# Patient Record
Sex: FEMALE | Race: WHITE | NOT HISPANIC OR LATINO | Employment: FULL TIME | URBAN - METROPOLITAN AREA
[De-identification: names, ages, dates, MRNs, and addresses within clinical notes are randomized per-mention and may not be internally consistent; named-entity substitution may affect disease eponyms.]

---

## 2017-01-06 RX ORDER — CODEINE SULFATE 30 MG/1
TABLET ORAL
Qty: 40 TABLET | Refills: 0 | Status: SHIPPED | OUTPATIENT
Start: 2017-01-06 | End: 2017-02-06 | Stop reason: SDUPTHER

## 2017-01-10 ENCOUNTER — TELEPHONE (OUTPATIENT)
Dept: SURGERY | Facility: CLINIC | Age: 37
End: 2017-01-10

## 2017-01-18 ENCOUNTER — PATIENT MESSAGE (OUTPATIENT)
Dept: SURGERY | Facility: CLINIC | Age: 37
End: 2017-01-18

## 2017-01-24 RX ORDER — HYDROCODONE BITARTRATE AND ACETAMINOPHEN 10; 325 MG/1; MG/1
TABLET ORAL
Qty: 90 TABLET | Refills: 0 | Status: SHIPPED | OUTPATIENT
Start: 2017-01-24 | End: 2017-02-22 | Stop reason: SDUPTHER

## 2017-01-25 ENCOUNTER — PATIENT MESSAGE (OUTPATIENT)
Dept: SURGERY | Facility: CLINIC | Age: 37
End: 2017-01-25

## 2017-01-25 RX ORDER — TINIDAZOLE 250 MG/1
250 TABLET ORAL
Qty: 14 TABLET | Refills: 0 | Status: SHIPPED | OUTPATIENT
Start: 2017-01-25 | End: 2018-01-18 | Stop reason: SDUPTHER

## 2017-02-06 RX ORDER — CIPROFLOXACIN 500 MG/1
TABLET ORAL
Qty: 14 TABLET | Refills: 1 | Status: SHIPPED | OUTPATIENT
Start: 2017-02-06 | End: 2017-03-06 | Stop reason: SDUPTHER

## 2017-02-06 RX ORDER — CODEINE SULFATE 30 MG/1
TABLET ORAL
Qty: 40 TABLET | Refills: 0 | Status: SHIPPED | OUTPATIENT
Start: 2017-02-06 | End: 2017-02-22 | Stop reason: SDUPTHER

## 2017-02-16 ENCOUNTER — OFFICE VISIT (OUTPATIENT)
Dept: INTERNAL MEDICINE | Facility: CLINIC | Age: 37
End: 2017-02-16
Payer: COMMERCIAL

## 2017-02-16 VITALS
HEART RATE: 80 BPM | TEMPERATURE: 99 F | RESPIRATION RATE: 16 BRPM | DIASTOLIC BLOOD PRESSURE: 72 MMHG | SYSTOLIC BLOOD PRESSURE: 132 MMHG | HEIGHT: 68 IN | WEIGHT: 143.5 LBS | BODY MASS INDEX: 21.75 KG/M2

## 2017-02-16 DIAGNOSIS — Z00.00 ANNUAL PHYSICAL EXAM: Primary | ICD-10-CM

## 2017-02-16 DIAGNOSIS — F41.1 GAD (GENERALIZED ANXIETY DISORDER): ICD-10-CM

## 2017-02-16 DIAGNOSIS — R53.83 FATIGUE, UNSPECIFIED TYPE: ICD-10-CM

## 2017-02-16 DIAGNOSIS — R63.4 WEIGHT LOSS: ICD-10-CM

## 2017-02-16 DIAGNOSIS — G47.00 INSOMNIA, UNSPECIFIED TYPE: ICD-10-CM

## 2017-02-16 PROCEDURE — 99385 PREV VISIT NEW AGE 18-39: CPT | Mod: S$GLB,,, | Performed by: INTERNAL MEDICINE

## 2017-02-16 PROCEDURE — 99999 PR PBB SHADOW E&M-EST. PATIENT-LVL III: CPT | Mod: PBBFAC,,, | Performed by: INTERNAL MEDICINE

## 2017-02-16 RX ORDER — SERTRALINE HYDROCHLORIDE 100 MG/1
200 TABLET, FILM COATED ORAL DAILY
Qty: 180 TABLET | Refills: 3 | Status: SHIPPED | OUTPATIENT
Start: 2017-02-16 | End: 2018-05-08 | Stop reason: ALTCHOICE

## 2017-02-16 RX ORDER — TRAZODONE HYDROCHLORIDE 50 MG/1
50 TABLET ORAL NIGHTLY
Qty: 30 TABLET | Refills: 11 | Status: SHIPPED | OUTPATIENT
Start: 2017-02-16 | End: 2017-04-19

## 2017-02-16 RX ORDER — SIMETHICONE 125 MG
125 TABLET,CHEWABLE ORAL EVERY 6 HOURS PRN
COMMUNITY
End: 2017-04-19

## 2017-02-16 RX ORDER — DIPHENOXYLATE HYDROCHLORIDE AND ATROPINE SULFATE 2.5; .025 MG/1; MG/1
1 TABLET ORAL 4 TIMES DAILY PRN
COMMUNITY
End: 2017-04-19 | Stop reason: SDUPTHER

## 2017-02-16 NOTE — PROGRESS NOTES
Subjective:       Patient ID: Nyasia Middleton is a 36 y.o. female.    Chief Complaint: Fatigue and Weight Loss    Patient is a 36 y.o.female who presents today for annual.      Cholesterol: (due now)  Vaccines: Influenza (yearly)   Gyn exam: dr. Burnett; pap smear up to date  Exercise: some  Diet: paleo  LMP: regular  EGD done last  Year was normal with dr. Gonzales. She used to take protonix but started having black stools.   Colonoscopy done in December showed an anal stricture.   She had an IV dye study was performed which was normal.         Past Medical History   Diagnosis Date    GERD (gastroesophageal reflux disease)     History of Clostridium difficile infection     History of ulcerative colitis      Past Surgical History   Procedure Laterality Date    Restorative proctocolectomy      Ileostomy closure       section, classic      Tonsillectomy       adenoids    Rectovaginal fistula repair      Tubal ligation       2016     Social History     Social History    Marital status:      Spouse name: N/A    Number of children: N/A    Years of education: N/A     Occupational History    Not on file.     Social History Main Topics    Smoking status: Former Smoker     Quit date: 2009    Smokeless tobacco: Never Used    Alcohol use No    Drug use: No    Sexual activity: Not Currently     Other Topics Concern    Not on file     Social History Narrative    No narrative on file     Review of patient's allergies indicates:   Allergen Reactions    Ativan [lorazepam] Anxiety     Ms. Middleton had no medications administered during this visit.    Review of Systems   Constitutional: Negative for appetite change, chills, diaphoresis, fatigue and fever.   HENT: Negative for congestion, dental problem, ear discharge, ear pain, hearing loss, postnasal drip, sinus pressure and sore throat.    Eyes: Negative for discharge, redness and itching.   Respiratory: Negative for cough, chest tightness,  shortness of breath and wheezing.    Cardiovascular: Negative for chest pain, palpitations and leg swelling.   Gastrointestinal: Negative for abdominal pain, constipation, diarrhea, nausea and vomiting.   Endocrine: Negative for cold intolerance and heat intolerance.   Genitourinary: Negative for difficulty urinating, frequency, hematuria and urgency.   Musculoskeletal: Negative for arthralgias, back pain, gait problem, myalgias and neck pain.   Skin: Negative for color change and rash.   Neurological: Negative for dizziness, syncope and headaches.   Hematological: Negative for adenopathy.   Psychiatric/Behavioral: Negative for behavioral problems and sleep disturbance. The patient is nervous/anxious.        Objective:      Physical Exam   Constitutional: She is oriented to person, place, and time. She appears well-developed and well-nourished. No distress.   HENT:   Head: Normocephalic and atraumatic.   Right Ear: Tympanic membrane and external ear normal.   Left Ear: Tympanic membrane and external ear normal.   Nose: Nose normal. No mucosal edema or rhinorrhea.   Mouth/Throat: Uvula is midline, oropharynx is clear and moist and mucous membranes are normal. No oropharyngeal exudate, posterior oropharyngeal edema, posterior oropharyngeal erythema or tonsillar abscesses.   Eyes: Conjunctivae and EOM are normal. Pupils are equal, round, and reactive to light. Right eye exhibits no discharge. Left eye exhibits no discharge. No scleral icterus.   Neck: Normal range of motion. Neck supple. No JVD present. No thyromegaly present.   Cardiovascular: Normal rate, regular rhythm, normal heart sounds and intact distal pulses.  Exam reveals no gallop and no friction rub.    No murmur heard.  Pulmonary/Chest: Effort normal and breath sounds normal. No stridor. No respiratory distress. She has no wheezes. She has no rales. She exhibits no tenderness.   Abdominal: Soft. Bowel sounds are normal. She exhibits no distension. There is  no tenderness. There is no rebound.   Musculoskeletal: Normal range of motion. She exhibits no edema or tenderness.   Lymphadenopathy:     She has no cervical adenopathy.   Neurological: She is alert and oriented to person, place, and time.   Skin: Skin is warm. No rash noted. She is not diaphoretic. No erythema.   Psychiatric: She has a normal mood and affect. Her behavior is normal.   Nursing note and vitals reviewed.      Assessment and Plan:       1. Annual physical exam  - CBC auto differential; Future  - Ferritin; Future  - Iron and TIBC; Future  - Lipid panel; Future  - TSH; Future  - T4, free; Future  - Thyroglobulin; Future  - THYROID PEROXIDASE ANTIBODY; Future    2. Weight loss  - check tsh    3. Fatigue, unspecified type  - check labs    4. Insomnia, unspecified type  - trial of trazodone    5. MARION (generalized anxiety disorder)  - increase zoloft to 200 mg daily        No Follow-up on file.

## 2017-02-22 RX ORDER — HYDROCODONE BITARTRATE AND ACETAMINOPHEN 10; 325 MG/1; MG/1
TABLET ORAL
Qty: 90 TABLET | Refills: 0 | Status: SHIPPED | OUTPATIENT
Start: 2017-02-22 | End: 2017-04-07 | Stop reason: SDUPTHER

## 2017-02-22 RX ORDER — CODEINE SULFATE 30 MG/1
TABLET ORAL
Qty: 40 TABLET | Refills: 0 | Status: SHIPPED | OUTPATIENT
Start: 2017-02-22 | End: 2017-04-07 | Stop reason: SDUPTHER

## 2017-03-06 RX ORDER — CIPROFLOXACIN 500 MG/1
TABLET ORAL
Qty: 14 TABLET | Refills: 1 | Status: SHIPPED | OUTPATIENT
Start: 2017-03-06 | End: 2017-04-19

## 2017-03-08 ENCOUNTER — PATIENT MESSAGE (OUTPATIENT)
Dept: INTERNAL MEDICINE | Facility: CLINIC | Age: 37
End: 2017-03-08

## 2017-03-09 ENCOUNTER — PATIENT MESSAGE (OUTPATIENT)
Dept: INTERNAL MEDICINE | Facility: CLINIC | Age: 37
End: 2017-03-09

## 2017-03-14 ENCOUNTER — LAB VISIT (OUTPATIENT)
Dept: LAB | Facility: HOSPITAL | Age: 37
End: 2017-03-14
Attending: INTERNAL MEDICINE
Payer: COMMERCIAL

## 2017-03-14 DIAGNOSIS — Z00.00 ANNUAL PHYSICAL EXAM: ICD-10-CM

## 2017-03-14 LAB
BASOPHILS # BLD AUTO: 0.05 K/UL
BASOPHILS NFR BLD: 0.4 %
CHOLEST/HDLC SERPL: 2.4 {RATIO}
DIFFERENTIAL METHOD: ABNORMAL
EOSINOPHIL # BLD AUTO: 0.6 K/UL
EOSINOPHIL NFR BLD: 4.5 %
ERYTHROCYTE [DISTWIDTH] IN BLOOD BY AUTOMATED COUNT: 14.5 %
FERRITIN SERPL-MCNC: 83 NG/ML
HCT VFR BLD AUTO: 38.2 %
HDL/CHOLESTEROL RATIO: 41.1 %
HDLC SERPL-MCNC: 146 MG/DL
HDLC SERPL-MCNC: 60 MG/DL
HGB BLD-MCNC: 12.2 G/DL
IRON SERPL-MCNC: 68 UG/DL
LDLC SERPL CALC-MCNC: 72 MG/DL
LYMPHOCYTES # BLD AUTO: 1.6 K/UL
LYMPHOCYTES NFR BLD: 13 %
MCH RBC QN AUTO: 28.7 PG
MCHC RBC AUTO-ENTMCNC: 31.9 %
MCV RBC AUTO: 90 FL
MONOCYTES # BLD AUTO: 1 K/UL
MONOCYTES NFR BLD: 8 %
NEUTROPHILS # BLD AUTO: 9 K/UL
NEUTROPHILS NFR BLD: 73.8 %
NONHDLC SERPL-MCNC: 86 MG/DL
PLATELET # BLD AUTO: 283 K/UL
PMV BLD AUTO: 9.9 FL
RBC # BLD AUTO: 4.25 M/UL
SATURATED IRON: 17 %
T4 FREE SERPL-MCNC: 1.01 NG/DL
THYROPEROXIDASE IGG SERPL-ACNC: <6 IU/ML
TOTAL IRON BINDING CAPACITY: 389 UG/DL
TRANSFERRIN SERPL-MCNC: 263 MG/DL
TRIGL SERPL-MCNC: 70 MG/DL
TSH SERPL DL<=0.005 MIU/L-ACNC: 2.19 UIU/ML
WBC # BLD AUTO: 12.21 K/UL

## 2017-03-14 PROCEDURE — 84439 ASSAY OF FREE THYROXINE: CPT

## 2017-03-14 PROCEDURE — 36415 COLL VENOUS BLD VENIPUNCTURE: CPT | Mod: PO

## 2017-03-14 PROCEDURE — 84432 ASSAY OF THYROGLOBULIN: CPT

## 2017-03-14 PROCEDURE — 86376 MICROSOMAL ANTIBODY EACH: CPT

## 2017-03-14 PROCEDURE — 84443 ASSAY THYROID STIM HORMONE: CPT

## 2017-03-14 PROCEDURE — 85025 COMPLETE CBC W/AUTO DIFF WBC: CPT

## 2017-03-14 PROCEDURE — 82728 ASSAY OF FERRITIN: CPT

## 2017-03-14 PROCEDURE — 83540 ASSAY OF IRON: CPT

## 2017-03-14 PROCEDURE — 84466 ASSAY OF TRANSFERRIN: CPT

## 2017-03-14 PROCEDURE — 80061 LIPID PANEL: CPT

## 2017-03-15 ENCOUNTER — PATIENT MESSAGE (OUTPATIENT)
Dept: INTERNAL MEDICINE | Facility: CLINIC | Age: 37
End: 2017-03-15

## 2017-03-15 DIAGNOSIS — K91.850 POUCHITIS: Primary | ICD-10-CM

## 2017-03-15 LAB
THRYOGLOBULIN INTERPRETATION: ABNORMAL
THYROGLOB AB SERPL-ACNC: <1.8 IU/ML
THYROGLOB SERPL-MCNC: 3.1 NG/ML

## 2017-03-20 ENCOUNTER — PATIENT MESSAGE (OUTPATIENT)
Dept: INTERNAL MEDICINE | Facility: CLINIC | Age: 37
End: 2017-03-20

## 2017-03-20 DIAGNOSIS — R68.82 LOW LIBIDO: Primary | ICD-10-CM

## 2017-03-21 ENCOUNTER — PATIENT MESSAGE (OUTPATIENT)
Dept: INTERNAL MEDICINE | Facility: CLINIC | Age: 37
End: 2017-03-21

## 2017-03-21 ENCOUNTER — LAB VISIT (OUTPATIENT)
Dept: LAB | Facility: HOSPITAL | Age: 37
End: 2017-03-21
Attending: INTERNAL MEDICINE
Payer: COMMERCIAL

## 2017-03-21 DIAGNOSIS — K91.850 POUCHITIS: ICD-10-CM

## 2017-03-21 DIAGNOSIS — R68.82 LOW LIBIDO: ICD-10-CM

## 2017-03-21 LAB
CRP SERPL-MCNC: 40.92 MG/L
ERYTHROCYTE [SEDIMENTATION RATE] IN BLOOD BY WESTERGREN METHOD: 43 MM/HR
ESTRADIOL SERPL-MCNC: 26 PG/ML
FSH SERPL-ACNC: 12.4 MIU/ML
LH SERPL-ACNC: 5.7 MIU/ML
TESTOST SERPL-MCNC: 19 NG/DL

## 2017-03-21 PROCEDURE — 83002 ASSAY OF GONADOTROPIN (LH): CPT

## 2017-03-21 PROCEDURE — 36415 COLL VENOUS BLD VENIPUNCTURE: CPT | Mod: PO

## 2017-03-21 PROCEDURE — 86141 C-REACTIVE PROTEIN HS: CPT

## 2017-03-21 PROCEDURE — 84403 ASSAY OF TOTAL TESTOSTERONE: CPT

## 2017-03-21 PROCEDURE — 83001 ASSAY OF GONADOTROPIN (FSH): CPT

## 2017-03-21 PROCEDURE — 85651 RBC SED RATE NONAUTOMATED: CPT

## 2017-03-21 PROCEDURE — 82670 ASSAY OF TOTAL ESTRADIOL: CPT

## 2017-03-22 ENCOUNTER — PATIENT MESSAGE (OUTPATIENT)
Dept: INTERNAL MEDICINE | Facility: CLINIC | Age: 37
End: 2017-03-22

## 2017-03-28 ENCOUNTER — PATIENT MESSAGE (OUTPATIENT)
Dept: INTERNAL MEDICINE | Facility: CLINIC | Age: 37
End: 2017-03-28

## 2017-03-28 RX ORDER — BUPROPION HYDROCHLORIDE 150 MG/1
150 TABLET ORAL DAILY
Qty: 30 TABLET | Refills: 11 | Status: SHIPPED | OUTPATIENT
Start: 2017-03-28 | End: 2017-04-26 | Stop reason: SDUPTHER

## 2017-04-07 RX ORDER — DIPHENOXYLATE HYDROCHLORIDE AND ATROPINE SULFATE 2.5; .025 MG/1; MG/1
TABLET ORAL
Qty: 200 TABLET | Refills: 3 | Status: SHIPPED | OUTPATIENT
Start: 2017-04-07 | End: 2017-11-01 | Stop reason: SDUPTHER

## 2017-04-07 RX ORDER — CODEINE SULFATE 30 MG/1
TABLET ORAL
Qty: 40 TABLET | Refills: 0 | Status: SHIPPED | OUTPATIENT
Start: 2017-04-07 | End: 2017-05-01 | Stop reason: SDUPTHER

## 2017-04-07 RX ORDER — HYDROCODONE BITARTRATE AND ACETAMINOPHEN 10; 325 MG/1; MG/1
TABLET ORAL
Qty: 90 TABLET | Refills: 0 | Status: SHIPPED | OUTPATIENT
Start: 2017-04-07 | End: 2017-05-22 | Stop reason: SDUPTHER

## 2017-04-11 ENCOUNTER — PATIENT MESSAGE (OUTPATIENT)
Dept: INTERNAL MEDICINE | Facility: CLINIC | Age: 37
End: 2017-04-11

## 2017-04-19 ENCOUNTER — OFFICE VISIT (OUTPATIENT)
Dept: INTERNAL MEDICINE | Facility: CLINIC | Age: 37
End: 2017-04-19
Payer: COMMERCIAL

## 2017-04-19 ENCOUNTER — OFFICE VISIT (OUTPATIENT)
Dept: SURGERY | Facility: CLINIC | Age: 37
End: 2017-04-19
Payer: COMMERCIAL

## 2017-04-19 ENCOUNTER — HOSPITAL ENCOUNTER (OUTPATIENT)
Dept: RADIOLOGY | Facility: HOSPITAL | Age: 37
Discharge: HOME | End: 2017-04-19
Attending: INTERNAL MEDICINE
Payer: COMMERCIAL

## 2017-04-19 VITALS
WEIGHT: 154.13 LBS | TEMPERATURE: 98 F | DIASTOLIC BLOOD PRESSURE: 60 MMHG | RESPIRATION RATE: 16 BRPM | SYSTOLIC BLOOD PRESSURE: 105 MMHG | HEART RATE: 83 BPM | HEIGHT: 68 IN | BODY MASS INDEX: 23.36 KG/M2

## 2017-04-19 VITALS
HEART RATE: 85 BPM | DIASTOLIC BLOOD PRESSURE: 67 MMHG | BODY MASS INDEX: 23.36 KG/M2 | WEIGHT: 153.69 LBS | SYSTOLIC BLOOD PRESSURE: 119 MMHG

## 2017-04-19 DIAGNOSIS — M79.642 BILATERAL HAND PAIN: ICD-10-CM

## 2017-04-19 DIAGNOSIS — M25.462 BILATERAL KNEE SWELLING: ICD-10-CM

## 2017-04-19 DIAGNOSIS — M25.50 ARTHRALGIA, UNSPECIFIED JOINT: Primary | ICD-10-CM

## 2017-04-19 DIAGNOSIS — M79.641 BILATERAL HAND PAIN: ICD-10-CM

## 2017-04-19 DIAGNOSIS — Z43.4 ATTENTION TO ARTIFICIAL OPENING OF DIGESTIVE TRACT: Primary | ICD-10-CM

## 2017-04-19 DIAGNOSIS — M25.511 ACUTE PAIN OF RIGHT SHOULDER: ICD-10-CM

## 2017-04-19 DIAGNOSIS — M25.461 BILATERAL KNEE SWELLING: ICD-10-CM

## 2017-04-19 DIAGNOSIS — M79.10 MYALGIA: ICD-10-CM

## 2017-04-19 PROCEDURE — 73030 X-RAY EXAM OF SHOULDER: CPT | Mod: 26,RT,, | Performed by: RADIOLOGY

## 2017-04-19 PROCEDURE — 99999 PR PBB SHADOW E&M-EST. PATIENT-LVL III: CPT | Mod: PBBFAC,,, | Performed by: INTERNAL MEDICINE

## 2017-04-19 PROCEDURE — 99214 OFFICE O/P EST MOD 30 MIN: CPT | Mod: S$GLB,,, | Performed by: INTERNAL MEDICINE

## 2017-04-19 PROCEDURE — 1160F RVW MEDS BY RX/DR IN RCRD: CPT | Mod: S$GLB,,, | Performed by: INTERNAL MEDICINE

## 2017-04-19 PROCEDURE — 99999 PR PBB SHADOW E&M-EST. PATIENT-LVL III: CPT | Mod: PBBFAC,,, | Performed by: COLON & RECTAL SURGERY

## 2017-04-19 PROCEDURE — 1160F RVW MEDS BY RX/DR IN RCRD: CPT | Mod: S$GLB,,, | Performed by: COLON & RECTAL SURGERY

## 2017-04-19 PROCEDURE — 73030 X-RAY EXAM OF SHOULDER: CPT | Mod: TC,PO,RT

## 2017-04-19 PROCEDURE — 99213 OFFICE O/P EST LOW 20 MIN: CPT | Mod: S$GLB,,, | Performed by: COLON & RECTAL SURGERY

## 2017-04-19 PROCEDURE — 73130 X-RAY EXAM OF HAND: CPT | Mod: 50,TC,PO

## 2017-04-19 PROCEDURE — 73130 X-RAY EXAM OF HAND: CPT | Mod: 26,50,, | Performed by: RADIOLOGY

## 2017-04-19 RX ORDER — TRANEXAMIC ACID 650 MG/1
TABLET ORAL
COMMUNITY
Start: 2017-04-14 | End: 2017-05-23

## 2017-04-19 RX ORDER — SULFAMETHOXAZOLE AND TRIMETHOPRIM 800; 160 MG/1; MG/1
TABLET ORAL
COMMUNITY
Start: 2017-04-18 | End: 2017-05-23 | Stop reason: SDUPTHER

## 2017-04-19 NOTE — PROGRESS NOTES
Subjective:       Patient ID: Nyasia Middleton is a 36 y.o. female.    Chief Complaint: Muscle Pain (also joint pain )    Patient is a 36 y.o.female who presents today for joint and muscle pain.      Knee pain: Monday it started. It is in both knee. She went to work and it was still hurting. She took ibuprofen and it helped her discomfort. Tuesday morning, knees are fine but now pain is in both hands/ knuckles. It always switchest joints it appears. She didn't take ibuprofen yesterday. She also gets pain in her right shoulder near AC joint; ongoing for a few months. She thinks she has a baker cyst in both knees.   Review of Systems   Constitutional: Negative for appetite change, chills, diaphoresis, fatigue and fever.   HENT: Negative for congestion, dental problem, ear discharge, ear pain, hearing loss, postnasal drip, sinus pressure and sore throat.    Eyes: Negative for discharge, redness and itching.   Respiratory: Negative for cough, chest tightness, shortness of breath and wheezing.    Cardiovascular: Negative for chest pain, palpitations and leg swelling.   Gastrointestinal: Negative for abdominal pain, constipation, diarrhea, nausea and vomiting.   Endocrine: Negative for cold intolerance and heat intolerance.   Genitourinary: Negative for difficulty urinating, frequency, hematuria and urgency.   Musculoskeletal: Positive for arthralgias and myalgias. Negative for back pain, gait problem and neck pain.   Skin: Negative for color change and rash.   Neurological: Negative for dizziness, syncope and headaches.   Hematological: Negative for adenopathy.   Psychiatric/Behavioral: Negative for behavioral problems and sleep disturbance. The patient is not nervous/anxious.        Objective:      Physical Exam   Constitutional: She is oriented to person, place, and time. She appears well-developed and well-nourished. No distress.   HENT:   Head: Normocephalic and atraumatic.   Right Ear: External ear normal.   Left  Ear: External ear normal.   Eyes: Conjunctivae and EOM are normal. Pupils are equal, round, and reactive to light. Right eye exhibits no discharge. Left eye exhibits no discharge. No scleral icterus.   Neck: Normal range of motion. Neck supple. No JVD present. No thyromegaly present.   Cardiovascular: Normal rate, regular rhythm, normal heart sounds and intact distal pulses.  Exam reveals no gallop and no friction rub.    No murmur heard.  Pulmonary/Chest: Effort normal and breath sounds normal. No stridor. No respiratory distress. She has no wheezes. She has no rales. She exhibits no tenderness.   Abdominal: Soft. Bowel sounds are normal. She exhibits no distension. There is no tenderness. There is no rebound.   Musculoskeletal: Normal range of motion. She exhibits no edema.        Right shoulder: She exhibits tenderness. She exhibits normal range of motion.        Right knee: She exhibits normal range of motion and no swelling.        Left knee: She exhibits normal range of motion and no swelling.        Right hand: She exhibits normal range of motion, no tenderness and no swelling.        Left hand: She exhibits normal range of motion, no tenderness and no swelling.   Lymphadenopathy:     She has no cervical adenopathy.   Neurological: She is alert and oriented to person, place, and time.   Skin: Skin is warm. No rash noted. She is not diaphoretic. No erythema.   Psychiatric: She has a normal mood and affect. Her behavior is normal.   Nursing note and vitals reviewed.      Assessment and Plan:       1. Arthralgia, unspecified joint  - Rheumatoid factor; Future  - Cyclic citrul peptide antibody, IgG; Future  - KEITH; Future  - Anti-DNA antibody, double-stranded; Future    2. Myalgia  - Rheumatoid factor; Future  - Cyclic citrul peptide antibody, IgG; Future  - KEITH; Future  - Anti-DNA antibody, double-stranded; Future    3. Bilateral knee swelling  - Cardiology Lab US Lower Extremity Veins Bilateral; Future    4.  Acute pain of right shoulder   - X-ray Shoulder 2 or More Views Right; Future    5. Bilateral hand pain    - X-Ray Hand 3 View Bilateral; Future    Notify clinic if symptoms change, worsen or do not improve        No Follow-up on file.

## 2017-04-19 NOTE — PROGRESS NOTES
"SUBJECTIVE:    History of Present Illness:   Patient is a 35 y.o. female for f/u of perineal fistula s/p IPAA      Has two small openings in perineum - puss drainage with pressure. On bactrim for UTI. Bowels reasonable    11/30/16 Pouchagram ; no fistula    10/20/14 : Perineal body repair with biologic (acell).  Currently with minimal pain and little drainage       7/28/14 : LIFT for RV fistula and fistulotomy for vaginal perineal fistula - some pain and small amount of drainage    12/13/13 - LIFT for left anterior fistula. Wound is better but open and mildly irritated.    Has a history of ulcerative colitis s/p previous restorative proctocolectomy with J pouch (Dr Boyd) who presented with complaints of perineal abscess s/p I&D with continued intermittent drainage. for f/u after lift on 7/12/23. Started on Flagyl last visit. Drainage had stopped and patient ois feeling well.   4/3/13 ; Fistulotomy doing OK good control minimal drainage, has "pustule" at vagina area. Treated with Bactrim.   Has history of ulcerative colitis s/p previous restorative proctocolectomy with J pouch (Dr Boyd) who presented with complaints of perineal abscess s/p I&D with continued intermittent drainage.      Recently started on Lialda      PMH  Ulcerative colitis   C. Diff infection   PSH:   Resorative proctocolectomy with J pouch, diverting ileostomy   Ileostomy takedown   No family history on file.   History    Substance Use Topics      Smoking status:  Never Smoker      Smokeless tobacco:  Never Used      Alcohol Use:  No      Review of Systems   Constitutional: Negative for fever and chills.   HENT: Negative for sore throat.   Eyes: Negative for visual disturbance.   Respiratory: Negative for shortness of breath.   Cardiovascular: Negative for chest pain.   Gastrointestinal: Positive for diarrhea and rectal pain. Negative for nausea, vomiting, abdominal pain and constipation.   Genitourinary: Negative for difficulty " urinating.   Neurological: Negative for seizures and syncope.   Psychiatric/Behavioral: Negative for hallucinations and confusion.     OBJECTIVE:    Vital Signs (Most Recent)   Physical Exam   Constitutional: She is oriented to person, place, and time. She appears well-developed and well-nourished. No distress.   HENT:   Head: Normocephalic and atraumatic.   Eyes: Conjunctivae and EOM are normal.   Neck: Neck supple. No tracheal deviation present.   Cardiovascular: Normal rate.   Pulmonary/Chest: Effort normal. No respiratory distress.   Abdominal: Soft.   Genitourinary: Adult female  Rectal:  Perineal body Incisions healed except for 2  1 mm skin openings  No fistula felt with probe. Area treated with silver nitrate. No drainage with bimaual pressure  Rectal with mild stenosis      Neurological: She is alert and oriented to person, place, and time.   Skin: Skin is warm and dry.   Psychiatric: She has a normal mood and affect. Her behavior is normal. Judgment and thought content normal.     IMP: s/p fistula repair, IPAA, healed    PLAN:    RTC 1 yeaqr or PRN

## 2017-04-26 RX ORDER — BUPROPION HYDROCHLORIDE 150 MG/1
TABLET ORAL
Qty: 30 TABLET | Refills: 11 | Status: SHIPPED | OUTPATIENT
Start: 2017-04-26 | End: 2017-05-23 | Stop reason: DRUGHIGH

## 2017-04-27 ENCOUNTER — CLINICAL SUPPORT (OUTPATIENT)
Dept: CARDIOLOGY | Facility: CLINIC | Age: 37
End: 2017-04-27
Payer: COMMERCIAL

## 2017-04-27 DIAGNOSIS — M25.462 BILATERAL KNEE SWELLING: ICD-10-CM

## 2017-04-27 DIAGNOSIS — M25.461 BILATERAL KNEE SWELLING: ICD-10-CM

## 2017-04-27 PROCEDURE — 93970 EXTREMITY STUDY: CPT | Mod: S$GLB,,, | Performed by: INTERNAL MEDICINE

## 2017-05-01 RX ORDER — CODEINE SULFATE 30 MG/1
TABLET ORAL
Qty: 40 TABLET | Refills: 0 | Status: SHIPPED | OUTPATIENT
Start: 2017-05-01 | End: 2017-06-02 | Stop reason: SDUPTHER

## 2017-05-12 ENCOUNTER — PATIENT MESSAGE (OUTPATIENT)
Dept: INTERNAL MEDICINE | Facility: CLINIC | Age: 37
End: 2017-05-12

## 2017-05-22 ENCOUNTER — PATIENT MESSAGE (OUTPATIENT)
Dept: SURGERY | Facility: CLINIC | Age: 37
End: 2017-05-22

## 2017-05-22 ENCOUNTER — TELEPHONE (OUTPATIENT)
Dept: INTERNAL MEDICINE | Facility: CLINIC | Age: 37
End: 2017-05-22

## 2017-05-22 RX ORDER — HYDROCODONE BITARTRATE AND ACETAMINOPHEN 10; 325 MG/1; MG/1
TABLET ORAL
Qty: 90 TABLET | Refills: 0 | Status: SHIPPED | OUTPATIENT
Start: 2017-05-22 | End: 2017-06-23 | Stop reason: SDUPTHER

## 2017-05-23 ENCOUNTER — PATIENT MESSAGE (OUTPATIENT)
Dept: INTERNAL MEDICINE | Facility: CLINIC | Age: 37
End: 2017-05-23

## 2017-05-23 ENCOUNTER — OFFICE VISIT (OUTPATIENT)
Dept: INTERNAL MEDICINE | Facility: CLINIC | Age: 37
End: 2017-05-23
Payer: COMMERCIAL

## 2017-05-23 ENCOUNTER — LAB VISIT (OUTPATIENT)
Dept: LAB | Facility: HOSPITAL | Age: 37
End: 2017-05-23
Attending: INTERNAL MEDICINE
Payer: COMMERCIAL

## 2017-05-23 VITALS
HEIGHT: 68 IN | WEIGHT: 153.88 LBS | BODY MASS INDEX: 23.32 KG/M2 | TEMPERATURE: 98 F | HEART RATE: 78 BPM | SYSTOLIC BLOOD PRESSURE: 104 MMHG | RESPIRATION RATE: 16 BRPM | DIASTOLIC BLOOD PRESSURE: 62 MMHG

## 2017-05-23 DIAGNOSIS — M25.50 ARTHRALGIA, UNSPECIFIED JOINT: ICD-10-CM

## 2017-05-23 DIAGNOSIS — M25.50 ARTHRALGIA, UNSPECIFIED JOINT: Primary | ICD-10-CM

## 2017-05-23 DIAGNOSIS — F41.1 GAD (GENERALIZED ANXIETY DISORDER): ICD-10-CM

## 2017-05-23 DIAGNOSIS — M79.10 MYALGIA: ICD-10-CM

## 2017-05-23 LAB
CRP SERPL-MCNC: 126.4 MG/L
ERYTHROCYTE [SEDIMENTATION RATE] IN BLOOD BY WESTERGREN METHOD: 80 MM/HR
URATE SERPL-MCNC: 3.4 MG/DL

## 2017-05-23 PROCEDURE — 36415 COLL VENOUS BLD VENIPUNCTURE: CPT | Mod: PO

## 2017-05-23 PROCEDURE — 99214 OFFICE O/P EST MOD 30 MIN: CPT | Mod: S$GLB,,, | Performed by: INTERNAL MEDICINE

## 2017-05-23 PROCEDURE — 84550 ASSAY OF BLOOD/URIC ACID: CPT

## 2017-05-23 PROCEDURE — 85651 RBC SED RATE NONAUTOMATED: CPT

## 2017-05-23 PROCEDURE — 99999 PR PBB SHADOW E&M-EST. PATIENT-LVL III: CPT | Mod: PBBFAC,,, | Performed by: INTERNAL MEDICINE

## 2017-05-23 PROCEDURE — 86140 C-REACTIVE PROTEIN: CPT

## 2017-05-23 PROCEDURE — 1160F RVW MEDS BY RX/DR IN RCRD: CPT | Mod: S$GLB,,, | Performed by: INTERNAL MEDICINE

## 2017-05-23 RX ORDER — CYCLOBENZAPRINE HCL 5 MG
5 TABLET ORAL 3 TIMES DAILY PRN
Qty: 30 TABLET | Refills: 1 | Status: SHIPPED | OUTPATIENT
Start: 2017-05-23 | End: 2017-06-02

## 2017-05-23 RX ORDER — TRAMADOL HYDROCHLORIDE 50 MG/1
50 TABLET ORAL 2 TIMES DAILY PRN
Qty: 60 TABLET | Refills: 0 | Status: SHIPPED | OUTPATIENT
Start: 2017-05-23 | End: 2017-06-02

## 2017-05-23 RX ORDER — BUSPIRONE HYDROCHLORIDE 5 MG/1
5 TABLET ORAL 2 TIMES DAILY PRN
Qty: 60 TABLET | Refills: 11 | Status: SHIPPED | OUTPATIENT
Start: 2017-05-23 | End: 2018-05-08

## 2017-05-23 RX ORDER — ONDANSETRON 8 MG/1
TABLET, ORALLY DISINTEGRATING ORAL
COMMUNITY
Start: 2017-04-26 | End: 2018-05-08

## 2017-05-23 RX ORDER — CIPROFLOXACIN 500 MG/1
TABLET ORAL
Qty: 14 TABLET | Refills: 1 | Status: SHIPPED | OUTPATIENT
Start: 2017-05-23 | End: 2017-06-30 | Stop reason: SDUPTHER

## 2017-05-23 RX ORDER — BUPROPION HYDROCHLORIDE 300 MG/1
300 TABLET ORAL DAILY
Qty: 90 TABLET | Refills: 3 | Status: SHIPPED | OUTPATIENT
Start: 2017-05-23 | End: 2018-05-08

## 2017-05-23 NOTE — PROGRESS NOTES
Subjective:       Patient ID: Nyasia Middleton is a 36 y.o. female.    Chief Complaint: Anxiety and Pain (intermittent joint pain x 2 months )    Patient is a 36 y.o.female who presents today for joint pain and anxiety.      Pain is always in her knees; left worse than right. Pain is in her right ankle. She has pain in her left second digit. Pain is in her right wrist and both clavicles. Pain is in her right shoulder. Pain is also in the left big toe and right fourth toe. She has been running low grade fever since Sunday, 100.4. Her temp last night was 99.9.     Her anxiety is not well controlled. She is taking wellbutrin and zoloft daily.    Review of Systems   Constitutional: Positive for activity change. Negative for unexpected weight change.   HENT: Negative for hearing loss, rhinorrhea and trouble swallowing.    Eyes: Negative for discharge and visual disturbance.   Respiratory: Negative for chest tightness and wheezing.    Cardiovascular: Negative for chest pain and palpitations.   Gastrointestinal: Positive for blood in stool. Negative for constipation, diarrhea and vomiting.   Endocrine: Positive for polyuria. Negative for polydipsia.   Genitourinary: Positive for difficulty urinating. Negative for dysuria and hematuria.   Musculoskeletal: Positive for arthralgias, joint swelling and myalgias.   Neurological: Positive for weakness and headaches.   Psychiatric/Behavioral: Positive for dysphoric mood. Negative for confusion.       Objective:      Physical Exam   Constitutional: She is oriented to person, place, and time. She appears well-developed and well-nourished. No distress.   HENT:   Head: Normocephalic and atraumatic.   Right Ear: External ear normal.   Left Ear: External ear normal.   Nose: Nose normal.   Mouth/Throat: Oropharynx is clear and moist. No oropharyngeal exudate.   Eyes: Conjunctivae and EOM are normal. Pupils are equal, round, and reactive to light. Right eye exhibits no discharge. Left  eye exhibits no discharge. No scleral icterus.   Neck: Normal range of motion. Neck supple. No JVD present. No thyromegaly present.   Cardiovascular: Normal rate, regular rhythm, normal heart sounds and intact distal pulses.  Exam reveals no gallop and no friction rub.    No murmur heard.  Pulmonary/Chest: Effort normal and breath sounds normal. No stridor. No respiratory distress. She has no wheezes. She has no rales. She exhibits no tenderness.   Abdominal: Soft. Bowel sounds are normal. She exhibits no distension. There is no tenderness. There is no rebound.   Musculoskeletal: Normal range of motion. She exhibits no edema or tenderness.   Lymphadenopathy:     She has no cervical adenopathy.   Neurological: She is alert and oriented to person, place, and time. No cranial nerve deficit.   Skin: Skin is warm and dry. No rash noted. She is not diaphoretic. No erythema.   Psychiatric: She has a normal mood and affect. Her behavior is normal.   Nursing note and vitals reviewed.      Assessment and Plan:       1. Arthralgia, unspecified joint  - rule out gout vs fibromyalgia  - Uric acid; Future  - Sedimentation rate, manual; Future  - C-reactive protein; Future  - Ambulatory referral to Rheumatology    2. Myalgia  - Uric acid; Future  - Sedimentation rate, manual; Future  - C-reactive protein; Future  - Ambulatory referral to Rheumatology    3. MARION (generalized anxiety disorder)  - continue zoloft  - increase wellbutrin to 300 mg daily  - add buspar 5 mg po bid prn anxiety    Notify clinic if symptoms change, worsen or do not improve          No Follow-up on file.

## 2017-05-24 ENCOUNTER — PATIENT MESSAGE (OUTPATIENT)
Dept: INTERNAL MEDICINE | Facility: CLINIC | Age: 37
End: 2017-05-24

## 2017-05-25 ENCOUNTER — OFFICE VISIT (OUTPATIENT)
Dept: SURGERY | Facility: CLINIC | Age: 37
End: 2017-05-25
Payer: COMMERCIAL

## 2017-05-25 ENCOUNTER — INITIAL CONSULT (OUTPATIENT)
Dept: RHEUMATOLOGY | Facility: CLINIC | Age: 37
End: 2017-05-25
Payer: COMMERCIAL

## 2017-05-25 VITALS
HEIGHT: 68 IN | HEART RATE: 77 BPM | DIASTOLIC BLOOD PRESSURE: 63 MMHG | WEIGHT: 155.88 LBS | SYSTOLIC BLOOD PRESSURE: 111 MMHG | BODY MASS INDEX: 23.63 KG/M2

## 2017-05-25 VITALS
DIASTOLIC BLOOD PRESSURE: 54 MMHG | WEIGHT: 155.88 LBS | HEIGHT: 68 IN | HEART RATE: 76 BPM | SYSTOLIC BLOOD PRESSURE: 116 MMHG | BODY MASS INDEX: 23.63 KG/M2

## 2017-05-25 DIAGNOSIS — M06.4: Primary | ICD-10-CM

## 2017-05-25 DIAGNOSIS — Z43.4 ATTENTION TO ARTIFICIAL OPENING OF DIGESTIVE TRACT: Primary | ICD-10-CM

## 2017-05-25 DIAGNOSIS — N36.0 PERINEAL SINUS: ICD-10-CM

## 2017-05-25 PROCEDURE — 99999 PR PBB SHADOW E&M-EST. PATIENT-LVL III: CPT | Mod: PBBFAC,,, | Performed by: COLON & RECTAL SURGERY

## 2017-05-25 PROCEDURE — 99999 PR PBB SHADOW E&M-EST. PATIENT-LVL III: CPT | Mod: PBBFAC,,, | Performed by: INTERNAL MEDICINE

## 2017-05-25 PROCEDURE — 99244 OFF/OP CNSLTJ NEW/EST MOD 40: CPT | Mod: S$GLB,,, | Performed by: INTERNAL MEDICINE

## 2017-05-25 PROCEDURE — 99213 OFFICE O/P EST LOW 20 MIN: CPT | Mod: S$GLB,,, | Performed by: COLON & RECTAL SURGERY

## 2017-05-25 RX ORDER — METHYLPREDNISOLONE 4 MG/1
TABLET ORAL
Qty: 21 TABLET | Refills: 0 | Status: SHIPPED | OUTPATIENT
Start: 2017-05-25 | End: 2017-06-05 | Stop reason: SDUPTHER

## 2017-05-25 NOTE — LETTER
May 26, 2017      Bisi Tolbert, DO  [address] [facility] [place] [address]           [facility] - Rheumatology  [address]  [place] [address]  Phone: [phone]  Fax: [phone]          Patient: Nyasia Middleton   MR Number: [id number]   YOB: 1980   Date of Visit: 5/25/2017       Dear Dr. Bisi Tolbert:    Thank you for referring Nyasia Middleton to me for evaluation. Attached you will find relevant portions of my assessment and plan of care.    If you have questions, please do not hesitate to call me. I look forward to following Nyasia Middleton along with you.    Sincerely,    Harris Rivera MD    Enclosure  CC:  No Recipients    If you would like to receive this communication electronically, please contact [email or web address] or [phone] to request more information on [employer] Link access.    For providers and/or their staff who would like to refer a patient to [facility], please contact us through our one-stop-shop provider referral line, [facility], at [phone].    If you feel you have received this communication in error or would no longer like to receive these types of communications, please e-mail [email or web address]

## 2017-05-25 NOTE — LETTER
May 25, 2017      Bisi Tolbert, DO  [address] [facility]  [place] [address]           [facility]  [address]  [facility] [address]  Phone: [phone]          Patient: Nyasia Middleton   MR Number: [id number]   YOB: 1980   Date of Visit: 5/25/2017       Dear Dr. Bisi Tolbert:    Thank you for referring Nyasia Middleton to me for evaluation. Attached you will find relevant portions of my assessment and plan of care.    If you have questions, please do not hesitate to call me. I look forward to following Nyasia Middleton along with you.    Sincerely,    Jairo Peralta MD    Enclosure  CC:  No Recipients    If you would like to receive this communication electronically, please contact [email or web address] or [phone] to request more information on [facility] Link access.    For providers and/or their staff who would like to refer a patient to [facility], please contact us through our one-stop-shop provider referral line, [facility], at [phone].    If you feel you have received this communication in error or would no longer like to receive these types of communications, please e-mail [email or web address]

## 2017-05-25 NOTE — PROGRESS NOTES
"SUBJECTIVE:    History of Present Illness:   Patient is a 35 y.o. female for f/u of perineal fistula s/p IPAA      Has two small openings in perineum - puss drainage with pressure.      11/30/16 Pouchagram ; no fistula    10/20/14 : Perineal body repair with biologic (acell).  Currently with minimal pain and little drainage       7/28/14 : LIFT for RV fistula and fistulotomy for vaginal perineal fistula - some pain and small amount of drainage    12/13/13 - LIFT for left anterior fistula. Wound is better but open and mildly irritated.    Has a history of ulcerative colitis s/p previous restorative proctocolectomy with J pouch (Dr Boyd) who presented with complaints of perineal abscess s/p I&D with continued intermittent drainage. for f/u after lift on 7/12/23. Started on Flagyl last visit. Drainage had stopped and patient ois feeling well.   4/3/13 ; Fistulotomy doing OK good control minimal drainage, has "pustule" at vagina area. Treated with Bactrim.   Has history of ulcerative colitis s/p previous restorative proctocolectomy with J pouch (Dr Boyd) who presented with complaints of perineal abscess s/p I&D with continued intermittent drainage.      Recently started on Lialda      PMH  Ulcerative colitis   C. Diff infection   PSH:   Resorative proctocolectomy with J pouch, diverting ileostomy   Ileostomy takedown   No family history on file.   History    Substance Use Topics      Smoking status:  Never Smoker      Smokeless tobacco:  Never Used      Alcohol Use:  No      Review of Systems   Constitutional: Negative for fever and chills.   HENT: Negative for sore throat.   Eyes: Negative for visual disturbance.   Respiratory: Negative for shortness of breath.   Cardiovascular: Negative for chest pain.   Gastrointestinal: Positive for diarrhea and rectal pain. Negative for nausea, vomiting, abdominal pain and constipation.   Genitourinary: Negative for difficulty urinating.   Neurological: Negative for " seizures and syncope.   Psychiatric/Behavioral: Negative for hallucinations and confusion.     OBJECTIVE:    Vital Signs (Most Recent)   Physical Exam   Constitutional: She is oriented to person, place, and time. She appears well-developed and well-nourished. No distress.   HENT:   Head: Normocephalic and atraumatic.   Eyes: Conjunctivae and EOM are normal.   Neck: Neck supple. No tracheal deviation present.   Cardiovascular: Normal rate.   Pulmonary/Chest: Effort normal. No respiratory distress.   Abdominal: Soft.   Genitourinary: Adult female  Rectal:  Perineal body Incisions healed except for 2  1 mm skin openings    VAgina : 3-4 small olepings that may communicate to a vaginal septum cavity. Moderately tender    Rectal with mild stenosis      Neurological: She is alert and oriented to person, place, and time.   Skin: Skin is warm and dry.   Psychiatric: She has a normal mood and affect. Her behavior is normal. Judgment and thought content normal.     IMP: s/p fistula repair, IPAA, perineal body sinus    PLAN:    Discussed options. PAtient will call back to schedule an EUA and sinus excision.

## 2017-05-26 NOTE — PROGRESS NOTES
History of present illness: 36-year-old female developed ulcerative colitis in .  She presented at that time with bloody diarrhea.  She was initially treated with Remicade and prednisone.  When she did not respond she underwent a total colectomy with a J-pouch.  She still has episodes of pouchitis and his had a history of fistulous.  She may need further surgery.  She was on antibiotics for 6 weeks in January and February.  She saw Dr. back earlier today and is being restarted on Cipro.    In  she developed a migratory arthritis.  It lasted for several months.  The pain then went away until March.  It began about a month after she had stopped the antibiotics.  The pain was intermittent at first but is been constant since Saturday.  She has had some swelling in the hands and also in the knees.  She has had some increased warmth but no erythema accompanying the swelling.  She had no history of antecedent trauma.  She had had no increase in her bowel movements.   She has taken ibuprofen several times which has helped.  Heat and topical medications have given her some relief.  She started with a fever on  but it is not present today.  She has had no URI symptoms.    She has had no rashes.  She complains of occipital headaches.  She has no conjunctivitis or oral ulcers.  She has dryness of her mouth but not her eyes.  She has no Raynaud's phenomena, pleurisy, vaginal discharge or ulcers.  She has no numbness or tingling.  She does have a positive family history of IBD.  She is a  3 para 3    Systems review:  Gen.: Weight has decreased 30 pounds  GI: No history of liver problems  : No kidney or bladder problems    Physical examination:  Skin: No rashes  ENT: Adequate tears and saliva.  No conjunctival injection or oral ulcers.  Chest: Clear to auscultation and  Cardiac: No murmurs, gallops, rubs  Abdomen: No organomegaly or masses.  No tenderness to palpation  Extremities: No pedal  edema  Musculoskeletal: She has pain on flexion of the cervical spine but has full range of motion of the neck.  Shoulders have full range of motion without pain on range of motion.  She has no tenderness over the sternoclavicular joint although she has noted swelling there in the past.  Elbows and wrist are unremarkable.  She has synovitis of the left second MCP.  Other joints are unremarkable.  Lumbar spine has good range of motion without pain on range of motion.  Hips, knees, ankles are unremarkable.  She has soft tissue swelling of the left first and second MTP.  Laboratory: She has elevated ESR and CRP.  She has negative KEITH, CCP, rheumatoid factor.  Radiology: X-ray of the hands and shoulder are normal.    Assessment: She has a history of a migratory inflammatory arthritis.  It has settled down in 3 joints at the present time.  She has a history of ulcerative colitis.  She has recurrent pouchitis but no history of recurrence of the UC.  The arthritis is atypical for the arthritis associated with IBD.  It is actually more reminiscent of reactive arthritis associated with infection.  The increased inflammatory markers could be due to arthritis, colitis, or infection.    Plans:  1.  I am treating her with a Medrol Dosepak  2.  No further diagnostic testing at this time  3.  Return to see me in one month in less she flares in the interim.    Plans:  Answers for HPI/ROS submitted by the patient on 5/24/2017   fever: Yes  eye redness: No  swollen glands: No  headaches: Yes  shortness of breath: No  chest pain: No  trouble swallowing: No  heartburn: No  diarrhea: Yes  constipation: No  unexpected weight change: No  genital sore: No  dysuria: No  During the last 3 days, have you had a skin rash?: No  tingling: No  Bruises or bleeds easily: No  cough: No

## 2017-06-02 ENCOUNTER — PATIENT MESSAGE (OUTPATIENT)
Dept: SURGERY | Facility: CLINIC | Age: 37
End: 2017-06-02

## 2017-06-03 RX ORDER — CODEINE SULFATE 30 MG/1
TABLET ORAL
Qty: 40 TABLET | Refills: 0 | Status: SHIPPED | OUTPATIENT
Start: 2017-06-03 | End: 2017-08-14

## 2017-06-05 ENCOUNTER — PATIENT MESSAGE (OUTPATIENT)
Dept: RHEUMATOLOGY | Facility: CLINIC | Age: 37
End: 2017-06-05

## 2017-06-05 ENCOUNTER — PATIENT MESSAGE (OUTPATIENT)
Dept: INTERNAL MEDICINE | Facility: CLINIC | Age: 37
End: 2017-06-05

## 2017-06-05 DIAGNOSIS — N36.0 PERINEAL SINUS: Primary | ICD-10-CM

## 2017-06-05 RX ORDER — METHYLPREDNISOLONE 4 MG/1
TABLET ORAL
Qty: 21 TABLET | Refills: 0 | Status: SHIPPED | OUTPATIENT
Start: 2017-06-05 | End: 2017-07-07 | Stop reason: ALTCHOICE

## 2017-06-13 ENCOUNTER — PATIENT MESSAGE (OUTPATIENT)
Dept: RHEUMATOLOGY | Facility: CLINIC | Age: 37
End: 2017-06-13

## 2017-06-13 ENCOUNTER — PATIENT MESSAGE (OUTPATIENT)
Dept: SURGERY | Facility: CLINIC | Age: 37
End: 2017-06-13

## 2017-06-13 RX ORDER — PREDNISONE 5 MG/1
10 TABLET ORAL DAILY
Qty: 60 TABLET | Refills: 1 | Status: SHIPPED | OUTPATIENT
Start: 2017-06-13 | End: 2017-09-30 | Stop reason: SDUPTHER

## 2017-06-22 ENCOUNTER — OFFICE VISIT (OUTPATIENT)
Dept: SURGERY | Facility: CLINIC | Age: 37
End: 2017-06-22
Payer: COMMERCIAL

## 2017-06-22 VITALS
WEIGHT: 149.94 LBS | HEIGHT: 68 IN | SYSTOLIC BLOOD PRESSURE: 122 MMHG | BODY MASS INDEX: 22.72 KG/M2 | DIASTOLIC BLOOD PRESSURE: 67 MMHG | HEART RATE: 86 BPM

## 2017-06-22 DIAGNOSIS — Z43.4 ATTENTION TO ARTIFICIAL OPENING OF DIGESTIVE TRACT: ICD-10-CM

## 2017-06-22 DIAGNOSIS — K60.3 FISTULA, ANAL: Primary | ICD-10-CM

## 2017-06-22 DIAGNOSIS — K63.2 FISTULA OF INTESTINE, EXCLUDING RECTUM AND ANUS: ICD-10-CM

## 2017-06-22 DIAGNOSIS — K64.4 ANAL SKIN TAG: ICD-10-CM

## 2017-06-22 DIAGNOSIS — N36.0 PERINEAL SINUS: ICD-10-CM

## 2017-06-22 PROCEDURE — 99214 OFFICE O/P EST MOD 30 MIN: CPT | Mod: S$GLB,,, | Performed by: COLON & RECTAL SURGERY

## 2017-06-22 PROCEDURE — 99999 PR PBB SHADOW E&M-EST. PATIENT-LVL III: CPT | Mod: PBBFAC,,, | Performed by: COLON & RECTAL SURGERY

## 2017-06-22 RX ORDER — ACETAMINOPHEN 10 MG/ML
1000 INJECTION, SOLUTION INTRAVENOUS
Status: CANCELLED | OUTPATIENT
Start: 2017-06-22 | End: 2017-06-22

## 2017-06-22 RX ORDER — METRONIDAZOLE 500 MG/100ML
500 INJECTION, SOLUTION INTRAVENOUS
Status: CANCELLED | OUTPATIENT
Start: 2017-06-22

## 2017-06-22 RX ORDER — SODIUM CHLORIDE 9 MG/ML
INJECTION, SOLUTION INTRAVENOUS CONTINUOUS
Status: CANCELLED | OUTPATIENT
Start: 2017-06-22

## 2017-06-22 NOTE — H&P
"History of Present Illness:   Patient is a 36 y.o. female for f/u of perineal fistula s/p IPAA      Has two small openings in perineum - puss drainage with pressure. Desires EUA and sinus excision      11/30/16 Pouchagram ; no fistula    10/20/14 : Perineal body repair with biologic (acell).  Currently with minimal pain and little drainage       7/28/14 : LIFT for RV fistula and fistulotomy for vaginal perineal fistula - some pain and small amount of drainage    12/13/13 - LIFT for left anterior fistula. Wound is better but open and mildly irritated.    Has a history of ulcerative colitis s/p previous restorative proctocolectomy with J pouch (Dr Boyd) who presented with complaints of perineal abscess s/p I&D with continued intermittent drainage. for f/u after lift on 7/12/23. Started on Flagyl last visit. Drainage had stopped and patient ois feeling well.   4/3/13 ; Fistulotomy doing OK good control minimal drainage, has "pustule" at vagina area. Treated with Bactrim.   Has history of ulcerative colitis s/p previous restorative proctocolectomy with J pouch (Dr Boyd) who presented with complaints of perineal abscess s/p I&D with continued intermittent drainage.      Recently started on Lialda      [facility]  Ulcerative colitis   C. Diff infection   PSH:   Resorative proctocolectomy with J pouch, diverting ileostomy   Ileostomy takedown   No family history on file.   History    Substance Use Topics      Smoking status:  Never Smoker      Smokeless tobacco:  Never Used      Alcohol Use:  No      Review of Systems   Constitutional: Negative for fever and chills.   HENT: Negative for sore throat.   Eyes: Negative for visual disturbance.   Respiratory: Negative for shortness of breath.   Cardiovascular: Negative for chest pain.   Gastrointestinal: Positive for diarrhea and rectal pain. Negative for nausea, vomiting, abdominal pain and constipation.   Genitourinary: Negative for difficulty urinating.   Neurological: " Negative for seizures and syncope.   Psychiatric/Behavioral: Negative for hallucinations and confusion.     OBJECTIVE:    Vital Signs (Most Recent)   Physical Exam   Constitutional: She is oriented to person, place, and time. She appears well-developed and well-nourished. No distress.   HENT:   Head: Normocephalic and atraumatic.   Eyes: Conjunctivae and EOM are normal.   Neck: Neck supple. No tracheal deviation present.   Cardiovascular: Normal rate.   Pulmonary/Chest: Effort normal. No respiratory distress.   Abdominal: Soft.   Genitourinary: Adult female  Rectal:  Perineal body Incisions healed except for 2  1 mm skin openings  Skin tag  VAgina : 3-4 small olepings that may communicate to a vaginal septum cavity. Moderately tender    Rectal with mild stenosis      Neurological: She is alert and oriented to person, place, and time.   Skin: Skin is warm and dry.   Psychiatric: She has a normal mood and affect. Her behavior is normal. Judgment and thought content normal.     IMP: s/p fistula repair, IPAA, perineal body sinus    PLAN:    EUA and sinus and tag excision. Possible fistula repair.  I have explained the procedure including indications, alternatives, expected outcomes and potential complications. The patient appears to understand and gives informed consent. The patient is medically ready for surgery.

## 2017-06-22 NOTE — PROGRESS NOTES
"SUBJECTIVE:    History of Present Illness:   Patient is a 36 y.o. female for f/u of perineal fistula s/p IPAA      Has two small openings in perineum - puss drainage with pressure. Desires EUA and sinus excision      11/30/16 Pouchagram ; no fistula    10/20/14 : Perineal body repair with biologic (acell).  Currently with minimal pain and little drainage       7/28/14 : LIFT for RV fistula and fistulotomy for vaginal perineal fistula - some pain and small amount of drainage    12/13/13 - LIFT for left anterior fistula. Wound is better but open and mildly irritated.    Has a history of ulcerative colitis s/p previous restorative proctocolectomy with J pouch (Dr Boyd) who presented with complaints of perineal abscess s/p I&D with continued intermittent drainage. for f/u after lift on 7/12/23. Started on Flagyl last visit. Drainage had stopped and patient ois feeling well.   4/3/13 ; Fistulotomy doing OK good control minimal drainage, has "pustule" at vagina area. Treated with Bactrim.   Has history of ulcerative colitis s/p previous restorative proctocolectomy with J pouch (Dr Boyd) who presented with complaints of perineal abscess s/p I&D with continued intermittent drainage.      Recently started on Lialda      [facility]  Ulcerative colitis   C. Diff infection   PSH:   Resorative proctocolectomy with J pouch, diverting ileostomy   Ileostomy takedown   No family history on file.   History    Substance Use Topics      Smoking status:  Never Smoker      Smokeless tobacco:  Never Used      Alcohol Use:  No      Review of Systems   Constitutional: Negative for fever and chills.   HENT: Negative for sore throat.   Eyes: Negative for visual disturbance.   Respiratory: Negative for shortness of breath.   Cardiovascular: Negative for chest pain.   Gastrointestinal: Positive for diarrhea and rectal pain. Negative for nausea, vomiting, abdominal pain and constipation.   Genitourinary: Negative for difficulty urinating. "   Neurological: Negative for seizures and syncope.   Psychiatric/Behavioral: Negative for hallucinations and confusion.     OBJECTIVE:    Vital Signs (Most Recent)   Physical Exam   Constitutional: She is oriented to person, place, and time. She appears well-developed and well-nourished. No distress.   HENT:   Head: Normocephalic and atraumatic.   Eyes: Conjunctivae and EOM are normal.   Neck: Neck supple. No tracheal deviation present.   Cardiovascular: Normal rate.   Pulmonary/Chest: Effort normal. No respiratory distress.   Abdominal: Soft.   Genitourinary: Adult female  Rectal:  Perineal body Incisions healed except for 2  1 mm skin openings  Skin tag  VAgina : 3-4 small olepings that may communicate to a vaginal septum cavity. Moderately tender    Rectal with mild stenosis      Neurological: She is alert and oriented to person, place, and time.   Skin: Skin is warm and dry.   Psychiatric: She has a normal mood and affect. Her behavior is normal. Judgment and thought content normal.     IMP: s/p fistula repair, IPAA, perineal body sinus    PLAN:    EUA and sinus and tag excision. Possible fistula repair.  I have explained the procedure including indications, alternatives, expected outcomes and potential complications. The patient appears to understand and gives informed consent. The patient is medically ready for surgery.

## 2017-06-24 RX ORDER — HYDROCODONE BITARTRATE AND ACETAMINOPHEN 10; 325 MG/1; MG/1
TABLET ORAL
Qty: 90 TABLET | Refills: 0 | Status: SHIPPED | OUTPATIENT
Start: 2017-06-24 | End: 2017-08-02 | Stop reason: SDUPTHER

## 2017-06-26 ENCOUNTER — ANESTHESIA EVENT (OUTPATIENT)
Dept: SURGERY | Facility: HOSPITAL | Age: 37
End: 2017-06-26
Payer: COMMERCIAL

## 2017-06-26 ENCOUNTER — HOSPITAL ENCOUNTER (OUTPATIENT)
Facility: HOSPITAL | Age: 37
Discharge: HOME | End: 2017-06-26
Attending: COLON & RECTAL SURGERY | Admitting: COLON & RECTAL SURGERY
Payer: COMMERCIAL

## 2017-06-26 ENCOUNTER — ANESTHESIA (OUTPATIENT)
Dept: SURGERY | Facility: HOSPITAL | Age: 37
End: 2017-06-26
Payer: COMMERCIAL

## 2017-06-26 VITALS
BODY MASS INDEX: 22.58 KG/M2 | WEIGHT: 149 LBS | OXYGEN SATURATION: 99 % | RESPIRATION RATE: 18 BRPM | TEMPERATURE: 98 F | HEART RATE: 74 BPM | SYSTOLIC BLOOD PRESSURE: 130 MMHG | HEIGHT: 68 IN | DIASTOLIC BLOOD PRESSURE: 71 MMHG

## 2017-06-26 DIAGNOSIS — K64.4 ANAL SKIN TAG: ICD-10-CM

## 2017-06-26 DIAGNOSIS — K63.2 FISTULA OF INTESTINE, EXCLUDING RECTUM AND ANUS: ICD-10-CM

## 2017-06-26 DIAGNOSIS — K60.3 FISTULA, ANAL: ICD-10-CM

## 2017-06-26 DIAGNOSIS — Z43.4 ATTENTION TO ARTIFICIAL OPENING OF DIGESTIVE TRACT: ICD-10-CM

## 2017-06-26 DIAGNOSIS — N36.0 PERINEAL SINUS: Primary | ICD-10-CM

## 2017-06-26 PROCEDURE — 63600175 PHARM REV CODE 636 W HCPCS: Performed by: NURSE ANESTHETIST, CERTIFIED REGISTERED

## 2017-06-26 PROCEDURE — 88304 TISSUE EXAM BY PATHOLOGIST: CPT | Mod: 26,,, | Performed by: PATHOLOGY

## 2017-06-26 PROCEDURE — 71000039 HC RECOVERY, EACH ADD'L HOUR: Performed by: COLON & RECTAL SURGERY

## 2017-06-26 PROCEDURE — 25000003 PHARM REV CODE 250: Performed by: NURSE ANESTHETIST, CERTIFIED REGISTERED

## 2017-06-26 PROCEDURE — 63600175 PHARM REV CODE 636 W HCPCS

## 2017-06-26 PROCEDURE — 25000003 PHARM REV CODE 250: Performed by: COLON & RECTAL SURGERY

## 2017-06-26 PROCEDURE — 27000221 HC OXYGEN, UP TO 24 HOURS

## 2017-06-26 PROCEDURE — 71000015 HC POSTOP RECOV 1ST HR: Performed by: COLON & RECTAL SURGERY

## 2017-06-26 PROCEDURE — 25000003 PHARM REV CODE 250: Performed by: SURGERY

## 2017-06-26 PROCEDURE — 88304 TISSUE EXAM BY PATHOLOGIST: CPT | Performed by: PATHOLOGY

## 2017-06-26 PROCEDURE — 25000003 PHARM REV CODE 250: Performed by: NURSE PRACTITIONER

## 2017-06-26 PROCEDURE — 63600175 PHARM REV CODE 636 W HCPCS: Performed by: COLON & RECTAL SURGERY

## 2017-06-26 PROCEDURE — D9220A PRA ANESTHESIA: Mod: CRNA,,, | Performed by: NURSE ANESTHETIST, CERTIFIED REGISTERED

## 2017-06-26 PROCEDURE — 37000009 HC ANESTHESIA EA ADD 15 MINS: Performed by: COLON & RECTAL SURGERY

## 2017-06-26 PROCEDURE — 37000008 HC ANESTHESIA 1ST 15 MINUTES: Performed by: COLON & RECTAL SURGERY

## 2017-06-26 PROCEDURE — 63600175 PHARM REV CODE 636 W HCPCS: Performed by: ANESTHESIOLOGY

## 2017-06-26 PROCEDURE — 71000033 HC RECOVERY, INTIAL HOUR: Performed by: COLON & RECTAL SURGERY

## 2017-06-26 PROCEDURE — 36000706: Performed by: COLON & RECTAL SURGERY

## 2017-06-26 PROCEDURE — 36000707: Performed by: COLON & RECTAL SURGERY

## 2017-06-26 PROCEDURE — D9220A PRA ANESTHESIA: Mod: ANES,,, | Performed by: ANESTHESIOLOGY

## 2017-06-26 RX ORDER — BUPIVACAINE HYDROCHLORIDE 2.5 MG/ML
INJECTION, SOLUTION EPIDURAL; INFILTRATION; INTRACAUDAL
Status: DISCONTINUED | OUTPATIENT
Start: 2017-06-26 | End: 2017-06-26 | Stop reason: HOSPADM

## 2017-06-26 RX ORDER — OXYCODONE AND ACETAMINOPHEN 7.5; 325 MG/1; MG/1
1 TABLET ORAL EVERY 4 HOURS PRN
Status: DISCONTINUED | OUTPATIENT
Start: 2017-06-26 | End: 2017-06-26 | Stop reason: HOSPADM

## 2017-06-26 RX ORDER — HYDROMORPHONE HYDROCHLORIDE 1 MG/ML
INJECTION, SOLUTION INTRAMUSCULAR; INTRAVENOUS; SUBCUTANEOUS
Status: COMPLETED
Start: 2017-06-26 | End: 2017-06-26

## 2017-06-26 RX ORDER — HYDROMORPHONE HYDROCHLORIDE 1 MG/ML
0.2 INJECTION, SOLUTION INTRAMUSCULAR; INTRAVENOUS; SUBCUTANEOUS EVERY 5 MIN PRN
Status: DISCONTINUED | OUTPATIENT
Start: 2017-06-26 | End: 2017-06-26

## 2017-06-26 RX ORDER — FENTANYL CITRATE 50 UG/ML
INJECTION, SOLUTION INTRAMUSCULAR; INTRAVENOUS
Status: DISCONTINUED | OUTPATIENT
Start: 2017-06-26 | End: 2017-06-26

## 2017-06-26 RX ORDER — SODIUM CHLORIDE 9 MG/ML
INJECTION, SOLUTION INTRAVENOUS CONTINUOUS
Status: DISCONTINUED | OUTPATIENT
Start: 2017-06-26 | End: 2017-06-26 | Stop reason: HOSPADM

## 2017-06-26 RX ORDER — DOCUSATE SODIUM 100 MG/1
100 CAPSULE, LIQUID FILLED ORAL 2 TIMES DAILY
Qty: 30 CAPSULE | Refills: 11 | COMMUNITY
Start: 2017-06-26 | End: 2017-08-14

## 2017-06-26 RX ORDER — ONDANSETRON 2 MG/ML
INJECTION INTRAMUSCULAR; INTRAVENOUS
Status: DISCONTINUED | OUTPATIENT
Start: 2017-06-26 | End: 2017-06-26

## 2017-06-26 RX ORDER — DIPHENHYDRAMINE HYDROCHLORIDE 50 MG/ML
12.5 INJECTION INTRAMUSCULAR; INTRAVENOUS ONCE
Status: DISCONTINUED | OUTPATIENT
Start: 2017-06-26 | End: 2017-06-26 | Stop reason: HOSPADM

## 2017-06-26 RX ORDER — ACETAMINOPHEN 10 MG/ML
1000 INJECTION, SOLUTION INTRAVENOUS
Status: COMPLETED | OUTPATIENT
Start: 2017-06-26 | End: 2017-06-26

## 2017-06-26 RX ORDER — HYDROMORPHONE HYDROCHLORIDE 1 MG/ML
0.2 INJECTION, SOLUTION INTRAMUSCULAR; INTRAVENOUS; SUBCUTANEOUS EVERY 5 MIN PRN
Status: DISCONTINUED | OUTPATIENT
Start: 2017-06-26 | End: 2017-06-26 | Stop reason: HOSPADM

## 2017-06-26 RX ORDER — METRONIDAZOLE 500 MG/100ML
500 INJECTION, SOLUTION INTRAVENOUS
Status: COMPLETED | OUTPATIENT
Start: 2017-06-26 | End: 2017-06-26

## 2017-06-26 RX ORDER — MIDAZOLAM HYDROCHLORIDE 1 MG/ML
INJECTION, SOLUTION INTRAMUSCULAR; INTRAVENOUS
Status: DISCONTINUED | OUTPATIENT
Start: 2017-06-26 | End: 2017-06-26

## 2017-06-26 RX ORDER — LIDOCAINE HCL/PF 100 MG/5ML
SYRINGE (ML) INTRAVENOUS
Status: DISCONTINUED | OUTPATIENT
Start: 2017-06-26 | End: 2017-06-26

## 2017-06-26 RX ORDER — DIPHENHYDRAMINE HYDROCHLORIDE 50 MG/ML
12.5 INJECTION INTRAMUSCULAR; INTRAVENOUS ONCE
Status: COMPLETED | OUTPATIENT
Start: 2017-06-26 | End: 2017-06-26

## 2017-06-26 RX ORDER — PROPOFOL 10 MG/ML
VIAL (ML) INTRAVENOUS
Status: DISCONTINUED | OUTPATIENT
Start: 2017-06-26 | End: 2017-06-26

## 2017-06-26 RX ORDER — OXYCODONE AND ACETAMINOPHEN 5; 325 MG/1; MG/1
1-2 TABLET ORAL
Qty: 60 TABLET | Refills: 0 | Status: SHIPPED | OUTPATIENT
Start: 2017-06-26 | End: 2018-05-08

## 2017-06-26 RX ORDER — FENTANYL CITRATE 50 UG/ML
25 INJECTION, SOLUTION INTRAMUSCULAR; INTRAVENOUS EVERY 5 MIN PRN
Status: COMPLETED | OUTPATIENT
Start: 2017-06-26 | End: 2017-06-26

## 2017-06-26 RX ORDER — HYDROMORPHONE HYDROCHLORIDE 1 MG/ML
0.2 INJECTION, SOLUTION INTRAMUSCULAR; INTRAVENOUS; SUBCUTANEOUS EVERY 5 MIN PRN
Status: COMPLETED | OUTPATIENT
Start: 2017-06-26 | End: 2017-06-26

## 2017-06-26 RX ADMIN — PROPOFOL 200 MG: 10 INJECTION, EMULSION INTRAVENOUS at 08:06

## 2017-06-26 RX ADMIN — HYDROMORPHONE HYDROCHLORIDE 0.2 MG: 1 INJECTION, SOLUTION INTRAMUSCULAR; INTRAVENOUS; SUBCUTANEOUS at 10:06

## 2017-06-26 RX ADMIN — MIDAZOLAM HYDROCHLORIDE 2 MG: 1 INJECTION, SOLUTION INTRAMUSCULAR; INTRAVENOUS at 08:06

## 2017-06-26 RX ADMIN — CEFTRIAXONE 2 G: 2 INJECTION, SOLUTION INTRAVENOUS at 09:06

## 2017-06-26 RX ADMIN — FENTANYL CITRATE 25 MCG: 50 INJECTION INTRAMUSCULAR; INTRAVENOUS at 10:06

## 2017-06-26 RX ADMIN — ONDANSETRON 4 MG: 2 INJECTION INTRAMUSCULAR; INTRAVENOUS at 09:06

## 2017-06-26 RX ADMIN — HYDROMORPHONE HYDROCHLORIDE 0.2 MG: 1 INJECTION, SOLUTION INTRAMUSCULAR; INTRAVENOUS; SUBCUTANEOUS at 12:06

## 2017-06-26 RX ADMIN — HYDROMORPHONE HYDROCHLORIDE 0.2 MG: 1 INJECTION, SOLUTION INTRAMUSCULAR; INTRAVENOUS; SUBCUTANEOUS at 11:06

## 2017-06-26 RX ADMIN — FENTANYL CITRATE 25 MCG: 50 INJECTION INTRAMUSCULAR; INTRAVENOUS at 09:06

## 2017-06-26 RX ADMIN — DIPHENHYDRAMINE HYDROCHLORIDE 12.5 MG: 50 INJECTION, SOLUTION INTRAMUSCULAR; INTRAVENOUS at 01:06

## 2017-06-26 RX ADMIN — LIDOCAINE HYDROCHLORIDE 75 MG: 20 INJECTION, SOLUTION INTRAVENOUS at 08:06

## 2017-06-26 RX ADMIN — FENTANYL CITRATE 50 MCG: 50 INJECTION, SOLUTION INTRAMUSCULAR; INTRAVENOUS at 09:06

## 2017-06-26 RX ADMIN — PROPOFOL 50 MG: 10 INJECTION, EMULSION INTRAVENOUS at 09:06

## 2017-06-26 RX ADMIN — OXYCODONE HYDROCHLORIDE AND ACETAMINOPHEN 1 TABLET: 7.5; 325 TABLET ORAL at 09:06

## 2017-06-26 RX ADMIN — SODIUM CHLORIDE: 0.9 INJECTION, SOLUTION INTRAVENOUS at 07:06

## 2017-06-26 RX ADMIN — METRONIDAZOLE 500 MG: 500 SOLUTION INTRAVENOUS at 08:06

## 2017-06-26 RX ADMIN — ACETAMINOPHEN 1000 MG: 10 INJECTION, SOLUTION INTRAVENOUS at 07:06

## 2017-06-26 NOTE — BRIEF OP NOTE
[facility]  Colon and Rectal Surgery  Operative Note    SUMMARY     Date of Procedure: 6/26/2017     Procedure: Procedure(s) (LRB):  EXAM UNDER ANESTHESIA (N/A)  EXCISION-SKIN TAG (N/A)  LIGATION-INTERSPHINCTERIC FISTULA TRACT     Surgeon(s) and Role:     * Jairo Peralta MD - Primary     * Lucille Medina MD - Resident - Assisting        Pre-Operative Diagnosis: Perineal sinus [N36.0]    Post-Operative Diagnosis: Post-Op Diagnosis Codes:     * Perineal sinus [N36.0]    Anesthesia: General/MAC, Exparel 26perianal injection preperironeal injection      Technical Procedures Used:LIFT amd excision of skin tag left    Description of the Findings of the Procedure: Rectovaginal fistula with connection to perineal body    Significant Surgical Tasks Conducted by the Assistant(s), if Applicable: n/a    Complications: No    Estimated Blood Loss (EBL): *15 cc           Implants: * No implants in log *    Specimens:   Specimen (12h ago through future)    Start     Ordered    06/26/17 0935  Specimen to Pathology - Surgery  Once     Comments:  1. Skin tag (permanent, in formalin, to fridge)      06/26/17 0952           Condition: Good    Disposition: PACU - hemodynamically stable.    Attestation: I was present and scrubbed for the entire procedure.

## 2017-06-26 NOTE — H&P (VIEW-ONLY)
"History of Present Illness:   Patient is a 36 y.o. female for f/u of perineal fistula s/p IPAA      Has two small openings in perineum - puss drainage with pressure. Desires EUA and sinus excision      11/30/16 Pouchagram ; no fistula    10/20/14 : Perineal body repair with biologic (acell).  Currently with minimal pain and little drainage       7/28/14 : LIFT for RV fistula and fistulotomy for vaginal perineal fistula - some pain and small amount of drainage    12/13/13 - LIFT for left anterior fistula. Wound is better but open and mildly irritated.    Has a history of ulcerative colitis s/p previous restorative proctocolectomy with J pouch (Dr Boyd) who presented with complaints of perineal abscess s/p I&D with continued intermittent drainage. for f/u after lift on 7/12/23. Started on Flagyl last visit. Drainage had stopped and patient ois feeling well.   4/3/13 ; Fistulotomy doing OK good control minimal drainage, has "pustule" at vagina area. Treated with Bactrim.   Has history of ulcerative colitis s/p previous restorative proctocolectomy with J pouch (Dr Boyd) who presented with complaints of perineal abscess s/p I&D with continued intermittent drainage.      Recently started on Lialda      [facility]  Ulcerative colitis   C. Diff infection   PSH:   Resorative proctocolectomy with J pouch, diverting ileostomy   Ileostomy takedown   No family history on file.   History    Substance Use Topics      Smoking status:  Never Smoker      Smokeless tobacco:  Never Used      Alcohol Use:  No      Review of Systems   Constitutional: Negative for fever and chills.   HENT: Negative for sore throat.   Eyes: Negative for visual disturbance.   Respiratory: Negative for shortness of breath.   Cardiovascular: Negative for chest pain.   Gastrointestinal: Positive for diarrhea and rectal pain. Negative for nausea, vomiting, abdominal pain and constipation.   Genitourinary: Negative for difficulty urinating.   Neurological: " Negative for seizures and syncope.   Psychiatric/Behavioral: Negative for hallucinations and confusion.     OBJECTIVE:    Vital Signs (Most Recent)   Physical Exam   Constitutional: She is oriented to person, place, and time. She appears well-developed and well-nourished. No distress.   HENT:   Head: Normocephalic and atraumatic.   Eyes: Conjunctivae and EOM are normal.   Neck: Neck supple. No tracheal deviation present.   Cardiovascular: Normal rate.   Pulmonary/Chest: Effort normal. No respiratory distress.   Abdominal: Soft.   Genitourinary: Adult female  Rectal:  Perineal body Incisions healed except for 2  1 mm skin openings  Skin tag  VAgina : 3-4 small olepings that may communicate to a vaginal septum cavity. Moderately tender    Rectal with mild stenosis      Neurological: She is alert and oriented to person, place, and time.   Skin: Skin is warm and dry.   Psychiatric: She has a normal mood and affect. Her behavior is normal. Judgment and thought content normal.     IMP: s/p fistula repair, IPAA, perineal body sinus    PLAN:    EUA and sinus and tag excision. Possible fistula repair.  I have explained the procedure including indications, alternatives, expected outcomes and potential complications. The patient appears to understand and gives informed consent. The patient is medically ready for surgery.

## 2017-06-26 NOTE — ANESTHESIA POSTPROCEDURE EVALUATION
"Anesthesia Post Evaluation    Patient: Nyasia Middleton    Procedure(s) Performed: Procedure(s) (LRB):  EXAM UNDER ANESTHESIA (N/A)  EXCISION-SKIN TAG (N/A)  LIGATION-INTERSPHINCTERIC FISTULA TRACT    Final Anesthesia Type: general  Patient location during evaluation: PACU  Patient participation: Yes- Able to Participate  Level of consciousness: awake and alert  Post-procedure vital signs: reviewed and stable  Pain management: adequate  Airway patency: patent  PONV status at discharge: No PONV  Anesthetic complications: no      Cardiovascular status: blood pressure returned to baseline and hemodynamically stable  Respiratory status: unassisted, spontaneous ventilation and room air  Hydration status: euvolemic  Follow-up not needed.        Visit Vitals  /74   Pulse 64   Temp 36.6 °C (97.8 °F) (Oral)   Resp 15   Ht 5' 8" (1.727 m)   Wt 67.6 kg (149 lb)   SpO2 100%   Breastfeeding? No   BMI 22.66 kg/m²       Pain/George Score: Pain Assessment Performed: Yes (6/26/2017 12:36 PM)  Presence of Pain: complains of pain/discomfort (6/26/2017 12:36 PM)  Pain Rating Prior to Med Admin: 5 (6/26/2017 12:25 PM)  George Score: 10 (6/26/2017 12:36 PM)      "

## 2017-06-26 NOTE — DISCHARGE INSTRUCTIONS
Keep teal exparel band on until Friday, 6/30/17.    Ok to shower same day as surgery.  Ok to resume sexual activity once pain subsides.      Taking a Sitz Bath  A sitz bath is a type of therapy done by sitting in warm, shallow water. It can help soothe pain, itching, and other symptoms in the anal and genital areas. It can also help keep these areas clean if you cant take a bath or shower. Sitz is from the Turkmen word sitzen, which means to sit.  Why a sitz bath is done  A sitz bath helps clean and treat certain problems in the anal area, genital area, and the perineum. The perineum is the area between the anus and the vulva in women. In men, it is between the anus and the scrotum. A sitz bath helps increase blood flow to these areas and relax the muscles.  A sitz bath may be done to:  · Help ease pain and itching from hemorrhoids  · Help ease pain from an anal fissure  · Bathe and soothe the perineum after childbirth  · Clean and soothe the anal area or perineum after surgery  · Ease prostate pain during prostatitis or after a procedure  · Help ease period cramps  · Clean the anal and genital areas if you cant take a bath or shower  How a sitz bath is done  A sitz bath can be done in 2 ways: in a bathtub or using a sitz bath bowl.  In a bathtub  To take a sitz bath in a tub:  · Make sure your bathtub is clean. Fill a clean bathtub with 3 to 4 inches of warm water. In some cases, your healthcare provider may tell you to use cold water instead.  · Add salt or medicine to the water if advised by your healthcare provider.  · Gently lower yourself down into the bathtub and sit on the bottom of the tub. Dont get into the bath unless the water temperature is comfortable.  · Hold on to a railing. Or ask for help from a family member, friend, or caregiver if needed.  If you have a wound, the water may cause pain at first. But the pain should ease. Make sure the area that needs treating is under the water. You can bend  your knees up to help expose the area that needs contact with the water.  Using a sitz bath bowl  A sitz bath bowl is a special plastic container thats placed on a toilet. You can buy this in many drugstores and medical supply stores. To take a sitz bath this way:  · Lift the toilet lid and seat. Place a cleaned plastic sitz bath bowl on the rim of your toilet. Make sure the bowl is firmly in place and wont move around.  · Fill the sitz bath bowl with warm water from a pitcher or other container. The water should cover your perineum. Make sure the water temperature is comfortable.  · Add salt or medicine to the water if advised by your healthcare provider. Or follow the package instructions about how to fill the bowl. Some kits come with a plastic bag and tubing. This lets you stream water into the bowl and at the area of your body that needs treatment. The bowl may have a slot or hole in the back. This lets water flow out so it doesnt overflow onto the floor. If there is no hole, be careful not to fill the bowl too full.  · Gently sit down on the sitz bath bowl. Hold on to a railing. Or ask for help from a family member, friend, or caregiver if needed.  If you have a wound, the water may cause pain at first, but the pain should ease. Make sure the area that needs treating is under the water.  For any type of sitz bath:  1. Sit in the water for 10 to 20 minutes.  2. Add more warm water as needed to keep the water comfortable.  3. Get up slowly from the tub or toilet. You may feel lightheaded or dizzy. Hold on to a railing. Or ask for help from a family member, friend, or caregiver if needed.  4. Gently pat your anal area, perineum, and genitals dry with a clean towel. Dont rub the area.  5. Wash your hands. Put any ointment or cream on the area, if advised.  6. Wash the bathtub or sitz bath bowl with soap and water after each use.  7. Use a sitz bath 2 to 3 times a day, or as often as your healthcare provider  advises.  When to call your healthcare provider  Call your healthcare provider right away if you have any of these:  · Fever of 100.4°F (38°C) or higher, or as directed  · Redness, swelling, or fluid leaking from a cut (incision) that gets worse  · Pain that gets worse  · Symptoms that dont get better, or get worse  · New symptoms   Date Last Reviewed: 5/1/2016  © 0013-1222 [employer]. [address] [place] [address], [place] [address]. All rights reserved. This information is not intended as a substitute for professional medical care. Always follow your healthcare professional's instructions.

## 2017-06-26 NOTE — INTERVAL H&P NOTE
The patient has been examined and the H&P has been reviewed:    I concur with the findings and no changes have occurred since H&P was written.    Anesthesia/Surgery risks, benefits and alternative options discussed and understood by patient/family.          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

## 2017-06-26 NOTE — PROGRESS NOTES
Spoke to Dr. Medina for discharge instructions. She said no dressing other than dermabond in the perineum.  She said patient should be taking sitz baths.  She said okay to have sex once pain subsides in a few weeks.  She said okay to shower today. She said take stool softener for soft BMs.

## 2017-06-26 NOTE — ANESTHESIA RELEASE NOTE
"Anesthesia Release from PACU Note    Patient: Nyasia Middleton    Procedure(s) Performed: Procedure(s) (LRB):  EXAM UNDER ANESTHESIA (N/A)  EXCISION-SKIN TAG (N/A)  LIGATION-INTERSPHINCTERIC FISTULA TRACT    Anesthesia type: general    Post pain: Adequate analgesia    Post assessment: no apparent anesthetic complications, tolerated procedure well and no evidence of recall    Last Vitals:   Visit Vitals  /74   Pulse 64   Temp 36.6 °C (97.8 °F) (Oral)   Resp 15   Ht 5' 8" (1.727 m)   Wt 67.6 kg (149 lb)   SpO2 100%   Breastfeeding? No   BMI 22.66 kg/m²       Post vital signs: stable    Level of consciousness: awake and alert     Nausea/Vomiting: no nausea/no vomiting    Complications: none    Airway Patency: patent    Respiratory: unassisted, spontaneous ventilation, room air    Cardiovascular: stable and blood pressure at baseline    Hydration: euvolemic  "

## 2017-06-26 NOTE — OP NOTE
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   Skin tag present on right lateral position    Specimen:  Skin tag  '  Estimated Blood Loss: 30cc    Procedure in detail: after informed consent was reviewed and patient marked. SHe was brought back to the operating room, positioned in lithotomy position, and after adequate anesthesia, a time out was done appropriately to identify two patient identifiers, site, side and procedure to be performed, it was confirmed that preoperative antibiotics were administered.         There was a skin tag in the right lateral position which was excised at its base using cautery, and closed with running 4-0 monocryl.   We examined the vaginal vault where a sinus was noted posteriorly. Hydrogen peroxide confirmed a fistula tract between that and the rectum. The skin overlying the S probe was incised transversely about 2 cm, and tissue divided down to the level of the probe. We then isolated the fistula tract and this was suture ligated. The tissue was closed in 2 layers with interrupted vicryl figure of eights. As well running monocryl suture.  hemostasis was obtained, and exparel with marcaine was used for perianal block.   A figure of eight suture was also placed over the vaginal opening.  Incisions dressed with liquibond           Disposition: PACU - hemodynamically stable.           Condition: Good

## 2017-06-26 NOTE — ANESTHESIA PREPROCEDURE EVALUATION
06/26/2017  Nyasia Middleton is a 36 y.o., female.  36 year old female with pmh of ulcerative colitis with perineal fistula, and recent drainage presents for exam under anesthesia.  Anesthesia Evaluation    I have reviewed the Patient Summary Reports.        Review of Systems  Anesthesia Hx:   Denies Personal Hx of Anesthesia complications.   Hepatic/GI:   GERD        Physical Exam  General:  Well nourished    Airway/Jaw/Neck:  Airway Findings: Mouth Opening: Normal Tongue: Normal  General Airway Assessment: Adult  Mallampati: III  Improves to II with phonation.  TM Distance: Normal, at least 6 cm      Dental:  Dental Findings: In tact   Chest/Lungs:  Chest/Lungs Clear    Heart/Vascular:  Heart Findings: Normal            Anesthesia Plan  Type of Anesthesia, risks & benefits discussed:  Anesthesia Type:  general  Patient's Preference:   Intra-op Monitoring Plan:   Intra-op Monitoring Plan Comments:   Post Op Pain Control Plan:   Post Op Pain Control Plan Comments:   Induction:   IV  Beta Blocker:  Patient is not currently on a Beta-Blocker (No further documentation required).       Informed Consent: Patient understands risks and agrees with Anesthesia plan.  Questions answered. Anesthesia consent signed with patient.  ASA Score: 3     Day of Surgery Review of History & Physical:  There are no significant changes.          Ready For Surgery From Anesthesia Perspective.

## 2017-06-26 NOTE — TRANSFER OF CARE
"Anesthesia Transfer of Care Note    Patient: Nyasia Middleton    Procedure(s) Performed: Procedure(s) (LRB):  EXAM UNDER ANESTHESIA (N/A)  EXCISION-SKIN TAG (N/A)  LIGATION-INTERSPHINCTERIC FISTULA TRACT    Patient location: PACU    Anesthesia Type: general    Transport from OR: Transported from OR on 6-10 L/min O2 by face mask with adequate spontaneous ventilation    Post pain: adequate analgesia    Post assessment: no apparent anesthetic complications    Post vital signs: stable    Level of consciousness: sedated    Nausea/Vomiting: no nausea/vomiting    Complications: none    Transfer of care protocol was followed      Last vitals:   Visit Vitals  /81 (BP Location: Right arm, Patient Position: Lying, BP Method: Automatic)   Pulse 69   Temp 36.9 °C (98.4 °F) (Oral)   Resp 16   Ht 5' 8" (1.727 m)   Wt 67.6 kg (149 lb)   SpO2 99%   Breastfeeding? No   BMI 22.66 kg/m²     "

## 2017-06-26 NOTE — ANESTHESIA POSTPROCEDURE EVALUATION
"Anesthesia Post Evaluation    Patient: Nyasia Middleton    Procedure(s) Performed: Procedure(s) (LRB):  EXAM UNDER ANESTHESIA (N/A)  EXCISION-SKIN TAG (N/A)  LIGATION-INTERSPHINCTERIC FISTULA TRACT    Final Anesthesia Type: general  Patient location during evaluation: PACU  Patient participation: Yes- Able to Participate  Level of consciousness: awake and alert  Post-procedure vital signs: reviewed and stable  Pain management: adequate  Airway patency: patent  PONV status at discharge: No PONV  Anesthetic complications: no      Cardiovascular status: blood pressure returned to baseline and hemodynamically stable  Respiratory status: unassisted, spontaneous ventilation and room air  Hydration status: euvolemic  Follow-up not needed.        Visit Vitals  /70   Pulse 66   Temp 36.6 °C (97.8 °F) (Oral)   Resp 18   Ht 5' 8" (1.727 m)   Wt 67.6 kg (149 lb)   SpO2 100%   Breastfeeding? No   BMI 22.66 kg/m²       Pain/George Score: Pain Assessment Performed: Yes (6/26/2017 12:36 PM)  Presence of Pain: complains of pain/discomfort (6/26/2017 12:36 PM)  Pain Rating Prior to Med Admin: 5 (6/26/2017 12:25 PM)  George Score: 10 (6/26/2017 12:36 PM)      "

## 2017-06-28 ENCOUNTER — PATIENT MESSAGE (OUTPATIENT)
Dept: SURGERY | Facility: CLINIC | Age: 37
End: 2017-06-28

## 2017-06-28 NOTE — DISCHARGE SUMMARY
[facility]  Discharge Note  Short Stay    Admit Date: 6/26/2017    Discharge Date and Time: 6/26/2017  2:03 PM     Attending Physician: Fabian Cho Provider: Lucille Huff    Diagnoses:  Active Hospital Problems    Diagnosis  POA    *Perineal sinus [N36.0]  Yes    Fistula of intestine, excluding rectum and anus [K63.2]  Yes      Resolved Hospital Problems    Diagnosis Date Resolved POA   No resolved problems to display.       Discharged Condition: good    Hospital Course: Patient was admitted for an outpatient procedure and tolerated the procedure well with no complications.    Final Diagnoses: Same as principal problem.    Disposition: Home or Self Care    Follow up/Patient Instructions:    Medications:  Reconciled Home Medications:   Discharge Medication List as of 6/26/2017  1:40 PM      START taking these medications    Details   docusate sodium (COLACE) 100 MG capsule Take 1 capsule (100 mg total) by mouth 2 (two) times daily., Starting Mon 6/26/2017, OTC      oxycodone-acetaminophen (PERCOCET) 5-325 mg per tablet Take 1-2 tablets by mouth every 4 to 6 hours as needed., Starting Mon 6/26/2017, Print         CONTINUE these medications which have NOT CHANGED    Details   buPROPion (WELLBUTRIN XL) 300 MG 24 hr tablet Take 1 tablet (300 mg total) by mouth once daily., Starting Tue 5/23/2017, Normal      busPIRone (BUSPAR) 5 MG Tab Take 1 tablet (5 mg total) by mouth 2 (two) times daily as needed (anxiety)., Starting Tue 5/23/2017, Until Wed 5/23/2018, Normal      ciprofloxacin HCl (CIPRO) 500 MG tablet TAKE 1 TABLET BY MOUTH TWICE DAILY, Normal      codeine 30 MG Tab TAKE 1 TABLET BY MOUTH EVERY FOUR HOURS AS NEEDED, Normal      diphenoxylate-atropine 2.5-0.025 mg (LOMOTIL) 2.5-0.025 mg per tablet TAKE 1 TO 2 TABLETS BY MOUTH FOUR TIMES DAILY AS NEEDED FOR DIARRHEA, Normal      hydrocodone-acetaminophen 10-325mg (NORCO)  mg Tab TAKE 1 TABLET BY MOUTH EVERY 4 TO 6 HOURS AS NEEDED,  Normal      lactobacillus comb no.10 (PROBIOTIC) 20 billion cell Cap Take by mouth., Historical Med      ondansetron (ZOFRAN-ODT) 8 MG TbDL Starting Wed 4/26/2017, Historical Med      predniSONE (DELTASONE) 5 MG tablet Take 2 tablets (10 mg total) by mouth once daily., Starting Tue 6/13/2017, Until Thu 7/13/2017, Normal      PRENATAL VIT/IRON FUMARATE/FA (PRENATAL 1+1 ORAL) Take by mouth once daily., Until Discontinued, Historical Med      sertraline (ZOLOFT) 100 MG tablet Take 2 tablets (200 mg total) by mouth once daily., Starting 2/16/2017, Until Fri 2/16/18, Normal      vitamin D 1000 units Tab Take 185 mg by mouth once daily. Take 2000 IU daily, Until Discontinued, Historical Med      ferrous sulfate 325 (65 FE) MG EC tablet Take 325 mg by mouth 2 (two) times daily. , Until Discontinued, Historical Med      !! methylPREDNISolone (MEDROL DOSEPACK) 4 mg tablet use as directed, Normal      !! methylPREDNISolone (MEDROL DOSEPACK) 4 mg tablet use as directed, Normal       !! - Potential duplicate medications found. Please discuss with provider.          Discharge Procedure Orders  Diet general     Activity as tolerated     Shower on day dressing removed (No bath)     Remove dressing in 48 hours       Follow-up Information     Jairo Peralta MD In 2 weeks.    Specialty:  Colon and Rectal Surgery  Contact information:  [address] [facility] [address] [phone]                   Discharge Procedure Orders (must include Diet, Follow-up, Activity):    Discharge Procedure Orders (must include Diet, Follow-up, Activity)  Diet general     Activity as tolerated     Shower on day dressing removed (No bath)     Remove dressing in 48 hours

## 2017-06-30 RX ORDER — CIPROFLOXACIN 500 MG/1
TABLET ORAL
Qty: 14 TABLET | Refills: 1 | Status: SHIPPED | OUTPATIENT
Start: 2017-06-30 | End: 2017-08-02 | Stop reason: SDUPTHER

## 2017-07-07 ENCOUNTER — OFFICE VISIT (OUTPATIENT)
Dept: RHEUMATOLOGY | Facility: CLINIC | Age: 37
End: 2017-07-07
Payer: COMMERCIAL

## 2017-07-07 VITALS
BODY MASS INDEX: 23.84 KG/M2 | DIASTOLIC BLOOD PRESSURE: 69 MMHG | HEART RATE: 98 BPM | HEIGHT: 68 IN | SYSTOLIC BLOOD PRESSURE: 115 MMHG | WEIGHT: 157.31 LBS

## 2017-07-07 DIAGNOSIS — M13.0 POLYARTHRITIS: Primary | ICD-10-CM

## 2017-07-07 PROCEDURE — 99999 PR PBB SHADOW E&M-EST. PATIENT-LVL III: CPT | Mod: PBBFAC,,, | Performed by: INTERNAL MEDICINE

## 2017-07-07 PROCEDURE — 99213 OFFICE O/P EST LOW 20 MIN: CPT | Mod: S$GLB,,, | Performed by: INTERNAL MEDICINE

## 2017-07-09 NOTE — PROGRESS NOTES
History of present illness: 36-year-old female has a history of ulcerative colitis first diagnosed in 2010.  She received 2 doses of Remicade but because of lack of response she underwent a colectomy with a J-pouch.  She has not been on immunosuppressive therapy since then.  She has had recurrent fistulas and had recent fistula surgery.  She remains on antibiotics.  She is feeling better since she had the surgery.    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  The symptoms had recurred in March.  At that time she had swelling in the left second MCP and the first and second MTPs.  I treated her with a Medrol Dosepak.  This helped but the symptoms recurred on stopping the dose pack.  I treated her with a further dose pack and then placed her on prednisone 10 mg daily.  Her arthritis is been doing better since then.  She has had no other extracolonic manifestations.    Physical examination:  Musculoskeletal: Range of motion of all joints.  No synovitis.  No tender areas to palpation.  No arthritic deformities.    Assessment: She appears to have an inflammatory arthritis.  The question is whether this is a reactive arthritis due to her infections or whether it is arthritis of inflammatory bowel disease.    Plans: She is to decrease prednisone to 7.5 mg daily and if she is stable she may continue decreasing prednisone in the future.  If she has a flare with cutting the prednisone, I will consider adding another DMARD such as methotrexate.  I will see her in follow-up in 6 weeks.

## 2017-07-12 ENCOUNTER — OFFICE VISIT (OUTPATIENT)
Dept: SURGERY | Facility: CLINIC | Age: 37
End: 2017-07-12
Payer: COMMERCIAL

## 2017-07-12 VITALS
WEIGHT: 155.88 LBS | SYSTOLIC BLOOD PRESSURE: 138 MMHG | HEIGHT: 68 IN | BODY MASS INDEX: 23.63 KG/M2 | HEART RATE: 112 BPM | DIASTOLIC BLOOD PRESSURE: 67 MMHG

## 2017-07-12 DIAGNOSIS — K63.2 FISTULA OF INTESTINE, EXCLUDING RECTUM AND ANUS: ICD-10-CM

## 2017-07-12 DIAGNOSIS — Z43.4 ATTENTION TO ARTIFICIAL OPENING OF DIGESTIVE TRACT: Primary | ICD-10-CM

## 2017-07-12 PROCEDURE — 99999 PR PBB SHADOW E&M-EST. PATIENT-LVL III: CPT | Mod: PBBFAC,,, | Performed by: COLON & RECTAL SURGERY

## 2017-07-12 PROCEDURE — 99024 POSTOP FOLLOW-UP VISIT: CPT | Mod: S$GLB,,, | Performed by: COLON & RECTAL SURGERY

## 2017-07-12 NOTE — PROGRESS NOTES
"SUBJECTIVE:    History of Present Illness:   Patient is a 36 y.o. female for f/u of perineal fistula s/p IPAA         6/26/17 Advancement flap for pouchvaginal fistula. Has continued drainage and discomfort    11/30/16 Pouchagram ; no fistula    10/20/14 : Perineal body repair with biologic (acell).  Currently with minimal pain and little drainage       7/28/14 : LIFT for RV fistula and fistulotomy for vaginal perineal fistula - some pain and small amount of drainage    12/13/13 - LIFT for left anterior fistula. Wound is better but open and mildly irritated.    Has a history of ulcerative colitis s/p previous restorative proctocolectomy with J pouch (Dr Boyd) who presented with complaints of perineal abscess s/p I&D with continued intermittent drainage. for f/u after lift on 7/12/23. Started on Flagyl last visit. Drainage had stopped and patient ois feeling well.   4/3/13 ; Fistulotomy doing OK good control minimal drainage, has "pustule" at vagina area. Treated with Bactrim.   Has history of ulcerative colitis s/p previous restorative proctocolectomy with J pouch (Dr Boyd) who presented with complaints of perineal abscess s/p I&D with continued intermittent drainage.      Recently started on Lialda      [facility]  Ulcerative colitis   C. Diff infection   PSH:   Resorative proctocolectomy with J pouch, diverting ileostomy   Ileostomy takedown   No family history on file.   History    Substance Use Topics      Smoking status:  Never Smoker      Smokeless tobacco:  Never Used      Alcohol Use:  No      Review of Systems   Constitutional: Negative for fever and chills.   HENT: Negative for sore throat.   Eyes: Negative for visual disturbance.   Respiratory: Negative for shortness of breath.   Cardiovascular: Negative for chest pain.   Gastrointestinal: Positive for diarrhea and rectal pain. Negative for nausea, vomiting, abdominal pain and constipation.   Genitourinary: Negative for difficulty urinating. "   Neurological: Negative for seizures and syncope.   Psychiatric/Behavioral: Negative for hallucinations and confusion.     OBJECTIVE:    Vital Signs (Most Recent)   Physical Exam   Constitutional: She is oriented to person, place, and time. She appears well-developed and well-nourished. No distress.   HENT:   Head: Normocephalic and atraumatic.   Eyes: Conjunctivae and EOM are normal.   Neck: Neck supple. No tracheal deviation present.   Cardiovascular: Normal rate.   Pulmonary/Chest: Effort normal. No respiratory distress.   Abdominal: Soft.   Genitourinary: Adult female  Rectal:  Perineal body Incisions opened well granulated        Neurological: She is alert and oriented to person, place, and time.   Skin: Skin is warm and dry.   Psychiatric: She has a normal mood and affect. Her behavior is normal. Judgment and thought content normal.     IMP: s/p fistula repair, healing wound IPAA,    PLAN:    Continue flagyl, wound care RTC 1 month.

## 2017-07-12 NOTE — LETTER
July 12, 2017      [facility]  [address]  [facility] [address]  Phone: [phone]       Patient: Nyasia Middleton   YOB: 1980  Date of Visit: 07/12/2017    To Whom It May Concern:    Nyasia Elizabeth was at [facility] on 07/12/2017. He may return to work on with no restrictions. If you have any questions or concerns, or if I can be of further assistance, please do not hesitate to contact me.    Sincerely,    Jennifer Solomon MA

## 2017-07-12 NOTE — LETTER
July 12, 2017      [facility]  [address]  [facility] [address]  Phone: [phone]       Patient: Nyasia Middleton   YOB: 1980  Date of Visit: 07/12/2017    To Whom It May Concern:    Nyasia Elizabeth was at [facility] on 07/12/2017. {She may return to work on 07/12/2017 with no restrictions. If you have any questions or concerns, or if I can be of further assistance, please do not hesitate to contact me.    Sincerely,    Jennifer Solomon MA

## 2017-07-21 ENCOUNTER — PATIENT MESSAGE (OUTPATIENT)
Dept: ADMINISTRATIVE | Facility: HOSPITAL | Age: 37
End: 2017-07-21

## 2017-07-21 RX ORDER — AMOXICILLIN AND CLAVULANATE POTASSIUM 875; 125 MG/1; MG/1
TABLET, FILM COATED ORAL
Qty: 28 TABLET | Refills: 1 | Status: SHIPPED | OUTPATIENT
Start: 2017-07-21 | End: 2017-08-14 | Stop reason: ALTCHOICE

## 2017-08-02 ENCOUNTER — OFFICE VISIT (OUTPATIENT)
Dept: SURGERY | Facility: CLINIC | Age: 37
End: 2017-08-02
Payer: COMMERCIAL

## 2017-08-02 VITALS
HEIGHT: 68 IN | WEIGHT: 154.56 LBS | BODY MASS INDEX: 23.43 KG/M2 | HEART RATE: 90 BPM | SYSTOLIC BLOOD PRESSURE: 116 MMHG | DIASTOLIC BLOOD PRESSURE: 79 MMHG

## 2017-08-02 DIAGNOSIS — K63.2 FISTULA OF INTESTINE, EXCLUDING RECTUM AND ANUS: Primary | ICD-10-CM

## 2017-08-02 DIAGNOSIS — Z43.4 ATTENTION TO ARTIFICIAL OPENING OF DIGESTIVE TRACT: ICD-10-CM

## 2017-08-02 PROCEDURE — 99024 POSTOP FOLLOW-UP VISIT: CPT | Mod: S$GLB,,, | Performed by: COLON & RECTAL SURGERY

## 2017-08-02 PROCEDURE — 99999 PR PBB SHADOW E&M-EST. PATIENT-LVL IV: CPT | Mod: PBBFAC,,, | Performed by: COLON & RECTAL SURGERY

## 2017-08-02 RX ORDER — CIPROFLOXACIN 500 MG/1
500 TABLET ORAL 2 TIMES DAILY
Qty: 60 TABLET | Refills: 1 | Status: SHIPPED | OUTPATIENT
Start: 2017-08-02 | End: 2017-12-15 | Stop reason: SDUPTHER

## 2017-08-02 RX ORDER — METRONIDAZOLE 250 MG/1
250 TABLET ORAL 3 TIMES DAILY
Qty: 90 TABLET | Refills: 3 | Status: SHIPPED | OUTPATIENT
Start: 2017-08-02 | End: 2018-01-29 | Stop reason: SDUPTHER

## 2017-08-02 RX ORDER — HYDROCODONE BITARTRATE AND ACETAMINOPHEN 10; 325 MG/1; MG/1
TABLET ORAL
Qty: 90 TABLET | Refills: 0 | Status: SHIPPED | OUTPATIENT
Start: 2017-08-02 | End: 2017-08-28 | Stop reason: SDUPTHER

## 2017-08-02 NOTE — PROGRESS NOTES
"SUBJECTIVE:    History of Present Illness:   Patient is a 36 y.o. female for f/u of perineal fistula s/p IPAA         6/26/17 Advancement flap for pouchvaginal fistula. Has continued drainage and discomfort. 1 episode of incontinence. 6 BMs/day    11/30/16 Pouchagram ; no fistula    10/20/14 : Perineal body repair with biologic (acell).  Currently with minimal pain and little drainage       7/28/14 : LIFT for RV fistula and fistulotomy for vaginal perineal fistula - some pain and small amount of drainage    12/13/13 - LIFT for left anterior fistula. Wound is better but open and mildly irritated.    Has a history of ulcerative colitis s/p previous restorative proctocolectomy with J pouch (Dr Boyd) who presented with complaints of perineal abscess s/p I&D with continued intermittent drainage. for f/u after lift on 7/12/23. Started on Flagyl last visit. Drainage had stopped and patient ois feeling well.   4/3/13 ; Fistulotomy doing OK good control minimal drainage, has "pustule" at vagina area. Treated with Bactrim.   Has history of ulcerative colitis s/p previous restorative proctocolectomy with J pouch (Dr Boyd) who presented with complaints of perineal abscess s/p I&D with continued intermittent drainage.      Recently started on Lialda      [facility]  Ulcerative colitis   C. Diff infection   PSH:   Resorative proctocolectomy with J pouch, diverting ileostomy   Ileostomy takedown   No family history on file.   History    Substance Use Topics      Smoking status:  Never Smoker      Smokeless tobacco:  Never Used      Alcohol Use:  No      Review of Systems   Constitutional: Negative for fever and chills.   HENT: Negative for sore throat.   Eyes: Negative for visual disturbance.   Respiratory: Negative for shortness of breath.   Cardiovascular: Negative for chest pain.   Gastrointestinal: Positive for diarrhea and rectal pain. Negative for nausea, vomiting, abdominal pain and constipation.   Genitourinary: " Negative for difficulty urinating.   Neurological: Negative for seizures and syncope.   Psychiatric/Behavioral: Negative for hallucinations and confusion.     OBJECTIVE:    Vital Signs (Most Recent)   Physical Exam   Constitutional: She is oriented to person, place, and time. She appears well-developed and well-nourished. No distress.   HENT:   Head: Normocephalic and atraumatic.   Eyes: Conjunctivae and EOM are normal.   Neck: Neck supple. No tracheal deviation present.   Cardiovascular: Normal rate.   Pulmonary/Chest: Effort normal. No respiratory distress.   Abdominal: Soft.   Genitourinary: Adult female  Rectal:  Perineal body Incisions opened well granulated. Smaller in size. No fistula seen        Neurological: She is alert and oriented to person, place, and time.   Skin: Skin is warm and dry.   Psychiatric: She has a normal mood and affect. Her behavior is normal. Judgment and thought content normal.     IMP: s/p fistula repair, healing wound IPAA,    PLAN:    Continue and cipro flagyl, wound care RTC 1 month. Analpram and Calmaceptine

## 2017-08-14 ENCOUNTER — OFFICE VISIT (OUTPATIENT)
Dept: RHEUMATOLOGY | Facility: CLINIC | Age: 37
End: 2017-08-14
Payer: COMMERCIAL

## 2017-08-14 VITALS
RESPIRATION RATE: 18 BRPM | WEIGHT: 158.5 LBS | BODY MASS INDEX: 24.11 KG/M2 | HEART RATE: 89 BPM | DIASTOLIC BLOOD PRESSURE: 73 MMHG | SYSTOLIC BLOOD PRESSURE: 116 MMHG

## 2017-08-14 DIAGNOSIS — K51.90: Primary | ICD-10-CM

## 2017-08-14 DIAGNOSIS — M07.642: Primary | ICD-10-CM

## 2017-08-14 PROCEDURE — 3008F BODY MASS INDEX DOCD: CPT | Mod: S$GLB,,, | Performed by: INTERNAL MEDICINE

## 2017-08-14 PROCEDURE — 99214 OFFICE O/P EST MOD 30 MIN: CPT | Mod: S$GLB,,, | Performed by: INTERNAL MEDICINE

## 2017-08-14 PROCEDURE — 99999 PR PBB SHADOW E&M-EST. PATIENT-LVL III: CPT | Mod: PBBFAC,,, | Performed by: INTERNAL MEDICINE

## 2017-08-14 RX ORDER — BUTOCONAZOLE NITRATE 100 MG/5G
CREAM VAGINAL
COMMUNITY
Start: 2017-07-25 | End: 2018-07-09

## 2017-08-14 RX ORDER — SULFASALAZINE 500 MG/1
TABLET, DELAYED RELEASE ORAL
Qty: 120 TABLET | Refills: 3 | Status: SHIPPED | OUTPATIENT
Start: 2017-08-14 | End: 2017-11-24 | Stop reason: SDUPTHER

## 2017-08-14 RX ORDER — METRONIDAZOLE 250 MG/1
500 TABLET ORAL 3 TIMES DAILY
COMMUNITY
End: 2018-05-08

## 2017-08-14 ASSESSMENT — ROUTINE ASSESSMENT OF PATIENT INDEX DATA (RAPID3)
TOTAL RAPID3 SCORE: 1
AM STIFFNESS SCORE: 0, NO
PSYCHOLOGICAL DISTRESS SCORE: 2.2
PAIN SCORE: 1
MDHAQ FUNCTION SCORE: 0
PATIENT GLOBAL ASSESSMENT SCORE: 2
FATIGUE SCORE: 7

## 2017-08-14 NOTE — PROGRESS NOTES
History of present illness: 36-year-old female with ulcerative colitis diagnosed in 2010.  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  She has had a recent infection in her J-pouch.  She is on no immunosuppressive medications.  I placed her on prednisone and her joint problems did well.  She has been gradually tapering the prednisone and she is on 2.5 mg daily.  She states she flares for several days after cutting down on the prednisone but then she becomes used to it.  She is now on 2.5 mg daily.  Her fistula has been healing.  She still remains on antibiotics.    Physical examination:  Musculoskeletal: She has soft tissue swelling of the left second MCP.  She has tenderness of the left second and fourth DIP without definite synovitis.    Assessment: She still has evidence of active inflammatory arthritis    Plans: I discussed with her and her mother various options and elected to place her on sulfasalazine starting with 500 mg daily, gradually increasing to 2 g daily.  She is to continue prednisone 2.5 mg daily at the present time.  I plan on seeing her in follow-up in 3 months.

## 2017-08-21 ENCOUNTER — PATIENT MESSAGE (OUTPATIENT)
Dept: SURGERY | Facility: CLINIC | Age: 37
End: 2017-08-21

## 2017-09-03 RX ORDER — HYDROCODONE BITARTRATE AND ACETAMINOPHEN 10; 325 MG/1; MG/1
TABLET ORAL
Qty: 90 TABLET | Refills: 0 | Status: SHIPPED | OUTPATIENT
Start: 2017-09-03 | End: 2017-11-06 | Stop reason: SDUPTHER

## 2017-09-13 ENCOUNTER — OFFICE VISIT (OUTPATIENT)
Dept: SURGERY | Facility: CLINIC | Age: 37
End: 2017-09-13
Payer: COMMERCIAL

## 2017-09-13 VITALS
BODY MASS INDEX: 23.72 KG/M2 | DIASTOLIC BLOOD PRESSURE: 66 MMHG | HEIGHT: 68 IN | SYSTOLIC BLOOD PRESSURE: 112 MMHG | HEART RATE: 96 BPM | WEIGHT: 156.5 LBS

## 2017-09-13 DIAGNOSIS — N82.3 RECTOVAGINAL FISTULA: ICD-10-CM

## 2017-09-13 DIAGNOSIS — N36.0 PERINEAL SINUS: Primary | ICD-10-CM

## 2017-09-13 PROCEDURE — 99999 PR PBB SHADOW E&M-EST. PATIENT-LVL III: CPT | Mod: PBBFAC,,, | Performed by: COLON & RECTAL SURGERY

## 2017-09-13 PROCEDURE — 99024 POSTOP FOLLOW-UP VISIT: CPT | Mod: S$GLB,,, | Performed by: COLON & RECTAL SURGERY

## 2017-09-13 NOTE — PROGRESS NOTES
"SUBJECTIVE:    History of Present Illness:   Patient is a 36 y.o. female for f/u of perineal fistula s/p IPAA. Still having drainage. Patient has difficulty knowing if from rectum or vagina. Currently on menstrual cycle.       6/26/17 Advancement flap for pouchvaginal fistula. Has continued drainage and discomfort. 1 episode of incontinence. 6 BMs/day    11/30/16 Pouchagram ; no fistula    10/20/14 : Perineal body repair with biologic (acell).  Currently with minimal pain and little drainage       7/28/14 : LIFT for RV fistula and fistulotomy for vaginal perineal fistula - some pain and small amount of drainage    12/13/13 - LIFT for left anterior fistula. Wound is better but open and mildly irritated.    Has a history of ulcerative colitis s/p previous restorative proctocolectomy with J pouch (Dr Boyd) who presented with complaints of perineal abscess s/p I&D with continued intermittent drainage. for f/u after lift on 7/12/23. Started on Flagyl last visit. Drainage had stopped and patient ois feeling well.   4/3/13 ; Fistulotomy doing OK good control minimal drainage, has "pustule" at vagina area. Treated with Bactrim.   Has history of ulcerative colitis s/p previous restorative proctocolectomy with J pouch (Dr Boyd) who presented with complaints of perineal abscess s/p I&D with continued intermittent drainage.      Recently started on Lialda      PMH  Ulcerative colitis   C. Diff infection   PSH:   Resorative proctocolectomy with J pouch, diverting ileostomy   Ileostomy takedown   No family history on file.   History    Substance Use Topics      Smoking status:  Never Smoker      Smokeless tobacco:  Never Used      Alcohol Use:  No      Review of Systems   Constitutional: Negative for fever and chills.   HENT: Negative for sore throat.   Eyes: Negative for visual disturbance.   Respiratory: Negative for shortness of breath.   Cardiovascular: Negative for chest pain.   Gastrointestinal: Positive for " diarrhea and rectal pain. Negative for nausea, vomiting, abdominal pain and constipation.   Genitourinary: Negative for difficulty urinating.   Neurological: Negative for seizures and syncope.   Psychiatric/Behavioral: Negative for hallucinations and confusion.     OBJECTIVE:    Vital Signs (Most Recent)   Physical Exam   Constitutional: She is oriented to person, place, and time. She appears well-developed and well-nourished. No distress.   HENT:   Head: Normocephalic and atraumatic.   Eyes: Conjunctivae and EOM are normal.   Neck: Neck supple. No tracheal deviation present.   Cardiovascular: Normal rate.   Pulmonary/Chest: Effort normal. No respiratory distress.   Abdominal: Soft.   Genitourinary: Adult female  Rectal:  Perineal body Incisions opened well granulated. Smaller in size. No fistula seen     Neurological: She is alert and oriented to person, place, and time.   Skin: Skin is warm and dry.   Psychiatric: She has a normal mood and affect. Her behavior is normal. Judgment and thought content normal.     IMP: s/p fistula repair, healing wound IPAA,    PLAN:    Try dropoing  cipro and/or  flagyl, wound care RTC 5 weeks  Calmaceptine

## 2017-10-01 RX ORDER — PREDNISONE 5 MG/1
TABLET ORAL
Qty: 60 TABLET | Refills: 1 | Status: SHIPPED | OUTPATIENT
Start: 2017-10-01 | End: 2018-05-08 | Stop reason: ALTCHOICE

## 2017-11-01 RX ORDER — DIPHENOXYLATE HYDROCHLORIDE AND ATROPINE SULFATE 2.5; .025 MG/1; MG/1
TABLET ORAL
Qty: 200 TABLET | Refills: 3 | Status: SHIPPED | OUTPATIENT
Start: 2017-11-01 | End: 2018-05-08

## 2017-11-06 RX ORDER — HYDROCODONE BITARTRATE AND ACETAMINOPHEN 10; 325 MG/1; MG/1
TABLET ORAL
Qty: 90 TABLET | Refills: 0 | Status: SHIPPED | OUTPATIENT
Start: 2017-11-06 | End: 2017-12-06 | Stop reason: SDUPTHER

## 2017-11-24 RX ORDER — SULFASALAZINE 500 MG/1
1000 TABLET, DELAYED RELEASE ORAL 2 TIMES DAILY
Qty: 120 TABLET | Refills: 0 | Status: SHIPPED | OUTPATIENT
Start: 2017-11-24 | End: 2018-05-08

## 2017-12-06 RX ORDER — HYDROCODONE BITARTRATE AND ACETAMINOPHEN 10; 325 MG/1; MG/1
TABLET ORAL
Qty: 90 TABLET | Refills: 0 | Status: SHIPPED | OUTPATIENT
Start: 2017-12-06 | End: 2018-01-08 | Stop reason: SDUPTHER

## 2017-12-17 RX ORDER — CIPROFLOXACIN 500 MG/1
TABLET ORAL
Qty: 20 TABLET | Refills: 0 | Status: SHIPPED | OUTPATIENT
Start: 2017-12-17 | End: 2018-05-08 | Stop reason: ALTCHOICE

## 2017-12-17 RX ORDER — CIPROFLOXACIN 500 MG/1
TABLET ORAL
Qty: 14 TABLET | Refills: 1 | Status: SHIPPED | OUTPATIENT
Start: 2017-12-17 | End: 2018-05-08 | Stop reason: ALTCHOICE

## 2018-01-09 RX ORDER — HYDROCODONE BITARTRATE AND ACETAMINOPHEN 10; 325 MG/1; MG/1
TABLET ORAL
Qty: 90 TABLET | Refills: 0 | Status: SHIPPED | OUTPATIENT
Start: 2018-01-09 | End: 2018-01-29 | Stop reason: SDUPTHER

## 2018-01-18 RX ORDER — TINIDAZOLE 500 MG/1
TABLET ORAL
Qty: 7 TABLET | Refills: 0 | Status: SHIPPED | OUTPATIENT
Start: 2018-01-18 | End: 2018-05-08

## 2018-01-30 RX ORDER — HYDROCODONE BITARTRATE AND ACETAMINOPHEN 10; 325 MG/1; MG/1
TABLET ORAL
Qty: 90 TABLET | Refills: 0 | Status: SHIPPED | OUTPATIENT
Start: 2018-01-30 | End: 2018-02-19 | Stop reason: SDUPTHER

## 2018-01-30 RX ORDER — METRONIDAZOLE 250 MG/1
TABLET ORAL
Qty: 90 TABLET | Refills: 3 | Status: SHIPPED | OUTPATIENT
Start: 2018-01-30 | End: 2018-05-08

## 2018-02-19 RX ORDER — HYDROCODONE BITARTRATE AND ACETAMINOPHEN 10; 325 MG/1; MG/1
TABLET ORAL
Qty: 90 TABLET | Refills: 0 | Status: SHIPPED | OUTPATIENT
Start: 2018-02-19 | End: 2018-03-12 | Stop reason: SDUPTHER

## 2018-03-13 RX ORDER — HYDROCODONE BITARTRATE AND ACETAMINOPHEN 10; 325 MG/1; MG/1
TABLET ORAL
Qty: 90 TABLET | Refills: 0 | Status: SHIPPED | OUTPATIENT
Start: 2018-03-13 | End: 2018-03-30 | Stop reason: SDUPTHER

## 2018-03-31 RX ORDER — HYDROCODONE BITARTRATE AND ACETAMINOPHEN 10; 325 MG/1; MG/1
TABLET ORAL
Qty: 90 TABLET | Refills: 0 | Status: SHIPPED | OUTPATIENT
Start: 2018-03-31 | End: 2018-04-24 | Stop reason: SDUPTHER

## 2018-04-24 RX ORDER — HYDROCODONE BITARTRATE AND ACETAMINOPHEN 10; 325 MG/1; MG/1
TABLET ORAL
Qty: 90 TABLET | Refills: 0 | Status: SHIPPED | OUTPATIENT
Start: 2018-04-24 | End: 2018-05-09 | Stop reason: SDUPTHER

## 2018-05-02 ENCOUNTER — PATIENT MESSAGE (OUTPATIENT)
Dept: SURGERY | Facility: CLINIC | Age: 38
End: 2018-05-02

## 2018-05-02 ENCOUNTER — TELEPHONE (OUTPATIENT)
Dept: SURGERY | Facility: CLINIC | Age: 38
End: 2018-05-02

## 2018-05-03 ENCOUNTER — OFFICE VISIT (OUTPATIENT)
Dept: WOUND CARE | Facility: CLINIC | Age: 38
End: 2018-05-03
Payer: COMMERCIAL

## 2018-05-03 DIAGNOSIS — K50.113 CROHN'S DISEASE OF PERIANAL REGION WITH FISTULA: Primary | ICD-10-CM

## 2018-05-03 PROCEDURE — 99214 OFFICE O/P EST MOD 30 MIN: CPT | Mod: SA,S$GLB,, | Performed by: CLINICAL NURSE SPECIALIST

## 2018-05-03 PROCEDURE — 99999 PR PBB SHADOW E&M-EST. PATIENT-LVL I: CPT | Mod: PBBFAC,,, | Performed by: CLINICAL NURSE SPECIALIST

## 2018-05-03 NOTE — PROGRESS NOTES
Subjective:       Patient ID: Nyasia Middleton is a 37 y.o. female.    Chief Complaint: Wound Check and Fistula    This is new pt to enterostomal therapy dept, she comes for wound care help, has h/o fistula and non healing tracks, along with new skin breakdown in gluteal crease, She receives healthcare between various hospitals so she apparently says she has crohn's and under care of Dr Ku and just started Stelara. She has had many procedures and not yet healed, Had a lap hysterectomy last month ,so still healing from this, she wants to look into HBO as well and will check out [facility]       Wound Check       Review of Systems   Constitutional: Negative for activity change, appetite change and fever.   HENT: Negative for congestion and rhinorrhea.    Respiratory: Negative for cough and shortness of breath.    Cardiovascular: Negative for chest pain.   Gastrointestinal: Negative.    Genitourinary: Positive for dysuria.   Musculoskeletal: Negative.    Skin: Positive for wound.   Neurological: Negative.    Psychiatric/Behavioral: Negative.        Objective:      Physical Exam   Constitutional: She appears well-developed and well-nourished.   Pulmonary/Chest: Effort normal. No respiratory distress.   Abdominal: Soft. Bowel sounds are normal. She exhibits no distension.   Genitourinary:         Genitourinary Comments: The areas near vagina are tracks that have stool seepage from rectum/jpouch   She has tissue loss between glut crease ( from friction of frequent wiping)   Musculoskeletal: Normal range of motion. She exhibits no edema.   Skin: Skin is warm and dry.   Psychiatric: She has a normal mood and affect.     discussed options in topical care and pt wants to try the Sanararx woundspray /solution to the areas daily. We discussed at length ways to apply, how to clean first then apply   She has coverage from insurance for HBO so rec [facility] for this  Assessment:       1. Crohn's disease of perianal  region with fistula        Plan:     consult at [facility] for HBO   Care to fistula and skin breakdown with Sanararx daily  She will return for check in few weeks  I have reviewed the plan of care with the patient and/ her mom and they express understanding. I spent over 50% of this 30 minute visit in face to face counseling.

## 2018-05-07 ENCOUNTER — PATIENT MESSAGE (OUTPATIENT)
Dept: INTERNAL MEDICINE | Facility: CLINIC | Age: 38
End: 2018-05-07

## 2018-05-08 ENCOUNTER — OFFICE VISIT (OUTPATIENT)
Dept: INTERNAL MEDICINE | Facility: CLINIC | Age: 38
End: 2018-05-08
Payer: COMMERCIAL

## 2018-05-08 VITALS
OXYGEN SATURATION: 99 % | HEART RATE: 95 BPM | HEIGHT: 68 IN | TEMPERATURE: 99 F | DIASTOLIC BLOOD PRESSURE: 62 MMHG | SYSTOLIC BLOOD PRESSURE: 98 MMHG | WEIGHT: 143.31 LBS | RESPIRATION RATE: 16 BRPM | BODY MASS INDEX: 21.72 KG/M2

## 2018-05-08 DIAGNOSIS — N30.01 ACUTE CYSTITIS WITH HEMATURIA: ICD-10-CM

## 2018-05-08 DIAGNOSIS — R30.0 DYSURIA: ICD-10-CM

## 2018-05-08 DIAGNOSIS — F41.1 GAD (GENERALIZED ANXIETY DISORDER): Primary | ICD-10-CM

## 2018-05-08 LAB
BILIRUB SERPL-MCNC: NEGATIVE MG/DL
BLOOD URINE, POC: NORMAL
COLOR, POC UA: YELLOW
GLUCOSE UR QL STRIP: NORMAL
KETONES UR QL STRIP: NEGATIVE
LEUKOCYTE ESTERASE URINE, POC: NORMAL
NITRITE, POC UA: NEGATIVE
PH, POC UA: 5
PROTEIN, POC: 30
SPECIFIC GRAVITY, POC UA: 1.02
UROBILINOGEN, POC UA: NORMAL

## 2018-05-08 PROCEDURE — 3008F BODY MASS INDEX DOCD: CPT | Mod: CPTII,S$GLB,, | Performed by: INTERNAL MEDICINE

## 2018-05-08 PROCEDURE — 87088 URINE BACTERIA CULTURE: CPT

## 2018-05-08 PROCEDURE — 87077 CULTURE AEROBIC IDENTIFY: CPT

## 2018-05-08 PROCEDURE — 87186 SC STD MICRODIL/AGAR DIL: CPT

## 2018-05-08 PROCEDURE — 99999 PR PBB SHADOW E&M-EST. PATIENT-LVL IV: CPT | Mod: PBBFAC,,, | Performed by: INTERNAL MEDICINE

## 2018-05-08 PROCEDURE — 87086 URINE CULTURE/COLONY COUNT: CPT

## 2018-05-08 PROCEDURE — 81002 URINALYSIS NONAUTO W/O SCOPE: CPT | Mod: S$GLB,,, | Performed by: INTERNAL MEDICINE

## 2018-05-08 PROCEDURE — 99214 OFFICE O/P EST MOD 30 MIN: CPT | Mod: 25,S$GLB,, | Performed by: INTERNAL MEDICINE

## 2018-05-08 RX ORDER — ONDANSETRON 8 MG/1
TABLET, ORALLY DISINTEGRATING ORAL
COMMUNITY
End: 2020-12-11

## 2018-05-08 RX ORDER — CITALOPRAM 10 MG/1
10 TABLET ORAL DAILY
Qty: 30 TABLET | Refills: 1 | Status: SHIPPED | OUTPATIENT
Start: 2018-05-08 | End: 2018-07-05 | Stop reason: SDUPTHER

## 2018-05-08 RX ORDER — NITROFURANTOIN (MACROCRYSTALS) 100 MG/1
100 CAPSULE ORAL EVERY 12 HOURS
Qty: 14 CAPSULE | Refills: 0 | Status: SHIPPED | OUTPATIENT
Start: 2018-05-08 | End: 2018-05-15

## 2018-05-08 NOTE — PROGRESS NOTES
Subjective:       Patient ID: Nyasia Middleton is a 37 y.o. female.    Chief Complaint: Anxiety (anxiety, depression, dizziness, ) and Urinary Tract Infection (pt reports UTI symptoms)    Patient reported:  Anxiety   Symptoms include confusion and nervous/anxious behavior. Patient reports no chest pain, dizziness, nausea, palpitations, shortness of breath or suicidal ideas.       Urinary Tract Infection    Associated symptoms include chills and flank pain. Pertinent negatives include no hematuria, nausea, vomiting, constipation or rash.      Patient presenting with anxiety.  She notes that she has been having worsening anxiety over the last few months.  She was on zoloft and wellbutrin but ran out of medication and weaned herself off.  She notes being very short-tempered.  She denies any depressed thoughts.  No SI/HI.  She notes anxiety is affecting her day-to-day.  She had been following with a therapist in the past but stopped after her hysterectomy.  She has been on lexapro, cymbalta in the past with no relief.  She notes a lot of stress recently with her Crohn's disease.  She is starting hyperbaric treatment soon.      She notes UTI symptoms for last 24 hrs.  She feels like it is hard to urinate and feels pressure in abdomen.  She also feels like she is unable to completely empty her bladder.  She notes some chills last night.  No fevers.  She denies any burning with urination.  She notes some mild flank pain in the right side.      Review of Systems   Constitutional: Positive for chills. Negative for activity change, fever and unexpected weight change.   HENT: Positive for rhinorrhea. Negative for congestion, hearing loss, sinus pain, sinus pressure, sore throat and trouble swallowing.    Eyes: Positive for visual disturbance. Negative for pain, discharge and redness.   Respiratory: Negative for cough, chest tightness, shortness of breath and wheezing.    Cardiovascular: Negative for chest pain and  palpitations.   Gastrointestinal: Positive for blood in stool and diarrhea. Negative for abdominal pain, constipation, nausea and vomiting.   Endocrine: Positive for polydipsia. Negative for polyuria.   Genitourinary: Positive for difficulty urinating and flank pain. Negative for dysuria, hematuria and menstrual problem.   Musculoskeletal: Negative for arthralgias, joint swelling and neck pain.   Skin: Negative for rash.   Neurological: Positive for weakness and headaches. Negative for dizziness and light-headedness.   Psychiatric/Behavioral: Positive for confusion, dysphoric mood and sleep disturbance. Negative for suicidal ideas. The patient is nervous/anxious.      Objective:     Vitals:    05/08/18 1344   BP: 98/62   Pulse: 95   Resp: 16   Temp: 98.5 °F (36.9 °C)    Body mass index is 21.79 kg/m².  Physical Exam   Constitutional: She is oriented to person, place, and time. She appears well-developed and well-nourished.   HENT:   Head: Normocephalic and atraumatic.   Mouth/Throat: Oropharynx is clear and moist.   Eyes: Conjunctivae are normal. Pupils are equal, round, and reactive to light.   Neck: Normal range of motion. No tracheal deviation present. No thyromegaly present.   Cardiovascular: Normal rate, regular rhythm, normal heart sounds and intact distal pulses.  Exam reveals no gallop and no friction rub.    No murmur heard.  Pulmonary/Chest: Effort normal and breath sounds normal. She has no wheezes. She has no rales.   Abdominal: Soft. Bowel sounds are normal. There is tenderness in the suprapubic area. There is no guarding and no CVA tenderness.   Lymphadenopathy:     She has no cervical adenopathy.   Neurological: She is alert and oriented to person, place, and time.   Skin: Skin is warm and dry. No rash noted.   Psychiatric: Judgment normal. Her mood appears anxious.     Assessment:     1. MARION (generalized anxiety disorder)    2. Dysuria    3. Acute cystitis with hematuria      Plan:   1. MARION  (generalized anxiety disorder)  - return back to therapist  - citalopram (CELEXA) 10 MG tablet; Take 1 tablet (10 mg total) by mouth once daily.  Dispense: 30 tablet; Refill: 1  - Since she has failed multiple medications in the past, would like to have a psychiatry recommendation for medication  - Ambulatory Referral to Psychiatry  - RTC in 1 month for medication check    2. Dysuria  - POCT urine dipstick without microscope  - Urine culture    3. Acute cystitis with hematuria  - increase fluids, add AZO OTC, add gatorade/electrolyte solution  - nitrofurantoin (MACRODANTIN) 100 MG capsule; Take 1 capsule (100 mg total) by mouth every 12 (twelve) hours.  Dispense: 14 capsule; Refill: 0      Return to clinic in one month for follow up or sooner if dictated by labs or illness.

## 2018-05-08 NOTE — PATIENT INSTRUCTIONS
Understanding Generalized Anxiety Disorder (MARION)  Anxiety can fill you with worry and fear. Sometimes anxiety is healthy. It alerts you to a potential threat and gets you to respond and take action. But for some people, anxiety gets so bad it causes problems in daily life. If you find yourself in a constant state of anxiety, you may have an anxiety disorder called generalized anxiety disorder (MARION). Speak with your healthcare provider or mental health professional to learn more. He or she can help.     What is generalized anxiety disorder?  With MARION, you might worry about money, your family and friends, work, or the world in general. You might not even be sure what you're anxious about. But whatever it is, you have an intense fear that the worst will happen. These feelings never really go away. In people age 65 and older, MARION is one of the most commonly diagnosed anxiety disorders.  Many times it occurs with depression. This constant worry affects your quality of life and makes it hard to function. MARION can cause physical symptoms, too.  What are common symptoms of generalized anxiety disorder?  People with MARION often think they have a physical illness. The disorder can cause symptoms, such as:  · Muscle tension, especially in the neck and shoulders  · Nausea and stomach problems  · Frequent headaches  · Feeling lightheaded  · Restlessness, trouble sleeping  · Feeling irritable and on edge all the time  How can generalized anxiety disorder be treated?  MARION can be treated with medicine or therapy (also called counseling), or both. Medicine helps to reduce symptoms, so you can continue with your daily routine. Therapy helps you understand the cause of your anxiety and learn how to manage it. Both forms of treatment help you deal with problems that anxiety causes in your life. This helps you to be healthier and happier.  Date Last Reviewed: 5/1/2017  © 7511-8120 [employer] [address] [facility],  [place] [address]. All rights reserved. This information is not intended as a substitute for professional medical care. Always follow your healthcare professional's instructions.

## 2018-05-09 RX ORDER — HYDROCODONE BITARTRATE AND ACETAMINOPHEN 10; 325 MG/1; MG/1
TABLET ORAL
Qty: 90 TABLET | Refills: 0 | Status: SHIPPED | OUTPATIENT
Start: 2018-05-09 | End: 2018-05-29 | Stop reason: SDUPTHER

## 2018-05-11 LAB — BACTERIA UR CULT: NORMAL

## 2018-05-16 ENCOUNTER — HOSPITAL ENCOUNTER (OUTPATIENT)
Dept: WOUND CARE | Facility: HOSPITAL | Age: 38
Discharge: HOME | End: 2018-05-16
Attending: SURGERY
Payer: COMMERCIAL

## 2018-05-16 VITALS
BODY MASS INDEX: 21.98 KG/M2 | HEART RATE: 74 BPM | WEIGHT: 145 LBS | SYSTOLIC BLOOD PRESSURE: 104 MMHG | HEIGHT: 68 IN | DIASTOLIC BLOOD PRESSURE: 64 MMHG | TEMPERATURE: 98 F

## 2018-05-16 PROCEDURE — 99212 OFFICE O/P EST SF 10 MIN: CPT | Mod: SA

## 2018-05-16 NOTE — PROGRESS NOTES
Much of the documentation for this visit was completed in the [facility] system. Please see the attached documentation for further details about this patient's care.     Myla Carcamo RN

## 2018-05-29 RX ORDER — HYDROCODONE BITARTRATE AND ACETAMINOPHEN 10; 325 MG/1; MG/1
TABLET ORAL
Qty: 90 TABLET | Refills: 0 | Status: SHIPPED | OUTPATIENT
Start: 2018-05-29 | End: 2018-06-25 | Stop reason: SDUPTHER

## 2018-06-25 ENCOUNTER — PATIENT MESSAGE (OUTPATIENT)
Dept: SURGERY | Facility: CLINIC | Age: 38
End: 2018-06-25

## 2018-06-25 RX ORDER — HYDROCODONE BITARTRATE AND ACETAMINOPHEN 10; 325 MG/1; MG/1
TABLET ORAL
Qty: 90 TABLET | Refills: 0 | Status: SHIPPED | OUTPATIENT
Start: 2018-06-25 | End: 2018-07-11 | Stop reason: SDUPTHER

## 2018-06-25 RX ORDER — SENNOSIDES 25 MG/1
1 TABLET, FILM COATED ORAL 4 TIMES DAILY
Qty: 30 G | Refills: 2 | Status: SHIPPED | OUTPATIENT
Start: 2018-06-25 | End: 2019-05-02

## 2018-07-04 ENCOUNTER — PATIENT MESSAGE (OUTPATIENT)
Dept: SURGERY | Facility: CLINIC | Age: 38
End: 2018-07-04

## 2018-07-05 DIAGNOSIS — F41.1 GAD (GENERALIZED ANXIETY DISORDER): ICD-10-CM

## 2018-07-05 RX ORDER — CITALOPRAM 10 MG/1
10 TABLET ORAL DAILY
Qty: 30 TABLET | Refills: 1 | Status: SHIPPED | OUTPATIENT
Start: 2018-07-05 | End: 2018-07-09

## 2018-07-05 NOTE — PROGRESS NOTES
Subjective:       Patient ID: Nyasia Middleton is a 37 y.o. female.    Chief Complaint: Annual Exam    Patient is a 37 y.o.female who presents today for annual.    Cholesterol: due now  Vaccines: Influenza (yearly)  Gyn exam: partial hysterectomy  Exercise: some  Diet: paleo  LMP: regular    On treatment 31 / 40 for hyperbaric treatment. It is treating a wound and fistulas. The wound has decreased in size. The treatments are two hours. She will get a cscope once tx is finished. Pt follows with dr. Gonzales and dr. Peralta.    She still has some fatigue and on and off fevers recently. Her temp can reach 100.5 at times.     Doing well on celexa.    Review of Systems   Constitutional: Negative for appetite change, chills, diaphoresis, fatigue and fever.   HENT: Negative for congestion, dental problem, ear discharge, ear pain, hearing loss, postnasal drip, sinus pressure and sore throat.    Eyes: Negative for discharge, redness and itching.   Respiratory: Negative for cough, chest tightness, shortness of breath and wheezing.    Cardiovascular: Negative for chest pain, palpitations and leg swelling.   Gastrointestinal: Negative for abdominal pain, constipation, diarrhea, nausea and vomiting.   Endocrine: Negative for cold intolerance and heat intolerance.   Genitourinary: Negative for difficulty urinating, frequency, hematuria and urgency.   Musculoskeletal: Negative for arthralgias, back pain, gait problem, myalgias and neck pain.   Skin: Negative for color change and rash.   Neurological: Negative for dizziness, syncope and headaches.   Hematological: Negative for adenopathy.   Psychiatric/Behavioral: Negative for behavioral problems and sleep disturbance. The patient is not nervous/anxious.        Objective:      Physical Exam   Constitutional: She is oriented to person, place, and time. She appears well-developed and well-nourished. No distress.   HENT:   Head: Normocephalic and atraumatic.   Right Ear: External ear  normal.   Left Ear: External ear normal.   Nose: Nose normal.   Mouth/Throat: Oropharynx is clear and moist. No oropharyngeal exudate.   Eyes: Conjunctivae and EOM are normal. Pupils are equal, round, and reactive to light. Right eye exhibits no discharge. Left eye exhibits no discharge. No scleral icterus.   Neck: Normal range of motion. Neck supple. No JVD present. No thyromegaly present.   Cardiovascular: Normal rate, regular rhythm, normal heart sounds and intact distal pulses.  Exam reveals no gallop and no friction rub.    No murmur heard.  Pulmonary/Chest: Effort normal and breath sounds normal. No stridor. No respiratory distress. She has no wheezes. She has no rales. She exhibits no tenderness.   Abdominal: Soft. Bowel sounds are normal. She exhibits no distension. There is no tenderness. There is no rebound.   Musculoskeletal: Normal range of motion. She exhibits no edema or tenderness.   Lymphadenopathy:     She has no cervical adenopathy.   Neurological: She is alert and oriented to person, place, and time. No cranial nerve deficit.   Skin: Skin is warm and dry. No rash noted. She is not diaphoretic. No erythema.   Psychiatric: She has a normal mood and affect. Her behavior is normal.   Nursing note and vitals reviewed.      Assessment and Plan:       1. Annual physical exam  - not fasting today  - CBC auto differential; Future  - Comprehensive metabolic panel; Future  - Lipid panel; Future  - TSH; Future  - Urinalysis; Future  - Vitamin D; Future  - ESTRADIOL; Future  - Follicle stimulating hormone; Future  - Luteinizing hormone; Future    2. Fever, unspecified fever cause  - Blood culture; Future  - Urine culture; Future  - Sedimentation rate; Future  - High sensitivity CRP (Cardiac CRP); Future    3. Fistula of intestine, excluding rectum and anus  - follows with GI and colorectal  - Blood culture; Future  - Urine culture; Future  - Sedimentation rate; Future  - High sensitivity CRP (Cardiac CRP);  Future  - will take over opioid script in 2019    4. MARION (generalized anxiety disorder)  - celexa 20 mg daily          No Follow-up on file.

## 2018-07-09 ENCOUNTER — OFFICE VISIT (OUTPATIENT)
Dept: INTERNAL MEDICINE | Facility: CLINIC | Age: 38
End: 2018-07-09
Payer: COMMERCIAL

## 2018-07-09 ENCOUNTER — LAB VISIT (OUTPATIENT)
Dept: LAB | Facility: HOSPITAL | Age: 38
End: 2018-07-09
Attending: INTERNAL MEDICINE
Payer: COMMERCIAL

## 2018-07-09 VITALS
WEIGHT: 141.31 LBS | DIASTOLIC BLOOD PRESSURE: 56 MMHG | SYSTOLIC BLOOD PRESSURE: 102 MMHG | BODY MASS INDEX: 21.42 KG/M2 | HEART RATE: 84 BPM | TEMPERATURE: 99 F | RESPIRATION RATE: 14 BRPM | HEIGHT: 68 IN

## 2018-07-09 DIAGNOSIS — K63.2 FISTULA OF INTESTINE, EXCLUDING RECTUM AND ANUS: ICD-10-CM

## 2018-07-09 DIAGNOSIS — Z00.00 ANNUAL PHYSICAL EXAM: Primary | ICD-10-CM

## 2018-07-09 DIAGNOSIS — Z00.00 ANNUAL PHYSICAL EXAM: ICD-10-CM

## 2018-07-09 DIAGNOSIS — R50.9 FEVER, UNSPECIFIED FEVER CAUSE: ICD-10-CM

## 2018-07-09 DIAGNOSIS — F41.1 GAD (GENERALIZED ANXIETY DISORDER): ICD-10-CM

## 2018-07-09 LAB
25(OH)D3+25(OH)D2 SERPL-MCNC: 47 NG/ML
ALBUMIN SERPL BCP-MCNC: 3.1 G/DL
ALP SERPL-CCNC: 79 U/L
ALT SERPL W/O P-5'-P-CCNC: 10 U/L
ANION GAP SERPL CALC-SCNC: 7 MMOL/L
AST SERPL-CCNC: 18 U/L
BASOPHILS # BLD AUTO: 0.06 K/UL
BASOPHILS NFR BLD: 0.7 %
BILIRUB SERPL-MCNC: 0.3 MG/DL
BUN SERPL-MCNC: 12 MG/DL
CALCIUM SERPL-MCNC: 9.3 MG/DL
CHLORIDE SERPL-SCNC: 105 MMOL/L
CHOLEST SERPL-MCNC: 118 MG/DL
CHOLEST/HDLC SERPL: 2.9 {RATIO}
CO2 SERPL-SCNC: 26 MMOL/L
CREAT SERPL-MCNC: 0.7 MG/DL
CRP SERPL-MCNC: 67.74 MG/L
DIFFERENTIAL METHOD: ABNORMAL
EOSINOPHIL # BLD AUTO: 0.2 K/UL
EOSINOPHIL NFR BLD: 2.6 %
ERYTHROCYTE [DISTWIDTH] IN BLOOD BY AUTOMATED COUNT: 15.8 %
ERYTHROCYTE [SEDIMENTATION RATE] IN BLOOD BY WESTERGREN METHOD: 78 MM/HR
EST. GFR  (AFRICAN AMERICAN): >60 ML/MIN/1.73 M^2
EST. GFR  (NON AFRICAN AMERICAN): >60 ML/MIN/1.73 M^2
ESTRADIOL SERPL-MCNC: 47 PG/ML
FSH SERPL-ACNC: 4.7 MIU/ML
GLUCOSE SERPL-MCNC: 87 MG/DL
HCT VFR BLD AUTO: 31.3 %
HDLC SERPL-MCNC: 41 MG/DL
HDLC SERPL: 34.7 %
HGB BLD-MCNC: 9.1 G/DL
IMM GRANULOCYTES # BLD AUTO: 0.03 K/UL
IMM GRANULOCYTES NFR BLD AUTO: 0.4 %
LDLC SERPL CALC-MCNC: 60.6 MG/DL
LH SERPL-ACNC: 2.3 MIU/ML
LYMPHOCYTES # BLD AUTO: 1.6 K/UL
LYMPHOCYTES NFR BLD: 19.3 %
MCH RBC QN AUTO: 25 PG
MCHC RBC AUTO-ENTMCNC: 29.1 G/DL
MCV RBC AUTO: 86 FL
MONOCYTES # BLD AUTO: 0.6 K/UL
MONOCYTES NFR BLD: 7.5 %
NEUTROPHILS # BLD AUTO: 5.8 K/UL
NEUTROPHILS NFR BLD: 69.5 %
NONHDLC SERPL-MCNC: 77 MG/DL
NRBC BLD-RTO: 0 /100 WBC
PLATELET # BLD AUTO: 386 K/UL
PMV BLD AUTO: 9.5 FL
POTASSIUM SERPL-SCNC: 3.8 MMOL/L
PROT SERPL-MCNC: 7.8 G/DL
RBC # BLD AUTO: 3.64 M/UL
SODIUM SERPL-SCNC: 138 MMOL/L
TRIGL SERPL-MCNC: 82 MG/DL
TSH SERPL DL<=0.005 MIU/L-ACNC: 1.4 UIU/ML
WBC # BLD AUTO: 8.4 K/UL

## 2018-07-09 PROCEDURE — 85651 RBC SED RATE NONAUTOMATED: CPT

## 2018-07-09 PROCEDURE — 99999 PR PBB SHADOW E&M-EST. PATIENT-LVL III: CPT | Mod: PBBFAC,,, | Performed by: INTERNAL MEDICINE

## 2018-07-09 PROCEDURE — 80061 LIPID PANEL: CPT

## 2018-07-09 PROCEDURE — 82670 ASSAY OF TOTAL ESTRADIOL: CPT

## 2018-07-09 PROCEDURE — 36415 COLL VENOUS BLD VENIPUNCTURE: CPT | Mod: PO

## 2018-07-09 PROCEDURE — 86141 C-REACTIVE PROTEIN HS: CPT

## 2018-07-09 PROCEDURE — 83002 ASSAY OF GONADOTROPIN (LH): CPT

## 2018-07-09 PROCEDURE — 82306 VITAMIN D 25 HYDROXY: CPT

## 2018-07-09 PROCEDURE — 99395 PREV VISIT EST AGE 18-39: CPT | Mod: S$GLB,,, | Performed by: INTERNAL MEDICINE

## 2018-07-09 PROCEDURE — 87040 BLOOD CULTURE FOR BACTERIA: CPT

## 2018-07-09 PROCEDURE — 80053 COMPREHEN METABOLIC PANEL: CPT

## 2018-07-09 PROCEDURE — 84443 ASSAY THYROID STIM HORMONE: CPT

## 2018-07-09 PROCEDURE — 85025 COMPLETE CBC W/AUTO DIFF WBC: CPT

## 2018-07-09 PROCEDURE — 83001 ASSAY OF GONADOTROPIN (FSH): CPT

## 2018-07-09 RX ORDER — CITALOPRAM 20 MG/1
20 TABLET, FILM COATED ORAL DAILY
Qty: 90 TABLET | Refills: 3 | Status: SHIPPED | OUTPATIENT
Start: 2018-07-09 | End: 2018-07-30 | Stop reason: SDUPTHER

## 2018-07-09 RX ORDER — USTEKINUMAB 90 MG/ML
INJECTION, SOLUTION SUBCUTANEOUS
COMMUNITY
Start: 2018-06-19 | End: 2022-08-11

## 2018-07-10 ENCOUNTER — PATIENT MESSAGE (OUTPATIENT)
Dept: INTERNAL MEDICINE | Facility: CLINIC | Age: 38
End: 2018-07-10

## 2018-07-10 DIAGNOSIS — D64.9 ANEMIA, UNSPECIFIED TYPE: Primary | ICD-10-CM

## 2018-07-11 ENCOUNTER — DOCUMENTATION ONLY (OUTPATIENT)
Dept: INTERNAL MEDICINE | Facility: CLINIC | Age: 38
End: 2018-07-11

## 2018-07-11 ENCOUNTER — LAB VISIT (OUTPATIENT)
Dept: LAB | Facility: HOSPITAL | Age: 38
End: 2018-07-11
Attending: INTERNAL MEDICINE
Payer: COMMERCIAL

## 2018-07-11 ENCOUNTER — TELEPHONE (OUTPATIENT)
Dept: ENDOSCOPY | Facility: HOSPITAL | Age: 38
End: 2018-07-11

## 2018-07-11 ENCOUNTER — PATIENT MESSAGE (OUTPATIENT)
Dept: INTERNAL MEDICINE | Facility: CLINIC | Age: 38
End: 2018-07-11

## 2018-07-11 ENCOUNTER — OFFICE VISIT (OUTPATIENT)
Dept: SURGERY | Facility: CLINIC | Age: 38
End: 2018-07-11
Payer: COMMERCIAL

## 2018-07-11 VITALS
DIASTOLIC BLOOD PRESSURE: 68 MMHG | HEART RATE: 105 BPM | WEIGHT: 140 LBS | BODY MASS INDEX: 21.22 KG/M2 | SYSTOLIC BLOOD PRESSURE: 109 MMHG | HEIGHT: 68 IN

## 2018-07-11 DIAGNOSIS — K60.3 PERIANAL FISTULA: ICD-10-CM

## 2018-07-11 DIAGNOSIS — Z87.19 HISTORY OF ULCERATIVE COLITIS: Primary | ICD-10-CM

## 2018-07-11 DIAGNOSIS — D64.9 ANEMIA, UNSPECIFIED TYPE: ICD-10-CM

## 2018-07-11 LAB
BASOPHILS # BLD AUTO: 0.08 K/UL
BASOPHILS NFR BLD: 0.7 %
DIFFERENTIAL METHOD: ABNORMAL
EOSINOPHIL # BLD AUTO: 0.4 K/UL
EOSINOPHIL NFR BLD: 3.6 %
ERYTHROCYTE [DISTWIDTH] IN BLOOD BY AUTOMATED COUNT: 15.9 %
FERRITIN SERPL-MCNC: 97 NG/ML
HCT VFR BLD AUTO: 32.5 %
HGB BLD-MCNC: 9.6 G/DL
IMM GRANULOCYTES # BLD AUTO: 0.04 K/UL
IMM GRANULOCYTES NFR BLD AUTO: 0.4 %
IRON SERPL-MCNC: 20 UG/DL
LYMPHOCYTES # BLD AUTO: 1.8 K/UL
LYMPHOCYTES NFR BLD: 15.7 %
MCH RBC QN AUTO: 25.1 PG
MCHC RBC AUTO-ENTMCNC: 29.5 G/DL
MCV RBC AUTO: 85 FL
MONOCYTES # BLD AUTO: 0.7 K/UL
MONOCYTES NFR BLD: 6.4 %
NEUTROPHILS # BLD AUTO: 8.2 K/UL
NEUTROPHILS NFR BLD: 73.2 %
NRBC BLD-RTO: 0 /100 WBC
PLATELET # BLD AUTO: 442 K/UL
PMV BLD AUTO: 9.8 FL
RBC # BLD AUTO: 3.82 M/UL
SATURATED IRON: 6 %
TOTAL IRON BINDING CAPACITY: 311 UG/DL
TRANSFERRIN SERPL-MCNC: 210 MG/DL
WBC # BLD AUTO: 11.15 K/UL

## 2018-07-11 PROCEDURE — 83540 ASSAY OF IRON: CPT

## 2018-07-11 PROCEDURE — 3008F BODY MASS INDEX DOCD: CPT | Mod: CPTII,S$GLB,, | Performed by: NURSE PRACTITIONER

## 2018-07-11 PROCEDURE — 82728 ASSAY OF FERRITIN: CPT

## 2018-07-11 PROCEDURE — 99214 OFFICE O/P EST MOD 30 MIN: CPT | Mod: S$GLB,,, | Performed by: NURSE PRACTITIONER

## 2018-07-11 PROCEDURE — 36415 COLL VENOUS BLD VENIPUNCTURE: CPT | Mod: PO

## 2018-07-11 PROCEDURE — 99999 PR PBB SHADOW E&M-EST. PATIENT-LVL III: CPT | Mod: PBBFAC,,, | Performed by: NURSE PRACTITIONER

## 2018-07-11 PROCEDURE — 85025 COMPLETE CBC W/AUTO DIFF WBC: CPT

## 2018-07-11 RX ORDER — HYDROCODONE BITARTRATE AND ACETAMINOPHEN 10; 325 MG/1; MG/1
1 TABLET ORAL
Qty: 90 TABLET | Refills: 0 | Status: SHIPPED | OUTPATIENT
Start: 2018-07-11 | End: 2018-08-22 | Stop reason: SDUPTHER

## 2018-07-11 RX ORDER — METRONIDAZOLE 500 MG/1
500 TABLET ORAL EVERY 12 HOURS
Qty: 14 TABLET | Refills: 0 | Status: SHIPPED | OUTPATIENT
Start: 2018-07-11 | End: 2018-07-23 | Stop reason: SDUPTHER

## 2018-07-11 RX ORDER — DICYCLOMINE HYDROCHLORIDE 20 MG/1
20 TABLET ORAL EVERY 6 HOURS
Qty: 120 TABLET | Refills: 0 | Status: SHIPPED | OUTPATIENT
Start: 2018-07-11 | End: 2018-08-10

## 2018-07-11 RX ORDER — HYDROCODONE BITARTRATE AND ACETAMINOPHEN 10; 325 MG/1; MG/1
1 TABLET ORAL
Qty: 30 TABLET | Refills: 0 | Status: SHIPPED | OUTPATIENT
Start: 2018-07-11 | End: 2018-07-11 | Stop reason: SDUPTHER

## 2018-07-11 NOTE — PROGRESS NOTES
Subjective:       Patient ID: Nyasia Middleton is a 37 y.o. female.    Chief Complaint: Skin Tags    HPI   37 F pt of Dr Foote s/p IPAA and multiple perianal fistula repairs. She presents to clinic with complaints of increased purulent drainage. Skin tag also causing irritation. Denies fevers, chills. Having BMs.       Review of Systems   Constitutional: Negative for fatigue, fever and unexpected weight change.   Respiratory: Negative for shortness of breath.    Cardiovascular: Negative for chest pain.   Gastrointestinal: Positive for abdominal pain, diarrhea and rectal pain. Negative for abdominal distention, anal bleeding, blood in stool, constipation, nausea and vomiting.       Objective:      Physical Exam   Constitutional: She is oriented to person, place, and time. She appears well-developed and well-nourished. No distress.   Eyes: Conjunctivae and EOM are normal.   Pulmonary/Chest: Effort normal. No respiratory distress.   Abdominal: Soft. She exhibits no distension. There is no tenderness.   Genitourinary:   Genitourinary Comments: Anterior opening, purulent drainage. Probed with Q tip.   Right lateral skin tag present, non tender   Musculoskeletal: Normal range of motion.   Neurological: She is alert and oriented to person, place, and time.   Skin: Skin is warm and dry.   Psychiatric: She has a normal mood and affect. Her behavior is normal.       Assessment:       1. History of ulcerative colitis    2. Perianal fistula        Plan:       Patient examined with Dr Peralta, will schedule for flex sig possible skin tag removal   Pain medication refilled   Course of flagyl x 1 for drainage

## 2018-07-12 ENCOUNTER — PATIENT MESSAGE (OUTPATIENT)
Dept: INTERNAL MEDICINE | Facility: CLINIC | Age: 38
End: 2018-07-12

## 2018-07-12 DIAGNOSIS — D50.9 IRON DEFICIENCY ANEMIA, UNSPECIFIED IRON DEFICIENCY ANEMIA TYPE: Primary | ICD-10-CM

## 2018-07-12 RX ORDER — FERROUS SULFATE 325(65) MG
325 TABLET ORAL
Qty: 30 TABLET | Refills: 2 | Status: SHIPPED | OUTPATIENT
Start: 2018-07-12 | End: 2018-12-12

## 2018-07-13 ENCOUNTER — PATIENT MESSAGE (OUTPATIENT)
Dept: INTERNAL MEDICINE | Facility: CLINIC | Age: 38
End: 2018-07-13

## 2018-07-14 LAB — BACTERIA BLD CULT: NORMAL

## 2018-07-16 ENCOUNTER — LAB VISIT (OUTPATIENT)
Dept: LAB | Facility: HOSPITAL | Age: 38
End: 2018-07-16
Attending: INTERNAL MEDICINE
Payer: COMMERCIAL

## 2018-07-16 DIAGNOSIS — D64.9 ANEMIA, UNSPECIFIED TYPE: ICD-10-CM

## 2018-07-16 LAB — HEMOCCULT STL QL IA: NEGATIVE

## 2018-07-16 PROCEDURE — 82274 ASSAY TEST FOR BLOOD FECAL: CPT

## 2018-07-19 ENCOUNTER — PATIENT MESSAGE (OUTPATIENT)
Dept: SURGERY | Facility: CLINIC | Age: 38
End: 2018-07-19

## 2018-07-23 RX ORDER — METRONIDAZOLE 500 MG/1
TABLET ORAL
Qty: 14 TABLET | Refills: 0 | Status: SHIPPED | OUTPATIENT
Start: 2018-07-23 | End: 2018-08-08

## 2018-07-30 RX ORDER — CITALOPRAM 20 MG/1
TABLET, FILM COATED ORAL
Qty: 90 TABLET | Refills: 3 | Status: SHIPPED | OUTPATIENT
Start: 2018-07-30 | End: 2018-12-12

## 2018-07-31 ENCOUNTER — ANESTHESIA EVENT (OUTPATIENT)
Dept: ENDOSCOPY | Facility: HOSPITAL | Age: 38
End: 2018-07-31
Payer: COMMERCIAL

## 2018-07-31 RX ORDER — PANTOPRAZOLE SODIUM 40 MG/1
TABLET, DELAYED RELEASE ORAL
Qty: 90 TABLET | Refills: 3 | Status: SHIPPED | OUTPATIENT
Start: 2018-07-31 | End: 2018-11-12 | Stop reason: SDUPTHER

## 2018-07-31 RX ORDER — HYDROCODONE BITARTRATE AND ACETAMINOPHEN 10; 325 MG/1; MG/1
TABLET ORAL
Qty: 90 TABLET | Refills: 0 | Status: SHIPPED | OUTPATIENT
Start: 2018-07-31 | End: 2018-08-17 | Stop reason: SDUPTHER

## 2018-08-01 ENCOUNTER — SURGERY (OUTPATIENT)
Age: 38
End: 2018-08-01

## 2018-08-01 ENCOUNTER — TELEPHONE (OUTPATIENT)
Dept: SURGERY | Facility: CLINIC | Age: 38
End: 2018-08-01

## 2018-08-01 ENCOUNTER — ANESTHESIA (OUTPATIENT)
Dept: ENDOSCOPY | Facility: HOSPITAL | Age: 38
End: 2018-08-01
Payer: COMMERCIAL

## 2018-08-01 ENCOUNTER — PATIENT MESSAGE (OUTPATIENT)
Dept: ENDOSCOPY | Facility: HOSPITAL | Age: 38
End: 2018-08-01

## 2018-08-01 ENCOUNTER — HOSPITAL ENCOUNTER (OUTPATIENT)
Facility: HOSPITAL | Age: 38
Discharge: HOME | End: 2018-08-01
Attending: COLON & RECTAL SURGERY | Admitting: COLON & RECTAL SURGERY
Payer: COMMERCIAL

## 2018-08-01 VITALS
BODY MASS INDEX: 20.92 KG/M2 | SYSTOLIC BLOOD PRESSURE: 109 MMHG | HEART RATE: 61 BPM | DIASTOLIC BLOOD PRESSURE: 52 MMHG | TEMPERATURE: 98 F | HEIGHT: 68 IN | OXYGEN SATURATION: 100 % | RESPIRATION RATE: 18 BRPM | WEIGHT: 138 LBS

## 2018-08-01 DIAGNOSIS — K64.4 ANAL SKIN TAG: ICD-10-CM

## 2018-08-01 DIAGNOSIS — N36.0 PERINEAL SINUS: ICD-10-CM

## 2018-08-01 DIAGNOSIS — Z12.11 SCREENING FOR COLON CANCER: ICD-10-CM

## 2018-08-01 DIAGNOSIS — N82.3 RECTOVAGINAL FISTULA: Primary | ICD-10-CM

## 2018-08-01 PROCEDURE — 44386 ENDOSCOPY BOWEL POUCH/BIOP: CPT | Mod: ,,, | Performed by: COLON & RECTAL SURGERY

## 2018-08-01 PROCEDURE — 37000009 HC ANESTHESIA EA ADD 15 MINS: Performed by: COLON & RECTAL SURGERY

## 2018-08-01 PROCEDURE — 25000003 PHARM REV CODE 250: Performed by: NURSE PRACTITIONER

## 2018-08-01 PROCEDURE — 88305 TISSUE EXAM BY PATHOLOGIST: CPT | Performed by: PATHOLOGY

## 2018-08-01 PROCEDURE — 44377 SMALL BOWEL ENDOSCOPY/BIOPSY: CPT | Performed by: COLON & RECTAL SURGERY

## 2018-08-01 PROCEDURE — E9220 PRA ENDO ANESTHESIA: HCPCS | Mod: ,,, | Performed by: NURSE ANESTHETIST, CERTIFIED REGISTERED

## 2018-08-01 PROCEDURE — 27201012 HC FORCEPS, HOT/COLD, DISP: Performed by: COLON & RECTAL SURGERY

## 2018-08-01 PROCEDURE — 46020 PLACEMENT OF SETON: CPT

## 2018-08-01 PROCEDURE — 37000008 HC ANESTHESIA 1ST 15 MINUTES: Performed by: COLON & RECTAL SURGERY

## 2018-08-01 PROCEDURE — 63600175 PHARM REV CODE 636 W HCPCS: Performed by: ANESTHESIOLOGY

## 2018-08-01 PROCEDURE — 44386 ENDOSCOPY BOWEL POUCH/BIOP: CPT | Performed by: COLON & RECTAL SURGERY

## 2018-08-01 PROCEDURE — 88305 TISSUE EXAM BY PATHOLOGIST: CPT | Mod: 26,,, | Performed by: PATHOLOGY

## 2018-08-01 PROCEDURE — 63600175 PHARM REV CODE 636 W HCPCS: Performed by: NURSE ANESTHETIST, CERTIFIED REGISTERED

## 2018-08-01 RX ORDER — CIPROFLOXACIN 500 MG/1
500 TABLET ORAL 2 TIMES DAILY
Qty: 20 TABLET | Refills: 0 | Status: SHIPPED | OUTPATIENT
Start: 2018-08-01 | End: 2018-08-11

## 2018-08-01 RX ORDER — PROPOFOL 10 MG/ML
VIAL (ML) INTRAVENOUS
Status: DISCONTINUED | OUTPATIENT
Start: 2018-08-01 | End: 2018-08-01

## 2018-08-01 RX ORDER — ALPRAZOLAM 0.5 MG/1
0.5 TABLET ORAL 3 TIMES DAILY
Qty: 50 TABLET | Refills: 0 | Status: SHIPPED | OUTPATIENT
Start: 2018-08-01 | End: 2018-11-12 | Stop reason: SDUPTHER

## 2018-08-01 RX ORDER — SODIUM CHLORIDE 9 MG/ML
INJECTION, SOLUTION INTRAVENOUS CONTINUOUS
Status: DISCONTINUED | OUTPATIENT
Start: 2018-08-01 | End: 2018-08-01 | Stop reason: HOSPADM

## 2018-08-01 RX ORDER — SODIUM CHLORIDE 0.9 % (FLUSH) 0.9 %
3 SYRINGE (ML) INJECTION
Status: DISCONTINUED | OUTPATIENT
Start: 2018-08-01 | End: 2018-08-01 | Stop reason: HOSPADM

## 2018-08-01 RX ORDER — LIDOCAINE HCL/PF 100 MG/5ML
SYRINGE (ML) INTRAVENOUS
Status: DISCONTINUED | OUTPATIENT
Start: 2018-08-01 | End: 2018-08-01

## 2018-08-01 RX ORDER — PROPOFOL 10 MG/ML
VIAL (ML) INTRAVENOUS CONTINUOUS PRN
Status: DISCONTINUED | OUTPATIENT
Start: 2018-08-01 | End: 2018-08-01

## 2018-08-01 RX ORDER — HYDROMORPHONE HYDROCHLORIDE 2 MG/ML
0.2 INJECTION, SOLUTION INTRAMUSCULAR; INTRAVENOUS; SUBCUTANEOUS EVERY 5 MIN PRN
Status: DISCONTINUED | OUTPATIENT
Start: 2018-08-01 | End: 2018-08-01 | Stop reason: HOSPADM

## 2018-08-01 RX ORDER — FENTANYL CITRATE 50 UG/ML
50 INJECTION, SOLUTION INTRAMUSCULAR; INTRAVENOUS ONCE
Status: COMPLETED | OUTPATIENT
Start: 2018-08-01 | End: 2018-08-01

## 2018-08-01 RX ADMIN — PROPOFOL 20 MG: 10 INJECTION, EMULSION INTRAVENOUS at 08:08

## 2018-08-01 RX ADMIN — PROPOFOL 70 MG: 10 INJECTION, EMULSION INTRAVENOUS at 08:08

## 2018-08-01 RX ADMIN — FENTANYL CITRATE 100 MCG: 50 INJECTION INTRAMUSCULAR; INTRAVENOUS at 09:08

## 2018-08-01 RX ADMIN — PROPOFOL 200 MCG/KG/MIN: 10 INJECTION, EMULSION INTRAVENOUS at 08:08

## 2018-08-01 RX ADMIN — LIDOCAINE HYDROCHLORIDE 100 MG: 20 INJECTION, SOLUTION INTRAVENOUS at 08:08

## 2018-08-01 RX ADMIN — SODIUM CHLORIDE: 0.9 INJECTION, SOLUTION INTRAVENOUS at 08:08

## 2018-08-01 RX ADMIN — SODIUM CHLORIDE: 0.9 INJECTION, SOLUTION INTRAVENOUS at 09:08

## 2018-08-01 NOTE — DISCHARGE INSTRUCTIONS
Colonoscopy     A camera attached to a flexible tube with a viewing lens is used to take video pictures.     Colonoscopy is a test to view the inside of your lower digestive tract (colon and rectum). Sometimes it can show the last part of the small intestine (ileum). During the test, small pieces of tissue may be removed for testing. This is called a biopsy. Small growths, such as polyps, may also be removed.   Why is colonoscopy done?  The test is done to help look for colon cancer. And it can help find the source of abdominal pain, bleeding, and changes in bowel habits. It may be needed once a year, depending on factors such as your:  · Age  · Health history  · Family health history  · Symptoms  · Results from any prior colonoscopy  Risks and possible complications  These include:  · Bleeding               · A puncture or tear in the colon   · Risks of anesthesia  · A cancer lesion not being seen  Getting ready   To prepare for the test:  · Talk with your healthcare provider about the risks of the test (see below). Also ask your healthcare provider about alternatives to the test.  · Tell your healthcare provider about any medicines you take. Also tell him or her about any health conditions you may have.  · Make sure your rectum and colon are empty for the test. Follow the diet and bowel prep instructions exactly. If you dont, the test may need to be rescheduled.  · Plan for a friend or family member to drive you home after the test.     Colonoscopy provides an inside view of the entire colon.     You may discuss the results with your doctor right away or at a future visit.  During the test   The test is usually done in the hospital on an outpatient basis. This means you go home the same day. The procedure takes about 30 minutes. During that time:  · You are given relaxing (sedating) medicine through an IV line. You may be drowsy, or fully asleep.  · The healthcare provider will first give you a physical exam to  check for anal and rectal problems.  · Then the anus is lubricated and the scope inserted.  · If you are awake, you may have a feeling similar to needing to have a bowel movement. You may also feel pressure as air is pumped into the colon. Its OK to pass gas during the procedure.  · Biopsy, polyp removal, or other treatments may be done during the test.  After the test   You may have gas right after the test. It can help to try to pass it to help prevent later bloating. Your healthcare provider may discuss the results with you right away. Or you may need to schedule a follow-up visit to talk about the results. After the test, you can go back to your normal eating and other activities. You may be tired from the sedation and need to rest for a few hours.  Date Last Reviewed: 11/1/2016 © 2000-2017 [employer]. [address] [place] [address], [place] [address]. All rights reserved. This information is not intended as a substitute for professional medical care. Always follow your healthcare professional's instructions.

## 2018-08-01 NOTE — ANESTHESIA PREPROCEDURE EVALUATION
2018  Nyasia Middleton is a 37 y.o., female.  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     I have reviewed the Medications.     Review of Systems  Anesthesia Hx:  No problems with previous Anesthesia Denies Hx of Anesthetic complications  Denies Family Hx of Anesthesia complications.   Denies Personal Hx of Anesthesia complications.   Social:  Non-Smoker    Hematology/Oncology:  Hematology Normal   Oncology Normal     EENT/Dental:EENT/Dental Normal   Cardiovascular:  Cardiovascular Normal Exercise tolerance: good     Pulmonary:  Pulmonary Normal    Renal/:  Renal/ Normal     Hepatic/GI:   Bowel Prep. GERD    Musculoskeletal:  Musculoskeletal Normal    Neurological:  Neurology Normal    Endocrine:  Endocrine Normal    Dermatological:  Skin Normal    Psych:   anxiety          Physical Exam  General:  Well nourished    Airway/Jaw/Neck:  Airway Findings: Mouth Opening: Normal Tongue: Normal  General Airway Assessment: Adult  Mallampati: II  TM Distance: Normal, at least 6 cm  Jaw/Neck Findings:  Neck ROM: Normal ROM      Eyes/Ears/Nose:  EYES/EARS/NOSE FINDINGS: Normal   Dental:  Dental Findings: In tact   Chest/Lungs:  Chest/Lungs Findings: Clear to auscultation, Normal Respiratory Rate     Heart/Vascular:  Heart Findings: Rate: Normal  Sounds: Normal     Abdomen:  Abdomen Findings: Normal    Musculoskeletal:  Musculoskeletal Findings: Normal   Skin:  Skin Findings: Normal    Mental Status:  Mental Status Findings:  Cooperative, Alert and Oriented         Anesthesia Plan  Type of Anesthesia, risks & benefits discussed:  Anesthesia Type:  general  Patient's Preference: General  Intra-op Monitoring Plan: standard ASA monitors  Intra-op Monitoring Plan Comments:   Post Op Pain Control Plan:   Post Op Pain Control Plan Comments:   Induction:   IV  Beta Blocker:  Patient is not currently on a Beta-Blocker (No further documentation required).       Informed Consent: Patient understands risks and agrees with Anesthesia plan.  Questions answered. Anesthesia consent signed with patient.  ASA Score: 2     Day of Surgery Review of History & Physical: I have interviewed and examined the patient. I have reviewed the patient's H&P dated:  There are no significant changes.  H&P update referred to the provider.     Anesthesia Plan Notes: NPO confirmed.         Ready For Surgery From Anesthesia Perspective.

## 2018-08-01 NOTE — TRANSFER OF CARE
"Anesthesia Transfer of Care Note    Patient: Nyasia Middleton    Procedure(s) Performed: Procedure(s) (LRB):  SIGMOIDOSCOPY, FLEXIBLE (N/A)    Patient location: OPS    Anesthesia Type: general    Transport from OR: Transported from OR on room air with adequate spontaneous ventilation    Post pain: adequate analgesia    Post assessment: no apparent anesthetic complications    Post vital signs: stable    Level of consciousness: awake    Nausea/Vomiting: no nausea/vomiting    Complications: none    Transfer of care protocol was followed      Last vitals:   Visit Vitals  /60   Pulse 67   Temp 36.8 °C (98.2 °F)   Resp 14   Ht 5' 8" (1.727 m)   Wt 62.6 kg (138 lb)   LMP 07/14/2017   SpO2 98%   Breastfeeding? No   BMI 20.98 kg/m²     "

## 2018-08-01 NOTE — PROVATION PATIENT INSTRUCTIONS
Discharge Summary/Instructions after an Endoscopic Procedure  Patient Name: Nyasia Middleton  Patient MRN: [id number]  Patient YOB: 1980 Wednesday, August 01, 2018  Jairo Peralta MD  RESTRICTIONS:  During your procedure today, you received medications for sedation.  These   medications may affect your judgment, balance and coordination.  Therefore,   for 24 hours, you have the following restrictions:   - DO NOT drive a car, operate machinery, make legal/financial decisions,   sign important papers or drink alcohol.    ACTIVITY:  Today: no heavy lifting, straining or running due to procedural   sedation/anesthesia.  The following day: return to full activity including work.  DIET:  Eat and drink normally unless instructed otherwise.     TREATMENT FOR COMMON SIDE EFFECTS:  - Mild abdominal pain, nausea, belching, bloating or excessive gas:  rest,   eat lightly and use a heating pad.  - Sore Throat: treat with throat lozenges and/or gargle with warm salt   water.  - Because air was used during the procedure, expelling large amounts of air   from your rectum or belching is normal.  - If a bowel prep was taken, you may not have a bowel movement for 1-3 days.    This is normal.  SYMPTOMS TO WATCH FOR AND REPORT TO YOUR PHYSICIAN:  1. Abdominal pain or bloating, other than gas cramps.  2. Chest pain.  3. Back pain.  4. Signs of infection such as: chills or fever occurring within 24 hours   after the procedure.  5. Rectal bleeding, which would show as bright red, maroon, or black stools.   (A tablespoon of blood from the rectum is not serious, especially if   hemorrhoids are present.)  6. Vomiting.  7. Weakness or dizziness.  GO DIRECTLY TO THE NEAREST EMERGENCY ROOM IF YOU HAVE ANY OF THE FOLLOWING:      Difficulty breathing              Chills and/or fever over 101 F   Persistent vomiting and/or vomiting blood   Severe abdominal pain   Severe chest pain   Black, tarry stools   Bleeding- more than one tablespoon   Any  other symptom or condition that you feel may need urgent attention  Your doctor recommends these additional instructions:  If any biopsies were taken, your doctors clinic will contact you in 1 to 2   weeks with any results.  - Discharge patient to home (ambulatory).   - Return to my office in 2 weeks.  For questions, problems or results please call your physician - Jairo Peralta MD at Work:  [phone].  [facility], EMERGENCY ROOM PHONE NUMBER: [phone]  IF A COMPLICATION OR EMERGENCY SITUATION ARISES AND YOU ARE UNABLE TO REACH   YOUR PHYSICIAN - GO DIRECTLY TO THE EMERGENCY ROOM.  Jairo Peralta MD  8/1/2018 9:09:10 AM  This report has been verified and signed electronically.  [employer]

## 2018-08-01 NOTE — ANESTHESIA POSTPROCEDURE EVALUATION
"Anesthesia Post Evaluation    Patient: Nyasia Middleton    Procedure(s) Performed: Procedure(s) (LRB):  SIGMOIDOSCOPY, FLEXIBLE (N/A)    Final Anesthesia Type: general  Patient location during evaluation: PACU  Patient participation: Yes- Able to Participate  Level of consciousness: awake and alert  Post-procedure vital signs: reviewed and stable  Pain management: adequate  Airway patency: patent  PONV status at discharge: No PONV  Anesthetic complications: no      Cardiovascular status: blood pressure returned to baseline  Respiratory status: spontaneous ventilation and room air  Hydration status: euvolemic  Follow-up not needed.        Visit Vitals  /74   Pulse 67   Temp 36.7 °C (98.1 °F)   Resp (!) 23   Ht 5' 8" (1.727 m)   Wt 62.6 kg (138 lb)   LMP 07/14/2017   SpO2 100%   Breastfeeding? No   BMI 20.98 kg/m²       Pain/George Score: Pain Assessment Performed: Yes (8/1/2018  8:06 AM)  Presence of Pain: complains of pain/discomfort (8/1/2018  9:17 AM)  Pain Rating Prior to Med Admin: 10 (8/1/2018  9:33 AM)      "

## 2018-08-01 NOTE — H&P
Endoscopy H&P    Procedure : Colonoscopy      IPAA      Past Medical History:   Diagnosis Date    GERD (gastroesophageal reflux disease)     History of Clostridium difficile infection     History of ulcerative colitis              Review of Systems -ROS:  GENERAL: No fever, chills, fatigability or weight loss.  CHEST: Denies CHRISTINA, cyanosis, wheezing, cough and sputum production.  CARDIOVASCULAR: Denies chest pain, PND, orthopnea or reduced exercise tolerance.   Musculoskeletal ROS: negative for - gait disturbance or joint pain  Neurological ROS: negative for - confusion or memory loss        Physical Exam:  General: well developed, well nourished, no distress  Head: normocephalic  Neck: supple, symmetrical, trachea midline  Lungs:  clear to auscultation bilaterally and normal respiratory effort  Heart: regular rate and rhythm, S1, S2 normal, no murmur, rub or gallop and regular rate and rhythm  Abdomen: soft, non-tender non-distented; bowel sounds normal; no masses,  no organomegaly  Extremities: no cyanosis or edema, or clubbing       Moderate Sedation (choice): Mallampati Score 1    ASA : II    IMP: IPAA    Plan: Pouchoscopy with Moderate sedation. Possible excis eskin tags or seton  I have explained the procedure including indications, alternatives, expected outcomes and potential complications. The patient appears to understand and gives informed consent. The patient is medically ready for surgery.

## 2018-08-08 ENCOUNTER — OFFICE VISIT (OUTPATIENT)
Dept: SURGERY | Facility: CLINIC | Age: 38
End: 2018-08-08
Payer: COMMERCIAL

## 2018-08-08 ENCOUNTER — TELEPHONE (OUTPATIENT)
Dept: ENDOSCOPY | Facility: HOSPITAL | Age: 38
End: 2018-08-08

## 2018-08-08 VITALS
WEIGHT: 141.56 LBS | DIASTOLIC BLOOD PRESSURE: 62 MMHG | HEIGHT: 68 IN | SYSTOLIC BLOOD PRESSURE: 98 MMHG | BODY MASS INDEX: 21.45 KG/M2 | HEART RATE: 73 BPM

## 2018-08-08 DIAGNOSIS — N82.3 RECTOVAGINAL FISTULA: Primary | ICD-10-CM

## 2018-08-08 PROCEDURE — 99214 OFFICE O/P EST MOD 30 MIN: CPT | Mod: S$GLB,,, | Performed by: COLON & RECTAL SURGERY

## 2018-08-08 PROCEDURE — 99999 PR PBB SHADOW E&M-EST. PATIENT-LVL III: CPT | Mod: PBBFAC,,, | Performed by: COLON & RECTAL SURGERY

## 2018-08-08 PROCEDURE — 3008F BODY MASS INDEX DOCD: CPT | Mod: CPTII,S$GLB,, | Performed by: COLON & RECTAL SURGERY

## 2018-08-08 RX ORDER — CIPROFLOXACIN 500 MG/1
500 TABLET ORAL 2 TIMES DAILY
Qty: 20 TABLET | Refills: 0 | Status: SHIPPED | OUTPATIENT
Start: 2018-08-08 | End: 2018-09-18 | Stop reason: SDUPTHER

## 2018-08-08 NOTE — PROGRESS NOTES
Patient ID:  Nyasia Middleton is a 37 y.o. female     Chief Complaint:   Chief Complaint   Patient presents with    Anal Fistula        HPI:37 F pt of Dr Foote s/p IPAA and multiple perianal fistula repairs.      Had pouchoscopy last week with placement of 2 anovaginal setons and ulcers in pouch. alal diltation. Placed on cipro and Xiflaxin. Feels god. Leess drainage from fistulas.      ROS:        Constitutional: No fever, chills, activity or appetite change.      HENT: No hearing loss, facial swelling, neck pain or stiffness.       Eyes: No discharge, itching and visual disturbance.      Respiratory: No apnea, cough, choking or shortness of breath.       Cardiovascular: No leg swelling or chest pain      Gastrointestinal: No abdominal distention or change in bowel habbits     Genitourinary: No dysuria, frequency or flank pain.      Musculoskeletal: No arthralgias or gait problem.      Neurological: No dizziness, seizures or weakness.      Hematological: No adenopathy.      Psychiatric/Behavioral: No hallucinations or behavioral problems.       PE:    APPEARANCE: Well nourished, well developed, in no acute distress.   CHEST: Lungs clear. Normal respiratory effort.  CARDIOVASCULAR: Normal rhythm. No edema.  ABDOMEN: Soft. No tenderness or masses. Well healed scars  Rectum:  2 setons in place  Musculoskeletal: Symmetric, normal range of motion and strength.   Neurological: Alert and oriented to person, place, and time. Normal reflexes.   Skin: Skin is warm and dry.   Psychiatric: Normal mood and affect. Behavior is normal and appropriate.     Impression: IPAA with cROHN'S AND ANOVAGINAL FISTULAS   PLAN: CONTINUE MEDS. RTC 2 weeks. Will remove 1 seton at that time.

## 2018-08-15 ENCOUNTER — PATIENT MESSAGE (OUTPATIENT)
Dept: INTERNAL MEDICINE | Facility: CLINIC | Age: 38
End: 2018-08-15

## 2018-08-15 DIAGNOSIS — N39.0 URINARY TRACT INFECTION WITHOUT HEMATURIA, SITE UNSPECIFIED: Primary | ICD-10-CM

## 2018-08-16 ENCOUNTER — LAB VISIT (OUTPATIENT)
Dept: LAB | Facility: HOSPITAL | Age: 38
End: 2018-08-16
Attending: INTERNAL MEDICINE
Payer: COMMERCIAL

## 2018-08-16 ENCOUNTER — PATIENT MESSAGE (OUTPATIENT)
Dept: INTERNAL MEDICINE | Facility: CLINIC | Age: 38
End: 2018-08-16

## 2018-08-16 DIAGNOSIS — N39.0 URINARY TRACT INFECTION WITHOUT HEMATURIA, SITE UNSPECIFIED: ICD-10-CM

## 2018-08-16 PROCEDURE — 87088 URINE BACTERIA CULTURE: CPT

## 2018-08-16 PROCEDURE — 87086 URINE CULTURE/COLONY COUNT: CPT

## 2018-08-17 ENCOUNTER — PATIENT MESSAGE (OUTPATIENT)
Dept: INTERNAL MEDICINE | Facility: CLINIC | Age: 38
End: 2018-08-17

## 2018-08-17 RX ORDER — AMOXICILLIN AND CLAVULANATE POTASSIUM 875; 125 MG/1; MG/1
1 TABLET, FILM COATED ORAL 2 TIMES DAILY
Qty: 14 TABLET | Refills: 0 | Status: SHIPPED | OUTPATIENT
Start: 2018-08-17 | End: 2018-08-24

## 2018-08-17 NOTE — TELEPHONE ENCOUNTER
Cannot refill this without an appt due to it being a narcotic. Recommend she leave a message with colorectal

## 2018-08-18 LAB — BACTERIA UR CULT: NORMAL

## 2018-08-19 RX ORDER — HYDROCODONE BITARTRATE AND ACETAMINOPHEN 10; 325 MG/1; MG/1
TABLET ORAL
Qty: 90 TABLET | Refills: 0 | Status: SHIPPED | OUTPATIENT
Start: 2018-08-19 | End: 2018-09-10 | Stop reason: SDUPTHER

## 2018-08-20 ENCOUNTER — PATIENT MESSAGE (OUTPATIENT)
Dept: INTERNAL MEDICINE | Facility: CLINIC | Age: 38
End: 2018-08-20

## 2018-08-20 RX ORDER — FLUCONAZOLE 150 MG/1
TABLET ORAL
Qty: 2 TABLET | Refills: 0 | Status: SHIPPED | OUTPATIENT
Start: 2018-08-20 | End: 2018-09-12

## 2018-08-20 RX ORDER — AMOXICILLIN AND CLAVULANATE POTASSIUM 875; 125 MG/1; MG/1
1 TABLET, FILM COATED ORAL 2 TIMES DAILY
Qty: 20 TABLET | Refills: 0 | Status: SHIPPED | OUTPATIENT
Start: 2018-08-20 | End: 2018-08-30

## 2018-08-22 ENCOUNTER — OFFICE VISIT (OUTPATIENT)
Dept: SURGERY | Facility: CLINIC | Age: 38
End: 2018-08-22
Payer: COMMERCIAL

## 2018-08-22 VITALS
WEIGHT: 138 LBS | SYSTOLIC BLOOD PRESSURE: 119 MMHG | HEART RATE: 87 BPM | BODY MASS INDEX: 20.98 KG/M2 | DIASTOLIC BLOOD PRESSURE: 61 MMHG

## 2018-08-22 DIAGNOSIS — Z43.4 ATTENTION TO ARTIFICIAL OPENING OF DIGESTIVE TRACT: ICD-10-CM

## 2018-08-22 DIAGNOSIS — K64.4 ANAL SKIN TAG: Primary | ICD-10-CM

## 2018-08-22 DIAGNOSIS — K63.2 FISTULA OF INTESTINE, EXCLUDING RECTUM AND ANUS: ICD-10-CM

## 2018-08-22 PROCEDURE — 3008F BODY MASS INDEX DOCD: CPT | Mod: CPTII,S$GLB,, | Performed by: COLON & RECTAL SURGERY

## 2018-08-22 PROCEDURE — 99213 OFFICE O/P EST LOW 20 MIN: CPT | Mod: S$GLB,,, | Performed by: COLON & RECTAL SURGERY

## 2018-08-22 PROCEDURE — 99999 PR PBB SHADOW E&M-EST. PATIENT-LVL III: CPT | Mod: PBBFAC,,, | Performed by: COLON & RECTAL SURGERY

## 2018-08-22 RX ORDER — DICYCLOMINE HYDROCHLORIDE 20 MG/1
20 TABLET ORAL EVERY 6 HOURS
Qty: 120 TABLET | Refills: 0 | Status: SHIPPED | OUTPATIENT
Start: 2018-08-22 | End: 2018-09-24 | Stop reason: SDUPTHER

## 2018-08-22 NOTE — PROGRESS NOTES
Patient ID:  Nyasia Middleton is a 37 y.o. female     Chief Complaint:   Chief Complaint   Patient presents with    Anal Fistula    Follow-up        HPI:37 F pt of Dr Foote s/p IPAA and multiple perianal fistula repairs.          Had pouchoscopy last week with placement of 2 anovaginal setons and ulcers in pouch. alal diltation. Placed on cipro and Xiflaxin. Feels good. Leess drainage from fistulas. Pilonida;l wpound decreased in size.  Stlera increased yto q 4 weeks.      ROS:        Constitutional: No fever, chills, activity or appetite change.      HENT: No hearing loss, facial swelling, neck pain or stiffness.       Eyes: No discharge, itching and visual disturbance.      Respiratory: No apnea, cough, choking or shortness of breath.       Cardiovascular: No leg swelling or chest pain      Gastrointestinal: No abdominal distention or change in bowel habbits     Genitourinary: No dysuria, frequency or flank pain.      Musculoskeletal: No arthralgias or gait problem.      Neurological: No dizziness, seizures or weakness.      Hematological: No adenopathy.      Psychiatric/Behavioral: No hallucinations or behavioral problems.       PE:    APPEARANCE: Well nourished, well developed, in no acute distress.   CHEST: Lungs clear. Normal respiratory effort.  CARDIOVASCULAR: Normal rhythm. No edema.  ABDOMEN: Soft. No tenderness or masses. Well healed scars  Rectum:  2 setons in place. Superficial on eremoved   Pilonidal wound 1 x 3 cm shallow  Musculoskeletal: Symmetric, normal range of motion and strength.   Neurological: Alert and oriented to person, place, and time. Normal reflexes.   Skin: Skin is warm and dry.   Psychiatric: Normal mood and affect. Behavior is normal and appropriate.     Impression: IPAA with CROHN'S AND ANOVAGINAL FISTULAS   PLAN: CONTINUE MEDS. RTC 3 weeks.

## 2018-09-05 RX ORDER — METRONIDAZOLE 500 MG/1
TABLET ORAL
Qty: 14 TABLET | Refills: 0 | Status: SHIPPED | OUTPATIENT
Start: 2018-09-05 | End: 2018-09-18 | Stop reason: SDUPTHER

## 2018-09-10 RX ORDER — HYDROCODONE BITARTRATE AND ACETAMINOPHEN 10; 325 MG/1; MG/1
TABLET ORAL
Qty: 90 TABLET | Refills: 0 | Status: SHIPPED | OUTPATIENT
Start: 2018-09-10 | End: 2018-10-01 | Stop reason: SDUPTHER

## 2018-09-12 ENCOUNTER — OFFICE VISIT (OUTPATIENT)
Dept: SURGERY | Facility: CLINIC | Age: 38
End: 2018-09-12
Payer: COMMERCIAL

## 2018-09-12 ENCOUNTER — TELEPHONE (OUTPATIENT)
Dept: ENDOSCOPY | Facility: HOSPITAL | Age: 38
End: 2018-09-12

## 2018-09-12 VITALS
SYSTOLIC BLOOD PRESSURE: 97 MMHG | HEART RATE: 72 BPM | WEIGHT: 139.13 LBS | DIASTOLIC BLOOD PRESSURE: 58 MMHG | BODY MASS INDEX: 21.15 KG/M2

## 2018-09-12 DIAGNOSIS — N82.3 RECTOVAGINAL FISTULA: ICD-10-CM

## 2018-09-12 DIAGNOSIS — K50.018 CROHN'S DISEASE OF SMALL INTESTINE WITH OTHER COMPLICATION: ICD-10-CM

## 2018-09-12 DIAGNOSIS — K60.3 ANAL FISTULA: Primary | ICD-10-CM

## 2018-09-12 PROBLEM — N36.0 PERINEAL SINUS: Status: RESOLVED | Noted: 2017-05-25 | Resolved: 2018-09-12

## 2018-09-12 PROCEDURE — 99214 OFFICE O/P EST MOD 30 MIN: CPT | Mod: S$GLB,,, | Performed by: COLON & RECTAL SURGERY

## 2018-09-12 PROCEDURE — 3008F BODY MASS INDEX DOCD: CPT | Mod: CPTII,S$GLB,, | Performed by: COLON & RECTAL SURGERY

## 2018-09-12 PROCEDURE — 99999 PR PBB SHADOW E&M-EST. PATIENT-LVL III: CPT | Mod: PBBFAC,,, | Performed by: COLON & RECTAL SURGERY

## 2018-09-12 NOTE — PROGRESS NOTES
Patient ID:  Nyasia Middleton is a 37 y.o. female     Chief Complaint:   Chief Complaint   Patient presents with    Follow-up        HPI:37 F pt of Dr Foote s/p IPAA and multiple perianal fistula repairs.  Remaining fistula fell out. Some drainage,    Had pouchoscopy last week with placement of 2 anovaginal setons and ulcers in pouch. alal diltation. Placed on cipro and Xiflaxin. Feels good. Leess drainage from fistulas. Pilonida;l wpound decreased in size.  Stlera increased yto q 4 weeks.      ROS:        Constitutional: No fever, chills, activity or appetite change.      HENT: No hearing loss, facial swelling, neck pain or stiffness.       Eyes: No discharge, itching and visual disturbance.      Respiratory: No apnea, cough, choking or shortness of breath.       Cardiovascular: No leg swelling or chest pain      Gastrointestinal: No abdominal distention or change in bowel habbits     Genitourinary: No dysuria, frequency or flank pain.      Musculoskeletal: No arthralgias or gait problem.      Neurological: No dizziness, seizures or weakness.      Hematological: No adenopathy.      Psychiatric/Behavioral: No hallucinations or behavioral problems.       PE:    APPEARANCE: Well nourished, well developed, in no acute distress.   CHEST: Lungs clear. Normal respiratory effort.  CARDIOVASCULAR: Normal rhythm. No edema.  ABDOMEN: Soft. No tenderness or masses. Well healed scars  Rectum:   No fistula felt in vaginal exam   Pilonidal wound 1 x 2 cm shallow  Musculoskeletal: Symmetric, normal range of motion and strength.   Neurological: Alert and oriented to person, place, and time. Normal reflexes.   Skin: Skin is warm and dry.   Psychiatric: Normal mood and affect. Behavior is normal and appropriate.     Impression: IPAA with CROHN'S AND ANOVAGINAL FISTULAS   PLAN: CONTINUE MEDS. RTC 2 months for pouchoscopy with sedation.

## 2018-09-18 RX ORDER — CIPROFLOXACIN 500 MG/1
TABLET ORAL
Qty: 20 TABLET | Refills: 0 | Status: SHIPPED | OUTPATIENT
Start: 2018-09-18 | End: 2018-09-28 | Stop reason: SDUPTHER

## 2018-09-18 RX ORDER — METRONIDAZOLE 500 MG/1
TABLET ORAL
Qty: 14 TABLET | Refills: 0 | Status: SHIPPED | OUTPATIENT
Start: 2018-09-18 | End: 2018-12-12

## 2018-09-24 RX ORDER — DICYCLOMINE HYDROCHLORIDE 20 MG/1
TABLET ORAL
Qty: 120 TABLET | Refills: 0 | Status: SHIPPED | OUTPATIENT
Start: 2018-09-24 | End: 2018-11-12 | Stop reason: SDUPTHER

## 2018-09-28 RX ORDER — CIPROFLOXACIN 500 MG/1
TABLET ORAL
Qty: 20 TABLET | Refills: 0 | Status: SHIPPED | OUTPATIENT
Start: 2018-09-28 | End: 2018-10-18 | Stop reason: SDUPTHER

## 2018-10-02 RX ORDER — HYDROCODONE BITARTRATE AND ACETAMINOPHEN 10; 325 MG/1; MG/1
TABLET ORAL
Qty: 90 TABLET | Refills: 0 | Status: SHIPPED | OUTPATIENT
Start: 2018-10-02 | End: 2018-10-30 | Stop reason: SDUPTHER

## 2018-10-15 RX ORDER — FLUCONAZOLE 150 MG/1
TABLET ORAL
Qty: 2 TABLET | Refills: 0 | Status: SHIPPED | OUTPATIENT
Start: 2018-10-15 | End: 2018-12-12 | Stop reason: SDUPTHER

## 2018-10-18 RX ORDER — CIPROFLOXACIN 500 MG/1
TABLET ORAL
Qty: 20 TABLET | Refills: 0 | Status: SHIPPED | OUTPATIENT
Start: 2018-10-18 | End: 2018-12-12 | Stop reason: SDUPTHER

## 2018-10-30 RX ORDER — SODIUM CHLORIDE 0.9 % (FLUSH) 0.9 %
3 SYRINGE (ML) INJECTION
Status: CANCELLED | OUTPATIENT
Start: 2018-10-30

## 2018-10-30 RX ORDER — HYDROCODONE BITARTRATE AND ACETAMINOPHEN 10; 325 MG/1; MG/1
TABLET ORAL
Qty: 90 TABLET | Refills: 0 | Status: SHIPPED | OUTPATIENT
Start: 2018-10-30 | End: 2018-11-13 | Stop reason: SDUPTHER

## 2018-11-05 ENCOUNTER — ANESTHESIA EVENT (OUTPATIENT)
Dept: ENDOSCOPY | Facility: HOSPITAL | Age: 38
End: 2018-11-05
Payer: COMMERCIAL

## 2018-11-06 ENCOUNTER — ANESTHESIA (OUTPATIENT)
Dept: ENDOSCOPY | Facility: HOSPITAL | Age: 38
End: 2018-11-06
Payer: COMMERCIAL

## 2018-11-06 ENCOUNTER — PATIENT MESSAGE (OUTPATIENT)
Dept: ENDOSCOPY | Facility: HOSPITAL | Age: 38
End: 2018-11-06

## 2018-11-06 ENCOUNTER — HOSPITAL ENCOUNTER (OUTPATIENT)
Facility: HOSPITAL | Age: 38
Discharge: HOME | End: 2018-11-06
Attending: COLON & RECTAL SURGERY | Admitting: COLON & RECTAL SURGERY
Payer: COMMERCIAL

## 2018-11-06 VITALS
WEIGHT: 137 LBS | RESPIRATION RATE: 18 BRPM | HEIGHT: 68 IN | OXYGEN SATURATION: 100 % | TEMPERATURE: 98 F | BODY MASS INDEX: 20.76 KG/M2 | SYSTOLIC BLOOD PRESSURE: 107 MMHG | DIASTOLIC BLOOD PRESSURE: 64 MMHG | HEART RATE: 64 BPM

## 2018-11-06 DIAGNOSIS — K64.4 ANAL SKIN TAG: ICD-10-CM

## 2018-11-06 DIAGNOSIS — Z12.11 SCREENING FOR COLON CANCER: ICD-10-CM

## 2018-11-06 DIAGNOSIS — N82.3 RECTOVAGINAL FISTULA: ICD-10-CM

## 2018-11-06 DIAGNOSIS — Z43.4 ATTENTION TO ARTIFICIAL OPENING OF DIGESTIVE TRACT: Primary | ICD-10-CM

## 2018-11-06 DIAGNOSIS — K50.018 CROHN'S DISEASE OF SMALL INTESTINE WITH OTHER COMPLICATION: ICD-10-CM

## 2018-11-06 PROCEDURE — 25000003 PHARM REV CODE 250: Performed by: NURSE PRACTITIONER

## 2018-11-06 PROCEDURE — 44385 ENDOSCOPY OF BOWEL POUCH: CPT | Performed by: COLON & RECTAL SURGERY

## 2018-11-06 PROCEDURE — 44799 UNLISTED PX SMALL INTESTINE: CPT

## 2018-11-06 PROCEDURE — 63600175 PHARM REV CODE 636 W HCPCS: Performed by: NURSE ANESTHETIST, CERTIFIED REGISTERED

## 2018-11-06 PROCEDURE — 44385 ENDOSCOPY OF BOWEL POUCH: CPT | Mod: ,,, | Performed by: COLON & RECTAL SURGERY

## 2018-11-06 PROCEDURE — 63600175 PHARM REV CODE 636 W HCPCS: Performed by: COLON & RECTAL SURGERY

## 2018-11-06 PROCEDURE — 37000009 HC ANESTHESIA EA ADD 15 MINS: Performed by: COLON & RECTAL SURGERY

## 2018-11-06 PROCEDURE — E9220 PRA ENDO ANESTHESIA: HCPCS | Mod: ,,, | Performed by: NURSE ANESTHETIST, CERTIFIED REGISTERED

## 2018-11-06 PROCEDURE — 37000008 HC ANESTHESIA 1ST 15 MINUTES: Performed by: COLON & RECTAL SURGERY

## 2018-11-06 RX ORDER — FENTANYL CITRATE 50 UG/ML
INJECTION, SOLUTION INTRAMUSCULAR; INTRAVENOUS
Status: DISCONTINUED | OUTPATIENT
Start: 2018-11-06 | End: 2018-11-06

## 2018-11-06 RX ORDER — FENTANYL CITRATE-0.9 % NACL/PF 10 MCG/ML
25 PLASTIC BAG, INJECTION (ML) INTRAVENOUS ONCE
Status: DISCONTINUED | OUTPATIENT
Start: 2018-11-06 | End: 2018-11-06

## 2018-11-06 RX ORDER — SODIUM CHLORIDE 9 MG/ML
INJECTION, SOLUTION INTRAVENOUS CONTINUOUS
Status: DISCONTINUED | OUTPATIENT
Start: 2018-11-06 | End: 2018-11-06 | Stop reason: HOSPADM

## 2018-11-06 RX ORDER — FENTANYL CITRATE 50 UG/ML
25 INJECTION, SOLUTION INTRAMUSCULAR; INTRAVENOUS ONCE
Status: COMPLETED | OUTPATIENT
Start: 2018-11-06 | End: 2018-11-06

## 2018-11-06 RX ORDER — LIDOCAINE HCL/PF 100 MG/5ML
SYRINGE (ML) INTRAVENOUS
Status: DISCONTINUED | OUTPATIENT
Start: 2018-11-06 | End: 2018-11-06

## 2018-11-06 RX ORDER — PROPOFOL 10 MG/ML
INJECTION, EMULSION INTRAVENOUS
Status: DISCONTINUED | OUTPATIENT
Start: 2018-11-06 | End: 2018-11-06

## 2018-11-06 RX ADMIN — PROPOFOL 20 MG: 10 INJECTION, EMULSION INTRAVENOUS at 08:11

## 2018-11-06 RX ADMIN — PROPOFOL 80 MG: 10 INJECTION, EMULSION INTRAVENOUS at 07:11

## 2018-11-06 RX ADMIN — FENTANYL CITRATE 50 MCG: 50 INJECTION, SOLUTION INTRAMUSCULAR; INTRAVENOUS at 08:11

## 2018-11-06 RX ADMIN — SODIUM CHLORIDE: 0.9 INJECTION, SOLUTION INTRAVENOUS at 07:11

## 2018-11-06 RX ADMIN — FENTANYL CITRATE 25 MCG: 50 INJECTION INTRAMUSCULAR; INTRAVENOUS at 08:11

## 2018-11-06 RX ADMIN — PROPOFOL 50 MG: 10 INJECTION, EMULSION INTRAVENOUS at 08:11

## 2018-11-06 RX ADMIN — PROPOFOL 20 MG: 10 INJECTION, EMULSION INTRAVENOUS at 07:11

## 2018-11-06 RX ADMIN — LIDOCAINE HYDROCHLORIDE 40 MG: 20 INJECTION, SOLUTION INTRAVENOUS at 07:11

## 2018-11-06 NOTE — TRANSFER OF CARE
"Anesthesia Transfer of Care Note    Patient: Nyasia Middleton    Procedure(s) Performed: Procedure(s) (LRB):  ENDOSCOPY, SMALL INTESTINAL POUCH, DIAGNOSTIC, possible seton placement (N/A)    Patient location: GI    Anesthesia Type: general    Transport from OR: Transported from OR on room air with adequate spontaneous ventilation    Post pain: adequate analgesia    Post assessment: no apparent anesthetic complications and tolerated procedure well    Post vital signs: stable    Level of consciousness: awake    Nausea/Vomiting: no nausea/vomiting    Complications: none    Transfer of care protocol was followed      Last vitals:   Visit Vitals  BP (!) 114/56 (BP Location: Left arm, Patient Position: Lying)   Pulse 83   Temp 36.8 °C (98.2 °F) (Temporal)   Resp 18   Ht 5' 8" (1.727 m)   Wt 62.1 kg (137 lb)   LMP 07/14/2017   SpO2 100%   Breastfeeding? No   BMI 20.83 kg/m²     "

## 2018-11-06 NOTE — ANESTHESIA PREPROCEDURE EVALUATION
2018  Nyasia Middleton is a 37 y.o., female.  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    MARION (generalized anxiety disorder)    Screening for colon cancer    Crohn's disease of small intestine with other complication     Past Medical History:   Diagnosis Date    Crohn's disease     GERD (gastroesophageal reflux disease)     History of Clostridium difficile infection     History of ulcerative colitis     Perineal sinus 2017     Past Surgical History:   Procedure Laterality Date    ANOSCOPY N/A 2016    Performed by Jairo Peralta MD at [facility] (4TH FLR)     SECTION, CLASSIC      EXAM UNDER ANESTHESIA N/A 2017    Performed by [facility] at [facility] OR 2ND FLR    EXAM UNDER ANESTHESIA N/A 2013    [facility]    EXAM UNDER ANESTHESIA N/A 2013    [facility]    EXAM UNDER ANESTHESIA N/A 4/3/2013    Performed by Jairo Peralta MD at [facility] OR [address] [place] [address]    EXCISION-SKIN TAG N/A 2017    Performed by Jairo Peralta MD at [facility] OR [address] [place] [address]    FISTULOTOMY, RECTUM  2013    Performed by Jairo Peralta MD at [facility] OR [address] [place] [address]    FISTULOTOMY-RECTAL, EUA, possible LIFT, Lithotomy, 1 hour N/A 2014    Performed by Jairo Peralta MD at [facility] OR [address] [place] [address]    FLEXIBLE SIGMOIDOSCOPY N/A 2018    Procedure: SIGMOIDOSCOPY, FLEXIBLE;  Surgeon: Jairo Peralta MD;  Location: [facility] (4TH FLR);  Service: Endoscopy;  Laterality: N/A;  no enemas per Jennifer NP    HYSTERECTOMY      partial    ILEOSTOMY CLOSURE      LIGATION-INTERSPHINCTERIC FISTULA TRACT  2017     Performed by Jairo Peralta MD at [facility] OR 2ND FLR    rectovaginal fistula repair      REPAIR-FISTULA-RECTOVAGINAL N/A 10/20/2014    Performed by Jairo Peralta MD at [facility] OR 2ND FLR    RESTORATIVE PROCTOCOLECTOMY      SIGMOIDOSCOPY, FLEXIBLE N/A 8/1/2018    Performed by Jairo Peralta MD at [facility] ENDO (4TH FLR)    TONSILLECTOMY      adenoids    TUBAL LIGATION      june 2016           Pre-op Assessment    I have reviewed the Patient Summary Reports.     I have reviewed the Nursing Notes.   I have reviewed the Medications.     Review of Systems  Anesthesia Hx:  No problems with previous Anesthesia Denies Hx of Anesthetic complications  Denies Family Hx of Anesthesia complications.   Denies Personal Hx of Anesthesia complications.   Social:  Non-Smoker    Hematology/Oncology:  Hematology Normal   Oncology Normal     EENT/Dental:EENT/Dental Normal   Cardiovascular:  Cardiovascular Normal Exercise tolerance: good     Pulmonary:  Pulmonary Normal    Renal/:  Renal/ Normal     Hepatic/GI:   GERD    Musculoskeletal:  Musculoskeletal Normal    Neurological:  Neurology Normal    Endocrine:  Endocrine Normal    Dermatological:  Skin Normal    Psych:   anxiety          Physical Exam  General:  Well nourished    Airway/Jaw/Neck:  Airway Findings: Mouth Opening: Normal Tongue: Normal  General Airway Assessment: Adult  Mallampati: II  TM Distance: Normal, at least 6 cm  Jaw/Neck Findings:  Neck ROM: Normal ROM     Eyes/Ears/Nose:  EYES/EARS/NOSE FINDINGS: Normal   Dental:  Dental Findings: In tact   Chest/Lungs:  Chest/Lungs Findings: Clear to auscultation, Normal Respiratory Rate     Heart/Vascular:  Heart Findings: Rate: Normal  Sounds: Normal     Abdomen:  Abdomen Findings: Normal    Musculoskeletal:  Musculoskeletal Findings: Normal   Skin:  Skin Findings: Normal    Mental Status:  Mental Status Findings:  Cooperative, Alert and Oriented         Anesthesia Plan  Type of Anesthesia, risks & benefits  discussed:  Anesthesia Type:  general  Patient's Preference: General  Intra-op Monitoring Plan: standard ASA monitors  Intra-op Monitoring Plan Comments:   Post Op Pain Control Plan: IV/PO Opioids PRN  Post Op Pain Control Plan Comments:   Induction:   IV  Beta Blocker:  Patient is not currently on a Beta-Blocker (No further documentation required).       Informed Consent: Patient understands risks and agrees with Anesthesia plan.  Questions answered. Anesthesia consent signed with patient.  ASA Score: 2     Day of Surgery Review of History & Physical: I have interviewed and examined the patient. I have reviewed the patient's H&P dated: 11/06/18. There are no significant changes.  H&P update referred to the provider.     Anesthesia Plan Notes: NPO confirmed.         Ready For Surgery From Anesthesia Perspective.

## 2018-11-06 NOTE — DISCHARGE INSTRUCTIONS
Colonoscopy     A camera attached to a flexible tube with a viewing lens is used to take video pictures.     Colonoscopy is a test to view the inside of your lower digestive tract (colon and rectum). Sometimes it can show the last part of the small intestine (ileum). During the test, small pieces of tissue may be removed for testing. This is called a biopsy. Small growths, such as polyps, may also be removed.   Why is colonoscopy done?  The test is done to help look for colon cancer. And it can help find the source of abdominal pain, bleeding, and changes in bowel habits. It may be needed once a year, depending on factors such as your:  · Age  · Health history  · Family health history  · Symptoms  · Results from any prior colonoscopy  Risks and possible complications  These include:  · Bleeding               · A puncture or tear in the colon   · Risks of anesthesia  · A cancer lesion not being seen  Getting ready   To prepare for the test:  · Talk with your healthcare provider about the risks of the test (see below). Also ask your healthcare provider about alternatives to the test.  · Tell your healthcare provider about any medicines you take. Also tell him or her about any health conditions you may have.  · Make sure your rectum and colon are empty for the test. Follow the diet and bowel prep instructions exactly. If you dont, the test may need to be rescheduled.  · Plan for a friend or family member to drive you home after the test.     Colonoscopy provides an inside view of the entire colon.     You may discuss the results with your doctor right away or at a future visit.  During the test   The test is usually done in the hospital on an outpatient basis. This means you go home the same day. The procedure takes about 30 minutes. During that time:  · You are given relaxing (sedating) medicine through an IV line. You may be drowsy, or fully asleep.  · The healthcare provider will first give you a physical exam to  check for anal and rectal problems.  · Then the anus is lubricated and the scope inserted.  · If you are awake, you may have a feeling similar to needing to have a bowel movement. You may also feel pressure as air is pumped into the colon. Its OK to pass gas during the procedure.  · Biopsy, polyp removal, or other treatments may be done during the test.  After the test   You may have gas right after the test. It can help to try to pass it to help prevent later bloating. Your healthcare provider may discuss the results with you right away. Or you may need to schedule a follow-up visit to talk about the results. After the test, you can go back to your normal eating and other activities. You may be tired from the sedation and need to rest for a few hours.  Date Last Reviewed: 11/1/2016 © 2000-2017 [employer]. [address] [place] [address], [place] [address]. All rights reserved. This information is not intended as a substitute for professional medical care. Always follow your healthcare professional's instructions.

## 2018-11-06 NOTE — PROVATION PATIENT INSTRUCTIONS
Discharge Summary/Instructions after an Endoscopic Procedure  Patient Name: Nyasia Middleton  Patient MRN: [id number]  Patient YOB: 1980 Tuesday, November 06, 2018  Jairo Peralta MD  RESTRICTIONS:  During your procedure today, you received medications for sedation.  These   medications may affect your judgment, balance and coordination.  Therefore,   for 24 hours, you have the following restrictions:   - DO NOT drive a car, operate machinery, make legal/financial decisions,   sign important papers or drink alcohol.    ACTIVITY:  Today: no heavy lifting, straining or running due to procedural   sedation/anesthesia.  The following day: return to full activity including work.  DIET:  Eat and drink normally unless instructed otherwise.     TREATMENT FOR COMMON SIDE EFFECTS:  - Mild abdominal pain, nausea, belching, bloating or excessive gas:  rest,   eat lightly and use a heating pad.  - Sore Throat: treat with throat lozenges and/or gargle with warm salt   water.  - Because air was used during the procedure, expelling large amounts of air   from your rectum or belching is normal.  - If a bowel prep was taken, you may not have a bowel movement for 1-3 days.    This is normal.  SYMPTOMS TO WATCH FOR AND REPORT TO YOUR PHYSICIAN:  1. Abdominal pain or bloating, other than gas cramps.  2. Chest pain.  3. Back pain.  4. Signs of infection such as: chills or fever occurring within 24 hours   after the procedure.  5. Rectal bleeding, which would show as bright red, maroon, or black stools.   (A tablespoon of blood from the rectum is not serious, especially if   hemorrhoids are present.)  6. Vomiting.  7. Weakness or dizziness.  GO DIRECTLY TO THE NEAREST EMERGENCY ROOM IF YOU HAVE ANY OF THE FOLLOWING:      Difficulty breathing              Chills and/or fever over 101 F   Persistent vomiting and/or vomiting blood   Severe abdominal pain   Severe chest pain   Black, tarry stools   Bleeding- more than one tablespoon   Any  other symptom or condition that you feel may need urgent attention  Your doctor recommends these additional instructions:  If any biopsies were taken, your doctors clinic will contact you in 1 to 2   weeks with any results.  - Discharge patient to home (ambulatory).   - Crohn's medciation  For questions, problems or results please call your physician - Jairo Peralta MD at Work:  [phone].  [facility], EMERGENCY ROOM PHONE NUMBER: [phone]  IF A COMPLICATION OR EMERGENCY SITUATION ARISES AND YOU ARE UNABLE TO REACH   YOUR PHYSICIAN - GO DIRECTLY TO THE EMERGENCY ROOM.  Jairo Peralta MD  11/6/2018 8:22:59 AM  This report has been verified and signed electronically.  [employer]ATION

## 2018-11-06 NOTE — H&P
" Endoscopy H&P    Procedure : Pouchoscopy, excision of skin taG, POSSIBLE SETON PLAVEMENT, eua    INDICATION: ipaa, SKIN TAG, POSSIBLE FISTULA    Past Medical History:   Diagnosis Date    Crohn's disease     GERD (gastroesophageal reflux disease)     History of Clostridium difficile infection     History of ulcerative colitis     Perineal sinus 5/25/2017             Review of Systems -ROS:  GENERAL: No fever, chills, fatigability or weight loss.  CHEST: Denies CHRISTINA, cyanosis, wheezing, cough and sputum production.  CARDIOVASCULAR: Denies chest pain, PND, orthopnea or reduced exercise tolerance.   Musculoskeletal ROS: negative for - gait disturbance or joint pain  Neurological ROS: negative for - confusion or memory loss        Physical Exam:  General: well developed, well nourished, no distress  Head: normocephalic  Neck: supple, symmetrical, trachea midline  Lungs:  clear to auscultation bilaterally and normal respiratory effort  Heart: regular rate and rhythm, S1, S2 normal, no murmur, rub or gallop and regular rate and rhythm  Abdomen: soft, non-tender non-distented; bowel sounds normal; no masses,  no organomegaly  Extremities: no cyanosis or edema, or clubbing       Moderate Sedation (choice): Mallampati Score 1    ASA : II    IMP: ipaa, SKIN TAG, POSSIBLE FISTULA  xam"}    Plan: Pouchoscopy, excision of skin taG, POSSIBLE SETON PLAVEMENT, eua with Moderate sedation.  I have explained the procedure including indications, alternatives, expected outcomes and potential complications. The patient appears to understand and gives informed consent. The patient is medically ready for surgery.        "

## 2018-11-06 NOTE — ANESTHESIA POSTPROCEDURE EVALUATION
"Anesthesia Post Evaluation    Patient: Nyasia Middleton    Procedure(s) Performed: Procedure(s) (LRB):  ENDOSCOPY, SMALL INTESTINAL POUCH, DIAGNOSTIC, possible seton placement (N/A)    Final Anesthesia Type: general  Patient location during evaluation: PACU  Patient participation: Yes- Able to Participate  Level of consciousness: awake and alert  Post-procedure vital signs: reviewed and stable  Pain management: adequate  Airway patency: patent  PONV status at discharge: No PONV  Anesthetic complications: no      Cardiovascular status: stable  Respiratory status: unassisted and spontaneous ventilation  Hydration status: euvolemic  Follow-up not needed.        Visit Vitals  /64   Pulse 64   Temp 36.8 °C (98.2 °F) (Temporal)   Resp 18   Ht 5' 8" (1.727 m)   Wt 62.1 kg (137 lb)   LMP 07/14/2017   SpO2 100%   Breastfeeding? No   BMI 20.83 kg/m²       Pain/George Score: Pain Assessment Performed: Yes (11/6/2018  9:19 AM)  Presence of Pain: complains of pain/discomfort (11/6/2018  9:19 AM)  Pain Rating Prior to Med Admin: 7 (11/6/2018  9:19 AM)  Pain Rating Post Med Admin: 3 (11/6/2018  9:19 AM)  George Score: 10 (11/6/2018  9:19 AM)        "

## 2018-11-13 ENCOUNTER — TELEPHONE (OUTPATIENT)
Dept: ENDOSCOPY | Facility: HOSPITAL | Age: 38
End: 2018-11-13

## 2018-11-13 RX ORDER — DICYCLOMINE HYDROCHLORIDE 20 MG/1
TABLET ORAL
Qty: 120 TABLET | Refills: 0 | Status: SHIPPED | OUTPATIENT
Start: 2018-11-13 | End: 2018-12-12 | Stop reason: SDUPTHER

## 2018-11-13 RX ORDER — ALPRAZOLAM 0.5 MG/1
TABLET ORAL
Qty: 50 TABLET | Refills: 0 | Status: SHIPPED | OUTPATIENT
Start: 2018-11-13 | End: 2018-12-12 | Stop reason: SDUPTHER

## 2018-11-13 RX ORDER — PANTOPRAZOLE SODIUM 40 MG/1
TABLET, DELAYED RELEASE ORAL
Qty: 90 TABLET | Refills: 3 | Status: SHIPPED | OUTPATIENT
Start: 2018-11-13 | End: 2018-12-12 | Stop reason: SDUPTHER

## 2018-11-14 RX ORDER — HYDROCODONE BITARTRATE AND ACETAMINOPHEN 10; 325 MG/1; MG/1
TABLET ORAL
Qty: 90 TABLET | Refills: 0 | Status: SHIPPED | OUTPATIENT
Start: 2018-11-14 | End: 2018-12-03 | Stop reason: SDUPTHER

## 2018-12-03 RX ORDER — HYDROCODONE BITARTRATE AND ACETAMINOPHEN 10; 325 MG/1; MG/1
TABLET ORAL
Qty: 90 TABLET | Refills: 0 | Status: SHIPPED | OUTPATIENT
Start: 2018-12-03 | End: 2018-12-12 | Stop reason: SDUPTHER

## 2018-12-12 ENCOUNTER — OFFICE VISIT (OUTPATIENT)
Dept: INTERNAL MEDICINE | Facility: CLINIC | Age: 38
End: 2018-12-12
Payer: COMMERCIAL

## 2018-12-12 ENCOUNTER — LAB VISIT (OUTPATIENT)
Dept: LAB | Facility: HOSPITAL | Age: 38
End: 2018-12-12
Attending: INTERNAL MEDICINE
Payer: COMMERCIAL

## 2018-12-12 VITALS
BODY MASS INDEX: 21.72 KG/M2 | TEMPERATURE: 99 F | HEIGHT: 68 IN | WEIGHT: 143.31 LBS | SYSTOLIC BLOOD PRESSURE: 107 MMHG | HEART RATE: 72 BPM | DIASTOLIC BLOOD PRESSURE: 79 MMHG | RESPIRATION RATE: 16 BRPM

## 2018-12-12 DIAGNOSIS — K50.018 CROHN'S DISEASE OF SMALL INTESTINE WITH OTHER COMPLICATION: ICD-10-CM

## 2018-12-12 DIAGNOSIS — F32.A DEPRESSION, UNSPECIFIED DEPRESSION TYPE: ICD-10-CM

## 2018-12-12 DIAGNOSIS — R33.9 URINE RETENTION: ICD-10-CM

## 2018-12-12 DIAGNOSIS — N82.3 RECTOVAGINAL FISTULA: Primary | ICD-10-CM

## 2018-12-12 DIAGNOSIS — R53.83 FATIGUE, UNSPECIFIED TYPE: ICD-10-CM

## 2018-12-12 LAB
25(OH)D3+25(OH)D2 SERPL-MCNC: 26 NG/ML
ALBUMIN SERPL BCP-MCNC: 3.7 G/DL
ALP SERPL-CCNC: 78 U/L
ALT SERPL W/O P-5'-P-CCNC: 12 U/L
ANION GAP SERPL CALC-SCNC: 7 MMOL/L
AST SERPL-CCNC: 18 U/L
BASOPHILS # BLD AUTO: 0.08 K/UL
BASOPHILS NFR BLD: 1 %
BILIRUB SERPL-MCNC: 0.5 MG/DL
BUN SERPL-MCNC: 10 MG/DL
CALCIUM SERPL-MCNC: 9.3 MG/DL
CHLORIDE SERPL-SCNC: 102 MMOL/L
CO2 SERPL-SCNC: 29 MMOL/L
CREAT SERPL-MCNC: 0.6 MG/DL
DIFFERENTIAL METHOD: ABNORMAL
EOSINOPHIL # BLD AUTO: 0.7 K/UL
EOSINOPHIL NFR BLD: 8.2 %
ERYTHROCYTE [DISTWIDTH] IN BLOOD BY AUTOMATED COUNT: 12.7 %
EST. GFR  (AFRICAN AMERICAN): >60 ML/MIN/1.73 M^2
EST. GFR  (NON AFRICAN AMERICAN): >60 ML/MIN/1.73 M^2
FERRITIN SERPL-MCNC: 283 NG/ML
GLUCOSE SERPL-MCNC: 73 MG/DL
HCT VFR BLD AUTO: 35.4 %
HGB BLD-MCNC: 11.2 G/DL
IMM GRANULOCYTES # BLD AUTO: 0.02 K/UL
IMM GRANULOCYTES NFR BLD AUTO: 0.2 %
IRON SERPL-MCNC: 80 UG/DL
LYMPHOCYTES # BLD AUTO: 1.6 K/UL
LYMPHOCYTES NFR BLD: 19 %
MCH RBC QN AUTO: 30.5 PG
MCHC RBC AUTO-ENTMCNC: 31.6 G/DL
MCV RBC AUTO: 97 FL
MONOCYTES # BLD AUTO: 0.7 K/UL
MONOCYTES NFR BLD: 8 %
NEUTROPHILS # BLD AUTO: 5.3 K/UL
NEUTROPHILS NFR BLD: 63.6 %
NRBC BLD-RTO: 0 /100 WBC
PLATELET # BLD AUTO: 250 K/UL
PMV BLD AUTO: 10 FL
POTASSIUM SERPL-SCNC: 4.2 MMOL/L
PROT SERPL-MCNC: 7.3 G/DL
RBC # BLD AUTO: 3.67 M/UL
SATURATED IRON: 36 %
SODIUM SERPL-SCNC: 138 MMOL/L
TOTAL IRON BINDING CAPACITY: 223 UG/DL
TRANSFERRIN SERPL-MCNC: 151 MG/DL
TSH SERPL DL<=0.005 MIU/L-ACNC: 1.5 UIU/ML
VIT B12 SERPL-MCNC: 699 PG/ML
WBC # BLD AUTO: 8.39 K/UL

## 2018-12-12 PROCEDURE — 36415 COLL VENOUS BLD VENIPUNCTURE: CPT | Mod: PO

## 2018-12-12 PROCEDURE — 83540 ASSAY OF IRON: CPT

## 2018-12-12 PROCEDURE — 82607 VITAMIN B-12: CPT

## 2018-12-12 PROCEDURE — 84443 ASSAY THYROID STIM HORMONE: CPT

## 2018-12-12 PROCEDURE — 80053 COMPREHEN METABOLIC PANEL: CPT

## 2018-12-12 PROCEDURE — 85025 COMPLETE CBC W/AUTO DIFF WBC: CPT

## 2018-12-12 PROCEDURE — 99999 PR PBB SHADOW E&M-EST. PATIENT-LVL III: CPT | Mod: PBBFAC,,, | Performed by: INTERNAL MEDICINE

## 2018-12-12 PROCEDURE — 82306 VITAMIN D 25 HYDROXY: CPT

## 2018-12-12 PROCEDURE — 3008F BODY MASS INDEX DOCD: CPT | Mod: CPTII,S$GLB,, | Performed by: INTERNAL MEDICINE

## 2018-12-12 PROCEDURE — 99214 OFFICE O/P EST MOD 30 MIN: CPT | Mod: S$GLB,,, | Performed by: INTERNAL MEDICINE

## 2018-12-12 PROCEDURE — 82728 ASSAY OF FERRITIN: CPT

## 2018-12-12 RX ORDER — DIPHENOXYLATE HYDROCHLORIDE AND ATROPINE SULFATE 2.5; .025 MG/1; MG/1
TABLET ORAL
COMMUNITY
Start: 2018-12-07 | End: 2018-12-26 | Stop reason: SDUPTHER

## 2018-12-12 RX ORDER — DICYCLOMINE HYDROCHLORIDE 20 MG/1
20 TABLET ORAL EVERY 6 HOURS
Qty: 120 TABLET | Refills: 0 | Status: SHIPPED | OUTPATIENT
Start: 2018-12-12 | End: 2019-05-25 | Stop reason: SDUPTHER

## 2018-12-12 RX ORDER — DIPHENOXYLATE HYDROCHLORIDE AND ATROPINE SULFATE 2.5; .025 MG/1; MG/1
TABLET ORAL
COMMUNITY
End: 2019-12-03 | Stop reason: SDUPTHER

## 2018-12-12 RX ORDER — KETOCONAZOLE 20 MG/G
1 CREAM TOPICAL 2 TIMES DAILY
Refills: 1 | COMMUNITY
Start: 2018-09-11 | End: 2019-12-03 | Stop reason: ALTCHOICE

## 2018-12-12 RX ORDER — HYDROCODONE BITARTRATE AND ACETAMINOPHEN 10; 325 MG/1; MG/1
1 TABLET ORAL
Qty: 90 TABLET | Refills: 0 | Status: SHIPPED | OUTPATIENT
Start: 2019-01-01 | End: 2019-12-03

## 2018-12-12 RX ORDER — FLUCONAZOLE 150 MG/1
TABLET ORAL
Qty: 4 TABLET | Refills: 11 | Status: SHIPPED | OUTPATIENT
Start: 2018-12-12 | End: 2020-04-20 | Stop reason: SDUPTHER

## 2018-12-12 RX ORDER — ALPRAZOLAM 0.5 MG/1
0.5 TABLET ORAL 3 TIMES DAILY
Qty: 50 TABLET | Refills: 0 | Status: SHIPPED | OUTPATIENT
Start: 2019-01-01 | End: 2022-01-11

## 2018-12-12 RX ORDER — CIPROFLOXACIN 500 MG/1
500 TABLET ORAL 2 TIMES DAILY
Qty: 20 TABLET | Refills: 0 | Status: SHIPPED | OUTPATIENT
Start: 2018-12-12 | End: 2019-01-16 | Stop reason: SDUPTHER

## 2018-12-12 RX ORDER — PANTOPRAZOLE SODIUM 40 MG/1
40 TABLET, DELAYED RELEASE ORAL 2 TIMES DAILY
Qty: 180 TABLET | Refills: 3 | Status: SHIPPED | OUTPATIENT
Start: 2018-12-12 | End: 2020-02-07

## 2018-12-12 RX ORDER — CITALOPRAM 40 MG/1
40 TABLET, FILM COATED ORAL DAILY
Qty: 90 TABLET | Refills: 3 | Status: SHIPPED | OUTPATIENT
Start: 2018-12-12 | End: 2020-03-04

## 2018-12-13 ENCOUNTER — PATIENT MESSAGE (OUTPATIENT)
Dept: INTERNAL MEDICINE | Facility: CLINIC | Age: 38
End: 2018-12-13

## 2018-12-17 RX ORDER — METRONIDAZOLE 250 MG/1
TABLET ORAL
Qty: 90 TABLET | Refills: 3 | Status: SHIPPED | OUTPATIENT
Start: 2018-12-17 | End: 2019-12-03

## 2018-12-17 RX ORDER — HYDROCODONE BITARTRATE AND ACETAMINOPHEN 10; 325 MG/1; MG/1
TABLET ORAL
Qty: 90 TABLET | Refills: 0 | Status: SHIPPED | OUTPATIENT
Start: 2018-12-17 | End: 2018-12-26 | Stop reason: SDUPTHER

## 2018-12-26 RX ORDER — DIPHENOXYLATE HYDROCHLORIDE AND ATROPINE SULFATE 2.5; .025 MG/1; MG/1
TABLET ORAL
Qty: 100 TABLET | Refills: 2 | Status: SHIPPED | OUTPATIENT
Start: 2018-12-26 | End: 2019-12-03 | Stop reason: SDUPTHER

## 2018-12-26 RX ORDER — HYDROCODONE BITARTRATE AND ACETAMINOPHEN 10; 325 MG/1; MG/1
TABLET ORAL
Qty: 90 TABLET | Refills: 0 | Status: SHIPPED | OUTPATIENT
Start: 2018-12-26 | End: 2022-01-11

## 2019-01-16 RX ORDER — CIPROFLOXACIN 500 MG/1
TABLET ORAL
Qty: 20 TABLET | Refills: 0 | Status: SHIPPED | OUTPATIENT
Start: 2019-01-16 | End: 2019-02-12 | Stop reason: SDUPTHER

## 2019-02-12 RX ORDER — CIPROFLOXACIN 500 MG/1
TABLET ORAL
Qty: 20 TABLET | Refills: 0 | Status: SHIPPED | OUTPATIENT
Start: 2019-02-12 | End: 2019-12-03 | Stop reason: ALTCHOICE

## 2019-05-02 ENCOUNTER — INITIAL CONSULT (OUTPATIENT)
Dept: UROGYNECOLOGY | Facility: CLINIC | Age: 39
End: 2019-05-02
Payer: COMMERCIAL

## 2019-05-02 VITALS
DIASTOLIC BLOOD PRESSURE: 74 MMHG | BODY MASS INDEX: 23.69 KG/M2 | SYSTOLIC BLOOD PRESSURE: 110 MMHG | WEIGHT: 156.31 LBS | HEIGHT: 68 IN

## 2019-05-02 DIAGNOSIS — N82.9 FISTULA OF FEMALE GENITALIA: Primary | ICD-10-CM

## 2019-05-02 PROCEDURE — 99215 OFFICE O/P EST HI 40 MIN: CPT | Mod: S$GLB,,, | Performed by: OBSTETRICS & GYNECOLOGY

## 2019-05-02 PROCEDURE — 99215 PR OFFICE/OUTPT VISIT, EST, LEVL V, 40-54 MIN: ICD-10-PCS | Mod: S$GLB,,, | Performed by: OBSTETRICS & GYNECOLOGY

## 2019-05-02 PROCEDURE — 3008F BODY MASS INDEX DOCD: CPT | Mod: CPTII,S$GLB,, | Performed by: OBSTETRICS & GYNECOLOGY

## 2019-05-02 PROCEDURE — 99999 PR PBB SHADOW E&M-EST. PATIENT-LVL III: CPT | Mod: PBBFAC,,, | Performed by: OBSTETRICS & GYNECOLOGY

## 2019-05-02 PROCEDURE — 3008F PR BODY MASS INDEX (BMI) DOCUMENTED: ICD-10-PCS | Mod: CPTII,S$GLB,, | Performed by: OBSTETRICS & GYNECOLOGY

## 2019-05-02 PROCEDURE — 99999 PR PBB SHADOW E&M-EST. PATIENT-LVL III: ICD-10-PCS | Mod: PBBFAC,,, | Performed by: OBSTETRICS & GYNECOLOGY

## 2019-05-02 RX ORDER — DICYCLOMINE HYDROCHLORIDE 20 MG/1
TABLET ORAL
COMMUNITY
End: 2019-12-03 | Stop reason: SDUPTHER

## 2019-05-02 RX ORDER — ASCORBIC ACID 500 MG
TABLET ORAL
COMMUNITY
Start: 2017-03-16 | End: 2019-12-03

## 2019-05-02 RX ORDER — PANTOPRAZOLE SODIUM 40 MG/1
TABLET, DELAYED RELEASE ORAL
COMMUNITY
End: 2019-12-03 | Stop reason: SDUPTHER

## 2019-05-02 RX ORDER — CITALOPRAM 40 MG/1
TABLET, FILM COATED ORAL
COMMUNITY
End: 2019-12-03 | Stop reason: SDUPTHER

## 2019-05-02 RX ORDER — ALPRAZOLAM 0.5 MG/1
TABLET ORAL
COMMUNITY
End: 2019-12-03 | Stop reason: SDUPTHER

## 2019-05-02 NOTE — PROGRESS NOTES
Subjective:      Patient ID: Nyasia Middleton is a 38 y.o. female.    Chief Complaint:  rvf (follow up)      History of Present Illness  Patient's will known from to me from [facility].  Patient with complex rectovaginal fistula.  Patient has had 4 previous attempts at repair.  With pouch.  Patient with seton in in place  Patient is doing much better her C reactive protein is now down to 2 this was in the 100s previously.  Her inflammatory bowel as 100 control of Dr. Ku is at [facility].    I performed her supracervical hysterectomy secondary to dysfunctional uterine bleeding            Past Medical History:   Diagnosis Date    Crohn's disease     GERD (gastroesophageal reflux disease)     History of Clostridium difficile infection     History of ulcerative colitis     Perineal sinus 2017       Past Surgical History:   Procedure Laterality Date    ANOSCOPY N/A 2016    Performed by [facility] at [facility] (4TH FLR)     SECTION, CLASSIC      ENDOSCOPY, SMALL INTESTINAL POUCH, DIAGNOSTIC, possible seton placement N/A 2018    Performed by [facility] at [facility] (4TH FLR)    EXAM UNDER ANESTHESIA N/A 2017    [facility]formed [facility] at [facility] OR 2ND FLR    EXAM UNDER ANESTHESIA N/A 2013    Performed by [facility] at [facility] OR 2ND FLR    EXAM UNDER ANESTHESIA N/A 2013    Performed by [facility] at [facility] OR 2ND FLR    EXAM UNDER ANESTHESIA N/A 4/3/2013    Performed [facility]    EXCISION-SKIN TAG N/A 2017    [facility]    FISTULOTOMY, RECTUM  2013    [facility]    FISTULOTOMY-RECTAL, EUA, possible LIFT, Lithotomy, 1 hour N/A 2014    [facility]    HYSTERECTOMY      partial    ILEOSTOMY CLOSURE      LIGATION-INTERSPHINCTERIC FISTULA  TRACT  2017    Performed by [facility] at [facility] OR 2ND FLR    rectovaginal fistula repair      REPAIR-FISTULA-RECTOVAGINAL N/A 10/20/2014    Performed by [facility] at [facility] OR 2ND FLR    RESTORATIVE PROCTOCOLECTOMY      SIGMOIDOSCOPY, FLEXIBLE N/A 2018    Performed by J[facility]eralta MD at [facility] ENDO (4TH FLR)    TONSILLECTOMY      adenoids    TUBAL LIGATION      2016       GYN & OB History  Patient's last menstrual period was 2017.   Date of Last Pap: No result found    OB History    Para Term  AB Living   3 3 3         SAB TAB Ectopic Multiple Live Births                  # Outcome Date GA Lbr Hong/2nd Weight Sex Delivery Anes PTL Lv   3 Term            2 Term            1 Term                Health Maintenance       Date Due Completion Date    TETANUS VACCINE 2007    Fecal Occult Blood Test (FOBT)/[employer] 2019    Influenza Vaccine 2019 9/15/2014          Family History   Problem Relation Age of Onset    Cancer Paternal Grandfather 53        colon ca       Social History     Socioeconomic History    Marital status:      Spouse name: Not on file    Number of children: Not on file    Years of education: Not on file    Highest education level: Not on file   Occupational History    Not on file   Social Needs    Financial resource strain: Not on file    Food insecurity:     Worry: Not on file     Inability: Not on file    Transportation needs:     Medical: Not on file     Non-medical: Not on file   Tobacco Use    Smoking status: Former Smoker     Last attempt to quit: 2009     Years since quittin.4    Smokeless tobacco: Never Used   Substance and Sexual Activity    Alcohol use: No     Alcohol/week: 0.0 oz    Drug use: No    Sexual activity: Not Currently   Lifestyle    Physical activity:     Days per week: Not on file     Minutes per session: Not on file    Stress: Not on file   Relationships     "Social connections:     Talks on phone: Not on file     Gets together: Not on file     Attends Restorationism service: Not on file     Active member of club or organization: Not on file     Attends meetings of clubs or organizations: Not on file     Relationship status: Not on file   Other Topics Concern    Not on file   Social History Narrative    Not on file       Review of Systems  Review of Systems   Constitutional: Negative for activity change and appetite change.   HENT: Negative for congestion and dental problem.    Respiratory: Negative for chest tightness and shortness of breath.    Cardiovascular: Negative for chest pain and leg swelling.   Gastrointestinal: Negative for abdominal distention and abdominal pain.   Genitourinary: Positive for vaginal discharge. Negative for decreased urine volume, difficulty urinating, dyspareunia, dysuria, enuresis, flank pain, frequency, genital sores, hematuria, pelvic pain, urgency, vaginal bleeding and vaginal pain.   Musculoskeletal: Negative for back pain and neck pain.   Neurological: Negative for dizziness and facial asymmetry.   Hematological: Negative for adenopathy. Does not bruise/bleed easily.   Psychiatric/Behavioral: Negative for agitation, behavioral problems and confusion.          Objective:   /74   Ht 5' 8" (1.727 m)   Wt 70.9 kg (156 lb 4.9 oz)   LMP 07/14/2017   BMI 23.77 kg/m²     Physical Exam   Abdominal: Hernia confirmed negative in the right inguinal area.   Genitourinary: Pelvic exam was performed with patient supine. Skenes normal.       Urethra exhibits no urethral caruncle, no urethral diverticulum, no urethral mass and no hypermobility. Right bartholin is not enlarged. Left bartholin is not enlarged. No rectocele, cystocele, unspecified prolapse of vaginal walls, atrophy or fistula in the vagina.    Anal- perineal fistula seen see time and place.  I do not see a rectovaginal fistula     Assessment:     1. 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  This would involve  1.  Harvesting of gross still is muscle flap via plastic surgery  2.  Support from colorectal.  3.  Reconstitution of perineal body in filling in of space defect and closure of fistula via urogynecology.  A small split leg multidisciplinary team with approach would be would have to be utilized.  This has been explained to the patient  Patient has to continue to keep the inflammatory condition and under control.  It seems is that has achieved this the next step is to get consultation from Dr. of chest in and assemble a team at [facility] in order to undertake this surgery.  This is explained to the patient and her mother in great length.  Total time of this consultation was 40 min greater than 50% time 30 min spent direct face-to-face discussion with the patient patient is    There are no Patient Instructions on file for this visit.

## 2019-05-15 ENCOUNTER — PATIENT MESSAGE (OUTPATIENT)
Dept: INTERNAL MEDICINE | Facility: CLINIC | Age: 39
End: 2019-05-15

## 2019-05-25 RX ORDER — DICYCLOMINE HYDROCHLORIDE 20 MG/1
TABLET ORAL
Qty: 120 TABLET | Refills: 0 | Status: SHIPPED | OUTPATIENT
Start: 2019-05-25 | End: 2019-12-03 | Stop reason: SDUPTHER

## 2019-05-28 RX ORDER — CIPROFLOXACIN 500 MG/1
TABLET ORAL
Qty: 20 TABLET | Refills: 0 | OUTPATIENT
Start: 2019-05-28

## 2019-06-17 ENCOUNTER — PATIENT MESSAGE (OUTPATIENT)
Dept: UROGYNECOLOGY | Facility: CLINIC | Age: 39
End: 2019-06-17

## 2019-07-08 RX ORDER — DICYCLOMINE HYDROCHLORIDE 20 MG/1
TABLET ORAL
Qty: 120 TABLET | Refills: 0 | Status: SHIPPED | OUTPATIENT
Start: 2019-07-08 | End: 2019-07-08 | Stop reason: SDUPTHER

## 2019-07-08 RX ORDER — DICYCLOMINE HYDROCHLORIDE 20 MG/1
20 TABLET ORAL EVERY 6 HOURS
Qty: 120 TABLET | Refills: 0 | Status: SHIPPED | OUTPATIENT
Start: 2019-07-08 | End: 2019-12-03 | Stop reason: SDUPTHER

## 2019-07-09 ENCOUNTER — TELEPHONE (OUTPATIENT)
Dept: INTERNAL MEDICINE | Facility: CLINIC | Age: 39
End: 2019-07-09

## 2019-08-13 RX ORDER — DIPHENOXYLATE HYDROCHLORIDE AND ATROPINE SULFATE 2.5; .025 MG/1; MG/1
2 TABLET ORAL 4 TIMES DAILY PRN
Qty: 100 TABLET | Refills: 0 | Status: SHIPPED | OUTPATIENT
Start: 2019-08-13 | End: 2019-11-25 | Stop reason: SDUPTHER

## 2019-08-14 ENCOUNTER — PATIENT MESSAGE (OUTPATIENT)
Dept: INTERNAL MEDICINE | Facility: CLINIC | Age: 39
End: 2019-08-14

## 2019-08-14 RX ORDER — CIPROFLOXACIN 500 MG/1
500 TABLET ORAL 2 TIMES DAILY
Qty: 14 TABLET | Refills: 0 | Status: SHIPPED | OUTPATIENT
Start: 2019-08-14 | End: 2019-08-22 | Stop reason: SDUPTHER

## 2019-08-22 RX ORDER — CIPROFLOXACIN 500 MG/1
TABLET ORAL
Qty: 14 TABLET | Refills: 0 | Status: SHIPPED | OUTPATIENT
Start: 2019-08-22 | End: 2019-11-25 | Stop reason: SDUPTHER

## 2019-08-22 RX ORDER — DICYCLOMINE HYDROCHLORIDE 20 MG/1
TABLET ORAL
Qty: 120 TABLET | Refills: 0 | Status: SHIPPED | OUTPATIENT
Start: 2019-08-22 | End: 2022-12-06

## 2019-10-09 RX ORDER — CIPROFLOXACIN 500 MG/1
TABLET ORAL
Qty: 14 TABLET | Refills: 0 | OUTPATIENT
Start: 2019-10-09

## 2019-11-19 NOTE — PROGRESS NOTES
Subjective:       Patient ID: Nyasia Middleton is a 39 y.o. female.    Chief Complaint: Depression; Anxiety; and Medication Management    Patient is a 39 y.o.female with attention to artificial opening of digestive tract, crohn's disease and arthropathy of left hand due to UC who presents today for depression/ anxiety. She is taking xanax and celexa currently.    Room will spin at times; it occurs 1-2 times per day; lasts a few seconds  Review of Systems   Constitutional: Negative for activity change, appetite change, chills, diaphoresis, fever and unexpected weight change.   HENT: Positive for rhinorrhea. Negative for congestion, ear discharge, ear pain, hearing loss, postnasal drip, tinnitus, trouble swallowing and voice change.    Eyes: Positive for visual disturbance. Negative for discharge, redness and itching.   Respiratory: Negative for cough, chest tightness, shortness of breath and wheezing.    Cardiovascular: Negative for chest pain, palpitations and leg swelling.   Gastrointestinal: Negative for abdominal pain, blood in stool, constipation, diarrhea, nausea and vomiting.   Endocrine: Negative for cold intolerance, heat intolerance, polydipsia and polyuria.   Genitourinary: Negative for difficulty urinating, dysuria, flank pain, hematuria, menstrual problem and urgency.   Musculoskeletal: Negative for arthralgias, gait problem, joint swelling, myalgias, neck pain and neck stiffness.   Skin: Negative for color change and rash.   Neurological: Negative for dizziness, seizures, syncope, weakness and headaches.   Hematological: Negative for adenopathy.   Psychiatric/Behavioral: Positive for dysphoric mood. Negative for agitation, behavioral problems, confusion and sleep disturbance.       Objective:      Physical Exam   Constitutional: She is oriented to person, place, and time. She appears well-developed and well-nourished. No distress.   HENT:   Head: Normocephalic and atraumatic.   Right Ear: External ear  normal. There is swelling.   Left Ear: External ear normal. There is swelling.   Nose: Nose normal.   Mouth/Throat: Oropharynx is clear and moist. No oropharyngeal exudate.   Eyes: Pupils are equal, round, and reactive to light. Conjunctivae and EOM are normal. Right eye exhibits no discharge. Left eye exhibits no discharge. No scleral icterus.   Neck: Neck supple. No JVD present. No tracheal deviation present. No thyromegaly present.   Cardiovascular: Normal rate, normal heart sounds and intact distal pulses. Exam reveals no gallop and no friction rub.   No murmur heard.  Pulmonary/Chest: Effort normal and breath sounds normal. No stridor. No respiratory distress. She has no wheezes. She has no rales. She exhibits no tenderness.   Abdominal: Soft. Bowel sounds are normal. She exhibits no distension. There is no tenderness. There is no rebound.   Musculoskeletal: She exhibits no edema or tenderness.   Lymphadenopathy:     She has no cervical adenopathy.   Neurological: She is alert and oriented to person, place, and time.   Skin: Skin is warm and dry. No rash noted. She is not diaphoretic. No erythema.   Psychiatric: She has a normal mood and affect. Her behavior is normal.   Nursing note and vitals reviewed.      Assessment and Plan:       1. MARION (generalized anxiety disorder)  - continue celexa; add wellbutrin; f/u in 4-6 weeks    2. Allergic rhinitis due to pollen, unspecified seasonality  - trial of zyrtec and flonase    3. Attention to artificial opening of digestive tract  - sees gi    4. Crohn's disease of small intestine with other complication  - sees gi    5. Arthropathy of left hand associated with ulcerative colitis  - sees gi; may need refill of norco in future; will sign pain contract at next visit          No follow-ups on file.

## 2019-11-25 RX ORDER — DIPHENOXYLATE HYDROCHLORIDE AND ATROPINE SULFATE 2.5; .025 MG/1; MG/1
TABLET ORAL
Qty: 100 TABLET | Refills: 0 | Status: SHIPPED | OUTPATIENT
Start: 2019-11-25 | End: 2019-12-20

## 2019-11-25 RX ORDER — CIPROFLOXACIN 500 MG/1
TABLET ORAL
Qty: 14 TABLET | Refills: 0 | Status: SHIPPED | OUTPATIENT
Start: 2019-11-25 | End: 2020-01-20

## 2019-12-03 ENCOUNTER — OFFICE VISIT (OUTPATIENT)
Dept: INTERNAL MEDICINE | Facility: CLINIC | Age: 39
End: 2019-12-03
Payer: COMMERCIAL

## 2019-12-03 VITALS
WEIGHT: 168.19 LBS | TEMPERATURE: 99 F | DIASTOLIC BLOOD PRESSURE: 60 MMHG | HEART RATE: 80 BPM | HEIGHT: 68 IN | RESPIRATION RATE: 16 BRPM | SYSTOLIC BLOOD PRESSURE: 100 MMHG | BODY MASS INDEX: 25.49 KG/M2

## 2019-12-03 DIAGNOSIS — M07.642: ICD-10-CM

## 2019-12-03 DIAGNOSIS — F41.1 GAD (GENERALIZED ANXIETY DISORDER): Primary | ICD-10-CM

## 2019-12-03 DIAGNOSIS — K51.90: ICD-10-CM

## 2019-12-03 DIAGNOSIS — J30.1 ALLERGIC RHINITIS DUE TO POLLEN, UNSPECIFIED SEASONALITY: ICD-10-CM

## 2019-12-03 DIAGNOSIS — Z43.4 ATTENTION TO ARTIFICIAL OPENING OF DIGESTIVE TRACT: ICD-10-CM

## 2019-12-03 DIAGNOSIS — K50.018 CROHN'S DISEASE OF SMALL INTESTINE WITH OTHER COMPLICATION: ICD-10-CM

## 2019-12-03 PROCEDURE — 3008F BODY MASS INDEX DOCD: CPT | Mod: CPTII,S$GLB,, | Performed by: INTERNAL MEDICINE

## 2019-12-03 PROCEDURE — 99999 PR PBB SHADOW E&M-EST. PATIENT-LVL III: CPT | Mod: PBBFAC,,, | Performed by: INTERNAL MEDICINE

## 2019-12-03 PROCEDURE — 3008F PR BODY MASS INDEX (BMI) DOCUMENTED: ICD-10-PCS | Mod: CPTII,S$GLB,, | Performed by: INTERNAL MEDICINE

## 2019-12-03 PROCEDURE — 99999 PR PBB SHADOW E&M-EST. PATIENT-LVL III: ICD-10-PCS | Mod: PBBFAC,,, | Performed by: INTERNAL MEDICINE

## 2019-12-03 PROCEDURE — 99214 PR OFFICE/OUTPT VISIT, EST, LEVL IV, 30-39 MIN: ICD-10-PCS | Mod: S$GLB,,, | Performed by: INTERNAL MEDICINE

## 2019-12-03 PROCEDURE — 99214 OFFICE O/P EST MOD 30 MIN: CPT | Mod: S$GLB,,, | Performed by: INTERNAL MEDICINE

## 2019-12-03 RX ORDER — BUPROPION HYDROCHLORIDE 150 MG/1
150 TABLET ORAL DAILY
Qty: 30 TABLET | Refills: 11 | Status: SHIPPED | OUTPATIENT
Start: 2019-12-03 | End: 2020-02-14

## 2019-12-20 RX ORDER — DIPHENOXYLATE HYDROCHLORIDE AND ATROPINE SULFATE 2.5; .025 MG/1; MG/1
TABLET ORAL
Qty: 100 TABLET | Refills: 0 | Status: SHIPPED | OUTPATIENT
Start: 2019-12-20 | End: 2020-01-21 | Stop reason: SDUPTHER

## 2020-01-08 RX ORDER — METRONIDAZOLE 500 MG/1
TABLET ORAL
Qty: 14 TABLET | Refills: 0 | Status: SHIPPED | OUTPATIENT
Start: 2020-01-08 | End: 2022-08-11

## 2020-01-20 RX ORDER — CIPROFLOXACIN 500 MG/1
TABLET ORAL
Qty: 14 TABLET | Refills: 0 | Status: SHIPPED | OUTPATIENT
Start: 2020-01-20 | End: 2020-04-20

## 2020-01-21 RX ORDER — DIPHENOXYLATE HYDROCHLORIDE AND ATROPINE SULFATE 2.5; .025 MG/1; MG/1
TABLET ORAL
Qty: 100 TABLET | Refills: 0 | Status: SHIPPED | OUTPATIENT
Start: 2020-01-21 | End: 2020-02-05

## 2020-02-05 RX ORDER — DIPHENOXYLATE HYDROCHLORIDE AND ATROPINE SULFATE 2.5; .025 MG/1; MG/1
TABLET ORAL
Qty: 100 TABLET | Refills: 0 | Status: SHIPPED | OUTPATIENT
Start: 2020-02-05 | End: 2020-03-18

## 2020-02-07 RX ORDER — PANTOPRAZOLE SODIUM 40 MG/1
TABLET, DELAYED RELEASE ORAL
Qty: 180 TABLET | Refills: 2 | Status: SHIPPED | OUTPATIENT
Start: 2020-02-07 | End: 2021-05-03

## 2020-02-07 RX ORDER — PANTOPRAZOLE SODIUM 40 MG/1
TABLET, DELAYED RELEASE ORAL
Qty: 180 TABLET | Refills: 2 | Status: SHIPPED | OUTPATIENT
Start: 2020-02-07 | End: 2020-02-07

## 2020-02-14 RX ORDER — BUPROPION HYDROCHLORIDE 150 MG/1
TABLET ORAL
Qty: 30 TABLET | Refills: 10 | Status: SHIPPED | OUTPATIENT
Start: 2020-02-14 | End: 2021-05-24

## 2020-03-04 RX ORDER — CITALOPRAM 40 MG/1
TABLET, FILM COATED ORAL
Qty: 90 TABLET | Refills: 2 | Status: SHIPPED | OUTPATIENT
Start: 2020-03-04 | End: 2021-01-22

## 2020-03-18 RX ORDER — DIPHENOXYLATE HYDROCHLORIDE AND ATROPINE SULFATE 2.5; .025 MG/1; MG/1
TABLET ORAL
Qty: 100 TABLET | Refills: 0 | Status: SHIPPED | OUTPATIENT
Start: 2020-03-18 | End: 2020-10-30

## 2020-04-20 RX ORDER — FLUCONAZOLE 150 MG/1
TABLET ORAL
Qty: 4 TABLET | Refills: 10 | Status: SHIPPED | OUTPATIENT
Start: 2020-04-20 | End: 2021-04-27

## 2020-04-20 RX ORDER — CIPROFLOXACIN 500 MG/1
TABLET ORAL
Qty: 14 TABLET | Refills: 0 | Status: SHIPPED | OUTPATIENT
Start: 2020-04-20 | End: 2022-01-26 | Stop reason: SDUPTHER

## 2020-07-15 RX ORDER — DIPHENOXYLATE HYDROCHLORIDE AND ATROPINE SULFATE 2.5; .025 MG/1; MG/1
TABLET ORAL
Qty: 100 TABLET | Refills: 0 | OUTPATIENT
Start: 2020-07-15

## 2020-07-21 ENCOUNTER — PATIENT OUTREACH (OUTPATIENT)
Dept: ADMINISTRATIVE | Facility: OTHER | Age: 40
End: 2020-07-21

## 2020-07-23 ENCOUNTER — OFFICE VISIT (OUTPATIENT)
Dept: UROGYNECOLOGY | Facility: CLINIC | Age: 40
End: 2020-07-23
Payer: COMMERCIAL

## 2020-07-23 VITALS
WEIGHT: 178.13 LBS | BODY MASS INDEX: 27 KG/M2 | HEIGHT: 68 IN | DIASTOLIC BLOOD PRESSURE: 72 MMHG | SYSTOLIC BLOOD PRESSURE: 110 MMHG

## 2020-07-23 DIAGNOSIS — N82.9 FISTULA OF FEMALE GENITALIA: Primary | ICD-10-CM

## 2020-07-23 PROCEDURE — 99214 OFFICE O/P EST MOD 30 MIN: CPT | Mod: S$GLB,,, | Performed by: OBSTETRICS & GYNECOLOGY

## 2020-07-23 PROCEDURE — 3008F BODY MASS INDEX DOCD: CPT | Mod: CPTII,S$GLB,, | Performed by: OBSTETRICS & GYNECOLOGY

## 2020-07-23 PROCEDURE — 3008F PR BODY MASS INDEX (BMI) DOCUMENTED: ICD-10-PCS | Mod: CPTII,S$GLB,, | Performed by: OBSTETRICS & GYNECOLOGY

## 2020-07-23 PROCEDURE — 99999 PR PBB SHADOW E&M-EST. PATIENT-LVL III: ICD-10-PCS | Mod: PBBFAC,,, | Performed by: OBSTETRICS & GYNECOLOGY

## 2020-07-23 PROCEDURE — 99999 PR PBB SHADOW E&M-EST. PATIENT-LVL III: CPT | Mod: PBBFAC,,, | Performed by: OBSTETRICS & GYNECOLOGY

## 2020-07-23 PROCEDURE — 99214 PR OFFICE/OUTPT VISIT, EST, LEVL IV, 30-39 MIN: ICD-10-PCS | Mod: S$GLB,,, | Performed by: OBSTETRICS & GYNECOLOGY

## 2020-07-23 NOTE — PROGRESS NOTES
Subjective:      Patient ID: Nyasia Middleton is a 39 y.o. female.    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  She states she is much better but wants see if there could be any cosmetic approach to restoring perineum          Past Medical History:   Diagnosis Date    Crohn's disease     GERD (gastroesophageal reflux disease)     History of Clostridium difficile infection     History of ulcerative colitis     Perineal sinus 2017       Past Surgical History:   Procedure Laterality Date     SECTION, CLASSIC      FLEXIBLE SIGMOIDOSCOPY N/A 2018    Procedure: SIGMOIDOSCOPY, FLEXIBLE;  Surgeon: Jairo Peralta MD;  [facility]  Service: Endoscopy;  Laterality: N/A;  no enemas per Eudora NP    HYSTERECTOMY      partial    ILEOSTOMY CLOSURE      POUCHOSCOPY N/A 2018    Procedure: ENDOSCOPY, SMALL INTESTINAL POUCH, DIAGNOSTIC, possible seton placement;  Surgeon: Jairo Peralta MD;  [facility]  Service: Endoscopy;  Laterality: N/A;  Schedule early 2018; No prep    rectovaginal fistula repair      RESTORATIVE PROCTOCOLECTOMY      TONSILLECTOMY      adenoids    TUBAL LIGATION      2016       GYN & OB History  Patient's last menstrual period was 2017.   Date of Last Pap: No result found    OB History    Para Term  AB Living   3 3 3         SAB TAB Ectopic Multiple Live Births                  # Outcome Date GA Lbr Hong/2nd Weight Sex Delivery Anes PTL Lv   3 Term            2 Term            1 Term                Health Maintenance       Date Due Completion Date    Hepatitis C Screening 1981 ---    HIV Screening 1996 ---    TETANUS VACCINE 2007    Influenza Vaccine (1) 2020 12/3/2019          Family History   Problem Relation Age of Onset    Cancer Paternal  "Grandfather 53        colon ca       Social History     Socioeconomic History    Marital status:      Spouse name: Not on file    Number of children: Not on file    Years of education: Not on file    Highest education level: Not on file   Occupational History    Not on file   Social Needs    Financial resource strain: Not hard at all    Food insecurity     Worry: Never true     Inability: Never true    Transportation needs     Medical: No     Non-medical: No   Tobacco Use    Smoking status: Former Smoker     Quit date: 11/13/2009     Years since quitting: 10.6    Smokeless tobacco: Never Used   Substance and Sexual Activity    Alcohol use: No     Alcohol/week: 0.0 standard drinks     Frequency: Monthly or less     Drinks per session: 1 or 2     Binge frequency: Never    Drug use: No    Sexual activity: Not Currently   Lifestyle    Physical activity     Days per week: 3 days     Minutes per session: 20 min    Stress: Rather much   Relationships    Social connections     Talks on phone: More than three times a week     Gets together: Twice a week     Attends Tenriism service: Not on file     Active member of club or organization: No     Attends meetings of clubs or organizations: Never     Relationship status:    Other Topics Concern    Not on file   Social History Narrative    Not on file       Review of Systems  Review of Systems  Still with some fecal in con     Objective:   /72   Ht 5' 8" (1.727 m)   Wt 80.8 kg (178 lb 1.6 oz)   LMP 07/14/2017   BMI 27.08 kg/m²     Physical Exam   Vaginal tissue looks healthy stub ever seen I can still see the residual substantial fistula the tract but it is so much better with viable tissue I am leery to approach this at this time  Assessment:     1. 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      I was after the examination had patient come to my office at this point I am remarkably impressed I am " asking her to consider another round of cyst and the stent therapy and I want to see where that takes us from there I might add after the examination of I think she is a candidate I will talk to specialist in sacral neuromodulation I have explained to this to her in great length patient many questions all addressed total time of this visit 30 min greater 50% time 20 min direct discussion answering all questions and discussing sacral neurologic patient very happy at this  There are no Patient Instructions on file for this visit.

## 2021-01-04 ENCOUNTER — PATIENT MESSAGE (OUTPATIENT)
Dept: ADMINISTRATIVE | Facility: HOSPITAL | Age: 41
End: 2021-01-04

## 2021-04-05 ENCOUNTER — PATIENT MESSAGE (OUTPATIENT)
Dept: ADMINISTRATIVE | Facility: HOSPITAL | Age: 41
End: 2021-04-05

## 2021-05-04 ENCOUNTER — PATIENT MESSAGE (OUTPATIENT)
Dept: RESEARCH | Facility: HOSPITAL | Age: 41
End: 2021-05-04

## 2021-07-06 ENCOUNTER — PATIENT MESSAGE (OUTPATIENT)
Dept: ADMINISTRATIVE | Facility: HOSPITAL | Age: 41
End: 2021-07-06

## 2021-10-04 ENCOUNTER — PATIENT MESSAGE (OUTPATIENT)
Dept: ADMINISTRATIVE | Facility: HOSPITAL | Age: 41
End: 2021-10-04

## 2021-11-01 RX ORDER — ONDANSETRON 8 MG/1
TABLET, ORALLY DISINTEGRATING ORAL
Qty: 20 TABLET | Refills: 1 | OUTPATIENT
Start: 2021-11-01

## 2021-12-13 ENCOUNTER — TELEPHONE (OUTPATIENT)
Dept: INTERNAL MEDICINE | Facility: CLINIC | Age: 41
End: 2021-12-13
Payer: COMMERCIAL

## 2021-12-13 ENCOUNTER — PATIENT MESSAGE (OUTPATIENT)
Dept: INTERNAL MEDICINE | Facility: CLINIC | Age: 41
End: 2021-12-13
Payer: COMMERCIAL

## 2021-12-13 DIAGNOSIS — Z00.00 ANNUAL PHYSICAL EXAM: Primary | ICD-10-CM

## 2021-12-28 NOTE — PROGRESS NOTES
Subjective:       Patient ID: Nyasia Middleton is a 41 y.o. female.    Chief Complaint: Annual Exam (Discuss labs ), Weight Loss, Headache, Nasal Congestion, and ear clogged    Patient is a 41 y.o.female with crohn's disease who presents today for annual    Cholesterol: reviewed; due for tsh and lipid  Vaccines: Influenza (yearly)  Gyn exam: partial hysterectomy  Mammogram due now  Exercise: some  Diet: paleo  LMP: regular    Mood: short fused; taking prozac and wellbutrin; not sure it is working the best; stress with kids  Review of Systems   Constitutional: Negative for appetite change, chills, diaphoresis and fever.   HENT: Negative for congestion, ear discharge, ear pain, postnasal drip, tinnitus, trouble swallowing and voice change.    Eyes: Negative for discharge, redness and itching.   Respiratory: Negative for cough, chest tightness, shortness of breath and wheezing.    Cardiovascular: Negative for chest pain, palpitations and leg swelling.   Gastrointestinal: Negative for abdominal pain, constipation, diarrhea, nausea and vomiting.   Endocrine: Negative for cold intolerance and heat intolerance.   Genitourinary: Negative for difficulty urinating, flank pain, hematuria and urgency.   Musculoskeletal: Negative for arthralgias, gait problem, myalgias and neck stiffness.   Skin: Negative for color change and rash.   Neurological: Negative for dizziness, seizures, syncope and headaches.   Hematological: Negative for adenopathy.   Psychiatric/Behavioral: Negative for agitation, behavioral problems, confusion and sleep disturbance.       Objective:      Physical Exam  Vitals and nursing note reviewed.   Constitutional:       General: She is not in acute distress.     Appearance: She is well-developed and well-nourished. She is not diaphoretic.   HENT:      Head: Normocephalic and atraumatic.      Right Ear: External ear normal.      Left Ear: External ear normal.      Nose: Nose normal.      Mouth/Throat:       Mouth: Oropharynx is clear and moist.      Pharynx: No oropharyngeal exudate.   Eyes:      General: No scleral icterus.        Right eye: No discharge.         Left eye: No discharge.      Extraocular Movements: EOM normal.      Conjunctiva/sclera: Conjunctivae normal.      Pupils: Pupils are equal, round, and reactive to light.   Neck:      Thyroid: No thyromegaly.      Vascular: No JVD.      Trachea: No tracheal deviation.   Cardiovascular:      Rate and Rhythm: Normal rate.      Pulses: Intact distal pulses.      Heart sounds: Normal heart sounds. No murmur heard.  No friction rub. No gallop.    Pulmonary:      Effort: Pulmonary effort is normal. No respiratory distress.      Breath sounds: Normal breath sounds. No stridor. No wheezing or rales.   Chest:      Chest wall: No tenderness.   Abdominal:      General: Bowel sounds are normal. There is no distension.      Palpations: Abdomen is soft.      Tenderness: There is no abdominal tenderness. There is no rebound.   Musculoskeletal:         General: No tenderness or edema.      Cervical back: Neck supple.   Lymphadenopathy:      Cervical: No cervical adenopathy.   Skin:     General: Skin is warm and dry.      Findings: No erythema or rash.   Neurological:      Mental Status: She is alert and oriented to person, place, and time.   Psychiatric:         Mood and Affect: Mood and affect normal.         Behavior: Behavior normal.         Assessment and Plan:       1. Annual physical exam  Labs reviewed  - TSH; Future  - Lipid Panel; Future    2. MARION (generalized anxiety disorder)  Increase wellbutrin to 300 mg; if no improvement in a couple of weeks, may consider swap of prozac to effexor    3. Crohn's disease of small intestine with other complication  Stable; sees dr salvador; gastro; monitor    4. Visit for screening mammogram    - Mammo Digital Screening Bilat w/ Abe; Future          No follow-ups on file.

## 2022-01-06 ENCOUNTER — PATIENT MESSAGE (OUTPATIENT)
Dept: INTERNAL MEDICINE | Facility: CLINIC | Age: 42
End: 2022-01-06
Payer: COMMERCIAL

## 2022-01-07 ENCOUNTER — PATIENT MESSAGE (OUTPATIENT)
Dept: INTERNAL MEDICINE | Facility: CLINIC | Age: 42
End: 2022-01-07
Payer: COMMERCIAL

## 2022-01-11 ENCOUNTER — OFFICE VISIT (OUTPATIENT)
Dept: INTERNAL MEDICINE | Facility: CLINIC | Age: 42
End: 2022-01-11
Payer: COMMERCIAL

## 2022-01-11 ENCOUNTER — LAB VISIT (OUTPATIENT)
Dept: LAB | Facility: HOSPITAL | Age: 42
End: 2022-01-11
Attending: INTERNAL MEDICINE
Payer: COMMERCIAL

## 2022-01-11 VITALS
HEIGHT: 68 IN | BODY MASS INDEX: 22.22 KG/M2 | DIASTOLIC BLOOD PRESSURE: 70 MMHG | OXYGEN SATURATION: 98 % | SYSTOLIC BLOOD PRESSURE: 110 MMHG | WEIGHT: 146.63 LBS | HEART RATE: 73 BPM | TEMPERATURE: 98 F

## 2022-01-11 DIAGNOSIS — K50.018 CROHN'S DISEASE OF SMALL INTESTINE WITH OTHER COMPLICATION: ICD-10-CM

## 2022-01-11 DIAGNOSIS — Z00.00 ANNUAL PHYSICAL EXAM: Primary | ICD-10-CM

## 2022-01-11 DIAGNOSIS — Z12.31 VISIT FOR SCREENING MAMMOGRAM: ICD-10-CM

## 2022-01-11 DIAGNOSIS — Z00.00 ANNUAL PHYSICAL EXAM: ICD-10-CM

## 2022-01-11 DIAGNOSIS — F41.1 GAD (GENERALIZED ANXIETY DISORDER): ICD-10-CM

## 2022-01-11 LAB
CHOLEST SERPL-MCNC: 130 MG/DL (ref 120–199)
CHOLEST/HDLC SERPL: 2.5 {RATIO} (ref 2–5)
HDLC SERPL-MCNC: 53 MG/DL (ref 40–75)
HDLC SERPL: 40.8 % (ref 20–50)
LDLC SERPL CALC-MCNC: 64 MG/DL (ref 63–159)
NONHDLC SERPL-MCNC: 77 MG/DL
TRIGL SERPL-MCNC: 65 MG/DL (ref 30–150)
TSH SERPL DL<=0.005 MIU/L-ACNC: 1.11 UIU/ML (ref 0.4–4)

## 2022-01-11 PROCEDURE — 3008F PR BODY MASS INDEX (BMI) DOCUMENTED: ICD-10-PCS | Mod: CPTII,S$GLB,, | Performed by: INTERNAL MEDICINE

## 2022-01-11 PROCEDURE — 99999 PR PBB SHADOW E&M-EST. PATIENT-LVL IV: ICD-10-PCS | Mod: PBBFAC,,, | Performed by: INTERNAL MEDICINE

## 2022-01-11 PROCEDURE — 3074F SYST BP LT 130 MM HG: CPT | Mod: CPTII,S$GLB,, | Performed by: INTERNAL MEDICINE

## 2022-01-11 PROCEDURE — 3008F BODY MASS INDEX DOCD: CPT | Mod: CPTII,S$GLB,, | Performed by: INTERNAL MEDICINE

## 2022-01-11 PROCEDURE — 99999 PR PBB SHADOW E&M-EST. PATIENT-LVL IV: CPT | Mod: PBBFAC,,, | Performed by: INTERNAL MEDICINE

## 2022-01-11 PROCEDURE — 3078F PR MOST RECENT DIASTOLIC BLOOD PRESSURE < 80 MM HG: ICD-10-PCS | Mod: CPTII,S$GLB,, | Performed by: INTERNAL MEDICINE

## 2022-01-11 PROCEDURE — 3074F PR MOST RECENT SYSTOLIC BLOOD PRESSURE < 130 MM HG: ICD-10-PCS | Mod: CPTII,S$GLB,, | Performed by: INTERNAL MEDICINE

## 2022-01-11 PROCEDURE — 3078F DIAST BP <80 MM HG: CPT | Mod: CPTII,S$GLB,, | Performed by: INTERNAL MEDICINE

## 2022-01-11 PROCEDURE — 1159F PR MEDICATION LIST DOCUMENTED IN MEDICAL RECORD: ICD-10-PCS | Mod: CPTII,S$GLB,, | Performed by: INTERNAL MEDICINE

## 2022-01-11 PROCEDURE — 84443 ASSAY THYROID STIM HORMONE: CPT | Performed by: INTERNAL MEDICINE

## 2022-01-11 PROCEDURE — 80061 LIPID PANEL: CPT | Performed by: INTERNAL MEDICINE

## 2022-01-11 PROCEDURE — 99396 PR PREVENTIVE VISIT,EST,40-64: ICD-10-PCS | Mod: S$GLB,,, | Performed by: INTERNAL MEDICINE

## 2022-01-11 PROCEDURE — 99396 PREV VISIT EST AGE 40-64: CPT | Mod: S$GLB,,, | Performed by: INTERNAL MEDICINE

## 2022-01-11 PROCEDURE — 1159F MED LIST DOCD IN RCRD: CPT | Mod: CPTII,S$GLB,, | Performed by: INTERNAL MEDICINE

## 2022-01-11 PROCEDURE — 36415 COLL VENOUS BLD VENIPUNCTURE: CPT | Mod: PO | Performed by: INTERNAL MEDICINE

## 2022-01-11 RX ORDER — BUPROPION HYDROCHLORIDE 300 MG/1
300 TABLET ORAL DAILY
Qty: 30 TABLET | Refills: 11 | Status: SHIPPED | OUTPATIENT
Start: 2022-01-11 | End: 2022-03-14 | Stop reason: SDUPTHER

## 2022-01-11 RX ORDER — AMOXICILLIN AND CLAVULANATE POTASSIUM 875; 125 MG/1; MG/1
1 TABLET, FILM COATED ORAL 2 TIMES DAILY
Qty: 14 TABLET | Refills: 0 | Status: SHIPPED | OUTPATIENT
Start: 2022-01-11 | End: 2022-01-18

## 2022-01-11 RX ORDER — FLUOXETINE HYDROCHLORIDE 40 MG/1
40 CAPSULE ORAL DAILY
Qty: 30 CAPSULE | Refills: 0
Start: 2022-01-11 | End: 2022-03-11

## 2022-01-11 RX ORDER — AZELASTINE 1 MG/ML
1 SPRAY, METERED NASAL 2 TIMES DAILY
Qty: 30 ML | Refills: 11 | Status: SHIPPED | OUTPATIENT
Start: 2022-01-11 | End: 2022-08-11

## 2022-01-11 RX ORDER — FLUCONAZOLE 150 MG/1
150 TABLET ORAL DAILY
Qty: 6 EACH | Refills: 0 | Status: SHIPPED | OUTPATIENT
Start: 2022-01-11 | End: 2022-01-12

## 2022-01-11 RX ORDER — LEVOCETIRIZINE DIHYDROCHLORIDE 5 MG/1
5 TABLET, FILM COATED ORAL NIGHTLY
Qty: 30 TABLET | Refills: 11 | Status: SHIPPED | OUTPATIENT
Start: 2022-01-11 | End: 2022-08-11

## 2022-01-12 RX ORDER — DIPHENOXYLATE HYDROCHLORIDE AND ATROPINE SULFATE 2.5; .025 MG/1; MG/1
TABLET ORAL
Qty: 100 TABLET | Refills: 0 | Status: SHIPPED | OUTPATIENT
Start: 2022-01-12 | End: 2022-05-13

## 2022-01-25 ENCOUNTER — PATIENT MESSAGE (OUTPATIENT)
Dept: ADMINISTRATIVE | Facility: HOSPITAL | Age: 42
End: 2022-01-25
Payer: COMMERCIAL

## 2022-01-26 ENCOUNTER — PATIENT MESSAGE (OUTPATIENT)
Dept: INTERNAL MEDICINE | Facility: CLINIC | Age: 42
End: 2022-01-26
Payer: COMMERCIAL

## 2022-01-26 RX ORDER — CIPROFLOXACIN 500 MG/1
500 TABLET ORAL 2 TIMES DAILY
Qty: 14 TABLET | Refills: 0 | Status: SHIPPED | OUTPATIENT
Start: 2022-01-26 | End: 2022-01-26 | Stop reason: SDUPTHER

## 2022-03-11 ENCOUNTER — PATIENT MESSAGE (OUTPATIENT)
Dept: INTERNAL MEDICINE | Facility: CLINIC | Age: 42
End: 2022-03-11
Payer: COMMERCIAL

## 2022-03-11 RX ORDER — VENLAFAXINE HYDROCHLORIDE 37.5 MG/1
CAPSULE, EXTENDED RELEASE ORAL
Qty: 49 CAPSULE | Refills: 0 | Status: SHIPPED | OUTPATIENT
Start: 2022-03-11 | End: 2022-04-29 | Stop reason: SDUPTHER

## 2022-03-14 ENCOUNTER — PATIENT MESSAGE (OUTPATIENT)
Dept: INTERNAL MEDICINE | Facility: CLINIC | Age: 42
End: 2022-03-14
Payer: COMMERCIAL

## 2022-03-14 RX ORDER — BUPROPION HYDROCHLORIDE 300 MG/1
300 TABLET ORAL DAILY
Qty: 90 TABLET | Refills: 3 | Status: SHIPPED | OUTPATIENT
Start: 2022-03-14 | End: 2022-08-09 | Stop reason: SDUPTHER

## 2022-03-14 NOTE — TELEPHONE ENCOUNTER
Care Due:                  Date            Visit Type   Department     Provider  --------------------------------------------------------------------------------                                MYCHART                              ANNUAL                              CHECKUP/PHY  [facility] INTERNAL  Last Visit: 01-      [facility]       Bisi Tolbert  Next Visit: None Scheduled  None         None Found                                                            Last  Test          Frequency    Reason                     Performed    Due Date  --------------------------------------------------------------------------------    Cr..........  12 months..  venlafaxine..............  Not Found    Overdue    Powered by [employer]. Reference number: [id number].   3/14/2022 2:16:11 PM CDT

## 2022-04-05 ENCOUNTER — PATIENT MESSAGE (OUTPATIENT)
Dept: INTERNAL MEDICINE | Facility: CLINIC | Age: 42
End: 2022-04-05
Payer: COMMERCIAL

## 2022-04-05 RX ORDER — LOPERAMIDE HYDROCHLORIDE 2 MG/1
CAPSULE ORAL
Qty: 240 CAPSULE | Refills: 3 | Status: ON HOLD | OUTPATIENT
Start: 2022-04-05 | End: 2022-08-19 | Stop reason: SDUPTHER

## 2022-04-18 ENCOUNTER — PATIENT MESSAGE (OUTPATIENT)
Dept: INTERNAL MEDICINE | Facility: CLINIC | Age: 42
End: 2022-04-18
Payer: COMMERCIAL

## 2022-04-18 DIAGNOSIS — F41.1 GAD (GENERALIZED ANXIETY DISORDER): Primary | ICD-10-CM

## 2022-04-20 ENCOUNTER — PATIENT MESSAGE (OUTPATIENT)
Dept: INTERNAL MEDICINE | Facility: CLINIC | Age: 42
End: 2022-04-20
Payer: COMMERCIAL

## 2022-04-20 DIAGNOSIS — K50.018 CROHN'S DISEASE OF SMALL INTESTINE WITH OTHER COMPLICATION: Primary | ICD-10-CM

## 2022-04-25 ENCOUNTER — PATIENT MESSAGE (OUTPATIENT)
Dept: INTERNAL MEDICINE | Facility: CLINIC | Age: 42
End: 2022-04-25
Payer: COMMERCIAL

## 2022-04-29 ENCOUNTER — PATIENT MESSAGE (OUTPATIENT)
Dept: INTERNAL MEDICINE | Facility: CLINIC | Age: 42
End: 2022-04-29
Payer: COMMERCIAL

## 2022-04-29 RX ORDER — VENLAFAXINE HYDROCHLORIDE 37.5 MG/1
37.5 CAPSULE, EXTENDED RELEASE ORAL DAILY
Qty: 90 CAPSULE | Refills: 0 | Status: SHIPPED | OUTPATIENT
Start: 2022-04-29 | End: 2022-08-10

## 2022-04-29 NOTE — TELEPHONE ENCOUNTER
LOV 1/11/22  LRF 3/11/22      Pt states the 37.5mg dose is working for her and would like a 90 day supply

## 2022-04-29 NOTE — TELEPHONE ENCOUNTER
No new care gaps identified.  Powered by [employer]. Reference number: [id number].   4/29/2022 9:32:35 AM CDT

## 2022-05-06 ENCOUNTER — PATIENT MESSAGE (OUTPATIENT)
Dept: INTERNAL MEDICINE | Facility: CLINIC | Age: 42
End: 2022-05-06
Payer: COMMERCIAL

## 2022-05-06 RX ORDER — FLUCONAZOLE 150 MG/1
150 TABLET ORAL DAILY
Qty: 2 TABLET | Refills: 9 | Status: SHIPPED | OUTPATIENT
Start: 2022-05-06 | End: 2022-09-02

## 2022-05-11 ENCOUNTER — TELEPHONE (OUTPATIENT)
Dept: SURGERY | Facility: CLINIC | Age: 42
End: 2022-05-11
Payer: COMMERCIAL

## 2022-05-12 ENCOUNTER — OFFICE VISIT (OUTPATIENT)
Dept: SURGERY | Facility: CLINIC | Age: 42
End: 2022-05-12
Payer: COMMERCIAL

## 2022-05-12 VITALS
BODY MASS INDEX: 22.13 KG/M2 | DIASTOLIC BLOOD PRESSURE: 76 MMHG | HEART RATE: 74 BPM | HEIGHT: 68 IN | WEIGHT: 146 LBS | SYSTOLIC BLOOD PRESSURE: 128 MMHG

## 2022-05-12 DIAGNOSIS — K50.013 CROHN'S DISEASE OF SMALL INTESTINE WITH FISTULA: Primary | ICD-10-CM

## 2022-05-12 PROCEDURE — 1160F RVW MEDS BY RX/DR IN RCRD: CPT | Mod: CPTII,S$GLB,, | Performed by: COLON & RECTAL SURGERY

## 2022-05-12 PROCEDURE — 3008F PR BODY MASS INDEX (BMI) DOCUMENTED: ICD-10-PCS | Mod: CPTII,S$GLB,, | Performed by: COLON & RECTAL SURGERY

## 2022-05-12 PROCEDURE — 3008F BODY MASS INDEX DOCD: CPT | Mod: CPTII,S$GLB,, | Performed by: COLON & RECTAL SURGERY

## 2022-05-12 PROCEDURE — 3074F PR MOST RECENT SYSTOLIC BLOOD PRESSURE < 130 MM HG: ICD-10-PCS | Mod: CPTII,S$GLB,, | Performed by: COLON & RECTAL SURGERY

## 2022-05-12 PROCEDURE — 99999 PR PBB SHADOW E&M-EST. PATIENT-LVL IV: ICD-10-PCS | Mod: PBBFAC,,, | Performed by: COLON & RECTAL SURGERY

## 2022-05-12 PROCEDURE — 3078F PR MOST RECENT DIASTOLIC BLOOD PRESSURE < 80 MM HG: ICD-10-PCS | Mod: CPTII,S$GLB,, | Performed by: COLON & RECTAL SURGERY

## 2022-05-12 PROCEDURE — 1159F MED LIST DOCD IN RCRD: CPT | Mod: CPTII,S$GLB,, | Performed by: COLON & RECTAL SURGERY

## 2022-05-12 PROCEDURE — 99204 OFFICE O/P NEW MOD 45 MIN: CPT | Mod: S$GLB,,, | Performed by: COLON & RECTAL SURGERY

## 2022-05-12 PROCEDURE — 3078F DIAST BP <80 MM HG: CPT | Mod: CPTII,S$GLB,, | Performed by: COLON & RECTAL SURGERY

## 2022-05-12 PROCEDURE — 1159F PR MEDICATION LIST DOCUMENTED IN MEDICAL RECORD: ICD-10-PCS | Mod: CPTII,S$GLB,, | Performed by: COLON & RECTAL SURGERY

## 2022-05-12 PROCEDURE — 1160F PR REVIEW ALL MEDS BY PRESCRIBER/CLIN PHARMACIST DOCUMENTED: ICD-10-PCS | Mod: CPTII,S$GLB,, | Performed by: COLON & RECTAL SURGERY

## 2022-05-12 PROCEDURE — 99999 PR PBB SHADOW E&M-EST. PATIENT-LVL IV: CPT | Mod: PBBFAC,,, | Performed by: COLON & RECTAL SURGERY

## 2022-05-12 PROCEDURE — 3074F SYST BP LT 130 MM HG: CPT | Mod: CPTII,S$GLB,, | Performed by: COLON & RECTAL SURGERY

## 2022-05-12 PROCEDURE — 99204 PR OFFICE/OUTPT VISIT, NEW, LEVL IV, 45-59 MIN: ICD-10-PCS | Mod: S$GLB,,, | Performed by: COLON & RECTAL SURGERY

## 2022-05-12 RX ORDER — CIPROFLOXACIN 500 MG/1
500 TABLET ORAL DAILY
Qty: 30 TABLET | Refills: 5 | Status: SHIPPED | OUTPATIENT
Start: 2022-05-12 | End: 2022-06-11

## 2022-05-12 NOTE — PROGRESS NOTES
CRS Office Visit History and Physical    Referring Md:   Aaareferral Self  No address on file    SUBJECTIVE:     Chief Complaint:  Perianal fistula    History of Present Illness:  The patient is a new patient to this practice.   Course is as follows:  Patient is a 41 y.o. female presents with perianal fistula.  [id number]: History of 3 stage restorative proctocolectomy for presumed ulcerative colitis ([facility] at [facility]).  2011:  Diagnosis changed to Crohn's disease due to perianal abscesses and fistulas.    She transferred care to Dr. Peralta, who performed the following surgeries.    4/3/2013:  Fistulotomy x2  7/12/2013: LIFT  12/13/2013: repeat LIFT  7/28/2014: LIFT for anovaginal fistula  10/20/2014: repeat LIFT with mesh for anovaginal fistula (MatriStem mesh)  6/26/2017: repeat LIFT  Following Dr. Peralta's senior care, she has been seen by Dr. Condon who has performed multiple stem cell applications.    Regarding her Crohn's disease, she is followed by Dr. Gonzales and is treated with Stelara.  She is about to change medical therapy to Entyvio.     Functionally, she is active.  She previously vape but has recently stopped 3 weeks ago.  She has 6-7 bowel movements during the day.  1-2 bowel movements overnight.  Had intermittent improvement of her pouch function as well as decreased drainage when she was on a course of antibiotics recently.  Maintains continence.  Has frequent vaginal drainage.  Strongly wishes to avoid ileostomy.    Last Pouchoscopy: 11/6/2018:  Pouchoscopy with dilation of the ileal pouch anal anastomosis.  Few ulcers in the pouch.  Skin tag excised.  Seton placed into a fistula.    Review of patient's allergies indicates:  No Active Allergies    Past Medical History:   Diagnosis Date    Crohn's disease     GERD (gastroesophageal reflux disease)     History of Clostridium difficile infection     History of ulcerative colitis     Perineal sinus 5/25/2017     Past Surgical History:    Procedure Laterality Date     SECTION, CLASSIC      FLEXIBLE SIGMOIDOSCOPY N/A 2018    Procedure: SIGMOIDOSCOPY, FLEXIBLE;  Surgeon: Jairo Peralta MD;  [facility]  Service: Endoscopy;  Laterality: N/A;  no enemas per Forreston NP    HYSTERECTOMY      partial    ILEOSTOMY CLOSURE      POUCHOSCOPY N/A 2018    Procedure: ENDOSCOPY, SMALL INTESTINAL POUCH, DIAGNOSTIC, possible seton placement;  Surgeon: Jairo Peralta MD;  [facility]  Service: Endoscopy;  Laterality: N/A;  Schedule early 2018; No prep    rectovaginal fistula repair      RESTORATIVE PROCTOCOLECTOMY      TONSILLECTOMY      adenoids    TUBAL LIGATION      2016     Family History   Problem Relation Age of Onset    Cancer Paternal Grandfather 53        colon ca     Social History     Tobacco Use    Smoking status: Current Some Day Smoker     Types: Vaping with nicotine     Last attempt to quit: 2009     Years since quittin.5    Smokeless tobacco: Never Used   Substance Use Topics    Alcohol use: No     Alcohol/week: 0.0 standard drinks    Drug use: No        Review of Systems:  Review of Systems   Constitutional: Negative for chills, diaphoresis, fever, malaise/fatigue and weight loss.   HENT: Negative for congestion.    Respiratory: Negative for shortness of breath.    Cardiovascular: Negative for chest pain and leg swelling.   Gastrointestinal: Positive for diarrhea. Negative for abdominal pain, blood in stool, constipation, nausea and vomiting.   Genitourinary: Negative for dysuria.   Musculoskeletal: Negative for back pain and myalgias.   Skin: Positive for rash.   Neurological: Negative for dizziness and weakness.   Endo/Heme/Allergies: Does not bruise/bleed easily.   Psychiatric/Behavioral: Negative for depression.       OBJECTIVE:     Vital Signs (Most Recent)  /76 (BP Location: Left arm, Patient Position: Sitting, BP Method: Large (Automatic))   Pulse  "74   Ht 5' 8" (1.727 m)   Wt 66.2 kg (146 lb)   LMP 07/14/2017   BMI 22.20 kg/m²     Physical Exam:  General: White female in no distress   Neuro: alert and oriented x 4.  Moves all extremities.     HEENT: no icterus.  Trachea midline  Respiratory: respirations are even and unlabored  Cardiac: regular rate  Abdomen:  Soft, nontender, no masses  Extremities: Warm dry and intact  Skin: no rashes  Anorectal:  External exam with posterior midline open scar measuring approximately 3 cm x 1 cm with exposed granulation tissue over the sacrum approximately 6 cm away from the anus.  Around the anus, there are multiple scars.  Slight amount of purulence seen emanating from the left anterior aspect.  Thin perineal body.    Labs:  TSH normal.  Otherwise, no recent labs    Imaging:  No recent imaging      ASSESSMENT/PLAN:     Nyasia was seen today for anal fistula.    Diagnoses and all orders for this visit:    Crohn's disease of small intestine with fistula  -     ciprofloxacin HCl (CIPRO) 500 MG tablet; Take 1 tablet (500 mg total) by mouth once daily.        41 y.o. female with restorative total proctocolectomy with ileal pouch anastomosis in 2011 who then developed Crohn's disease with multiple perianal fistulas as well as an anal vaginal fistula.  She has undergone multiple attempts at fistula repair.  She is about to change medical therapy.  Long discussion with her today regarding optimal treatment for Crohn's disease in the setting of an ileal pouch.  Discussed that the majority of her care would need to be medically based.  Will plan to see if she has any benefit following changing her medical therapy from Stelara to Entyvio.  Additionally, 30 days of suppressive antibiotics were given to her with low-dose Cipro.  If no improvement after her change in her medical therapy, would plan for further evaluation with an EUA and flexible pouchoscopy.  She will follow up with me through the portal.    45 minute spent in chart " review and with the patient in discussion.    CORETTA Doyle MD, FACS, FASCRS  Staff Surgeon  Colon & Rectal Surgery

## 2022-06-01 ENCOUNTER — PATIENT MESSAGE (OUTPATIENT)
Dept: ADMINISTRATIVE | Facility: HOSPITAL | Age: 42
End: 2022-06-01
Payer: COMMERCIAL

## 2022-06-06 ENCOUNTER — PATIENT OUTREACH (OUTPATIENT)
Dept: ADMINISTRATIVE | Facility: HOSPITAL | Age: 42
End: 2022-06-06
Payer: COMMERCIAL

## 2022-06-09 ENCOUNTER — PATIENT MESSAGE (OUTPATIENT)
Dept: SURGERY | Facility: CLINIC | Age: 42
End: 2022-06-09
Payer: COMMERCIAL

## 2022-06-24 ENCOUNTER — PATIENT MESSAGE (OUTPATIENT)
Dept: SURGERY | Facility: CLINIC | Age: 42
End: 2022-06-24
Payer: COMMERCIAL

## 2022-07-14 ENCOUNTER — HOSPITAL ENCOUNTER (OUTPATIENT)
Dept: RADIOLOGY | Facility: HOSPITAL | Age: 42
Discharge: HOME | End: 2022-07-14
Attending: INTERNAL MEDICINE
Payer: COMMERCIAL

## 2022-07-14 DIAGNOSIS — Z12.31 VISIT FOR SCREENING MAMMOGRAM: ICD-10-CM

## 2022-07-14 PROCEDURE — 77063 BREAST TOMOSYNTHESIS BI: CPT | Mod: TC,PO

## 2022-07-14 PROCEDURE — 77067 SCR MAMMO BI INCL CAD: CPT | Mod: TC,PO

## 2022-07-15 ENCOUNTER — TELEPHONE (OUTPATIENT)
Dept: GASTROENTEROLOGY | Facility: CLINIC | Age: 42
End: 2022-07-15
Payer: COMMERCIAL

## 2022-07-15 NOTE — TELEPHONE ENCOUNTER
Referral in epic for K50.018 (ICD-10-CM) - Crohn's disease of small intestine with other complication

## 2022-07-15 NOTE — TELEPHONE ENCOUNTER
----- Message from Kendra Mcneill sent at 7/15/2022 11:18 AM CDT -----  Pt send a message through live chat. Pt would like to be seen asap. Pt would like the office to give her a call back      Pt can be reached at [phone]            TY

## 2022-07-20 ENCOUNTER — TELEPHONE (OUTPATIENT)
Dept: GASTROENTEROLOGY | Facility: CLINIC | Age: 42
End: 2022-07-20
Payer: COMMERCIAL

## 2022-07-20 NOTE — TELEPHONE ENCOUNTER
ESCOBAR for patient to call me back at [phone] re:  Her appt with Dr. Weber; does she want to see him or Dr. Benton?

## 2022-07-20 NOTE — TELEPHONE ENCOUNTER
Patient returned call  Explained to her that she was certainly welcome to see either Dr. Benton or Dr. Weber, but that Dr. Benton didn't have availability until late 2022 - early 2023.  She asked if she could keep her appointment with Dr. Weber while waiting to see Dr. Benton and I advised that it was unlikely that Dr. Benton would have anything before her scheduled appointment with Dr. Weber, and that once you start with a doctor that it is preferred that you stay with that doctor.      After a lengthy, tearful discussion with patient, she has decided to keep her appointment with Dr. Weber.  She thanked me for talking with her while she worked through it and then we hung up.

## 2022-07-20 NOTE — TELEPHONE ENCOUNTER
----- Message from Belkis Godwin RN sent at 7/20/2022 11:15 AM CDT -----  I recommend calling her and letting her know that she can only establish with one provider. If she wants to she Dr. Benton she will need to cancel her appointment with Dr. Weber. I am still waiting on records for her.    Reach out to Nay to see what the second half of the message is about.    Belkis  ----- Message -----  From: Sandy Stein RN  Sent: 7/20/2022  11:10 AM CDT  To: Belkis Godwin RN    She is on the spreadsheet with your initials but she also scheduled herself open access with Dr. Weber.  I don't know who Nay Khan is and which patient the rest of the message is about.    Sascha,  JESUS  ----- Message -----  From: Mercy Lopez MA  Sent: 7/20/2022  10:45 AM CDT  To: , #      ----- Message -----  From: Chrissie Tay  Sent: 7/20/2022  10:40 AM CDT  To: Chetan Capellan Staff    Sandi Le calling regarding Appointment Access  (message) for Crohn.  She would like to be on Dr. Benton's schedule, even though she know nothing is available until Dec. and she is on the waiting list.   She scheduled herself with Dr. Weber just to get on the schedule but she would like to see a female doctor.  Nay Khan calling regarding Appointment Access.  Pt is calling to see if her appt on 7-21-22 can be changed to a Virtual Visit    Call back #[phone]

## 2022-08-01 ENCOUNTER — TELEPHONE (OUTPATIENT)
Dept: SURGERY | Facility: CLINIC | Age: 42
End: 2022-08-01
Payer: COMMERCIAL

## 2022-08-02 ENCOUNTER — PATIENT MESSAGE (OUTPATIENT)
Dept: SURGERY | Facility: HOSPITAL | Age: 42
End: 2022-08-02
Payer: COMMERCIAL

## 2022-08-02 ENCOUNTER — OFFICE VISIT (OUTPATIENT)
Dept: SURGERY | Facility: CLINIC | Age: 42
End: 2022-08-02
Payer: COMMERCIAL

## 2022-08-02 VITALS
DIASTOLIC BLOOD PRESSURE: 65 MMHG | SYSTOLIC BLOOD PRESSURE: 129 MMHG | HEART RATE: 99 BPM | HEIGHT: 68 IN | BODY MASS INDEX: 20 KG/M2 | WEIGHT: 132 LBS

## 2022-08-02 DIAGNOSIS — K50.013 CROHN'S DISEASE OF SMALL INTESTINE WITH FISTULA: Primary | ICD-10-CM

## 2022-08-02 PROCEDURE — 1160F PR REVIEW ALL MEDS BY PRESCRIBER/CLIN PHARMACIST DOCUMENTED: ICD-10-PCS | Mod: CPTII,S$GLB,, | Performed by: COLON & RECTAL SURGERY

## 2022-08-02 PROCEDURE — 99214 OFFICE O/P EST MOD 30 MIN: CPT | Mod: S$GLB,,, | Performed by: COLON & RECTAL SURGERY

## 2022-08-02 PROCEDURE — 99999 PR PBB SHADOW E&M-EST. PATIENT-LVL V: ICD-10-PCS | Mod: PBBFAC,,, | Performed by: COLON & RECTAL SURGERY

## 2022-08-02 PROCEDURE — 1159F PR MEDICATION LIST DOCUMENTED IN MEDICAL RECORD: ICD-10-PCS | Mod: CPTII,S$GLB,, | Performed by: COLON & RECTAL SURGERY

## 2022-08-02 PROCEDURE — 3008F PR BODY MASS INDEX (BMI) DOCUMENTED: ICD-10-PCS | Mod: CPTII,S$GLB,, | Performed by: COLON & RECTAL SURGERY

## 2022-08-02 PROCEDURE — 3074F SYST BP LT 130 MM HG: CPT | Mod: CPTII,S$GLB,, | Performed by: COLON & RECTAL SURGERY

## 2022-08-02 PROCEDURE — 3008F BODY MASS INDEX DOCD: CPT | Mod: CPTII,S$GLB,, | Performed by: COLON & RECTAL SURGERY

## 2022-08-02 PROCEDURE — 1160F RVW MEDS BY RX/DR IN RCRD: CPT | Mod: CPTII,S$GLB,, | Performed by: COLON & RECTAL SURGERY

## 2022-08-02 PROCEDURE — 3074F PR MOST RECENT SYSTOLIC BLOOD PRESSURE < 130 MM HG: ICD-10-PCS | Mod: CPTII,S$GLB,, | Performed by: COLON & RECTAL SURGERY

## 2022-08-02 PROCEDURE — 3078F PR MOST RECENT DIASTOLIC BLOOD PRESSURE < 80 MM HG: ICD-10-PCS | Mod: CPTII,S$GLB,, | Performed by: COLON & RECTAL SURGERY

## 2022-08-02 PROCEDURE — 1159F MED LIST DOCD IN RCRD: CPT | Mod: CPTII,S$GLB,, | Performed by: COLON & RECTAL SURGERY

## 2022-08-02 PROCEDURE — 3078F DIAST BP <80 MM HG: CPT | Mod: CPTII,S$GLB,, | Performed by: COLON & RECTAL SURGERY

## 2022-08-02 PROCEDURE — 99214 PR OFFICE/OUTPT VISIT, EST, LEVL IV, 30-39 MIN: ICD-10-PCS | Mod: S$GLB,,, | Performed by: COLON & RECTAL SURGERY

## 2022-08-02 PROCEDURE — 99999 PR PBB SHADOW E&M-EST. PATIENT-LVL V: CPT | Mod: PBBFAC,,, | Performed by: COLON & RECTAL SURGERY

## 2022-08-02 RX ORDER — METRONIDAZOLE 500 MG/1
500 TABLET ORAL EVERY 8 HOURS
Qty: 30 TABLET | Refills: 0 | Status: SHIPPED | OUTPATIENT
Start: 2022-08-02 | End: 2022-08-12

## 2022-08-02 RX ORDER — CIPROFLOXACIN 500 MG/1
500 TABLET ORAL EVERY 12 HOURS
Qty: 20 TABLET | Refills: 0 | Status: SHIPPED | OUTPATIENT
Start: 2022-08-02 | End: 2022-08-12

## 2022-08-02 NOTE — PROGRESS NOTES
CRS Office Visit History and Physical    Referring Md:   No referring provider defined for this encounter.    SUBJECTIVE:     Chief Complaint:  Perianal fistula    History of Present Illness:  The patient is a new patient to this practice.   Course is as follows:  Patient is a 41 y.o. female presents with perianal fistula.  [id number]: History of 3 stage restorative proctocolectomy for presumed ulcerative colitis (Foreign [facility] at [facility]).  2011:  Diagnosis changed to Crohn's disease due to perianal abscesses and fistulas.    She transferred care to Dr. Peralta, who performed the following surgeries.    4/3/2013:  Fistulotomy x2  7/12/2013: LIFT  12/13/2013: repeat LIFT  7/28/2014: LIFT for anovaginal fistula  10/20/2014: repeat LIFT with mesh for anovaginal fistula (MatriStem mesh)  6/26/2017: repeat LIFT  Following Dr. Peralta's senior living, she has been seen by Dr. Condon who has performed multiple stem cell applications.    5/12/2022: Regarding her Crohn's disease, she is followed by Dr. Gonzales and is treated with Stelara.  She is about to change medical therapy to Entyvio.     Functionally, she is active.  She previously vaped but has recently stopped 3 weeks ago.  She has 6-7 bowel movements during the day.  1-2 bowel movements overnight.  Had intermittent improvement of her pouch function as well as decreased drainage when she was on a course of antibiotics recently.  Maintains continence.  Has frequent vaginal drainage.  Strongly wishes to avoid ileostomy.   08/02/2022:  Presents for evaluation for increased vaginal and perineal draining with increased pain.  No improvement with antibiotics or with change in her medical therapy to Entyvio.  Recently underwent pouchoscopy by Dr. Condon  With significant inflammation of the pouch and the anal canal with multiple ulcers.  Interested in discussing possibility of fecal diversion.    Last Pouchoscopy:                   Review of Systems:  Review of Systems  "  Constitutional: Negative for chills, diaphoresis, fever, malaise/fatigue and weight loss.   HENT: Negative for congestion.    Respiratory: Negative for shortness of breath.    Cardiovascular: Negative for chest pain and leg swelling.   Gastrointestinal: Positive for diarrhea. Negative for abdominal pain, blood in stool, constipation, nausea and vomiting.   Genitourinary: Negative for dysuria.   Musculoskeletal: Negative for back pain and myalgias.   Skin: Positive for rash.   Neurological: Negative for dizziness and weakness.   Endo/Heme/Allergies: Does not bruise/bleed easily.   Psychiatric/Behavioral: Negative for depression.       OBJECTIVE:     Vital Signs (Most Recent)  /65 (BP Location: Left arm, Patient Position: Sitting, BP Method: Large (Automatic))   Pulse 99   Ht 5' 8" (1.727 m)   Wt 59.9 kg (132 lb)   LMP 07/14/2017   BMI 20.07 kg/m²     Physical Exam:  General: White female in no distress   Neuro: alert and oriented x 4.  Moves all extremities.     HEENT: no icterus.  Trachea midline  Respiratory: respirations are even and unlabored  Cardiac: regular rate  Abdomen:  Soft, nontender, no masses  Extremities: Warm dry and intact  Skin: no rashes  Anorectal:  External exam with posterior midline open scar measuring approximately 3 cm x 1 cm with exposed granulation tissue over the sacrum approximately 6 cm away from the anus.  Around the anus, there are multiple scars.  Slight amount of purulence seen emanating from the left anterior aspect.  Thin perineal body.        Labs:  TSH normal.  Otherwise, no recent labs    Imaging:  No recent imaging      ASSESSMENT/PLAN:     Nyasia was seen today for crohn's disease and rectal pain.    Diagnoses and all orders for this visit:    Crohn's disease of small intestine with fistula  -     Case Request Operating Room: CREATION, ILEOSTOMY, LAPAROSCOPIC, SIGMOIDOSCOPY, FLEXIBLE    Other orders  -     ciprofloxacin HCl (CIPRO) 500 MG tablet; Take 1 tablet (500 " mg total) by mouth every 12 (twelve) hours. for 10 days  -     metroNIDAZOLE (FLAGYL) 500 MG tablet; Take 1 tablet (500 mg total) by mouth every 8 (eight) hours. for 10 days        41 y.o. female with restorative total proctocolectomy with ileal pouch anastomosis in 2011 who then developed Crohn's disease with multiple perianal fistulas as well as an anal vaginal fistula.  She has undergone multiple attempts at fistula repair.     She has recently changed medical therapy to Entyvio with no significant improvement.  She is awaiting a GI visit with the IBD team here in September.  Secondary to pain and discomfort, she is interested in discussing possible ileostomy.  Discussed 2 options:  1)   Laparoscopic loop ileostomy.  Discussed this may need to be performed in open fashion secondary to scarring.  Discussed that loop ileostomy would have intermittent run over but would provide fecal diversion to allow the pouch to possibly healing respond to medical therapy.    Even if the pouch did not heal completely, it would likely make pouch excision and proctectomy easier.  2)   Pouch excision with end ileostomy.  Discussed this would be the most definitive measure but would be irreversible.      She strongly wishes to avoid anything permanent this time.  Will plan for laparoscopic loop ileostomy along with flexible pouchoscopy.      CORETTA Doyle MD, FACS, FASCRS  Staff Surgeon  Colon & Rectal Surgery

## 2022-08-05 ENCOUNTER — PATIENT MESSAGE (OUTPATIENT)
Dept: SURGERY | Facility: HOSPITAL | Age: 42
End: 2022-08-05
Payer: COMMERCIAL

## 2022-08-09 ENCOUNTER — TELEPHONE (OUTPATIENT)
Dept: INTERNAL MEDICINE | Facility: CLINIC | Age: 42
End: 2022-08-09
Payer: COMMERCIAL

## 2022-08-09 RX ORDER — BUPROPION HYDROCHLORIDE 300 MG/1
300 TABLET ORAL DAILY
Qty: 90 TABLET | Refills: 3 | Status: SHIPPED | OUTPATIENT
Start: 2022-08-09 | End: 2022-09-14

## 2022-08-09 RX ORDER — ALPRAZOLAM 0.25 MG/1
0.25 TABLET ORAL DAILY PRN
Qty: 30 TABLET | Refills: 0 | Status: SHIPPED | OUTPATIENT
Start: 2022-08-09 | End: 2022-09-13 | Stop reason: SDUPTHER

## 2022-08-09 NOTE — TELEPHONE ENCOUNTER
Fuad Willard Angele S., DO 7 minutes ago (3:40 PM)     She requested a refill for her antidepressant but hasnt heard anything.   Nyasia is scheduled for surgery on Aug 17 at [facility].  Her j pouch is very inflamed.  She will be getting a bag for now and hopefully not permanent.   She is not doing well at all.        Would you prescribe something to calm her down in addition to her antidepressant?        Im sending this to you bc she doesnt have the energy to handle it herself.   Thank you.

## 2022-08-10 ENCOUNTER — TELEPHONE (OUTPATIENT)
Dept: SURGERY | Facility: CLINIC | Age: 42
End: 2022-08-10
Payer: COMMERCIAL

## 2022-08-10 ENCOUNTER — PATIENT MESSAGE (OUTPATIENT)
Dept: INTERNAL MEDICINE | Facility: CLINIC | Age: 42
End: 2022-08-10
Payer: COMMERCIAL

## 2022-08-10 RX ORDER — FLUOXETINE HYDROCHLORIDE 40 MG/1
40 CAPSULE ORAL DAILY
Qty: 30 CAPSULE | Refills: 11 | Status: SHIPPED | OUTPATIENT
Start: 2022-08-10 | End: 2023-03-09 | Stop reason: SDUPTHER

## 2022-08-10 NOTE — TELEPHONE ENCOUNTER
Spoke with patient regarding disability paperwork. All questions answered. No further needs at this time.

## 2022-08-11 ENCOUNTER — OFFICE VISIT (OUTPATIENT)
Dept: WOUND CARE | Facility: CLINIC | Age: 42
End: 2022-08-11
Payer: COMMERCIAL

## 2022-08-11 DIAGNOSIS — Z71.89 ENCOUNTER FOR OSTOMY CARE EDUCATION: ICD-10-CM

## 2022-08-11 DIAGNOSIS — Z43.2 ATTENTION TO ILEOSTOMY: Primary | ICD-10-CM

## 2022-08-11 PROCEDURE — 1160F PR REVIEW ALL MEDS BY PRESCRIBER/CLIN PHARMACIST DOCUMENTED: ICD-10-PCS | Mod: CPTII,S$GLB,, | Performed by: CLINICAL NURSE SPECIALIST

## 2022-08-11 PROCEDURE — 1160F RVW MEDS BY RX/DR IN RCRD: CPT | Mod: CPTII,S$GLB,, | Performed by: CLINICAL NURSE SPECIALIST

## 2022-08-11 PROCEDURE — 1159F PR MEDICATION LIST DOCUMENTED IN MEDICAL RECORD: ICD-10-PCS | Mod: CPTII,S$GLB,, | Performed by: CLINICAL NURSE SPECIALIST

## 2022-08-11 PROCEDURE — 99024 POSTOP FOLLOW-UP VISIT: CPT | Mod: S$GLB,,, | Performed by: CLINICAL NURSE SPECIALIST

## 2022-08-11 PROCEDURE — 99999 PR PBB SHADOW E&M-EST. PATIENT-LVL III: CPT | Mod: PBBFAC,,, | Performed by: CLINICAL NURSE SPECIALIST

## 2022-08-11 PROCEDURE — 99024 PR POST-OP FOLLOW-UP VISIT: ICD-10-PCS | Mod: S$GLB,,, | Performed by: CLINICAL NURSE SPECIALIST

## 2022-08-11 PROCEDURE — 1159F MED LIST DOCD IN RCRD: CPT | Mod: CPTII,S$GLB,, | Performed by: CLINICAL NURSE SPECIALIST

## 2022-08-11 PROCEDURE — 99999 PR PBB SHADOW E&M-EST. PATIENT-LVL III: ICD-10-PCS | Mod: PBBFAC,,, | Performed by: CLINICAL NURSE SPECIALIST

## 2022-08-11 NOTE — PROGRESS NOTES
Pre op Ostomy marking:    This patient was seen today per request  in preparation for upcoming ostomy surgery on 8/17/22  Explained procedure for stoma siting and rationale. Simulated appliance use with empty pouch provided. Abdomen examined with patient sitting, standing and lying down. Observed abdomen, contours, location of belt line, she has scar from previous stoma and this is best location still for her siting . Has jpouch and had temp stoma done at [facility] in 2011 .       She brought her mom and we had long discussion and I answered all questions for both.  The proposed surgery was discussed and patient educated on expected postoperative course and expectations. She did not need the ACS kit since she has had ostomy 10 years ago

## 2022-08-16 ENCOUNTER — TELEPHONE (OUTPATIENT)
Dept: SURGERY | Facility: CLINIC | Age: 42
End: 2022-08-16
Payer: COMMERCIAL

## 2022-08-16 ENCOUNTER — ANESTHESIA EVENT (OUTPATIENT)
Dept: SURGERY | Facility: HOSPITAL | Age: 42
DRG: 331 | End: 2022-08-16
Payer: COMMERCIAL

## 2022-08-16 RX ORDER — OXYCODONE AND ACETAMINOPHEN 7.5; 325 MG/1; MG/1
TABLET ORAL
Status: ON HOLD | COMMUNITY
Start: 2022-08-05 | End: 2022-08-19 | Stop reason: HOSPADM

## 2022-08-16 RX ORDER — CIPROFLOXACIN 500 MG/1
500 TABLET ORAL
COMMUNITY
End: 2022-09-02

## 2022-08-16 RX ORDER — METRONIDAZOLE 500 MG/1
500 TABLET ORAL
COMMUNITY
End: 2022-09-02

## 2022-08-16 NOTE — ANESTHESIA PREPROCEDURE EVALUATION
[facility]  Anesthesia Pre-Operative Evaluation         Patient Name/: Nyasia Middleton, 1980  MRN: [id number]    SUBJECTIVE:     Pre-operative evaluation for Procedure(s) (LRB):  CREATION, ILEOSTOMY, LAPAROSCOPIC (N/A)  SIGMOIDOSCOPY, FLEXIBLE (N/A)     2022    Nyasia Middleton is a 41 y.o. female w/ a significant PMHx of GERD, anxiety, depression, protocolectomy for presumed ulcerative colitis in , was then diagnosed with Crohn's once fistula's were noted. Has since had multiple fistulotomy as well as LIFT procedures for anovaginal fistula. She presented to [facility] 2022 with increased perineal and vaginal drainage as well as recent pouchoscopy with inflammation noted. Now electing for ileostomy.     Patient now presents for the above procedure(s).      Prev airway: None documented.      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     Current Outpatient Medications on File Prior to Encounter   Medication Sig Dispense Refill    ciprofloxacin HCl (CIPRO) 500 MG tablet Take 500 mg by mouth every 12 (twelve) hours.      diphenoxylate-atropine 2.5-0.025 mg (LOMOTIL) 2.5-0.025 mg per tablet TAKE 2 TABLETS BY MOUTH FOUR TIMES DAILY AS NEEDED FOR DIARRHEA 100 tablet 0    metroNIDAZOLE (FLAGYL) 500 MG tablet Take 500 mg by mouth every 8 (eight) hours.      pantoprazole (PROTONIX) 40 MG tablet TAKE 1 TABLET BY MOUTH TWICE DAILY 180 tablet 2     clotrimazole-betamethasone 1-0.05% (LOTRISONE) cream 2 (two) times daily. Apply to affected area      dicyclomine (BENTYL) 20 mg tablet TAKE 1 TABLET BY MOUTH EVERY SIX HOURS 120 tablet 0    fluconazole (DIFLUCAN) 150 MG Tab Take 1 tablet (150 mg total) by mouth once daily. (Patient not taking: Reported on 2022) 2 tablet 9    loperamide (IMODIUM) 2 mg capsule TAKE 2 CAPSULE BY MOUTH FOUR TIMES DAILY 240 capsule 3    ondansetron (ZOFRAN-ODT) 8 MG TbDL DISSOLVE 1 TABLET UNDER TONGUE EVERY 8 HOURS AS NEEDED FOR NAUSEA 20 tablet 2    oxyCODONE-acetaminophen (PERCOCET) 7.5-325 mg per tablet Take by mouth.         Past Surgical History:   Procedure Laterality Date     SECTION, CLASSIC      FLEXIBLE SIGMOIDOSCOPY N/A 2018    Procedure: SIGMOIDOSCOPY, FLEXIBLE;  Surgeon: Jairo Peralta MD;  Location: [facility] ([address] [place] [address]);  Service: Endoscopy;  Laterality: N/A;  no enemas per Harrison NP    HYSTERECTOMY      partial    ILEOSTOMY CLOSURE      POUCHOSCOPY N/A 2018    Procedure: ENDOSCOPY, SMALL INTESTINAL POUCH, DIAGNOSTIC, possible seton placement;  Surgeon: Jairo Peralta MD;  Location: [facility] ([address] [place] [address]);  Service: Endoscopy;  Laterality: N/A;  Schedule early 2018; No prep    rectovaginal fistula repair      RESTORATIVE PROCTOCOLECTOMY      TONSILLECTOMY      adenoids    TUBAL LIGATION      2016       Social History:  Tobacco Use: High Risk    Smoking Tobacco Use: Current Some Day Smoker    Smokeless Tobacco Use: Never Used       Alcohol Use: Heavy Drinker    Frequency of Alcohol Consumption: 2-3 times a week    Average Number of Drinks: 5 or 6    Frequency of Binge Drinking: Less than monthly       OBJECTIVE:     Vital Signs Range:  BMI Readings from Last 1 Encounters:   22 20.07 kg/m²               Significant Labs:        Component Value Date/Time    WBC 8.39 2018 1046    HGB 11.2 (L) 2018 1046    HCT 35.4 (L) 2018 1046     2018 1046      12/12/2018 1046    K 4.2 12/12/2018 1046     12/12/2018 1046    CO2 29 12/12/2018 1046    GLU 73 12/12/2018 1046    BUN 10 12/12/2018 1046    CREATININE 0.6 12/12/2018 1046    MG 2.1 07/22/2014 1148    PHOS 3.8 07/22/2014 1148    CALCIUM 9.3 12/12/2018 1046    ALBUMIN 3.7 12/12/2018 1046    PROT 7.3 12/12/2018 1046    ALKPHOS 78 12/12/2018 1046    BILITOT 0.5 12/12/2018 1046    AST 18 12/12/2018 1046    ALT 12 12/12/2018 1046        Please see Results Review for additional labs.     Diagnostic Studies: No relevant studies.    EKG:   No results found for this or any previous visit.    ECHO:  No results found for this or any previous visit.        ASSESSMENT/PLAN:       Pre-op Assessment    I have reviewed the Patient Summary Reports.     I have reviewed the Nursing Notes. I have reviewed the NPO Status.   I have reviewed the Medications.     Review of Systems  Anesthesia Hx:  No problems with previous Anesthesia  History of prior surgery of interest to airway management or planning:  Denies Personal Hx of Anesthesia complications.   Social:  Social Alcohol Use Used to vape   Hematology/Oncology:     Oncology Normal     Cardiovascular:   Denies Hypertension.   Denies Angina.    Pulmonary:   Denies Shortness of breath.    Renal/:   Denies Chronic Renal Disease.     Hepatic/GI:   GERD    Neurological:   Denies CVA. Denies Seizures.    Endocrine:  Endocrine Normal    Psych:   Psychiatric History          Physical Exam  General: Well nourished, Cooperative, Alert and Oriented    Airway:  Mallampati: II / I  Mouth Opening: Normal  TM Distance: Normal  Tongue: Normal  Neck ROM: Normal ROM    Dental:  Intact        Anesthesia Plan  Type of Anesthesia, risks & benefits discussed:    Anesthesia Type: Gen ETT  Intra-op Monitoring Plan: Standard ASA Monitors  Post Op Pain Control Plan: IV/PO Opioids PRN and multimodal analgesia  Induction:  IV  Airway Plan: Direct, Post-Induction  Informed Consent: Informed  consent signed with the Patient and all parties understand the risks and agree with anesthesia plan.  All questions answered. Patient consented to blood products? Yes  ASA Score: 2  Day of Surgery Review of History & Physical: H&P Update referred to the surgeon/provider.    Ready For Surgery From Anesthesia Perspective.     .

## 2022-08-16 NOTE — PRE-PROCEDURE INSTRUCTIONS
Preoperative NPO and Bowel Prep instructions given per CRS Dept. Patient's questions answered and patient V/C/U.    PREOP INSTRUCTIONS:    Shower instructions as well as directions to the Surgery Center were given.Encouraged to wear loose fitting,comfortable clothing.Medication instructions for pm prior to and am of procedure reviewed.Instructed to avoid taking vitamins,supplements,aspirin and ibuprofen the morning of surgery.     Patient advised of the updated visitor policy allowing one adult support person,pre and post procedure.     Patient denies any side effects or issues with anesthesia or sedation.      Patient given 0600 arrival time to [facility]

## 2022-08-17 ENCOUNTER — HOSPITAL ENCOUNTER (INPATIENT)
Facility: HOSPITAL | Age: 42
LOS: 2 days | Discharge: HOME HEALTH | DRG: 331 | End: 2022-08-19
Attending: COLON & RECTAL SURGERY | Admitting: COLON & RECTAL SURGERY
Payer: COMMERCIAL

## 2022-08-17 ENCOUNTER — PATIENT MESSAGE (OUTPATIENT)
Dept: SURGERY | Facility: HOSPITAL | Age: 42
End: 2022-08-17
Payer: COMMERCIAL

## 2022-08-17 ENCOUNTER — ANESTHESIA (OUTPATIENT)
Dept: SURGERY | Facility: HOSPITAL | Age: 42
DRG: 331 | End: 2022-08-17
Payer: COMMERCIAL

## 2022-08-17 DIAGNOSIS — K50.10 CROHN'S DISEASE OF ANAL CANAL: ICD-10-CM

## 2022-08-17 DIAGNOSIS — K50.018 CROHN'S DISEASE OF SMALL INTESTINE WITH OTHER COMPLICATION: Primary | ICD-10-CM

## 2022-08-17 LAB
ABO + RH BLD: NORMAL
BLD GP AB SCN CELLS X3 SERPL QL: NORMAL
CREAT SERPL-MCNC: 0.6 MG/DL (ref 0.5–1.4)
EST. GFR  (NO RACE VARIABLE): >60 ML/MIN/1.73 M^2

## 2022-08-17 PROCEDURE — 63600175 PHARM REV CODE 636 W HCPCS

## 2022-08-17 PROCEDURE — 86850 RBC ANTIBODY SCREEN: CPT | Performed by: NURSE PRACTITIONER

## 2022-08-17 PROCEDURE — 25000003 PHARM REV CODE 250: Performed by: NURSE PRACTITIONER

## 2022-08-17 PROCEDURE — 25000003 PHARM REV CODE 250: Performed by: STUDENT IN AN ORGANIZED HEALTH CARE EDUCATION/TRAINING PROGRAM

## 2022-08-17 PROCEDURE — 25000003 PHARM REV CODE 250

## 2022-08-17 PROCEDURE — 44187 LAP ILEO/JEJUNO-STOMY: CPT | Mod: ,,, | Performed by: COLON & RECTAL SURGERY

## 2022-08-17 PROCEDURE — 94761 N-INVAS EAR/PLS OXIMETRY MLT: CPT

## 2022-08-17 PROCEDURE — 44187 PR LAP, SURG ILEO/JEJUNO-STOMY: ICD-10-PCS | Mod: ,,, | Performed by: COLON & RECTAL SURGERY

## 2022-08-17 PROCEDURE — S0030 INJECTION, METRONIDAZOLE: HCPCS

## 2022-08-17 PROCEDURE — 36415 COLL VENOUS BLD VENIPUNCTURE: CPT | Performed by: COLON & RECTAL SURGERY

## 2022-08-17 PROCEDURE — 36000710: Performed by: COLON & RECTAL SURGERY

## 2022-08-17 PROCEDURE — 82565 ASSAY OF CREATININE: CPT | Performed by: COLON & RECTAL SURGERY

## 2022-08-17 PROCEDURE — 44385 PR ENDOSCOPY OF BOWEL POUCH: ICD-10-PCS | Mod: 51,,, | Performed by: COLON & RECTAL SURGERY

## 2022-08-17 PROCEDURE — 37000008 HC ANESTHESIA 1ST 15 MINUTES: Performed by: COLON & RECTAL SURGERY

## 2022-08-17 PROCEDURE — 71000039 HC RECOVERY, EACH ADD'L HOUR: Performed by: COLON & RECTAL SURGERY

## 2022-08-17 PROCEDURE — 20600001 HC STEP DOWN PRIVATE ROOM

## 2022-08-17 PROCEDURE — 63600175 PHARM REV CODE 636 W HCPCS: Performed by: NURSE PRACTITIONER

## 2022-08-17 PROCEDURE — 36000711: Performed by: COLON & RECTAL SURGERY

## 2022-08-17 PROCEDURE — 37000009 HC ANESTHESIA EA ADD 15 MINS: Performed by: COLON & RECTAL SURGERY

## 2022-08-17 PROCEDURE — 71000015 HC POSTOP RECOV 1ST HR: Performed by: COLON & RECTAL SURGERY

## 2022-08-17 PROCEDURE — 44385 ENDOSCOPY OF BOWEL POUCH: CPT | Mod: 51,,, | Performed by: COLON & RECTAL SURGERY

## 2022-08-17 PROCEDURE — 94799 UNLISTED PULMONARY SVC/PX: CPT

## 2022-08-17 PROCEDURE — D9220A PRA ANESTHESIA: Mod: ,,, | Performed by: ANESTHESIOLOGY

## 2022-08-17 PROCEDURE — 63600175 PHARM REV CODE 636 W HCPCS: Performed by: STUDENT IN AN ORGANIZED HEALTH CARE EDUCATION/TRAINING PROGRAM

## 2022-08-17 PROCEDURE — D9220A PRA ANESTHESIA: ICD-10-PCS | Mod: ,,, | Performed by: ANESTHESIOLOGY

## 2022-08-17 PROCEDURE — 71000033 HC RECOVERY, INTIAL HOUR: Performed by: COLON & RECTAL SURGERY

## 2022-08-17 RX ORDER — DEXAMETHASONE SODIUM PHOSPHATE 4 MG/ML
INJECTION, SOLUTION INTRA-ARTICULAR; INTRALESIONAL; INTRAMUSCULAR; INTRAVENOUS; SOFT TISSUE
Status: DISCONTINUED | OUTPATIENT
Start: 2022-08-17 | End: 2022-08-17

## 2022-08-17 RX ORDER — ACETAMINOPHEN 650 MG/20.3ML
975 LIQUID ORAL
Status: COMPLETED | OUTPATIENT
Start: 2022-08-17 | End: 2022-08-17

## 2022-08-17 RX ORDER — LIDOCAINE HYDROCHLORIDE 10 MG/ML
INJECTION, SOLUTION EPIDURAL; INFILTRATION; INTRACAUDAL; PERINEURAL
Status: DISCONTINUED | OUTPATIENT
Start: 2022-08-17 | End: 2022-08-17

## 2022-08-17 RX ORDER — OXYCODONE HYDROCHLORIDE 10 MG/1
10 TABLET ORAL
Status: DISCONTINUED | OUTPATIENT
Start: 2022-08-17 | End: 2022-08-19 | Stop reason: HOSPADM

## 2022-08-17 RX ORDER — GABAPENTIN 300 MG/1
300 CAPSULE ORAL
Status: COMPLETED | OUTPATIENT
Start: 2022-08-17 | End: 2022-08-17

## 2022-08-17 RX ORDER — HEPARIN SODIUM 5000 [USP'U]/ML
5000 INJECTION, SOLUTION INTRAVENOUS; SUBCUTANEOUS EVERY 8 HOURS
Status: COMPLETED | OUTPATIENT
Start: 2022-08-17 | End: 2022-08-17

## 2022-08-17 RX ORDER — TALC
6 POWDER (GRAM) TOPICAL NIGHTLY PRN
Status: DISCONTINUED | OUTPATIENT
Start: 2022-08-17 | End: 2022-08-19 | Stop reason: HOSPADM

## 2022-08-17 RX ORDER — GABAPENTIN 300 MG/1
300 CAPSULE ORAL 3 TIMES DAILY
Status: DISCONTINUED | OUTPATIENT
Start: 2022-08-17 | End: 2022-08-17

## 2022-08-17 RX ORDER — ONDANSETRON 2 MG/ML
4 INJECTION INTRAMUSCULAR; INTRAVENOUS EVERY 6 HOURS PRN
Status: DISCONTINUED | OUTPATIENT
Start: 2022-08-17 | End: 2022-08-18

## 2022-08-17 RX ORDER — SODIUM CHLORIDE 9 MG/ML
INJECTION, SOLUTION INTRAVENOUS CONTINUOUS
Status: DISCONTINUED | OUTPATIENT
Start: 2022-08-17 | End: 2022-08-18

## 2022-08-17 RX ORDER — DIPHENHYDRAMINE HYDROCHLORIDE 50 MG/ML
INJECTION INTRAMUSCULAR; INTRAVENOUS
Status: COMPLETED
Start: 2022-08-17 | End: 2022-08-17

## 2022-08-17 RX ORDER — SODIUM CHLORIDE 0.9 % (FLUSH) 0.9 %
10 SYRINGE (ML) INJECTION
Status: DISCONTINUED | OUTPATIENT
Start: 2022-08-17 | End: 2022-08-19 | Stop reason: HOSPADM

## 2022-08-17 RX ORDER — HYDROMORPHONE HYDROCHLORIDE 1 MG/ML
0.2 INJECTION, SOLUTION INTRAMUSCULAR; INTRAVENOUS; SUBCUTANEOUS EVERY 5 MIN PRN
Status: DISCONTINUED | OUTPATIENT
Start: 2022-08-17 | End: 2022-08-17 | Stop reason: HOSPADM

## 2022-08-17 RX ORDER — SODIUM CHLORIDE 9 MG/ML
INJECTION, SOLUTION INTRAVENOUS
Status: COMPLETED | OUTPATIENT
Start: 2022-08-17 | End: 2022-08-17

## 2022-08-17 RX ORDER — TRAMADOL HYDROCHLORIDE 50 MG/1
50 TABLET ORAL EVERY 6 HOURS PRN
Status: DISCONTINUED | OUTPATIENT
Start: 2022-08-17 | End: 2022-08-19 | Stop reason: HOSPADM

## 2022-08-17 RX ORDER — OXYCODONE HYDROCHLORIDE 10 MG/1
10 TABLET ORAL ONCE
Status: DISCONTINUED | OUTPATIENT
Start: 2022-08-17 | End: 2022-08-19 | Stop reason: HOSPADM

## 2022-08-17 RX ORDER — MUPIROCIN 20 MG/G
OINTMENT TOPICAL 2 TIMES DAILY
Status: DISCONTINUED | OUTPATIENT
Start: 2022-08-17 | End: 2022-08-19 | Stop reason: HOSPADM

## 2022-08-17 RX ORDER — GABAPENTIN 300 MG/1
300 CAPSULE ORAL 3 TIMES DAILY
Status: DISCONTINUED | OUTPATIENT
Start: 2022-08-17 | End: 2022-08-18

## 2022-08-17 RX ORDER — KETAMINE HCL IN 0.9 % NACL 50 MG/5 ML
SYRINGE (ML) INTRAVENOUS
Status: DISCONTINUED | OUTPATIENT
Start: 2022-08-17 | End: 2022-08-17

## 2022-08-17 RX ORDER — SODIUM CHLORIDE 9 MG/ML
INJECTION, SOLUTION INTRAVENOUS CONTINUOUS
Status: DISCONTINUED | OUTPATIENT
Start: 2022-08-17 | End: 2022-08-17

## 2022-08-17 RX ORDER — MUPIROCIN 20 MG/G
1 OINTMENT TOPICAL
Status: COMPLETED | OUTPATIENT
Start: 2022-08-17 | End: 2022-08-17

## 2022-08-17 RX ORDER — ONDANSETRON 2 MG/ML
INJECTION INTRAMUSCULAR; INTRAVENOUS
Status: COMPLETED
Start: 2022-08-17 | End: 2022-08-19

## 2022-08-17 RX ORDER — METRONIDAZOLE 500 MG/100ML
500 INJECTION, SOLUTION INTRAVENOUS
Status: DISCONTINUED | OUTPATIENT
Start: 2022-08-17 | End: 2022-08-17 | Stop reason: HOSPADM

## 2022-08-17 RX ORDER — FENTANYL CITRATE 50 UG/ML
INJECTION, SOLUTION INTRAMUSCULAR; INTRAVENOUS
Status: DISCONTINUED | OUTPATIENT
Start: 2022-08-17 | End: 2022-08-17

## 2022-08-17 RX ORDER — LIDOCAINE HYDROCHLORIDE 10 MG/ML
1 INJECTION, SOLUTION EPIDURAL; INFILTRATION; INTRACAUDAL; PERINEURAL
Status: COMPLETED | OUTPATIENT
Start: 2022-08-17 | End: 2022-08-17

## 2022-08-17 RX ORDER — MIDAZOLAM HYDROCHLORIDE 1 MG/ML
INJECTION, SOLUTION INTRAMUSCULAR; INTRAVENOUS
Status: DISCONTINUED | OUTPATIENT
Start: 2022-08-17 | End: 2022-08-17

## 2022-08-17 RX ORDER — IBUPROFEN 400 MG/1
800 TABLET ORAL EVERY 8 HOURS
Status: DISCONTINUED | OUTPATIENT
Start: 2022-08-18 | End: 2022-08-19 | Stop reason: HOSPADM

## 2022-08-17 RX ORDER — ROCURONIUM BROMIDE 10 MG/ML
INJECTION, SOLUTION INTRAVENOUS
Status: DISCONTINUED | OUTPATIENT
Start: 2022-08-17 | End: 2022-08-17

## 2022-08-17 RX ORDER — OXYCODONE HYDROCHLORIDE 5 MG/1
5 TABLET ORAL EVERY 4 HOURS PRN
Status: DISCONTINUED | OUTPATIENT
Start: 2022-08-17 | End: 2022-08-17

## 2022-08-17 RX ORDER — SODIUM CHLORIDE 0.9 % (FLUSH) 0.9 %
10 SYRINGE (ML) INJECTION
Status: DISCONTINUED | OUTPATIENT
Start: 2022-08-17 | End: 2022-08-17 | Stop reason: HOSPADM

## 2022-08-17 RX ORDER — METRONIDAZOLE 500 MG/100ML
INJECTION, SOLUTION INTRAVENOUS
Status: DISCONTINUED | OUTPATIENT
Start: 2022-08-17 | End: 2022-08-17

## 2022-08-17 RX ORDER — TRIPROLIDINE/PSEUDOEPHEDRINE 2.5MG-60MG
600 TABLET ORAL
Status: COMPLETED | OUTPATIENT
Start: 2022-08-17 | End: 2022-08-17

## 2022-08-17 RX ORDER — ENOXAPARIN SODIUM 100 MG/ML
40 INJECTION SUBCUTANEOUS EVERY 24 HOURS
Status: DISCONTINUED | OUTPATIENT
Start: 2022-08-17 | End: 2022-08-19 | Stop reason: HOSPADM

## 2022-08-17 RX ORDER — BUPROPION HYDROCHLORIDE 150 MG/1
300 TABLET ORAL NIGHTLY
Status: DISCONTINUED | OUTPATIENT
Start: 2022-08-17 | End: 2022-08-19 | Stop reason: HOSPADM

## 2022-08-17 RX ORDER — ACETAMINOPHEN 500 MG
1000 TABLET ORAL EVERY 8 HOURS
Status: DISCONTINUED | OUTPATIENT
Start: 2022-08-18 | End: 2022-08-19 | Stop reason: HOSPADM

## 2022-08-17 RX ORDER — PROPOFOL 10 MG/ML
VIAL (ML) INTRAVENOUS
Status: DISCONTINUED | OUTPATIENT
Start: 2022-08-17 | End: 2022-08-17

## 2022-08-17 RX ORDER — ACETAMINOPHEN 10 MG/ML
1000 INJECTION, SOLUTION INTRAVENOUS EVERY 8 HOURS
Status: DISPENSED | OUTPATIENT
Start: 2022-08-17 | End: 2022-08-18

## 2022-08-17 RX ORDER — FENTANYL CITRATE 50 UG/ML
INJECTION, SOLUTION INTRAMUSCULAR; INTRAVENOUS
Status: COMPLETED
Start: 2022-08-17 | End: 2022-08-17

## 2022-08-17 RX ORDER — FLUOXETINE HYDROCHLORIDE 20 MG/1
40 CAPSULE ORAL NIGHTLY
Status: DISCONTINUED | OUTPATIENT
Start: 2022-08-17 | End: 2022-08-19 | Stop reason: HOSPADM

## 2022-08-17 RX ORDER — ONDANSETRON 2 MG/ML
INJECTION INTRAMUSCULAR; INTRAVENOUS
Status: DISCONTINUED | OUTPATIENT
Start: 2022-08-17 | End: 2022-08-17

## 2022-08-17 RX ORDER — DIPHENHYDRAMINE HYDROCHLORIDE 50 MG/ML
12.5 INJECTION INTRAMUSCULAR; INTRAVENOUS
Status: DISCONTINUED | OUTPATIENT
Start: 2022-08-17 | End: 2022-08-17 | Stop reason: HOSPADM

## 2022-08-17 RX ORDER — HALOPERIDOL 5 MG/ML
0.5 INJECTION INTRAMUSCULAR EVERY 10 MIN PRN
Status: DISCONTINUED | OUTPATIENT
Start: 2022-08-17 | End: 2022-08-17 | Stop reason: HOSPADM

## 2022-08-17 RX ADMIN — MUPIROCIN: 20 OINTMENT TOPICAL at 09:08

## 2022-08-17 RX ADMIN — IBUPROFEN 800 MG: 800 INJECTION INTRAVENOUS at 09:08

## 2022-08-17 RX ADMIN — OXYCODONE HYDROCHLORIDE 10 MG: 10 TABLET ORAL at 12:08

## 2022-08-17 RX ADMIN — PROPOFOL 200 MG: 10 INJECTION, EMULSION INTRAVENOUS at 08:08

## 2022-08-17 RX ADMIN — FENTANYL CITRATE 50 MCG: 0.05 INJECTION, SOLUTION INTRAMUSCULAR; INTRAVENOUS at 09:08

## 2022-08-17 RX ADMIN — HYDROMORPHONE HYDROCHLORIDE 0.2 MG: 1 INJECTION, SOLUTION INTRAMUSCULAR; INTRAVENOUS; SUBCUTANEOUS at 09:08

## 2022-08-17 RX ADMIN — DIPHENHYDRAMINE HYDROCHLORIDE 12.5 MG: 50 INJECTION INTRAMUSCULAR; INTRAVENOUS at 10:08

## 2022-08-17 RX ADMIN — SODIUM CHLORIDE: 0.9 INJECTION, SOLUTION INTRAVENOUS at 06:08

## 2022-08-17 RX ADMIN — HYDROMORPHONE HYDROCHLORIDE 0.2 MG: 1 INJECTION, SOLUTION INTRAMUSCULAR; INTRAVENOUS; SUBCUTANEOUS at 10:08

## 2022-08-17 RX ADMIN — Medication 10 MG: at 09:08

## 2022-08-17 RX ADMIN — OXYCODONE HYDROCHLORIDE 10 MG: 10 TABLET ORAL at 10:08

## 2022-08-17 RX ADMIN — LIDOCAINE HYDROCHLORIDE 1 MG: 10 INJECTION, SOLUTION EPIDURAL; INFILTRATION; INTRACAUDAL; PERINEURAL at 06:08

## 2022-08-17 RX ADMIN — SUGAMMADEX 200 MG: 100 INJECTION, SOLUTION INTRAVENOUS at 09:08

## 2022-08-17 RX ADMIN — GABAPENTIN 300 MG: 300 CAPSULE ORAL at 09:08

## 2022-08-17 RX ADMIN — ROCURONIUM BROMIDE 40 MG: 10 INJECTION INTRAVENOUS at 08:08

## 2022-08-17 RX ADMIN — Medication 30 MG: at 08:08

## 2022-08-17 RX ADMIN — SODIUM CHLORIDE: 0.9 INJECTION, SOLUTION INTRAVENOUS at 12:08

## 2022-08-17 RX ADMIN — SODIUM CHLORIDE: 0.9 INJECTION, SOLUTION INTRAVENOUS at 07:08

## 2022-08-17 RX ADMIN — ONDANSETRON 4 MG: 2 INJECTION INTRAMUSCULAR; INTRAVENOUS at 09:08

## 2022-08-17 RX ADMIN — OXYCODONE 5 MG: 5 TABLET ORAL at 09:08

## 2022-08-17 RX ADMIN — METRONIDAZOLE 500 MG: 500 INJECTION, SOLUTION INTRAVENOUS at 08:08

## 2022-08-17 RX ADMIN — MIDAZOLAM 2 MG: 1 INJECTION INTRAMUSCULAR; INTRAVENOUS at 07:08

## 2022-08-17 RX ADMIN — HEPARIN SODIUM 5000 UNITS: 5000 INJECTION INTRAVENOUS; SUBCUTANEOUS at 06:08

## 2022-08-17 RX ADMIN — ACETAMINOPHEN 1000 MG: 10 INJECTION INTRAVENOUS at 09:08

## 2022-08-17 RX ADMIN — TRAMADOL HYDROCHLORIDE 50 MG: 50 TABLET, COATED ORAL at 11:08

## 2022-08-17 RX ADMIN — IBUPROFEN 800 MG: 800 INJECTION INTRAVENOUS at 03:08

## 2022-08-17 RX ADMIN — OXYCODONE HYDROCHLORIDE 10 MG: 10 TABLET ORAL at 06:08

## 2022-08-17 RX ADMIN — BUPROPION HYDROCHLORIDE 300 MG: 150 TABLET, FILM COATED, EXTENDED RELEASE ORAL at 09:08

## 2022-08-17 RX ADMIN — IBUPROFEN 600 MG: 100 SUSPENSION ORAL at 06:08

## 2022-08-17 RX ADMIN — ROCURONIUM BROMIDE 10 MG: 10 INJECTION INTRAVENOUS at 08:08

## 2022-08-17 RX ADMIN — ACETAMINOPHEN 1000 MG: 10 INJECTION INTRAVENOUS at 02:08

## 2022-08-17 RX ADMIN — OXYCODONE HYDROCHLORIDE 10 MG: 10 TABLET ORAL at 03:08

## 2022-08-17 RX ADMIN — ENOXAPARIN SODIUM 40 MG: 100 INJECTION SUBCUTANEOUS at 04:08

## 2022-08-17 RX ADMIN — GABAPENTIN 300 MG: 300 CAPSULE ORAL at 02:08

## 2022-08-17 RX ADMIN — DEXAMETHASONE SODIUM PHOSPHATE 4 MG: 4 INJECTION INTRA-ARTICULAR; INTRALESIONAL; INTRAMUSCULAR; INTRAVENOUS; SOFT TISSUE at 08:08

## 2022-08-17 RX ADMIN — Medication 6 MG: at 09:08

## 2022-08-17 RX ADMIN — SODIUM CHLORIDE: 0.9 INJECTION, SOLUTION INTRAVENOUS at 09:08

## 2022-08-17 RX ADMIN — ACETAMINOPHEN 976.6 MG: 160 SOLUTION ORAL at 06:08

## 2022-08-17 RX ADMIN — TRAMADOL HYDROCHLORIDE 50 MG: 50 TABLET, COATED ORAL at 06:08

## 2022-08-17 RX ADMIN — CEFTRIAXONE SODIUM 2 G: 1 INJECTION, SOLUTION INTRAVENOUS at 08:08

## 2022-08-17 RX ADMIN — LIDOCAINE HYDROCHLORIDE 65 MG: 10 INJECTION, SOLUTION EPIDURAL; INFILTRATION; INTRACAUDAL at 08:08

## 2022-08-17 RX ADMIN — Medication 10 MG: at 08:08

## 2022-08-17 RX ADMIN — FENTANYL CITRATE 100 MCG: 0.05 INJECTION, SOLUTION INTRAMUSCULAR; INTRAVENOUS at 08:08

## 2022-08-17 RX ADMIN — GABAPENTIN 300 MG: 300 CAPSULE ORAL at 06:08

## 2022-08-17 RX ADMIN — FLUOXETINE 40 MG: 20 CAPSULE ORAL at 09:08

## 2022-08-17 RX ADMIN — MUPIROCIN 1 G: 20 OINTMENT TOPICAL at 06:08

## 2022-08-17 NOTE — OP NOTE
MAGGIE CHILDS  [id number]  [id number]    OPERATIVE NOTE:    DATE OF OPERATION: 08/17/2022    PREOPERATIVE DIAGNOSIS:  Crohn's disease with perianal involvement    POSTOPERATIVE DIAGNOSIS:  Crohn's disease with perianal involvement    PROCEDURE PERFORMED:  Laparoscopic loop diverting ileostomy and flexible pouchoscopy    ATTENDING SURGEON: CORETTA Doyle MD    RESIDENT/FELLOW SURGEON: Mumtaz Holden MD    ANESTHESIA:  General    ESTIMATED BLOOD LOSS:  20 mL    FINDINGS:  1. Flexible pouchoscopy demonstrated significant stenosis of the pouch anal anastomosis with multiple surrounding fissures.  Distal rectal mucosa was inflamed and stenotic.  Pouch with narrowing of the proximal limb consistent with Crohn's disease.  Rolan terminal ileum appeared healthy.  2. Diagnostic laparoscopy with normal-appearing small bowel.  Minimal intra-abdominal adhesions.  Prior ileostomy site was brought up to the skin and recreated as her new ileostomy.        SPECIMENS: none    COMPLICATIONS: none apparent    DISPOSITION: PACU    CONDITION: good    INDICATION FOR PROCEDURE:  Maggie Childs is a 41 y.o. female with prior colitis who underwent a restorative proctocolectomy.  She was subsequently diagnosed with Crohn's disease after she had significant pouch anal complications.  She is been medically managed but has worsening perianal disease.  She therefore presents for laparoscopic diverting loop ileostomy.    DESCRIPTION OF PROCEDURE:   After informed consent was reviewed, the patient was taken to the operating room and transferred to the OR table.  she was placed under general anesthesia.  A valdes catheter was sterilely inserted.  Ceftriaxone and flagyl were given for preoperative antibiotics.    Patient was placed into the frog-leg position.  Time-out was performed.  Digital rectal exam demonstrated severe stenosis of the pouch anal anastomosis as well as distal rectal stenosis.  Flexible endoscope was inserted.  The pouch  appeared contracted.  The proximal limb was narrowed and stenotic.  We are able to traverse the stenosis with a regular scope and advanced into the ben terminal ileum.  The ben terminal ileum appeared distensible and normal.  In the pouch, no significant ulcerations.  At the pouch anal anastomosis, multiple ulcerations extending into rectal cuff.  The scope was removed.  She was then placed into the supine position.  The anterior abdominal wall was prepped and draped in the usual sterile fashion.  Abdomen was entered through a Veress needle in the left upper quadrant.  The abdomen was insufflated.  A 5 mm port and camera were inserted into the left upper quadrant.  The Veress needle was inspected and there was no iatrogenic injury.  An additional 5 mm trocar was placed through the umbilicus with a 3rd 5 mm trocar at the site of her future ileostomy in the right lower quadrant.  The small bowel was brought out of the pelvis.  The prior ileostomy was identified.  This was grasped and came up easily to the right lower quadrant ostomy site.  The skin was then opened.  The anterior fascia was incised in longitudinal fashion.  The posterior fascia had not been closed from her past surgery.  The small bowel was brought through the defect to sit above the level of the skin.  The abdomen was reinsufflated.  Orientation of the bowel was confirmed.  Ports were then removed.  The 5 mm trocar sites were closed with a 4-0 Vicryl and a Steri-Strip was applied.  The ostomy was then matured with 3-0 Vicryl sutures in Kina fashion.  The patient tolerated the procedure well.  There were no apparent complications.  All sponge, needle, and instruments counts were correct x 2.  she was then extubated and taken to PACU in stable condition.        CORETTA Doyle MD, FACS, FASCRS  Staff Surgeon  Colon & Rectal Surgery

## 2022-08-17 NOTE — TRANSFER OF CARE
"Anesthesia Transfer of Care Note    Patient: Nyasia Middleton    Procedure(s) Performed: Procedure(s) (LRB):  CREATION, ILEOSTOMY, LAPAROSCOPIC (N/A)  SIGMOIDOSCOPY, FLEXIBLE (N/A)    Patient location: PACU    Anesthesia Type: general    Transport from OR: Transported from OR on 6-10 L/min O2 by face mask with adequate spontaneous ventilation    Post pain: adequate analgesia    Post assessment: no apparent anesthetic complications    Post vital signs: stable    Level of consciousness: awake and alert    Nausea/Vomiting: no nausea/vomiting    Complications: none    Transfer of care protocol was followed      Last vitals:   Visit Vitals  /65   Pulse 87   Temp 36.5 °C (97.7 °F) (Temporal)   Resp 15   Ht 5' 8" (1.727 m)   Wt 57.1 kg (125 lb 14.1 oz)   LMP 07/14/2017   SpO2 100%   Breastfeeding No   BMI 19.14 kg/m²     "

## 2022-08-17 NOTE — H&P
Interval History: No significant change since H&P below was written.  Consent obtained.  Risks of the procedure discussed in detail.  No further questions or concerns.         Chief Complaint:  Perianal fistula     History of Present Illness:  The patient is a new patient to this practice.   Course is as follows:  Patient is a 41 y.o. female presents with perianal fistula.  [id number]: History of 3 stage restorative proctocolectomy for presumed ulcerative colitis ([facility]).  2011:  Diagnosis changed to Crohn's disease due to perianal abscesses and fistulas.    She transferred care to Dr. Peralta, who performed the following surgeries.    4/3/2013:  Fistulotomy x2  7/12/2013: LIFT  12/13/2013: repeat LIFT  7/28/2014: LIFT for anovaginal fistula  10/20/2014: repeat LIFT with mesh for anovaginal fistula (MatriStem mesh)  6/26/2017: repeat LIFT  Following Dr. Peralta's care home, she has been seen by Dr. Condon who has performed multiple stem cell applications.     5/12/2022: Regarding her Crohn's disease, she is followed by Dr. Gonzales and is treated with Stelara.  She is about to change medical therapy to Entyvio.     Functionally, she is active.  She previously vaped but has recently stopped 3 weeks ago.  She has 6-7 bowel movements during the day.  1-2 bowel movements overnight.  Had intermittent improvement of her pouch function as well as decreased drainage when she was on a course of antibiotics recently.  Maintains continence.  Has frequent vaginal drainage.  Strongly wishes to avoid ileostomy.   08/02/2022:  Presents for evaluation for increased vaginal and perineal draining with increased pain.  No improvement with antibiotics or with change in her medical therapy to Entyvio.  Recently underwent pouchoscopy by Dr. Condon  With significant inflammation of the pouch and the anal canal with multiple ulcers.  Interested in discussing possibility of fecal diversion.     Last Pouchoscopy:  "                        Review of Systems:  Review of Systems   Constitutional: Negative for chills, diaphoresis, fever, malaise/fatigue and weight loss.   HENT: Negative for congestion.    Respiratory: Negative for shortness of breath.    Cardiovascular: Negative for chest pain and leg swelling.   Gastrointestinal: Positive for diarrhea. Negative for abdominal pain, blood in stool, constipation, nausea and vomiting.   Genitourinary: Negative for dysuria.   Musculoskeletal: Negative for back pain and myalgias.   Skin: Positive for rash.   Neurological: Negative for dizziness and weakness.   Endo/Heme/Allergies: Does not bruise/bleed easily.   Psychiatric/Behavioral: Negative for depression.         OBJECTIVE:      Vital Signs (Most Recent)  /65 (BP Location: Left arm, Patient Position: Sitting, BP Method: Large (Automatic))   Pulse 99   Ht 5' 8" (1.727 m)   Wt 59.9 kg (132 lb)   LMP 07/14/2017   BMI 20.07 kg/m²      Physical Exam:  General: White female in no distress   Neuro: alert and oriented x 4.  Moves all extremities.     HEENT: no icterus.  Trachea midline  Respiratory: respirations are even and unlabored  Cardiac: regular rate  Abdomen:  Soft, nontender, no masses  Extremities: Warm dry and intact  Skin: no rashes  Anorectal:  External exam with posterior midline open scar measuring approximately 3 cm x 1 cm with exposed granulation tissue over the sacrum approximately 6 cm away from the anus.  Around the anus, there are multiple scars.  Slight amount of purulence seen emanating from the left anterior aspect.  Thin perineal body.          Labs:  TSH normal.  Otherwise, no recent labs     Imaging:  No recent imaging        ASSESSMENT/PLAN:      Nyasia was seen today for crohn's disease and rectal pain.     Diagnoses and all orders for this visit:     Crohn's disease of small intestine with fistula  -     Case Request Operating Room: CREATION, ILEOSTOMY, LAPAROSCOPIC, SIGMOIDOSCOPY, " FLEXIBLE     Other orders  -     ciprofloxacin HCl (CIPRO) 500 MG tablet; Take 1 tablet (500 mg total) by mouth every 12 (twelve) hours. for 10 days  -     metroNIDAZOLE (FLAGYL) 500 MG tablet; Take 1 tablet (500 mg total) by mouth every 8 (eight) hours. for 10 days           41 y.o. female with restorative total proctocolectomy with ileal pouch anastomosis in 2011 who then developed Crohn's disease with multiple perianal fistulas as well as an anal vaginal fistula.  She has undergone multiple attempts at fistula repair.     She has recently changed medical therapy to Entyvio with no significant improvement.  She is awaiting a GI visit with the IBD team here in September.  Secondary to pain and discomfort, she is interested in discussing possible ileostomy.  Discussed 2 options:  1)   Laparoscopic loop ileostomy.  Discussed this may need to be performed in open fashion secondary to scarring.  Discussed that loop ileostomy would have intermittent run over but would provide fecal diversion to allow the pouch to possibly healing respond to medical therapy.    Even if the pouch did not heal completely, it would likely make pouch excision and proctectomy easier.  2)   Pouch excision with end ileostomy.  Discussed this would be the most definitive measure but would be irreversible.       She strongly wishes to avoid anything permanent this time.  Will plan for laparoscopic loop ileostomy along with flexible pouchoscopy.         Natan José MD  General Surgery, PGY-1

## 2022-08-17 NOTE — ANESTHESIA PROCEDURE NOTES
Intubation    Date/Time: 8/17/2022 8:08 AM  Performed by: Omar De La Rosa DO  Authorized by: Benjamín Varghese MD     Intubation:     Induction:  Intravenous    Intubated:  Postinduction    Mask Ventilation:  Easy mask    Attempts:  1    Attempted By:  Resident anesthesiologist    Method of Intubation:  Direct    Blade:  Viky 3    Laryngeal View Grade: Grade I - full view of cords      Difficult Airway Encountered?: No      Complications:  None    Airway Device:  Oral endotracheal tube    Airway Device Size:  7.0    Style/Cuff Inflation:  Cuffed (inflated to minimal occlusive pressure)    Inflation Amount (mL):  5    Tube secured:  21    Secured at:  The lips    Placement Verified By:  Capnometry and Revisualization with laryngoscopy    Complicating Factors:  None    Findings Post-Intubation:  BS equal bilateral and atraumatic/condition of teeth unchanged

## 2022-08-17 NOTE — PLAN OF CARE
Plan of care reviewed with pt:   -AAOx4, on RA, VS stable  -3 ABD lap sites with Steri strip CDI MIKEY  -ileostomy bag with flatus and small amount liquid brown stool, pt knows how to empty the bag herself  -No nausea, no emesis. Tolerated her low fiber diet, encouraged oral fluid intake.   -Complained of pain on the right side abdomen-around the ostomy site, pain under control with Oxycodone increased to 10mg q3h PRN, has been requesting pain meds around the clock  -Voided with adequate amount yellow urine  -Hx of fistula so has been having small amount brown liquid stool leaking from her rectum, pt stated it has been like that for 12 years, wearing pads  -Ambulated in the room independently  -SCD applied. IS encouraged to use, can get up to 2500cc on IS  -Call light in reach. [facility]CT[facility]

## 2022-08-17 NOTE — ANESTHESIA POSTPROCEDURE EVALUATION
Anesthesia Post Evaluation    Patient: Nyasia Middleton    Procedure(s) Performed: Procedure(s) (LRB):  CREATION, ILEOSTOMY, LAPAROSCOPIC (N/A)  SIGMOIDOSCOPY, FLEXIBLE (N/A)    Final Anesthesia Type: general      Patient location during evaluation: PACU  Patient participation: Yes- Able to Participate  Level of consciousness: awake and alert  Post-procedure vital signs: reviewed and stable  Pain management: adequate  Airway patency: patent    PONV status at discharge: No PONV  Anesthetic complications: no      Cardiovascular status: blood pressure returned to baseline  Respiratory status: unassisted  Hydration status: euvolemic  Follow-up not needed.          Vitals Value Taken Time   /54 08/17/22 1532   Temp 36.7 °C (98.1 °F) 08/17/22 1532   Pulse 75 08/17/22 1532   Resp 18 08/17/22 1535   SpO2 96 % 08/17/22 1532         Event Time   Out of Recovery 10:30:00         Pain/George Score: Pain Rating Prior to Med Admin: 6 (8/17/2022  3:35 PM)  Pain Rating Post Med Admin: 5 (8/17/2022 10:33 AM)  George Score: 10 (8/17/2022 10:30 AM)

## 2022-08-18 LAB
ANION GAP SERPL CALC-SCNC: 4 MMOL/L (ref 8–16)
BASOPHILS # BLD AUTO: 0.04 K/UL (ref 0–0.2)
BASOPHILS NFR BLD: 0.5 % (ref 0–1.9)
BUN SERPL-MCNC: 7 MG/DL (ref 6–20)
CALCIUM SERPL-MCNC: 7.9 MG/DL (ref 8.7–10.5)
CHLORIDE SERPL-SCNC: 106 MMOL/L (ref 95–110)
CO2 SERPL-SCNC: 27 MMOL/L (ref 23–29)
CREAT SERPL-MCNC: 0.6 MG/DL (ref 0.5–1.4)
DIFFERENTIAL METHOD: ABNORMAL
EOSINOPHIL # BLD AUTO: 0.1 K/UL (ref 0–0.5)
EOSINOPHIL NFR BLD: 1.3 % (ref 0–8)
ERYTHROCYTE [DISTWIDTH] IN BLOOD BY AUTOMATED COUNT: 14.1 % (ref 11.5–14.5)
EST. GFR  (NO RACE VARIABLE): >60 ML/MIN/1.73 M^2
GLUCOSE SERPL-MCNC: 72 MG/DL (ref 70–110)
HCT VFR BLD AUTO: 31.6 % (ref 37–48.5)
HGB BLD-MCNC: 10.4 G/DL (ref 12–16)
IMM GRANULOCYTES # BLD AUTO: 0.07 K/UL (ref 0–0.04)
IMM GRANULOCYTES NFR BLD AUTO: 0.8 % (ref 0–0.5)
LYMPHOCYTES # BLD AUTO: 1.2 K/UL (ref 1–4.8)
LYMPHOCYTES NFR BLD: 13.3 % (ref 18–48)
MCH RBC QN AUTO: 31.7 PG (ref 27–31)
MCHC RBC AUTO-ENTMCNC: 32.9 G/DL (ref 32–36)
MCV RBC AUTO: 96 FL (ref 82–98)
MONOCYTES # BLD AUTO: 0.9 K/UL (ref 0.3–1)
MONOCYTES NFR BLD: 10.7 % (ref 4–15)
NEUTROPHILS # BLD AUTO: 6.4 K/UL (ref 1.8–7.7)
NEUTROPHILS NFR BLD: 73.4 % (ref 38–73)
NRBC BLD-RTO: 0 /100 WBC
PLATELET # BLD AUTO: 269 K/UL (ref 150–450)
PMV BLD AUTO: 9.4 FL (ref 9.2–12.9)
POTASSIUM SERPL-SCNC: 3.2 MMOL/L (ref 3.5–5.1)
RBC # BLD AUTO: 3.28 M/UL (ref 4–5.4)
SODIUM SERPL-SCNC: 137 MMOL/L (ref 136–145)
WBC # BLD AUTO: 8.75 K/UL (ref 3.9–12.7)

## 2022-08-18 PROCEDURE — 25000003 PHARM REV CODE 250: Performed by: COLON & RECTAL SURGERY

## 2022-08-18 PROCEDURE — 25000003 PHARM REV CODE 250: Performed by: NURSE PRACTITIONER

## 2022-08-18 PROCEDURE — 25000003 PHARM REV CODE 250: Performed by: STUDENT IN AN ORGANIZED HEALTH CARE EDUCATION/TRAINING PROGRAM

## 2022-08-18 PROCEDURE — 20600001 HC STEP DOWN PRIVATE ROOM

## 2022-08-18 PROCEDURE — 36415 COLL VENOUS BLD VENIPUNCTURE: CPT | Performed by: STUDENT IN AN ORGANIZED HEALTH CARE EDUCATION/TRAINING PROGRAM

## 2022-08-18 PROCEDURE — 63600175 PHARM REV CODE 636 W HCPCS: Performed by: STUDENT IN AN ORGANIZED HEALTH CARE EDUCATION/TRAINING PROGRAM

## 2022-08-18 PROCEDURE — 63600175 PHARM REV CODE 636 W HCPCS: Performed by: NURSE PRACTITIONER

## 2022-08-18 PROCEDURE — 80048 BASIC METABOLIC PNL TOTAL CA: CPT | Performed by: STUDENT IN AN ORGANIZED HEALTH CARE EDUCATION/TRAINING PROGRAM

## 2022-08-18 PROCEDURE — 85025 COMPLETE CBC W/AUTO DIFF WBC: CPT | Performed by: STUDENT IN AN ORGANIZED HEALTH CARE EDUCATION/TRAINING PROGRAM

## 2022-08-18 RX ORDER — SODIUM CHLORIDE 9 MG/ML
INJECTION, SOLUTION INTRAVENOUS CONTINUOUS
Status: DISCONTINUED | OUTPATIENT
Start: 2022-08-18 | End: 2022-08-19 | Stop reason: HOSPADM

## 2022-08-18 RX ORDER — ONDANSETRON 2 MG/ML
4 INJECTION INTRAMUSCULAR; INTRAVENOUS EVERY 4 HOURS PRN
Status: DISCONTINUED | OUTPATIENT
Start: 2022-08-18 | End: 2022-08-19 | Stop reason: HOSPADM

## 2022-08-18 RX ORDER — POTASSIUM CHLORIDE 7.45 MG/ML
10 INJECTION INTRAVENOUS
Status: COMPLETED | OUTPATIENT
Start: 2022-08-18 | End: 2022-08-18

## 2022-08-18 RX ORDER — SODIUM CHLORIDE 9 MG/ML
INJECTION, SOLUTION INTRAVENOUS CONTINUOUS
Status: DISCONTINUED | OUTPATIENT
Start: 2022-08-18 | End: 2022-08-18

## 2022-08-18 RX ORDER — HYDROMORPHONE HYDROCHLORIDE 1 MG/ML
0.5 INJECTION, SOLUTION INTRAMUSCULAR; INTRAVENOUS; SUBCUTANEOUS EVERY 6 HOURS PRN
Status: DISCONTINUED | OUTPATIENT
Start: 2022-08-18 | End: 2022-08-18

## 2022-08-18 RX ORDER — PROMETHAZINE HYDROCHLORIDE 12.5 MG/1
12.5 TABLET ORAL EVERY 6 HOURS PRN
Status: DISCONTINUED | OUTPATIENT
Start: 2022-08-18 | End: 2022-08-18

## 2022-08-18 RX ORDER — PANTOPRAZOLE SODIUM 40 MG/1
40 TABLET, DELAYED RELEASE ORAL 2 TIMES DAILY
Status: DISCONTINUED | OUTPATIENT
Start: 2022-08-18 | End: 2022-08-19 | Stop reason: HOSPADM

## 2022-08-18 RX ORDER — GABAPENTIN 300 MG/1
300 CAPSULE ORAL 3 TIMES DAILY
Status: DISCONTINUED | OUTPATIENT
Start: 2022-08-18 | End: 2022-08-19 | Stop reason: HOSPADM

## 2022-08-18 RX ORDER — HYDROMORPHONE HYDROCHLORIDE 1 MG/ML
0.2 INJECTION, SOLUTION INTRAMUSCULAR; INTRAVENOUS; SUBCUTANEOUS EVERY 6 HOURS PRN
Status: DISCONTINUED | OUTPATIENT
Start: 2022-08-18 | End: 2022-08-19 | Stop reason: HOSPADM

## 2022-08-18 RX ADMIN — POTASSIUM CHLORIDE 10 MEQ: 10 INJECTION INTRAVENOUS at 10:08

## 2022-08-18 RX ADMIN — PROMETHAZINE HYDROCHLORIDE 12.5 MG: 25 INJECTION INTRAMUSCULAR; INTRAVENOUS at 10:08

## 2022-08-18 RX ADMIN — SODIUM CHLORIDE: 0.9 INJECTION, SOLUTION INTRAVENOUS at 06:08

## 2022-08-18 RX ADMIN — GABAPENTIN 300 MG: 300 CAPSULE ORAL at 08:08

## 2022-08-18 RX ADMIN — PANTOPRAZOLE SODIUM 40 MG: 40 TABLET, DELAYED RELEASE ORAL at 09:08

## 2022-08-18 RX ADMIN — PROMETHAZINE HYDROCHLORIDE 12.5 MG: 25 INJECTION INTRAMUSCULAR; INTRAVENOUS at 03:08

## 2022-08-18 RX ADMIN — ONDANSETRON 4 MG: 2 INJECTION INTRAMUSCULAR; INTRAVENOUS at 01:08

## 2022-08-18 RX ADMIN — POTASSIUM CHLORIDE 10 MEQ: 10 INJECTION INTRAVENOUS at 03:08

## 2022-08-18 RX ADMIN — PANTOPRAZOLE SODIUM 40 MG: 40 TABLET, DELAYED RELEASE ORAL at 08:08

## 2022-08-18 RX ADMIN — FLUOXETINE 40 MG: 20 CAPSULE ORAL at 08:08

## 2022-08-18 RX ADMIN — ONDANSETRON 4 MG: 2 INJECTION INTRAMUSCULAR; INTRAVENOUS at 05:08

## 2022-08-18 RX ADMIN — TRAMADOL HYDROCHLORIDE 50 MG: 50 TABLET, COATED ORAL at 12:08

## 2022-08-18 RX ADMIN — SODIUM CHLORIDE: 0.9 INJECTION, SOLUTION INTRAVENOUS at 03:08

## 2022-08-18 RX ADMIN — IBUPROFEN 800 MG: 800 INJECTION INTRAVENOUS at 05:08

## 2022-08-18 RX ADMIN — ACETAMINOPHEN 1000 MG: 500 TABLET ORAL at 09:08

## 2022-08-18 RX ADMIN — OXYCODONE HYDROCHLORIDE 10 MG: 10 TABLET ORAL at 09:08

## 2022-08-18 RX ADMIN — MUPIROCIN: 20 OINTMENT TOPICAL at 09:08

## 2022-08-18 RX ADMIN — ACETAMINOPHEN 1000 MG: 500 TABLET ORAL at 01:08

## 2022-08-18 RX ADMIN — MUPIROCIN: 20 OINTMENT TOPICAL at 10:08

## 2022-08-18 RX ADMIN — POTASSIUM CHLORIDE 10 MEQ: 10 INJECTION INTRAVENOUS at 01:08

## 2022-08-18 RX ADMIN — ONDANSETRON 4 MG: 2 INJECTION INTRAMUSCULAR; INTRAVENOUS at 08:08

## 2022-08-18 RX ADMIN — ENOXAPARIN SODIUM 40 MG: 100 INJECTION SUBCUTANEOUS at 05:08

## 2022-08-18 RX ADMIN — IBUPROFEN 800 MG: 400 TABLET, FILM COATED ORAL at 09:08

## 2022-08-18 RX ADMIN — OXYCODONE HYDROCHLORIDE 10 MG: 10 TABLET ORAL at 03:08

## 2022-08-18 RX ADMIN — BUPROPION HYDROCHLORIDE 300 MG: 150 TABLET, FILM COATED, EXTENDED RELEASE ORAL at 08:08

## 2022-08-18 RX ADMIN — GABAPENTIN 300 MG: 300 CAPSULE ORAL at 09:08

## 2022-08-18 RX ADMIN — HYDROMORPHONE HYDROCHLORIDE 0.5 MG: 1 INJECTION, SOLUTION INTRAMUSCULAR; INTRAVENOUS; SUBCUTANEOUS at 08:08

## 2022-08-18 RX ADMIN — OXYCODONE HYDROCHLORIDE 10 MG: 10 TABLET ORAL at 11:08

## 2022-08-18 RX ADMIN — IBUPROFEN 800 MG: 400 TABLET, FILM COATED ORAL at 01:08

## 2022-08-18 NOTE — CONSULTS
Patient seen for new ostomy consult. Initial ostomy education begun. Patient complains of nausea and requested lesson tomorrow. Will follow-up tomorrow for full lesson and pouch change.

## 2022-08-18 NOTE — NURSING
KUB is done. Result notified to . Per MD, pt does not need an NG tube now, she just needs to take her diet slowly. No more nausea or  emesis since 1520

## 2022-08-18 NOTE — ASSESSMENT & PLAN NOTE
41 y.o. female with PMHx of chron's disease with perianal involvement now s/p laparoscopic loop diverting ileostomy and flexible pouchoscopy 1 Day Post-Op    - Pain control: multimodal  - Home medications as appropriate  - Encourage IS, acapella  - Diet: low residue diet   - Bowel regimen  - PPX: lovenox, protonix  - antiemetic: zofran and phenergan  - OOBTC, encourage ambulation    Dispo: Continue inpatient care

## 2022-08-18 NOTE — PLAN OF CARE
Plan of care reviewed with pt:   -AAOx4, on RA, VS stable  -3 ABD lap sites with Steri strip CDI MIKEY  -ileostomy bag with small amount of liquid brown stool-only 50cc the whole shift, notified  about the low output, pt knows how to empty the bag herself  -had intermittent nausea today with 1 episode of emesis, MD awared, see previous notes. Instructed pt to take her diet slowly, pt did not eat breakfast and dinner, had 50% for lunch due to nausea.    -Pain is under control , only requested Dilaudid IV once  -Voided with adequate amount yellow urine. mIVF decreased to 25  -Hx of fistula so has been having small amount brown liquid stool leaking from her rectum, pt stated it has been like that for 12 years, wearing pads  -Ambulated in the room independently, in the castellano x1. Encouraged pt to walk more in the hallway but pt could not do it due to nausea.   -Refused to wear SCD. IS encouraged to use, can get up to 2500cc on IS  -Potassium replaced.   -Call light in reach. [facility]

## 2022-08-18 NOTE — SUBJECTIVE & OBJECTIVE
Subjective:     Interval History: Py had a laparoscopic loop diverting ileostomy and flexible pouchoscopy yesterday, without complications. Had some pain during the middle of the night currently well controlled. No N/V. Ileostomy pouch with some secretions. Tolerating low res diet.     Post-Op Info:  Procedure(s) (LRB):  CREATION, ILEOSTOMY, LAPAROSCOPIC (N/A)  SIGMOIDOSCOPY, FLEXIBLE (N/A)   1 Day Post-Op      Medications:  Continuous Infusions:   sodium chloride 0.9% 50 mL/hr at 08/18/22 0610     Scheduled Meds:   acetaminophen  1,000 mg Intravenous Q8H    Followed by    acetaminophen  1,000 mg Oral Q8H    buPROPion  300 mg Oral Nightly    enoxaparin  40 mg Subcutaneous Daily    FLUoxetine  40 mg Oral Nightly    gabapentin  300 mg Oral TID    ibuprofen  800 mg Oral Q8H    mupirocin   Nasal BID    oxyCODONE  10 mg Oral Once    pantoprazole  40 mg Oral BID    potassium chloride  10 mEq Intravenous Q1H     PRN Meds:   HYDROmorphone    melatonin    ondansetron    oxyCODONE    pneumoc 20-mary conj-dip cr(PF)    promethazine (PHENERGAN) IVPB    sodium chloride 0.9%    traMADoL        Objective:     Vital Signs (Most Recent):  Temp: 97.9 °F (36.6 °C) (08/18/22 0339)  Pulse: 70 (08/18/22 0339)  Resp: 18 (08/18/22 0847)  BP: (!) 113/59 (08/18/22 0339)  SpO2: 97 % (08/18/22 0339)   Vital Signs (24h Range):  Temp:  [97.6 °F (36.4 °C)-98.2 °F (36.8 °C)] 97.9 °F (36.6 °C)  Pulse:  [61-82] 70  Resp:  [12-33] 18  SpO2:  [96 %-100 %] 97 %  BP: (102-130)/(54-76) 113/59     Intake/Output - Last 3 Shifts         08/16 0700  08/17 0659 08/17 0700  08/18 0659 08/18 0700 08/19 0659    P.O.  840     I.V. (mL/kg)  877.1 (15.4)     IV Piggyback  1501.4     Total Intake(mL/kg)  3218.6 (56.4)     Urine (mL/kg/hr)  1300 (0.9)     Emesis/NG output  0     Other  0     Stool  250     Total Output  1550     Net  +1668.6            Urine Occurrence  2 x     Stool Occurrence  1 x     Emesis Occurrence  0 x             Physical  Exam  Constitutional:       General: She is not in acute distress.     Appearance: Normal appearance.   HENT:      Mouth/Throat:      Mouth: Mucous membranes are moist.   Eyes:      Extraocular Movements: Extraocular movements intact.   Cardiovascular:      Rate and Rhythm: Normal rate and regular rhythm.   Pulmonary:      Effort: Pulmonary effort is normal. No respiratory distress.      Breath sounds: Normal breath sounds.   Abdominal:      General: There is no distension.      Palpations: Abdomen is soft.      Tenderness: There is abdominal tenderness (incisional pain, appropriate post op). There is no guarding.      Comments: Laparoscopic incisions c/d/I  Ileostomy pink mucosa with some secretions in bag   Skin:     General: Skin is warm.   Neurological:      Mental Status: She is alert and oriented to person, place, and time. Mental status is at baseline.           Significant Labs:  CBC (Last 3 Results):   Recent Labs   Lab 08/18/22  0827   WBC 8.75   RBC 3.28*   HGB 10.4*   HCT 31.6*      MCV 96   MCH 31.7*   MCHC 32.9     CMP (Last 3 Results):   Recent Labs   Lab 08/17/22  1215 08/18/22  0827   GLU  --  72   CALCIUM  --  7.9*   NA  --  137   K  --  3.2*   CO2  --  27   CL  --  106   BUN  --  7   CREATININE 0.6 0.6     CRP (Last 3 Results): No results for input(s): CRP in the last 168 hours.  Prealbumin (Last 3 Results): No results for input(s): PREALBUMIN in the last 168 hours.  All pertinent lab results within the last 24 hours have been reviewed.     Significant Diagnostics:  I have reviewed all pertinent imaging results/findings within the past 24 hours.

## 2022-08-18 NOTE — PLAN OF CARE
[address] [facility] [facility]  Initial Discharge Assessment       Primary Care Provider: Bisi Tolbert DO    Admission Diagnosis: Crohn's disease of small intestine with fistula [K50.013]  Crohn's disease of anal canal [K50.10]    Admission Date: 8/17/2022  Expected Discharge Date: 8/20/2022    Discharge Barriers Identified: None    Payor: [employer] / Plan: [facility] CHOICE PLUS / Product Type: Commercial /     Extended Emergency Contact Information  Primary Emergency Contact: Terrell Middleton  Address: [address] [place] [address] [place]  Mobile Phone: [phone]  Relation: Spouse  Secondary Emergency Contact: Fuad Willard  Address: [address] [place] [address] [place]  Mobile Phone: [phone]  Relation: Mother    Discharge Plan A: Home  Discharge Plan B: Home, Home with family      [facility] - [place], [place] - [address]  [address]  [place] [address]  Phone: [phone] Fax: [phone]      Initial Assessment (most recent)     Adult Discharge Assessment - 08/18/22 1337        Discharge Assessment    Assessment Type Discharge Planning Assessment     Confirmed/corrected address, phone number and insurance Yes     Confirmed Demographics Correct on Facesheet     Source of Information patient;health record     When was your last doctors appointment? --   Pt reported she last saw her PCP in 1/2022.    Communicated GLORIA with patient/caregiver Date not available/Unable to determine     Reason For Admission Crohn's Disease     Lives With spouse;child(charo), dependent     Do you expect to return to your current living situation? Yes     Do you have help at home or someone to help you manage your care at home? Yes     Who are your caregiver(s) and their phone number(s)? Terrell Middleton, spouse, ([phone]     Current cognitive status: Alert/Oriented     Walking or Climbing Stairs Difficulty none     Dressing/Bathing Difficulty none      Equipment Currently Used at Home none     Readmission within 30 days? No     Patient currently being followed by outpatient case management? No     Do you currently have service(s) that help you manage your care at home? No     Do you take prescription medications? Yes     Do you have prescription coverage? Yes     Coverage [facility]     Do you have any problems affording any of your prescribed medications? No     Is the patient taking medications as prescribed? yes     Who is going to help you get home at discharge? Pt's  will provide transportation home.     How do you get to doctors appointments? car, drives self;family or friend will provide     Are you on dialysis? No     Do you take coumadin? No     Discharge Plan A Home     Discharge Plan B Home;Home with family     DME Needed Upon Discharge  none     Discharge Plan discussed with: Patient     Discharge Barriers Identified None        Relationship/Environment    Name(s) of Who Lives With Patient -Terrell Middleton- [phone]               SW met with pt and pt's spouse to complete initial discharge assessment.  No hospital readmissions.  Pt's  will provide transportation home.     Pt lives with his her  and three daughters in a [facility].  No steps for entry into home.  Pt reported she is independent with her ADLS and ambulation.  Per pt, she does not use any DME at home.  Pt reported her  is her support system at home.    Pt does not receive coumadin or dialysis.  Pt does not receive behavioral health services.  Pt reported she is compliant with her medications and does not have any issues paying for meds.      Disp:  Home No needs.  Medical team working to control pt's pain.      Sammie Rhoades LMSW  PRN-  [facility]  Ext. [phone]

## 2022-08-18 NOTE — NURSING
Pt has been nauseated since this morning intermittently. Phenergan added. Nausea relieved with Zofran and Phernergan but then pt threw up with 600cc yellow emesis.   Ostomy bag only put out 50cc since 0700. Pt was instructed to take it slow on her diet and walk as much as she can. Only walk once in the castellano but walk to the bathroom multiple times.    notified, KUB ordered

## 2022-08-18 NOTE — PROGRESS NOTES
[facility]  Colorectal Surgery  Progress Note    Patient Name: Nyasia Middleton  MRN: [id number]  Admission Date: 8/17/2022  Hospital Length of Stay: 1 days  Attending Physician: CORETTA Doyle MD    Subjective:     Interval History: Py had a laparoscopic loop diverting ileostomy and flexible pouchoscopy yesterday, without complications. Had some pain during the middle of the night currently well controlled. No N/V. Ileostomy pouch with some secretions. Tolerating low res diet.     Post-Op Info:  Procedure(s) (LRB):  CREATION, ILEOSTOMY, LAPAROSCOPIC (N/A)  SIGMOIDOSCOPY, FLEXIBLE (N/A)   1 Day Post-Op      Medications:  Continuous Infusions:   sodium chloride 0.9% 50 mL/hr at 08/18/22 0610     Scheduled Meds:   acetaminophen  1,000 mg Intravenous Q8H    Followed by    acetaminophen  1,000 mg Oral Q8H    buPROPion  300 mg Oral Nightly    enoxaparin  40 mg Subcutaneous Daily    FLUoxetine  40 mg Oral Nightly    gabapentin  300 mg Oral TID    ibuprofen  800 mg Oral Q8H    mupirocin   Nasal BID    oxyCODONE  10 mg Oral Once    pantoprazole  40 mg Oral BID    potassium chloride  10 mEq Intravenous Q1H     PRN Meds:   HYDROmorphone    melatonin    ondansetron    oxyCODONE    pneumoc 20-mary conj-dip cr(PF)    promethazine (PHENERGAN) IVPB    sodium chloride 0.9%    traMADoL        Objective:     Vital Signs (Most Recent):  Temp: 97.9 °F (36.6 °C) (08/18/22 0339)  Pulse: 70 (08/18/22 0339)  Resp: 18 (08/18/22 0847)  BP: (!) 113/59 (08/18/22 0339)  SpO2: 97 % (08/18/22 0339)   Vital Signs (24h Range):  Temp:  [97.6 °F (36.4 °C)-98.2 °F (36.8 °C)] 97.9 °F (36.6 °C)  Pulse:  [61-82] 70  Resp:  [12-33] 18  SpO2:  [96 %-100 %] 97 %  BP: (102-130)/(54-76) 113/59     Intake/Output - Last 3 Shifts         08/16 0700 08/17 0659 08/17 0700 08/18 0659 08/18 0700  08/19 0659    P.O.  840     I.V. (mL/kg)  877.1 (15.4)     IV Piggyback  1501.4     Total Intake(mL/kg)  3218.6 (56.4)     Urine (mL/kg/hr)   1300 (0.9)     Emesis/NG output  0     Other  0     Stool  250     Total Output  1550     Net  +1668.6            Urine Occurrence  2 x     Stool Occurrence  1 x     Emesis Occurrence  0 x             Physical Exam  Constitutional:       General: She is not in acute distress.     Appearance: Normal appearance.   HENT:      Mouth/Throat:      Mouth: Mucous membranes are moist.   Eyes:      Extraocular Movements: Extraocular movements intact.   Cardiovascular:      Rate and Rhythm: Normal rate and regular rhythm.   Pulmonary:      Effort: Pulmonary effort is normal. No respiratory distress.      Breath sounds: Normal breath sounds.   Abdominal:      General: There is no distension.      Palpations: Abdomen is soft.      Tenderness: There is abdominal tenderness (incisional pain, appropriate post op). There is no guarding.      Comments: Laparoscopic incisions c/d/I  Ileostomy pink mucosa with some secretions in bag   Skin:     General: Skin is warm.   Neurological:      Mental Status: She is alert and oriented to person, place, and time. Mental status is at baseline.           Significant Labs:  CBC (Last 3 Results):   Recent Labs   Lab 08/18/22  0827   WBC 8.75   RBC 3.28*   HGB 10.4*   HCT 31.6*      MCV 96   MCH 31.7*   MCHC 32.9     CMP (Last 3 Results):   Recent Labs   Lab 08/17/22  1215 08/18/22  0827   GLU  --  72   CALCIUM  --  7.9*   NA  --  137   K  --  3.2*   CO2  --  27   CL  --  106   BUN  --  7   CREATININE 0.6 0.6     CRP (Last 3 Results): No results for input(s): CRP in the last 168 hours.  Prealbumin (Last 3 Results): No results for input(s): PREALBUMIN in the last 168 hours.  All pertinent lab results within the last 24 hours have been reviewed.     Significant Diagnostics:  I have reviewed all pertinent imaging results/findings within the past 24 hours.    Assessment/Plan:     Crohn's disease of small intestine with other complication  41 y.o. female with PMHx of chron's disease with perianal  involvement now s/p laparoscopic loop diverting ileostomy and flexible pouchoscopy 1 Day Post-Op    - Pain control: multimodal  - Home medications as appropriate  - Encourage IS, acapella  - Diet: low residue diet   - Bowel regimen  - PPX: lovenox, protonix  - antiemetic: zofran and phenergan  - OOBTC, encourage ambulation    Dispo: Continue inpatient care          Natan Hopson MD  Colorectal Surgery  [facility]

## 2022-08-18 NOTE — PLAN OF CARE
Plan of care reviewed with patient, who verbalized understanding. Pt describes pain as tolerably controlled with regular use of narcotic pain medications administered at maximum allowed intervals. Pt is able to turn and position themselves, and has gotten out of bed, sat up in the chair and ambulated in the hallways. Pt is tolerating their clear liquid diet without nausea, and her Ileostomy has already started to drain stool. Pt is using their incentive spirometer with encouragement. Additional use of the incentive spirometer and ambulation in the halls should be encouraged this morning after a restful sleep.       Problem: Adult Inpatient Plan of Care  Goal: Plan of Care Review  Outcome: Ongoing, Progressing  Goal: Patient-Specific Goal (Individualized)  Outcome: Ongoing, Progressing  Goal: Absence of Hospital-Acquired Illness or Injury  Outcome: Ongoing, Progressing  Goal: Optimal Comfort and Wellbeing  Outcome: Ongoing, Progressing  Goal: Readiness for Transition of Care  Outcome: Ongoing, Progressing     Problem: Infection  Goal: Absence of Infection Signs and Symptoms  Outcome: Ongoing, Progressing     Problem: Fall Injury Risk  Goal: Absence of Fall and Fall-Related Injury  Outcome: Ongoing, Progressing     Problem: Pain Acute  Goal: Acceptable Pain Control and Functional Ability  Outcome: Ongoing, Progressing

## 2022-08-19 VITALS
DIASTOLIC BLOOD PRESSURE: 67 MMHG | SYSTOLIC BLOOD PRESSURE: 131 MMHG | RESPIRATION RATE: 17 BRPM | HEART RATE: 81 BPM | BODY MASS INDEX: 19.08 KG/M2 | OXYGEN SATURATION: 96 % | WEIGHT: 125.88 LBS | HEIGHT: 68 IN | TEMPERATURE: 98 F

## 2022-08-19 PROCEDURE — 63600175 PHARM REV CODE 636 W HCPCS: Performed by: NURSE PRACTITIONER

## 2022-08-19 PROCEDURE — 25000003 PHARM REV CODE 250

## 2022-08-19 PROCEDURE — 25000003 PHARM REV CODE 250: Performed by: STUDENT IN AN ORGANIZED HEALTH CARE EDUCATION/TRAINING PROGRAM

## 2022-08-19 PROCEDURE — 63600175 PHARM REV CODE 636 W HCPCS

## 2022-08-19 PROCEDURE — 63600175 PHARM REV CODE 636 W HCPCS: Performed by: STUDENT IN AN ORGANIZED HEALTH CARE EDUCATION/TRAINING PROGRAM

## 2022-08-19 RX ORDER — LOPERAMIDE HYDROCHLORIDE 2 MG/1
CAPSULE ORAL
Qty: 240 CAPSULE | Refills: 3 | Status: ON HOLD | OUTPATIENT
Start: 2022-08-19 | End: 2022-09-01 | Stop reason: HOSPADM

## 2022-08-19 RX ORDER — OXYCODONE AND ACETAMINOPHEN 7.5; 325 MG/1; MG/1
1 TABLET ORAL EVERY 6 HOURS PRN
Qty: 20 TABLET | Refills: 0 | Status: CANCELLED | OUTPATIENT
Start: 2022-08-19

## 2022-08-19 RX ORDER — OXYCODONE AND ACETAMINOPHEN 7.5; 325 MG/1; MG/1
1 TABLET ORAL EVERY 4 HOURS PRN
Qty: 20 TABLET | Refills: 0 | Status: SHIPPED | OUTPATIENT
Start: 2022-08-19 | End: 2022-12-06

## 2022-08-19 RX ORDER — PROMETHAZINE HYDROCHLORIDE 12.5 MG/1
12.5 TABLET ORAL EVERY 6 HOURS PRN
Qty: 40 TABLET | Refills: 0 | Status: SHIPPED | OUTPATIENT
Start: 2022-08-19 | End: 2022-12-06

## 2022-08-19 RX ADMIN — ONDANSETRON 4 MG: 2 INJECTION INTRAMUSCULAR; INTRAVENOUS at 05:08

## 2022-08-19 RX ADMIN — IBUPROFEN 800 MG: 400 TABLET, FILM COATED ORAL at 05:08

## 2022-08-19 RX ADMIN — MUPIROCIN: 20 OINTMENT TOPICAL at 09:08

## 2022-08-19 RX ADMIN — ONDANSETRON 4 MG: 2 INJECTION INTRAMUSCULAR; INTRAVENOUS at 10:08

## 2022-08-19 RX ADMIN — ACETAMINOPHEN 1000 MG: 500 TABLET ORAL at 05:08

## 2022-08-19 RX ADMIN — PANTOPRAZOLE SODIUM 40 MG: 40 TABLET, DELAYED RELEASE ORAL at 09:08

## 2022-08-19 RX ADMIN — GABAPENTIN 300 MG: 300 CAPSULE ORAL at 09:08

## 2022-08-19 RX ADMIN — PROMETHAZINE HYDROCHLORIDE 12.5 MG: 25 INJECTION INTRAMUSCULAR; INTRAVENOUS at 12:08

## 2022-08-19 RX ADMIN — SODIUM CHLORIDE: 0.9 INJECTION, SOLUTION INTRAVENOUS at 12:08

## 2022-08-19 NOTE — HOSPITAL COURSE
Nyasia Middleton is a 41 y.o. female with prior colitis who, prior to this admission, underwent a restorative proctocolectomy.  She was subsequently diagnosed with Crohn's disease after she had significant pouch anal complications.  She is been medically managed but has worsening perianal disease.  She is currently admitted s/p laparoscopic diverting loop ileostomy. There were no intraoperative complications. POD1 patient had some nausea and in the evening had an episode of emesis. KUB was not concerning and patient was told to ease diet intake. Has not has any more episodes. Pain is well controlled. Currently on low res diet. Ambulating halls adequately.      Physical Exam  Constitutional:       General: She is not in acute distress.     Appearance: Normal appearance.   HENT:      Mouth/Throat:      Mouth: Mucous membranes are moist.   Eyes:      Extraocular Movements: Extraocular movements intact.   Cardiovascular:      Rate and Rhythm: Normal rate and regular rhythm.   Pulmonary:      Effort: Pulmonary effort is normal. No respiratory distress.      Breath sounds: Normal breath sounds.   Abdominal:      General: There is no distension.      Palpations: Abdomen is soft.      Tenderness: There is abdominal tenderness (incisional pain, appropriate post op). There is no guarding.      Comments: Laparoscopic incisions c/d/I  Ostomy pink mucosa with some fecal matter   Skin:     General: Skin is warm.   Neurological:      Mental Status: She is alert and oriented to person, place, and time. Mental status is at baseline.

## 2022-08-19 NOTE — PLAN OF CARE
Aaron y - [facility]  Discharge Final Note    Primary Care Provider: Bisi Tolbert DO    Expected Discharge Date: 8/19/2022    Patient was accepted by [facility]     Final Discharge Note (most recent)     Final Note - 08/19/22 1330        Final Note    Assessment Type Final Discharge Note     Anticipated Discharge Disposition [place] [facility] [place]ospital Resources/Appts/Education Provided Appointments scheduled and added to AVS        Post-Acute Status    Post-Acute Authorization Home Health     Home Health Status Set-up Complete/Auth obtained               Contact Info     W Wade Doyle MD   Specialty: Colon and Rectal Surgery    [address] [id number]   Phone: [phone]       Next Steps: Schedule an appointment as soon as possible for a visit on 9/2/2022    Instructions: SURGICAL FOLLOW UP at 1:20PM    Apurva Holt NP   Specialty: Colon and Rectal Surgery, Wound Care    [address] [facility] [address]   Phone: [phone]       Next Steps: Follow up on 9/2/2022    Instructions: Ostomy follow up at 1PM        Jennifer Sloan RN, CM   Ext: [phone]

## 2022-08-19 NOTE — PLAN OF CARE
Plan of care reviewed with patient, who verbalized understanding. Pt describes pain as better controlled today, with less frequent use of narcotic pain medications. Pt is able to turn and position themselves, and has gotten out of bed, sat up in the chair and ambulated in the hallways. Pt is not tolerating much of her low fiber/ low residue diet, and has required frequent use of anti emetics to control nausea. Pt's ostomy is draining small amounts of stool and is passing flatus. Pt is using their incentive spirometer with encouragement. Additional use of the incentive spirometer and ambulation in the halls should be encouraged this morning after a restful sleep.       Problem: Adult Inpatient Plan of Care  Goal: Plan of Care Review  Outcome: Ongoing, Progressing  Goal: Patient-Specific Goal (Individualized)  Outcome: Ongoing, Progressing  Goal: Absence of Hospital-Acquired Illness or Injury  Outcome: Ongoing, Progressing  Goal: Optimal Comfort and Wellbeing  Outcome: Ongoing, Progressing  Goal: Readiness for Transition of Care  Outcome: Ongoing, Progressing     Problem: Infection  Goal: Absence of Infection Signs and Symptoms  Outcome: Ongoing, Progressing     Problem: Fall Injury Risk  Goal: Absence of Fall and Fall-Related Injury  Outcome: Ongoing, Progressing     Problem: Pain Acute  Goal: Acceptable Pain Control and Functional Ability  Outcome: Ongoing, Progressing     Problem: Nausea and Vomiting  Goal: Fluid and Electrolyte Balance  Outcome: Ongoing, Progressing

## 2022-08-19 NOTE — PROGRESS NOTES
Met with patient for ostomy training lesson #2.  Removed patient's ostomy pouch and discussed stoma and franck-stomal care.  Demonstrated cutting pouch to fit stoma allowing 1/8 inch clearance.  Pt returned demonstration and applied pouch to stoma site without difficulty.  Reviewed reading materials answering all questions.  Pt verbalized understanding of when to alert MD of issues with stoma.  Discussed follow up plan of care and provided patient with contact name and numbers.  Supplies at bedside for discharge.        08/19/22 1002        Ileostomy 08/17/22 0800 RLQ   Placement Date/Time: 08/17/22 0800   Inserted by: MD  Location: RLQ   Wound Image    Stoma Appearance round;pink;red;moist   Stoma Size (in)   (38mm)   Appliance 1-piece;changed;no leakage   Accessories/Skin Care barrier substance over peristomal skin;cleansed w/ water   Treatment Bag change   Peristomal Assessment Dry;Intact;Intact without breakdown   Tolerance no signs/symptoms of discomfort;independent w/ appliance change;independent w/ stoma care

## 2022-08-19 NOTE — DISCHARGE SUMMARY
[facility]  Colorectal Surgery  Discharge Summary      Patient Name: Nyasia Middleton  MRN: [id number]  Admission Date: 8/17/2022  Hospital Length of Stay: 2 days  Discharge Date and Time:  08/19/2022 8:55 AM  Attending Physician: CORETTA Doyle MD   Discharging Provider: Natan Hopson MD  Primary Care Provider: Bisi Tolbert DO     HPI:  No notes on file    Procedure(s) (LRB):  CREATION, ILEOSTOMY, LAPAROSCOPIC (N/A)  SIGMOIDOSCOPY, FLEXIBLE (N/A)     Hospital Course:   Nyasia Middleton is a 41 y.o. female with prior colitis who, prior to this admission, underwent a restorative proctocolectomy.  She was subsequently diagnosed with Crohn's disease after she had significant pouch anal complications.  She is been medically managed but has worsening perianal disease.  She is currently admitted s/p laparoscopic diverting loop ileostomy. There were no intraoperative complications. POD1 patient had some nausea and in the evening had an episode of emesis. KUB was not concerning and patient was told to ease diet intake. Has not has any more episodes. Pain is well controlled. Currently on low res diet. Ambulating halls adequately.      Physical Exam  Constitutional:       General: She is not in acute distress.     Appearance: Normal appearance.   HENT:      Mouth/Throat:      Mouth: Mucous membranes are moist.   Eyes:      Extraocular Movements: Extraocular movements intact.   Cardiovascular:      Rate and Rhythm: Normal rate and regular rhythm.   Pulmonary:      Effort: Pulmonary effort is normal. No respiratory distress.      Breath sounds: Normal breath sounds.   Abdominal:      General: There is no distension.      Palpations: Abdomen is soft.      Tenderness: There is abdominal tenderness (incisional pain, appropriate post op). There is no guarding.      Comments: Laparoscopic incisions c/d/I  Ostomy pink mucosa with some fecal matter   Skin:     General: Skin is warm.   Neurological:      Mental  Status: She is alert and oriented to person, place, and time. Mental status is at baseline.       Goals of Care Treatment Preferences:  Code Status: Full Code          Significant Diagnostic Studies: Labs:   CMP   Recent Labs   Lab 08/17/22  1215 08/18/22  0827   NA  --  137   K  --  3.2*   CL  --  106   CO2  --  27   GLU  --  72   BUN  --  7   CREATININE 0.6 0.6   CALCIUM  --  7.9*   ANIONGAP  --  4*   , CBC   Recent Labs   Lab 08/18/22  0827   WBC 8.75   HGB 10.4*   HCT 31.6*      , Lipid Panel   Lab Results   Component Value Date    CHOL 130 01/11/2022    HDL 53 01/11/2022    LDLCALC 64.0 01/11/2022    TRIG 65 01/11/2022    CHOLHDL 40.8 01/11/2022   , A1C: No results for input(s): HGBA1C in the last 4320 hours. and All labs within the past 24 hours have been reviewed  Microbiology:   Blood Culture   Lab Results   Component Value Date    LABBLOO No growth after 5 days. 07/09/2018   , Sputum Culture No results found for: GSRESP, RESPIRATORYC and Urine Culture    Lab Results   Component Value Date    LABURIN No growth 12/12/2018     Radiology: X-Ray: CXR: X-Ray Chest 1 View (CXR): No results found for this visit on 08/17/22. and X-Ray Chest PA and Lateral (CXR): No results found for this visit on 08/17/22. and KUB: X-Ray Abdomen AP 1 View (KUB):   Results for orders placed or performed during the hospital encounter of 08/17/22   X-Ray Abdomen AP 1 View    Narrative    EXAMINATION:  XR ABDOMEN AP 1 VIEW    CLINICAL HISTORY:  nausea and emesis;    TECHNIQUE:  AP View(s) of the abdomen was performed.    COMPARISON:  None    FINDINGS:  Single-view abdomen supine.    Prominent air-filled small bowel loops project over the mid abdomen.  Surgical changes are noted of the bowel.  There is a focus of calcification projected at the level of the lower pole of the right kidney, could reflect nephrolithiasis.  No findings to suggest pneumatosis.  The osseous structures are intact.      Impression    1. Slow flow through  the small bowel, findings could reflect chronic change or developing bowel obstruction, no previous exams are available for comparison, correlation is needed.  Follow-up advised.      Electronically signed by: Benjamín Ramos MD  Date:    08/18/2022  Time:    16:18     CT scan: CT ABDOMEN PELVIS WITH CONTRAST: No results found for this visit on 08/17/22. and CT ABDOMEN PELVIS WITHOUT CONTRAST: No results found for this visit on 08/17/22.    Pending Diagnostic Studies:     None        Final Active Diagnoses:    Diagnosis Date Noted POA    PRINCIPAL PROBLEM:  Crohn's disease of small intestine with other complication [K50.018] 09/12/2018 Yes    Rectovaginal fistula [N82.3] 10/20/2014 Yes      Problems Resolved During this Admission:      Discharged Condition: good    Disposition: Home or Self Care    Follow Up:   Follow-up Information     W Wade Doyle MD. Schedule an appointment as soon as possible for a visit in 2 week(s).    Specialty: Colon and Rectal Surgery  Contact information:  [address] [place] [id number]  [phone]                       Patient Instructions:      Diet Adult Regular     Lifting restrictions   Order Comments: No heavy lifting greater than 10 pounds (about the weight of a gallon of milk) for 6 week postoperatively. This is to prevent new/recurrent hernia at your incision sites while they heal.     No driving until:   Order Comments: While taking narcotic pain medications.     Notify your health care provider if you experience any of the following:  temperature >100.4     Notify your health care provider if you experience any of the following:  persistent nausea and vomiting or diarrhea     Notify your health care provider if you experience any of the following:  severe uncontrolled pain     Notify your health care provider if you experience any of the following:  redness, tenderness, or signs of infection (pain, swelling, redness, odor or green/yellow discharge around  "incision site)     Notify your health care provider if you experience any of the following:  difficulty breathing or increased cough     Notify your health care provider if you experience any of the following:  severe persistent headache     Notify your health care provider if you experience any of the following:  worsening rash     Notify your health care provider if you experience any of the following:  persistent dizziness, light-headedness, or visual disturbances     Notify your health care provider if you experience any of the following:  increased confusion or weakness     Shower on day dressing removed (No bath)   Order Comments: You may SHOWER 48 hours after surgery. If you have gauze/tape dressings in place, you should remove them prior to showering. If you have Dermabond (skin glue) or white "Steri strips" in place, these will eventually peel off on their own after 1-2 weeks. NO SUBMERSION of your incisions (bath, pool, hot tub, etc) until your postop appointment. This allows your incisions to heal and to avoid a wound infection.     Medications:  Reconciled Home Medications:      Medication List      START taking these medications    promethazine 12.5 MG Tab  Commonly known as: PHENERGAN  Take 1 tablet (12.5 mg total) by mouth every 6 (six) hours as needed (nausea).        CHANGE how you take these medications    buPROPion 300 MG 24 hr tablet  Commonly known as: WELLBUTRIN XL  Take 1 tablet (300 mg total) by mouth once daily.  What changed: when to take this     FLUoxetine 40 MG capsule  Take 1 capsule (40 mg total) by mouth once daily.  What changed: when to take this     oxyCODONE-acetaminophen 7.5-325 mg per tablet  Commonly known as: PERCOCET  Take 1 tablet by mouth every 4 (four) hours as needed for Pain.  What changed:   · how much to take  · when to take this  · reasons to take this        CONTINUE taking these medications    ALPRAZolam 0.25 MG tablet  Commonly known as: XANAX  Take 1 tablet (0.25 " mg total) by mouth daily as needed for Anxiety.     ciprofloxacin HCl 500 MG tablet  Commonly known as: CIPRO  Take 500 mg by mouth every 12 (twelve) hours.     clotrimazole-betamethasone 1-0.05% cream  Commonly known as: LOTRISONE  2 (two) times daily. Apply to affected area     dicyclomine 20 mg tablet  Commonly known as: BENTYL  TAKE 1 TABLET BY MOUTH EVERY SIX HOURS     diphenoxylate-atropine 2.5-0.025 mg 2.5-0.025 mg per tablet  Commonly known as: LOMOTIL  TAKE 2 TABLETS BY MOUTH FOUR TIMES DAILY AS NEEDED FOR DIARRHEA     fluconazole 150 MG Tab  Commonly known as: DIFLUCAN  Take 1 tablet (150 mg total) by mouth once daily.     loperamide 2 mg capsule  Commonly known as: IMODIUM  TAKE 2 CAPSULE BY MOUTH FOUR TIMES DAILY     metroNIDAZOLE 500 MG tablet  Commonly known as: FLAGYL  Take 500 mg by mouth every 8 (eight) hours.     ondansetron 8 MG Tbdl  Commonly known as: ZOFRAN-ODT  DISSOLVE 1 TABLET UNDER TONGUE EVERY 8 HOURS AS NEEDED FOR NAUSEA     pantoprazole 40 MG tablet  Commonly known as: PROTONIX  TAKE 1 TABLET BY MOUTH TWICE DAILY            Natan Hopson MD  Colorectal Surgery  [facility]

## 2022-08-19 NOTE — DISCHARGE INSTRUCTIONS
Surgery Post Op Instructions:    You had a laparoscopic diverting loop ileostomy performed 8/17/22.     Wound care:  Your incision is covered with Dermabond, a type of surgical super glue. You may shower tomorrow - let soapy water run over the incision but do not scrub the area. You must avoid submerging the incision in pools, bathtubs, etc until it is fully healed in 4-6 weeks.     You need to limit yourself to light duty activities (no lifting/pulling/pushing >10 lbs) for a total of 6 weeks after surgery.    No dietary restrictions.    Medications:  A narcotic pain medication has been prescribed.  Please start to wean the pain medication over the following few days.  You can take tylenol instead of the pain medication.      Please take miralax 1 capful at night to prevent constipation associated with narcotic pain medications.      Follow up:  Return to clinic in 2 weeks for follow up and wound check.

## 2022-08-21 PROCEDURE — G0180 MD CERTIFICATION HHA PATIENT: HCPCS | Mod: ,,, | Performed by: COLON & RECTAL SURGERY

## 2022-08-21 PROCEDURE — G0180 PR HOME HEALTH MD CERTIFICATION: ICD-10-PCS | Mod: ,,, | Performed by: COLON & RECTAL SURGERY

## 2022-08-22 ENCOUNTER — TELEPHONE (OUTPATIENT)
Dept: SURGERY | Facility: CLINIC | Age: 42
End: 2022-08-22
Payer: COMMERCIAL

## 2022-08-22 ENCOUNTER — PATIENT MESSAGE (OUTPATIENT)
Dept: SURGERY | Facility: CLINIC | Age: 42
End: 2022-08-22
Payer: COMMERCIAL

## 2022-08-22 ENCOUNTER — HOSPITAL ENCOUNTER (INPATIENT)
Facility: HOSPITAL | Age: 42
LOS: 10 days | Discharge: HOME HEALTH | DRG: 327 | End: 2022-09-01
Attending: COLON & RECTAL SURGERY | Admitting: COLON & RECTAL SURGERY
Payer: COMMERCIAL

## 2022-08-22 DIAGNOSIS — R07.9 CHEST PAIN: ICD-10-CM

## 2022-08-22 DIAGNOSIS — K56.600 PARTIAL SMALL BOWEL OBSTRUCTION: Primary | ICD-10-CM

## 2022-08-22 DIAGNOSIS — K56.609 SMALL BOWEL OBSTRUCTION: ICD-10-CM

## 2022-08-22 PROBLEM — N39.0 UTI (URINARY TRACT INFECTION): Status: ACTIVE | Noted: 2022-08-22

## 2022-08-22 LAB — CRP SERPL-MCNC: 177.2 MG/L (ref 0–8.2)

## 2022-08-22 PROCEDURE — 63600175 PHARM REV CODE 636 W HCPCS

## 2022-08-22 PROCEDURE — 36415 COLL VENOUS BLD VENIPUNCTURE: CPT | Performed by: STUDENT IN AN ORGANIZED HEALTH CARE EDUCATION/TRAINING PROGRAM

## 2022-08-22 PROCEDURE — 63600175 PHARM REV CODE 636 W HCPCS: Performed by: STUDENT IN AN ORGANIZED HEALTH CARE EDUCATION/TRAINING PROGRAM

## 2022-08-22 PROCEDURE — 25000003 PHARM REV CODE 250: Performed by: COLON & RECTAL SURGERY

## 2022-08-22 PROCEDURE — 20600001 HC STEP DOWN PRIVATE ROOM

## 2022-08-22 PROCEDURE — 86140 C-REACTIVE PROTEIN: CPT | Performed by: STUDENT IN AN ORGANIZED HEALTH CARE EDUCATION/TRAINING PROGRAM

## 2022-08-22 PROCEDURE — 63600175 PHARM REV CODE 636 W HCPCS: Performed by: COLON & RECTAL SURGERY

## 2022-08-22 PROCEDURE — 25000003 PHARM REV CODE 250: Performed by: STUDENT IN AN ORGANIZED HEALTH CARE EDUCATION/TRAINING PROGRAM

## 2022-08-22 RX ORDER — ACETAMINOPHEN 325 MG/1
650 TABLET ORAL EVERY 4 HOURS PRN
Status: DISCONTINUED | OUTPATIENT
Start: 2022-08-22 | End: 2022-08-29

## 2022-08-22 RX ORDER — TALC
6 POWDER (GRAM) TOPICAL NIGHTLY PRN
Status: DISCONTINUED | OUTPATIENT
Start: 2022-08-22 | End: 2022-09-01 | Stop reason: HOSPADM

## 2022-08-22 RX ORDER — ONDANSETRON 2 MG/ML
4 INJECTION INTRAMUSCULAR; INTRAVENOUS EVERY 12 HOURS PRN
Status: DISCONTINUED | OUTPATIENT
Start: 2022-08-22 | End: 2022-08-22

## 2022-08-22 RX ORDER — OXYCODONE HYDROCHLORIDE 5 MG/1
5 TABLET ORAL EVERY 4 HOURS PRN
Status: DISCONTINUED | OUTPATIENT
Start: 2022-08-22 | End: 2022-08-29

## 2022-08-22 RX ORDER — PANTOPRAZOLE SODIUM 40 MG/1
40 TABLET, DELAYED RELEASE ORAL 2 TIMES DAILY
Status: DISCONTINUED | OUTPATIENT
Start: 2022-08-22 | End: 2022-08-24

## 2022-08-22 RX ORDER — FLUOXETINE HYDROCHLORIDE 20 MG/1
40 CAPSULE ORAL NIGHTLY
Status: DISCONTINUED | OUTPATIENT
Start: 2022-08-22 | End: 2022-09-01 | Stop reason: HOSPADM

## 2022-08-22 RX ORDER — OXYCODONE HYDROCHLORIDE 10 MG/1
10 TABLET ORAL EVERY 4 HOURS PRN
Status: DISCONTINUED | OUTPATIENT
Start: 2022-08-22 | End: 2022-08-24

## 2022-08-22 RX ORDER — ONDANSETRON 2 MG/ML
8 INJECTION INTRAMUSCULAR; INTRAVENOUS EVERY 6 HOURS PRN
Status: DISCONTINUED | OUTPATIENT
Start: 2022-08-22 | End: 2022-09-01 | Stop reason: HOSPADM

## 2022-08-22 RX ORDER — DEXTROSE MONOHYDRATE, SODIUM CHLORIDE, AND POTASSIUM CHLORIDE 50; 1.49; 4.5 G/1000ML; G/1000ML; G/1000ML
INJECTION, SOLUTION INTRAVENOUS CONTINUOUS
Status: DISCONTINUED | OUTPATIENT
Start: 2022-08-22 | End: 2022-08-27

## 2022-08-22 RX ORDER — BUPROPION HYDROCHLORIDE 150 MG/1
300 TABLET ORAL NIGHTLY
Status: DISCONTINUED | OUTPATIENT
Start: 2022-08-22 | End: 2022-09-01 | Stop reason: HOSPADM

## 2022-08-22 RX ORDER — ACETAMINOPHEN 325 MG/1
650 TABLET ORAL EVERY 8 HOURS PRN
Status: DISCONTINUED | OUTPATIENT
Start: 2022-08-22 | End: 2022-08-29

## 2022-08-22 RX ORDER — SODIUM CHLORIDE 0.9 % (FLUSH) 0.9 %
10 SYRINGE (ML) INJECTION
Status: DISCONTINUED | OUTPATIENT
Start: 2022-08-22 | End: 2022-09-01 | Stop reason: HOSPADM

## 2022-08-22 RX ORDER — LIDOCAINE HYDROCHLORIDE 10 MG/ML
1 INJECTION, SOLUTION EPIDURAL; INFILTRATION; INTRACAUDAL; PERINEURAL ONCE
Status: DISCONTINUED | OUTPATIENT
Start: 2022-08-22 | End: 2022-08-23

## 2022-08-22 RX ORDER — ENOXAPARIN SODIUM 100 MG/ML
40 INJECTION SUBCUTANEOUS EVERY 24 HOURS
Status: DISCONTINUED | OUTPATIENT
Start: 2022-08-22 | End: 2022-09-01 | Stop reason: HOSPADM

## 2022-08-22 RX ADMIN — PANTOPRAZOLE SODIUM 40 MG: 40 TABLET, DELAYED RELEASE ORAL at 09:08

## 2022-08-22 RX ADMIN — FLUOXETINE 40 MG: 20 CAPSULE ORAL at 09:08

## 2022-08-22 RX ADMIN — ONDANSETRON 4 MG: 2 INJECTION INTRAMUSCULAR; INTRAVENOUS at 06:08

## 2022-08-22 RX ADMIN — ENOXAPARIN SODIUM 40 MG: 40 INJECTION SUBCUTANEOUS at 06:08

## 2022-08-22 RX ADMIN — PROMETHAZINE HYDROCHLORIDE 12.5 MG: 25 INJECTION INTRAMUSCULAR; INTRAVENOUS at 09:08

## 2022-08-22 RX ADMIN — PIPERACILLIN SODIUM AND TAZOBACTAM SODIUM 4.5 G: 4; .5 INJECTION, POWDER, LYOPHILIZED, FOR SOLUTION INTRAVENOUS at 09:08

## 2022-08-22 RX ADMIN — BUPROPION HYDROCHLORIDE 300 MG: 150 TABLET, FILM COATED, EXTENDED RELEASE ORAL at 09:08

## 2022-08-22 NOTE — TELEPHONE ENCOUNTER
08:45 am- Ms. Jenifer Traylor, supervisor at [facility] called ER at [facility] at this time, regarding message sent by patient via portal.     Nurse taking care of patient, Shira, leela administered haldol and phenergan to patient. NG tube had been placed, but patient pulled it out and refuses to get it replaced.     Shruthi Meza, director over at [facility], at the bedside at the time of call addressing the situation.

## 2022-08-22 NOTE — NURSING
1600 pt arrives to room 1003, pt alert and oriented x4, no distress, some nausea and low grade temperature. Pt ambulated to restroom independently. Will continue to monitor.

## 2022-08-23 ENCOUNTER — PATIENT MESSAGE (OUTPATIENT)
Dept: SURGERY | Facility: CLINIC | Age: 42
End: 2022-08-23
Payer: COMMERCIAL

## 2022-08-23 LAB
ALBUMIN SERPL BCP-MCNC: 3 G/DL (ref 3.5–5.2)
ALP SERPL-CCNC: 53 U/L (ref 55–135)
ALT SERPL W/O P-5'-P-CCNC: 9 U/L (ref 10–44)
ANION GAP SERPL CALC-SCNC: 8 MMOL/L (ref 8–16)
ANISOCYTOSIS BLD QL SMEAR: SLIGHT
AST SERPL-CCNC: 11 U/L (ref 10–40)
BASOPHILS # BLD AUTO: ABNORMAL K/UL (ref 0–0.2)
BASOPHILS NFR BLD: 1 % (ref 0–1.9)
BILIRUB SERPL-MCNC: 1.5 MG/DL (ref 0.1–1)
BUN SERPL-MCNC: 26 MG/DL (ref 6–20)
CALCIUM SERPL-MCNC: 9.1 MG/DL (ref 8.7–10.5)
CHLORIDE SERPL-SCNC: 95 MMOL/L (ref 95–110)
CO2 SERPL-SCNC: 30 MMOL/L (ref 23–29)
CREAT SERPL-MCNC: 0.7 MG/DL (ref 0.5–1.4)
CRP SERPL-MCNC: 404.69 MG/L (ref 0–3.19)
DIFFERENTIAL METHOD: ABNORMAL
EOSINOPHIL # BLD AUTO: ABNORMAL K/UL (ref 0–0.5)
EOSINOPHIL NFR BLD: 0 % (ref 0–8)
ERYTHROCYTE [DISTWIDTH] IN BLOOD BY AUTOMATED COUNT: 14.2 % (ref 11.5–14.5)
EST. GFR  (NO RACE VARIABLE): >60 ML/MIN/1.73 M^2
GLUCOSE SERPL-MCNC: 124 MG/DL (ref 70–110)
HCT VFR BLD AUTO: 38.5 % (ref 37–48.5)
HGB BLD-MCNC: 13.2 G/DL (ref 12–16)
HYPOCHROMIA BLD QL SMEAR: ABNORMAL
IMM GRANULOCYTES # BLD AUTO: ABNORMAL K/UL (ref 0–0.04)
IMM GRANULOCYTES NFR BLD AUTO: ABNORMAL % (ref 0–0.5)
LYMPHOCYTES # BLD AUTO: ABNORMAL K/UL (ref 1–4.8)
LYMPHOCYTES NFR BLD: 5 % (ref 18–48)
MCH RBC QN AUTO: 31.9 PG (ref 27–31)
MCHC RBC AUTO-ENTMCNC: 34.3 G/DL (ref 32–36)
MCV RBC AUTO: 93 FL (ref 82–98)
METAMYELOCYTES NFR BLD MANUAL: 7 %
MONOCYTES # BLD AUTO: ABNORMAL K/UL (ref 0.3–1)
MONOCYTES NFR BLD: 8 % (ref 4–15)
MYELOCYTES NFR BLD MANUAL: 3 %
NEUTROPHILS NFR BLD: 38 % (ref 38–73)
NEUTS BAND NFR BLD MANUAL: 38 %
NRBC BLD-RTO: 0 /100 WBC
OVALOCYTES BLD QL SMEAR: ABNORMAL
PLATELET # BLD AUTO: 252 K/UL (ref 150–450)
PMV BLD AUTO: 9.9 FL (ref 9.2–12.9)
POIKILOCYTOSIS BLD QL SMEAR: SLIGHT
POLYCHROMASIA BLD QL SMEAR: ABNORMAL
POTASSIUM SERPL-SCNC: 3.3 MMOL/L (ref 3.5–5.1)
PROT SERPL-MCNC: 7 G/DL (ref 6–8.4)
RBC # BLD AUTO: 4.14 M/UL (ref 4–5.4)
SODIUM SERPL-SCNC: 133 MMOL/L (ref 136–145)
SPHEROCYTES BLD QL SMEAR: ABNORMAL
WBC # BLD AUTO: 6.85 K/UL (ref 3.9–12.7)

## 2022-08-23 PROCEDURE — 63600175 PHARM REV CODE 636 W HCPCS: Performed by: COLON & RECTAL SURGERY

## 2022-08-23 PROCEDURE — 86141 C-REACTIVE PROTEIN HS: CPT | Performed by: STUDENT IN AN ORGANIZED HEALTH CARE EDUCATION/TRAINING PROGRAM

## 2022-08-23 PROCEDURE — 36415 COLL VENOUS BLD VENIPUNCTURE: CPT | Performed by: STUDENT IN AN ORGANIZED HEALTH CARE EDUCATION/TRAINING PROGRAM

## 2022-08-23 PROCEDURE — 25500020 PHARM REV CODE 255: Performed by: STUDENT IN AN ORGANIZED HEALTH CARE EDUCATION/TRAINING PROGRAM

## 2022-08-23 PROCEDURE — 85007 BL SMEAR W/DIFF WBC COUNT: CPT | Performed by: STUDENT IN AN ORGANIZED HEALTH CARE EDUCATION/TRAINING PROGRAM

## 2022-08-23 PROCEDURE — 85027 COMPLETE CBC AUTOMATED: CPT | Performed by: STUDENT IN AN ORGANIZED HEALTH CARE EDUCATION/TRAINING PROGRAM

## 2022-08-23 PROCEDURE — 20600001 HC STEP DOWN PRIVATE ROOM

## 2022-08-23 PROCEDURE — 63600175 PHARM REV CODE 636 W HCPCS: Performed by: STUDENT IN AN ORGANIZED HEALTH CARE EDUCATION/TRAINING PROGRAM

## 2022-08-23 PROCEDURE — 63600175 PHARM REV CODE 636 W HCPCS

## 2022-08-23 PROCEDURE — 25000003 PHARM REV CODE 250: Performed by: COLON & RECTAL SURGERY

## 2022-08-23 PROCEDURE — 25000003 PHARM REV CODE 250: Performed by: STUDENT IN AN ORGANIZED HEALTH CARE EDUCATION/TRAINING PROGRAM

## 2022-08-23 PROCEDURE — 80053 COMPREHEN METABOLIC PANEL: CPT | Performed by: STUDENT IN AN ORGANIZED HEALTH CARE EDUCATION/TRAINING PROGRAM

## 2022-08-23 RX ORDER — ALPRAZOLAM 0.25 MG/1
0.25 TABLET ORAL NIGHTLY PRN
Status: DISCONTINUED | OUTPATIENT
Start: 2022-08-23 | End: 2022-08-24

## 2022-08-23 RX ORDER — ONDANSETRON 2 MG/ML
4 INJECTION INTRAMUSCULAR; INTRAVENOUS ONCE
Status: COMPLETED | OUTPATIENT
Start: 2022-08-23 | End: 2022-08-23

## 2022-08-23 RX ORDER — MUPIROCIN 20 MG/G
OINTMENT TOPICAL 2 TIMES DAILY
Status: COMPLETED | OUTPATIENT
Start: 2022-08-23 | End: 2022-08-27

## 2022-08-23 RX ADMIN — FLUOXETINE 40 MG: 20 CAPSULE ORAL at 09:08

## 2022-08-23 RX ADMIN — BUPROPION HYDROCHLORIDE 300 MG: 150 TABLET, FILM COATED, EXTENDED RELEASE ORAL at 09:08

## 2022-08-23 RX ADMIN — ENOXAPARIN SODIUM 40 MG: 40 INJECTION SUBCUTANEOUS at 04:08

## 2022-08-23 RX ADMIN — PANTOPRAZOLE SODIUM 40 MG: 40 TABLET, DELAYED RELEASE ORAL at 08:08

## 2022-08-23 RX ADMIN — ONDANSETRON 4 MG: 2 INJECTION INTRAMUSCULAR; INTRAVENOUS at 05:08

## 2022-08-23 RX ADMIN — ONDANSETRON 8 MG: 2 INJECTION INTRAMUSCULAR; INTRAVENOUS at 08:08

## 2022-08-23 RX ADMIN — PROMETHAZINE HYDROCHLORIDE 12.5 MG: 25 INJECTION INTRAMUSCULAR; INTRAVENOUS at 12:08

## 2022-08-23 RX ADMIN — PIPERACILLIN SODIUM AND TAZOBACTAM SODIUM 4.5 G: 4; .5 INJECTION, POWDER, LYOPHILIZED, FOR SOLUTION INTRAVENOUS at 12:08

## 2022-08-23 RX ADMIN — MUPIROCIN: 20 OINTMENT TOPICAL at 09:08

## 2022-08-23 RX ADMIN — PIPERACILLIN SODIUM AND TAZOBACTAM SODIUM 4.5 G: 4; .5 INJECTION, POWDER, LYOPHILIZED, FOR SOLUTION INTRAVENOUS at 04:08

## 2022-08-23 RX ADMIN — DEXTROSE MONOHYDRATE, SODIUM CHLORIDE, AND POTASSIUM CHLORIDE: 50; 4.5; 1.49 INJECTION, SOLUTION INTRAVENOUS at 01:08

## 2022-08-23 RX ADMIN — DEXTROSE MONOHYDRATE, SODIUM CHLORIDE, AND POTASSIUM CHLORIDE: 50; 4.5; 1.49 INJECTION, SOLUTION INTRAVENOUS at 12:08

## 2022-08-23 RX ADMIN — IOHEXOL 15 ML: 350 INJECTION, SOLUTION INTRAVENOUS at 09:08

## 2022-08-23 RX ADMIN — PANTOPRAZOLE SODIUM 40 MG: 40 TABLET, DELAYED RELEASE ORAL at 09:08

## 2022-08-23 RX ADMIN — MUPIROCIN: 20 OINTMENT TOPICAL at 08:08

## 2022-08-23 RX ADMIN — PIPERACILLIN SODIUM AND TAZOBACTAM SODIUM 4.5 G: 4; .5 INJECTION, POWDER, LYOPHILIZED, FOR SOLUTION INTRAVENOUS at 08:08

## 2022-08-23 RX ADMIN — ONDANSETRON 8 MG: 2 INJECTION INTRAMUSCULAR; INTRAVENOUS at 01:08

## 2022-08-23 RX ADMIN — ONDANSETRON 8 MG: 2 INJECTION INTRAMUSCULAR; INTRAVENOUS at 02:08

## 2022-08-23 RX ADMIN — PROMETHAZINE HYDROCHLORIDE 12.5 MG: 25 INJECTION INTRAMUSCULAR; INTRAVENOUS at 09:08

## 2022-08-23 RX ADMIN — PROMETHAZINE HYDROCHLORIDE 12.5 MG: 25 INJECTION INTRAMUSCULAR; INTRAVENOUS at 05:08

## 2022-08-23 RX ADMIN — SODIUM CHLORIDE, SODIUM LACTATE, POTASSIUM CHLORIDE, AND CALCIUM CHLORIDE 500 ML: .6; .31; .03; .02 INJECTION, SOLUTION INTRAVENOUS at 08:08

## 2022-08-23 NOTE — PLAN OF CARE
Pt alert and oriented x4, no distress at this time, no complaints of pain, pt continues to feel nausea intermitently, but states nausea medication helps to decrease the symptom. NGT in place draining green fluid, no fevers this morning, will continue to monitor.

## 2022-08-23 NOTE — PLAN OF CARE
[address] [facility]  Initial Discharge Assessment       Primary Care Provider: Bisi Tolbert DO    Admission Diagnosis: Small bowel obstruction [K56.609]    Admission Date: 8/22/2022  Expected Discharge Date:     Discharge Barriers Identified: None    Payor: [employer] / Plan: [facility] CHOICE PLUS / Product Type: Commercial /     Extended Emergency Contact Information  Primary Emergency Contact: KaneTerrell  Address: [address] [place] [address] [place]  Mobile Phone: [phone]  Relation: Spouse  Secondary Emergency Contact: WillardFuad  Address: [address] [place] [address] [place]  Mobile Phone: [phone]  Relation: Mother    Discharge Plan A: Home, Home Health, Home with family  Discharge Plan B: Home, Home with family      [facility] - [place], [place] - [address]  [address]  [place] [address]  Phone: [phone] Fax: [phone]      Initial Assessment (most recent)     Adult Discharge Assessment - 08/23/22 0859        Discharge Assessment    Assessment Type Discharge Planning Assessment     Confirmed/corrected address, phone number and insurance Yes     Confirmed Demographics Correct on Facesheet     Source of Information patient     Communicated GLORIA with patient/caregiver Date not available/Unable to determine     Reason For Admission Partial small bowel obstruction     Lives With child(charo), dependent     Facility Arrived From: [facility]     Do you expect to return to your current living situation? Yes     Do you have help at home or someone to help you manage your care at home? Yes     Who are your caregiver(s) and their phone number(s)? Terrell Middleton, spouse, [phone]     Prior to hospitilization cognitive status: Alert/Oriented     Current cognitive status: Alert/Oriented     Walking or Climbing Stairs Difficulty none     Dressing/Bathing Difficulty none     Equipment Currently  Used at Home none     Readmission within 30 days? Yes     Do you currently have service(s) that help you manage your care at home? Yes     Name and Contact number of agency [facility]     Is the pt/caregiver preference to resume services with current agency Yes     Do you take prescription medications? Yes     Do you have prescription coverage? Yes     Coverage [facility] Choice Plus     Do you have any problems affording any of your prescribed medications? No     Is the patient taking medications as prescribed? yes     Who is going to help you get home at discharge? Pt's  will provide transportation home.     How do you get to doctors appointments? family or friend will provide;car, drives self     Are you on dialysis? No     Do you take coumadin? No     Discharge Plan A Home;Home Health;Home with family     Discharge Plan B Home;Home with family     DME Needed Upon Discharge  none     Discharge Plan discussed with: Patient     Discharge Barriers Identified None        Relationship/Environment    Name(s) of Who Lives With Patient Spouse-Terrell agarwal [phone]               SW met with pt to complete initial discharge assessment.  Pt has hospital readmissions.  Pt's  will provide transportation home.      Pt lives with her  and three daughters in a single story home.  0 steps for entry into home.  Pt reported she is independent with ADLS and mobility.  Pt reported she does not use DME at home.  Pt reported her  is her support system at home.      Pt does not receive coumadin or dialysis.  Pt does not receive behavioral health services.  Pt reported she is compliant with her medications and does not have any issues paying for meds.      Disp:  HH-[facility].  Pt not medically ready for d/c at this time.      Sammie Rhoades LMSW  PRN-  [facility]  Ext. [phone]

## 2022-08-23 NOTE — ASSESSMENT & PLAN NOTE
Nyasia Middleton is a 41 y.o. female who is s/p diverting loop ileostomy 8/17/22, readmitted due to small bowel obstruction as evidenced by CT. Urinalysis and clinical presentation also indicative of symptomatic UTI    - Pain control: multimodal  - Diet: NPO   - NGT @ [facility]IS  - antiemetics PRN  - tylenol for fever  - Abx zosyn  - Restart home meds  - CBC, CMP, CRP  - OOBTC, encourage ambulation    Dispo: admit to CRS for inpatient care

## 2022-08-23 NOTE — PROGRESS NOTES
[facility]  Colorectal Surgery  Progress Note    Patient Name: Nyasia Middleton  MRN: [id number]  Admission Date: 8/22/2022  Hospital Length of Stay: 1 days  Attending Physician: CORETTA Doyle MD    Subjective:     Interval History: No acute overnight events, AF, tachycardic. NGT in place overnight 1900 output of gastric/bilious content. Complains of some nausea still, otherwise no pain or abdominal discomfort.    Post-Op Info:  * No surgery found *          Medications:  Continuous Infusions:   dextrose 5 % and 0.45 % NaCl with KCl 20 mEq 125 mL/hr at 08/23/22 0147     Scheduled Meds:   buPROPion  300 mg Oral Nightly    enoxaparin  40 mg Subcutaneous Daily    FLUoxetine  40 mg Oral Nightly    lactated ringers  1,000 mL Intravenous Once    mupirocin   Nasal BID    pantoprazole  40 mg Oral BID    piperacillin-tazobactam (ZOSYN) IVPB  4.5 g Intravenous Q8H     PRN Meds:   acetaminophen    acetaminophen    melatonin    ondansetron    oxyCODONE    oxyCODONE    promethazine (PHENERGAN) IVPB    sodium chloride 0.9%        Objective:     Vital Signs (Most Recent):  Temp: 98.5 °F (36.9 °C) (08/23/22 0703)  Pulse: (!) 115 (08/23/22 0703)  Resp: 16 (08/23/22 0703)  BP: 123/74 (08/23/22 0703)  SpO2: 95 % (08/23/22 0703)   Vital Signs (24h Range):  Temp:  [97.9 °F (36.6 °C)-103.3 °F (39.6 °C)] 98.5 °F (36.9 °C)  Pulse:  [113-138] 115  Resp:  [16-20] 16  SpO2:  [92 %-96 %] 95 %  BP: (103-123)/(56-74) 123/74     Intake/Output - Last 3 Shifts         08/21 0700 08/22 0659 08/22 0700 08/23 0659 08/23 0700 08/24 0659    Drains  2900 750    Stool  450     Total Output  3350 750    Net  -3350 -750                   Physical Exam  Constitutional:       General: She is not in acute distress.     Appearance: Normal appearance.   HENT:      Nose:      Comments: NGT @ LWIS w/ bilious/gastric content     Mouth/Throat:      Mouth: Mucous membranes are moist.   Eyes:      Extraocular Movements: Extraocular  movements intact.   Cardiovascular:      Rate and Rhythm: Normal rate and regular rhythm.   Pulmonary:      Effort: Pulmonary effort is normal. No respiratory distress.      Breath sounds: Normal breath sounds.   Abdominal:      General: There is no distension.      Palpations: Abdomen is soft.      Tenderness: There is no abdominal tenderness. There is no guarding.      Comments: RUQ ostomy pouch with some fecal content     Skin:     General: Skin is warm.   Neurological:      Mental Status: She is alert and oriented to person, place, and time. Mental status is at baseline.         Significant Labs:  CBC (Last 3 Results):   Recent Labs   Lab 08/18/22  0827 08/22/22 0228 08/23/22  0256   WBC 8.75 15.74* 6.85   RBC 3.28* 4.44 4.14   HGB 10.4* 14.1 13.2   HCT 31.6* 41.6 38.5    323 252   MCV 96 94 93   MCH 31.7* 31.8* 31.9*   MCHC 32.9 33.9 34.3     CMP (Last 3 Results):   Recent Labs   Lab 08/17/22  1215 08/18/22  0827 08/22/22 0228   GLU  --  72 136*   CALCIUM  --  7.9* 9.7   ALBUMIN  --   --  4.6   PROT  --   --  8.4   NA  --  137 140   K  --  3.2* 3.6   CO2  --  27 31*   CL  --  106 96   BUN  --  7 9   CREATININE 0.6 0.6 0.82   ALKPHOS  --   --  74   ALT  --   --  15   AST  --   --  25   BILITOT  --   --  1.0     CRP (Last 3 Results):   Recent Labs   Lab 08/22/22  1905   .2*     Microbiology Results (last 7 days)       ** No results found for the last 168 hours. **          Recent Labs   Lab 08/22/22  0448   COLORU Yellow   SPECGRAV >=1.030*   PHUR 6.0   PROTEINUA 1+*   BACTERIA Moderate*   NITRITE Negative   LEUKOCYTESUR 1+*   UROBILINOGEN Negative   HYALINECASTS 0     All pertinent lab results within the last 24 hours have been reviewed.     Significant Diagnostics:  I have reviewed all pertinent imaging results/findings within the past 24 hours.    Assessment/Plan:     * Partial small bowel obstruction  Nyasia Middleton is a 41 y.o. female who is s/p diverting loop ileostomy 8/17/22, readmitted  due to small bowel obstruction as evidenced by CT. Urinalysis and clinical presentation also indicative of symptomatic UTI    - Pain control: multimodal  - Diet: NPO   - NGT @ LWIS  - mIVF, additional 500cc of LR due to adri NGT output  - antiemetics PRN  - tylenol for fever  - Abx zosyn for UTI  - CBC, CMP, CRP  - OOBTC, encourage ambulation    Dispo: Continue inpatient care.            Natan Hopson MD  Colorectal Surgery  [facility]

## 2022-08-23 NOTE — HPI
Nyasia Middleton is a 41-year-old who is 5 days status post laparoscopic diverting loop ileostomy. She began vomiting yesterday 8/21/22  with associated upper abdominal pain. She was discharged with phenergan but without any improvement. She has been having output to her ileostomy bag. She has been eating small amounts of food prior to the vomiting. Since being admitted patient has had several episodes of fever that respond to tylenol.    Patient had an abdominopelvic CT which showed diffusely dilated fluid-filled loops of small bowel throughout the abdomen and pelvis concerning for small bowel obstruction.  There is a questionable zone of transition just proximal to the patient's ileostomy/stoma.

## 2022-08-23 NOTE — SUBJECTIVE & OBJECTIVE
Subjective:     Interval History: No acute overnight events, AF, tachycardic. NGT in place overnight 1900 output of gastric/bilious content. Complains of some nausea still, otherwise no pain or abdominal discomfort.    Post-Op Info:  * No surgery found *          Medications:  Continuous Infusions:   dextrose 5 % and 0.45 % NaCl with KCl 20 mEq 125 mL/hr at 08/23/22 0147     Scheduled Meds:   buPROPion  300 mg Oral Nightly    enoxaparin  40 mg Subcutaneous Daily    FLUoxetine  40 mg Oral Nightly    lactated ringers  1,000 mL Intravenous Once    mupirocin   Nasal BID    pantoprazole  40 mg Oral BID    piperacillin-tazobactam (ZOSYN) IVPB  4.5 g Intravenous Q8H     PRN Meds:   acetaminophen    acetaminophen    melatonin    ondansetron    oxyCODONE    oxyCODONE    promethazine (PHENERGAN) IVPB    sodium chloride 0.9%        Objective:     Vital Signs (Most Recent):  Temp: 98.5 °F (36.9 °C) (08/23/22 0703)  Pulse: (!) 115 (08/23/22 0703)  Resp: 16 (08/23/22 0703)  BP: 123/74 (08/23/22 0703)  SpO2: 95 % (08/23/22 0703)   Vital Signs (24h Range):  Temp:  [97.9 °F (36.6 °C)-103.3 °F (39.6 °C)] 98.5 °F (36.9 °C)  Pulse:  [113-138] 115  Resp:  [16-20] 16  SpO2:  [92 %-96 %] 95 %  BP: (103-123)/(56-74) 123/74     Intake/Output - Last 3 Shifts         08/21 0700 08/22 0659 08/22 0700 08/23 0659 08/23 0700 08/24 0659    Drains  2900 750    Stool  450     Total Output  3350 750    Net  -3350 -750                   Physical Exam  Constitutional:       General: She is not in acute distress.     Appearance: Normal appearance.   HENT:      Nose:      Comments: NGT @ LWIS w/ bilious/gastric content     Mouth/Throat:      Mouth: Mucous membranes are moist.   Eyes:      Extraocular Movements: Extraocular movements intact.   Cardiovascular:      Rate and Rhythm: Normal rate and regular rhythm.   Pulmonary:      Effort: Pulmonary effort is normal. No respiratory distress.      Breath sounds: Normal breath sounds.   Abdominal:       General: There is no distension.      Palpations: Abdomen is soft.      Tenderness: There is no abdominal tenderness. There is no guarding.      Comments: RUQ ostomy pouch with some fecal content     Skin:     General: Skin is warm.   Neurological:      Mental Status: She is alert and oriented to person, place, and time. Mental status is at baseline.         Significant Labs:  CBC (Last 3 Results):   Recent Labs   Lab 08/18/22  0827 08/22/22 0228 08/23/22  0256   WBC 8.75 15.74* 6.85   RBC 3.28* 4.44 4.14   HGB 10.4* 14.1 13.2   HCT 31.6* 41.6 38.5    323 252   MCV 96 94 93   MCH 31.7* 31.8* 31.9*   MCHC 32.9 33.9 34.3     CMP (Last 3 Results):   Recent Labs   Lab 08/17/22  1215 08/18/22  0827 08/22/22 0228   GLU  --  72 136*   CALCIUM  --  7.9* 9.7   ALBUMIN  --   --  4.6   PROT  --   --  8.4   NA  --  137 140   K  --  3.2* 3.6   CO2  --  27 31*   CL  --  106 96   BUN  --  7 9   CREATININE 0.6 0.6 0.82   ALKPHOS  --   --  74   ALT  --   --  15   AST  --   --  25   BILITOT  --   --  1.0     CRP (Last 3 Results):   Recent Labs   Lab 08/22/22  1905   .2*     Microbiology Results (last 7 days)       ** No results found for the last 168 hours. **          Recent Labs   Lab 08/22/22  0448   COLORU Yellow   SPECGRAV >=1.030*   PHUR 6.0   PROTEINUA 1+*   BACTERIA Moderate*   NITRITE Negative   LEUKOCYTESUR 1+*   UROBILINOGEN Negative   HYALINECASTS 0     All pertinent lab results within the last 24 hours have been reviewed.     Significant Diagnostics:  I have reviewed all pertinent imaging results/findings within the past 24 hours.

## 2022-08-23 NOTE — ASSESSMENT & PLAN NOTE
Nyasia Middleton is a 41 y.o. female who is s/p diverting loop ileostomy 8/17/22, readmitted due to small bowel obstruction as evidenced by CT. Urinalysis and clinical presentation also indicative of symptomatic UTI    - Pain control: multimodal  - Diet: NPO   - NGT @ LWIS  - mIVF, additional 500cc of LR due to adri NGT output  - antiemetics PRN  - tylenol for fever  - Abx zosyn for UTI  - CBC, CMP, CRP  - OOBTC, encourage ambulation    Dispo: Continue inpatient care.

## 2022-08-23 NOTE — H&P
[facility]  Colorectal Surgery  History & Physical    Patient Name: Nyasia Middleton  MRN: [id number]  Admission Date: 8/22/2022  Attending Physician: CORETTA Doyle MD   Primary Care Provider: Bisi Tolbert DO    Subjective:     Chief Complaint/Reason for Admission: nausea and emesis/ small bowel obstruction    History of Present Illness:  Nyasia Middleton is a 41-year-old who is 5 days status post laparoscopic diverting loop ileostomy. She began vomiting yesterday 8/21/22  with associated upper abdominal pain. She was discharged with phenergan but without any improvement. She has been having output to her ileostomy bag. She has been eating small amounts of food prior to the vomiting. Since being admitted patient has had several episodes of fever that respond to tylenol.    Patient had an abdominopelvic CT which showed diffusely dilated fluid-filled loops of small bowel throughout the abdomen and pelvis concerning for small bowel obstruction.  There is a questionable zone of transition just proximal to the patient's ileostomy/stoma.         PTA Medications   Medication Sig    ALPRAZolam (XANAX) 0.25 MG tablet Take 1 tablet (0.25 mg total) by mouth daily as needed for Anxiety.    buPROPion (WELLBUTRIN XL) 300 MG 24 hr tablet Take 1 tablet (300 mg total) by mouth once daily. (Patient taking differently: Take 300 mg by mouth nightly.)    ciprofloxacin HCl (CIPRO) 500 MG tablet Take 500 mg by mouth every 12 (twelve) hours.    clotrimazole-betamethasone 1-0.05% (LOTRISONE) cream 2 (two) times daily. Apply to affected area    dicyclomine (BENTYL) 20 mg tablet TAKE 1 TABLET BY MOUTH EVERY SIX HOURS    diphenoxylate-atropine 2.5-0.025 mg (LOMOTIL) 2.5-0.025 mg per tablet TAKE 2 TABLETS BY MOUTH FOUR TIMES DAILY AS NEEDED FOR DIARRHEA    fluconazole (DIFLUCAN) 150 MG Tab Take 1 tablet (150 mg total) by mouth once daily. (Patient not taking: No sig reported)    FLUoxetine 40 MG capsule Take 1 capsule (40  mg total) by mouth once daily. (Patient taking differently: Take 40 mg by mouth nightly.)    loperamide (IMODIUM) 2 mg capsule TAKE 2 CAPSULE BY MOUTH FOUR TIMES DAILY    metroNIDAZOLE (FLAGYL) 500 MG tablet Take 500 mg by mouth every 8 (eight) hours.    ondansetron (ZOFRAN-ODT) 8 MG TbDL DISSOLVE 1 TABLET UNDER TONGUE EVERY 8 HOURS AS NEEDED FOR NAUSEA    oxyCODONE-acetaminophen (PERCOCET) 7.5-325 mg per tablet Take 1 tablet by mouth every 4 (four) hours as needed for Pain.    pantoprazole (PROTONIX) 40 MG tablet TAKE 1 TABLET BY MOUTH TWICE DAILY    promethazine (PHENERGAN) 12.5 MG Tab Take 1 tablet (12.5 mg total) by mouth every 6 (six) hours as needed (nausea).       Review of patient's allergies indicates:  No Known Allergies    Past Medical History:   Diagnosis Date    Crohn's disease     GERD (gastroesophageal reflux disease)     History of Clostridium difficile infection     History of ulcerative colitis     Perineal sinus 2017     Past Surgical History:   Procedure Laterality Date     SECTION, CLASSIC      FLEXIBLE SIGMOIDOSCOPY N/A 2018    Procedure: SIGMOIDOSCOPY, FLEXIBLE;  Surgeon: Jairo Peralta MD;  Location: [address]);  Service: Endoscopy;  Laterality: N/A;  no enemas per [facility]    FLEXIBLE SIGMOIDOSCOPY N/A 2022    Procedure: SIGMOIDOSCOPY, FLEXIBLE;  Surgeon: CORETTA Doyle MD;  Location: [address];  Service: Colon and Rectal;  Laterality: N/A;    HYSTERECTOMY      partial    ILEOSTOMY CLOSURE      LAPAROSCOPIC ILEOSTOMY N/A 2022    Procedure: CREATION, ILEOSTOMY, LAPAROSCOPIC;  Surgeon: CORETTA Doyle MD;  Location: [facility] OR [address] [place] [address];  Service: Colon and Rectal;  Laterality: N/A;    POUCHOSCOPY N/A 2018    Procedure: ENDOSCOPY, SMALL INTESTINAL POUCH, DIAGNOSTIC, possible seton placement;  Surgeon: Jairo Peralta MD;  [address]  Service: Endoscopy;  Laterality: N/A;  Schedule early 2018; No  prep    rectovaginal fistula repair      RESTORATIVE PROCTOCOLECTOMY      TONSILLECTOMY      adenoids    TUBAL LIGATION      2016     Family History       Problem Relation (Age of Onset)    Cancer Paternal Grandfather (53)          Tobacco Use    Smoking status: Current Some Day Smoker     Types: Vaping with nicotine     Last attempt to quit: 2009     Years since quittin.7    Smokeless tobacco: Never Used   Substance and Sexual Activity    Alcohol use: No     Alcohol/week: 0.0 standard drinks    Drug use: No    Sexual activity: Not Currently     Review of Systems   Constitutional:  Positive for appetite change and fever. Negative for activity change and fatigue.   HENT: Negative.     Eyes: Negative.    Respiratory: Negative.  Negative for cough, chest tightness and shortness of breath.    Cardiovascular: Negative.  Negative for chest pain.   Gastrointestinal:  Positive for abdominal distention, nausea and vomiting. Negative for anal bleeding, blood in stool, constipation, diarrhea and rectal pain.   Endocrine: Negative.    Genitourinary:  Positive for dysuria and urgency.   Musculoskeletal: Negative.    Skin: Negative.    Allergic/Immunologic: Negative.    Neurological: Negative.    Hematological: Negative.    Psychiatric/Behavioral: Negative.     Objective:     Vital Signs (Most Recent):  Temp: 99.8 °F (37.7 °C) (22 1600)  Pulse: (!) 118 (22 1600)  Resp: 20 (22 1600)  BP: (!) 103/56 (22 1600)  SpO2: 96 % (22 1600)   Vital Signs (24h Range):  Temp:  [98.3 °F (36.8 °C)-103.3 °F (39.6 °C)] 99.8 °F (37.7 °C)  Pulse:  [] 118  Resp:  [18-43] 20  SpO2:  [94 %-100 %] 96 %  BP: (103-130)/(55-80) 103/56        There is no height or weight on file to calculate BMI.    Physical Exam  Constitutional:       General: She is not in acute distress.     Appearance: Normal appearance.   HENT:      Nose:      Comments: NGT @ LWIS w/ bilious/gastric content     Mouth/Throat:       Mouth: Mucous membranes are moist.   Eyes:      Extraocular Movements: Extraocular movements intact.   Cardiovascular:      Rate and Rhythm: Normal rate and regular rhythm.   Pulmonary:      Effort: Pulmonary effort is normal. No respiratory distress.      Breath sounds: Normal breath sounds.   Abdominal:      General: There is distension (slightly distended).      Palpations: Abdomen is soft.      Tenderness: There is no abdominal tenderness. There is no guarding.      Comments: RUQ ostomy pouch with some fecal content     Skin:     General: Skin is warm.   Neurological:      Mental Status: She is alert and oriented to person, place, and time. Mental status is at baseline.         Significant Labs:  CBC (Last 3 Results):   Recent Labs   Lab 08/18/22 0827 08/22/22 0228   WBC 8.75 15.74*   RBC 3.28* 4.44   HGB 10.4* 14.1   HCT 31.6* 41.6    323   MCV 96 94   MCH 31.7* 31.8*   MCHC 32.9 33.9     CMP (Last 3 Results):   Recent Labs   Lab 08/17/22  1215 08/18/22 0827 08/22/22 0228   GLU  --  72 136*   CALCIUM  --  7.9* 9.7   ALBUMIN  --   --  4.6   PROT  --   --  8.4   NA  --  137 140   K  --  3.2* 3.6   CO2  --  27 31*   CL  --  106 96   BUN  --  7 9   CREATININE 0.6 0.6 0.82   ALKPHOS  --   --  74   ALT  --   --  15   AST  --   --  25   BILITOT  --   --  1.0     CRP (Last 3 Results): No results for input(s): CRP in the last 168 hours.  Microbiology Results (last 7 days)       ** No results found for the last 168 hours. **          Recent Labs   Lab 08/22/22  0448   COLORU Yellow   SPECGRAV >=1.030*   PHUR 6.0   PROTEINUA 1+*   BACTERIA Moderate*   NITRITE Negative   LEUKOCYTESUR 1+*   UROBILINOGEN Negative   HYALINECASTS 0     All pertinent lab results within the last 24 hours have been reviewed.     Significant Diagnostics:  I have reviewed all pertinent imaging results/findings within the past 24 hours.    Abdominopelvic CT:   Impression:   1.  Diffusely dilated fluid-filled loops of small bowel  throughout the abdomen and pelvis concerning for small bowel obstruction.  There is a questionable zone of transition just proximal to the patient's ileostomy/stoma.  Surgical consultation advised.     2.  Postsurgical change of prior total proctocolectomy and recent loop ileostomy.  Presumed recent postsurgical changes are noted within the anterior abdominal wall subcutaneous soft tissues as discussed above.     3.  Nonobstructing right renal calculus.    Assessment/Plan:     * Partial small bowel obstruction  Nyasia Middleton is a 41 y.o. female who is s/p diverting loop ileostomy 8/17/22, readmitted due to small bowel obstruction as evidenced by CT. Urinalysis and clinical presentation also indicative of symptomatic UTI    - Pain control: multimodal  - Diet: NPO   - NGT @ LWIS  - antiemetics PRN  - tylenol for fever  - Abx zosyn for UTI  - Restart home meds  - CBC, CMP, CRP  - OOBTC, encourage ambulation    Dispo: admit to CRS for inpatient care            Natan Hopson MD  Colorectal Surgery  [facility]

## 2022-08-23 NOTE — SUBJECTIVE & OBJECTIVE
PTA Medications   Medication Sig    ALPRAZolam (XANAX) 0.25 MG tablet Take 1 tablet (0.25 mg total) by mouth daily as needed for Anxiety.    buPROPion (WELLBUTRIN XL) 300 MG 24 hr tablet Take 1 tablet (300 mg total) by mouth once daily. (Patient taking differently: Take 300 mg by mouth nightly.)    ciprofloxacin HCl (CIPRO) 500 MG tablet Take 500 mg by mouth every 12 (twelve) hours.    clotrimazole-betamethasone 1-0.05% (LOTRISONE) cream 2 (two) times daily. Apply to affected area    dicyclomine (BENTYL) 20 mg tablet TAKE 1 TABLET BY MOUTH EVERY SIX HOURS    diphenoxylate-atropine 2.5-0.025 mg (LOMOTIL) 2.5-0.025 mg per tablet TAKE 2 TABLETS BY MOUTH FOUR TIMES DAILY AS NEEDED FOR DIARRHEA    fluconazole (DIFLUCAN) 150 MG Tab Take 1 tablet (150 mg total) by mouth once daily. (Patient not taking: No sig reported)    FLUoxetine 40 MG capsule Take 1 capsule (40 mg total) by mouth once daily. (Patient taking differently: Take 40 mg by mouth nightly.)    loperamide (IMODIUM) 2 mg capsule TAKE 2 CAPSULE BY MOUTH FOUR TIMES DAILY    metroNIDAZOLE (FLAGYL) 500 MG tablet Take 500 mg by mouth every 8 (eight) hours.    ondansetron (ZOFRAN-ODT) 8 MG TbDL DISSOLVE 1 TABLET UNDER TONGUE EVERY 8 HOURS AS NEEDED FOR NAUSEA    oxyCODONE-acetaminophen (PERCOCET) 7.5-325 mg per tablet Take 1 tablet by mouth every 4 (four) hours as needed for Pain.    pantoprazole (PROTONIX) 40 MG tablet TAKE 1 TABLET BY MOUTH TWICE DAILY    promethazine (PHENERGAN) 12.5 MG Tab Take 1 tablet (12.5 mg total) by mouth every 6 (six) hours as needed (nausea).       Review of patient's allergies indicates:  No Known Allergies    Past Medical History:   Diagnosis Date    Crohn's disease     GERD (gastroesophageal reflux disease)     History of Clostridium difficile infection     History of ulcerative colitis     Perineal sinus 2017     Past Surgical History:   Procedure Laterality Date     SECTION, CLASSIC      FLEXIBLE SIGMOIDOSCOPY N/A  2018    Procedure: SIGMOIDOSCOPY, FLEXIBLE;  Surgeon: Jairo Peralta MD;  Location: [facility] ENDO (4TH FLR);  Service: Endoscopy;  Laterality: N/A;  no enemas per [facility]    FLEXIBLE SIGMOIDOSCOPY N/A 2022    Procedure: SIGMOIDOSCOPY, FLEXIBLE;  Surgeon: CORETTA Doyle MD;  [facility];  Service: Colon and Rectal;  Laterality: N/A;    HYSTERECTOMY      partial    ILEOSTOMY CLOSURE      LAPAROSCOPIC ILEOSTOMY N/A 2022    Procedure: CREATION, ILEOSTOMY, LAPAROSCOPIC;  Surgeon: CORETTA Doyle MD;  [facility];  Service: Colon and Rectal;  Laterality: N/A;    POUCHOSCOPY N/A 2018    Procedure: ENDOSCOPY, SMALL INTESTINAL POUCH, DIAGNOSTIC, possible seton placement;  Surgeon: Jairo Peralta MD;  Location: [facility] END[facility] (4TH FLR);  Service: Endoscopy;  Laterality: N/A;  Schedule early 2018; No prep    rectovaginal fistula repair      RESTORATIVE PROCTOCOLECTOMY      TONSILLECTOMY      adenoids    TUBAL LIGATION      2016     Family History       Problem Relation (Age of Onset)    Cancer Paternal Grandfather (53)          Tobacco Use    Smoking status: Current Some Day Smoker     Types: Vaping with nicotine     Last attempt to quit: 2009     Years since quittin.7    Smokeless tobacco: Never Used   Substance and Sexual Activity    Alcohol use: No     Alcohol/week: 0.0 standard drinks    Drug use: No    Sexual activity: Not Currently     Review of Systems   Constitutional:  Positive for appetite change and fever. Negative for activity change and fatigue.   HENT: Negative.     Eyes: Negative.    Respiratory: Negative.  Negative for cough, chest tightness and shortness of breath.    Cardiovascular: Negative.  Negative for chest pain.   Gastrointestinal:  Positive for abdominal distention, nausea and vomiting. Negative for anal bleeding, blood in stool, constipation, diarrhea and rectal pain.   Endocrine: Negative.    Genitourinary:  Positive for dysuria  and urgency.   Musculoskeletal: Negative.    Skin: Negative.    Allergic/Immunologic: Negative.    Neurological: Negative.    Hematological: Negative.    Psychiatric/Behavioral: Negative.     Objective:     Vital Signs (Most Recent):  Temp: 99.8 °F (37.7 °C) (08/22/22 1600)  Pulse: (!) 118 (08/22/22 1600)  Resp: 20 (08/22/22 1600)  BP: (!) 103/56 (08/22/22 1600)  SpO2: 96 % (08/22/22 1600)   Vital Signs (24h Range):  Temp:  [98.3 °F (36.8 °C)-103.3 °F (39.6 °C)] 99.8 °F (37.7 °C)  Pulse:  [] 118  Resp:  [18-43] 20  SpO2:  [94 %-100 %] 96 %  BP: (103-130)/(55-80) 103/56        There is no height or weight on file to calculate BMI.    Physical Exam  Constitutional:       General: She is not in acute distress.     Appearance: Normal appearance.   HENT:      Nose:      Comments: NGT @ [facility] w/ bilious/gastric content     Mouth/Throat:      Mouth: Mucous membranes are moist.   Eyes:      Extraocular Movements: Extraocular movements intact.   Cardiovascular:      Rate and Rhythm: Normal rate and regular rhythm.   Pulmonary:      Effort: Pulmonary effort is normal. No respiratory distress.      Breath sounds: Normal breath sounds.   Abdominal:      General: There is distension (slightly distended).      Palpations: Abdomen is soft.      Tenderness: There is no abdominal tenderness. There is no guarding.      Comments: RUQ ostomy pouch with some fecal content     Skin:     General: Skin is warm.   Neurological:      Mental Status: She is alert and oriented to person, place, and time. Mental status is at baseline.         Significant Labs:  CBC (Last 3 Results):   Recent Labs   Lab 08/18/22 0827 08/22/22 0228   WBC 8.75 15.74*   RBC 3.28* 4.44   HGB 10.4* 14.1   HCT 31.6* 41.6    323   MCV 96 94   MCH 31.7* 31.8*   MCHC 32.9 33.9     CMP (Last 3 Results):   Recent Labs   Lab 08/17/22  1215 08/18/22 0827 08/22/22 0228   GLU  --  72 136*   CALCIUM  --  7.9* 9.7   ALBUMIN  --   --  4.6   PROT  --   --  8.4   NA   --  137 140   K  --  3.2* 3.6   CO2  --  27 31*   CL  --  106 96   BUN  --  7 9   CREATININE 0.6 0.6 0.82   ALKPHOS  --   --  74   ALT  --   --  15   AST  --   --  25   BILITOT  --   --  1.0     CRP (Last 3 Results): No results for input(s): CRP in the last 168 hours.  Microbiology Results (last 7 days)       ** No results found for the last 168 hours. **          Recent Labs   Lab 08/22/22  0448   COLORU Yellow   SPECGRAV >=1.030*   PHUR 6.0   PROTEINUA 1+*   BACTERIA Moderate*   NITRITE Negative   LEUKOCYTESUR 1+*   UROBILINOGEN Negative   HYALINECASTS 0     All pertinent lab results within the last 24 hours have been reviewed.     Significant Diagnostics:  I have reviewed all pertinent imaging results/findings within the past 24 hours.    Abdominopelvic CT:   Impression:   1.  Diffusely dilated fluid-filled loops of small bowel throughout the abdomen and pelvis concerning for small bowel obstruction.  There is a questionable zone of transition just proximal to the patient's ileostomy/stoma.  Surgical consultation advised.     2.  Postsurgical change of prior total proctocolectomy and recent loop ileostomy.  Presumed recent postsurgical changes are noted within the anterior abdominal wall subcutaneous soft tissues as discussed above.     3.  Nonobstructing right renal calculus.

## 2022-08-24 ENCOUNTER — ANESTHESIA (OUTPATIENT)
Dept: SURGERY | Facility: HOSPITAL | Age: 42
DRG: 327 | End: 2022-08-24
Payer: COMMERCIAL

## 2022-08-24 ENCOUNTER — ANESTHESIA EVENT (OUTPATIENT)
Dept: SURGERY | Facility: HOSPITAL | Age: 42
DRG: 327 | End: 2022-08-24
Payer: COMMERCIAL

## 2022-08-24 LAB
ABO + RH BLD: NORMAL
BLD GP AB SCN CELLS X3 SERPL QL: NORMAL
CRP SERPL-MCNC: 374.29 MG/L (ref 0–3.19)

## 2022-08-24 PROCEDURE — 27201423 OPTIME MED/SURG SUP & DEVICES STERILE SUPPLY: Performed by: COLON & RECTAL SURGERY

## 2022-08-24 PROCEDURE — 36000708 HC OR TIME LEV III 1ST 15 MIN: Performed by: COLON & RECTAL SURGERY

## 2022-08-24 PROCEDURE — D9220A PRA ANESTHESIA: Mod: ANES,,, | Performed by: ANESTHESIOLOGY

## 2022-08-24 PROCEDURE — 25000003 PHARM REV CODE 250: Performed by: NURSE ANESTHETIST, CERTIFIED REGISTERED

## 2022-08-24 PROCEDURE — 71000015 HC POSTOP RECOV 1ST HR: Performed by: COLON & RECTAL SURGERY

## 2022-08-24 PROCEDURE — 20600001 HC STEP DOWN PRIVATE ROOM

## 2022-08-24 PROCEDURE — 63600175 PHARM REV CODE 636 W HCPCS: Performed by: STUDENT IN AN ORGANIZED HEALTH CARE EDUCATION/TRAINING PROGRAM

## 2022-08-24 PROCEDURE — 49320 PR LAP,DIAGNOSTIC ABDOMEN: ICD-10-PCS | Mod: 78,,, | Performed by: COLON & RECTAL SURGERY

## 2022-08-24 PROCEDURE — 36000709 HC OR TIME LEV III EA ADD 15 MIN: Performed by: COLON & RECTAL SURGERY

## 2022-08-24 PROCEDURE — 63600175 PHARM REV CODE 636 W HCPCS: Performed by: ANESTHESIOLOGY

## 2022-08-24 PROCEDURE — 36415 COLL VENOUS BLD VENIPUNCTURE: CPT | Performed by: COLON & RECTAL SURGERY

## 2022-08-24 PROCEDURE — D9220A PRA ANESTHESIA: ICD-10-PCS | Mod: CRNA,,, | Performed by: NURSE ANESTHETIST, CERTIFIED REGISTERED

## 2022-08-24 PROCEDURE — 86141 C-REACTIVE PROTEIN HS: CPT | Performed by: STUDENT IN AN ORGANIZED HEALTH CARE EDUCATION/TRAINING PROGRAM

## 2022-08-24 PROCEDURE — 63600175 PHARM REV CODE 636 W HCPCS: Performed by: NURSE ANESTHETIST, CERTIFIED REGISTERED

## 2022-08-24 PROCEDURE — 27000221 HC OXYGEN, UP TO 24 HOURS

## 2022-08-24 PROCEDURE — 37000008 HC ANESTHESIA 1ST 15 MINUTES: Performed by: COLON & RECTAL SURGERY

## 2022-08-24 PROCEDURE — 25500020 PHARM REV CODE 255: Performed by: COLON & RECTAL SURGERY

## 2022-08-24 PROCEDURE — 25000003 PHARM REV CODE 250: Performed by: COLON & RECTAL SURGERY

## 2022-08-24 PROCEDURE — 25000003 PHARM REV CODE 250: Performed by: STUDENT IN AN ORGANIZED HEALTH CARE EDUCATION/TRAINING PROGRAM

## 2022-08-24 PROCEDURE — 63600175 PHARM REV CODE 636 W HCPCS: Performed by: COLON & RECTAL SURGERY

## 2022-08-24 PROCEDURE — C9113 INJ PANTOPRAZOLE SODIUM, VIA: HCPCS

## 2022-08-24 PROCEDURE — 25000003 PHARM REV CODE 250

## 2022-08-24 PROCEDURE — 63600175 PHARM REV CODE 636 W HCPCS

## 2022-08-24 PROCEDURE — 37000009 HC ANESTHESIA EA ADD 15 MINS: Performed by: COLON & RECTAL SURGERY

## 2022-08-24 PROCEDURE — D9220A PRA ANESTHESIA: ICD-10-PCS | Mod: ANES,,, | Performed by: ANESTHESIOLOGY

## 2022-08-24 PROCEDURE — 99900035 HC TECH TIME PER 15 MIN (STAT)

## 2022-08-24 PROCEDURE — 71000033 HC RECOVERY, INTIAL HOUR: Performed by: COLON & RECTAL SURGERY

## 2022-08-24 PROCEDURE — D9220A PRA ANESTHESIA: Mod: CRNA,,, | Performed by: NURSE ANESTHETIST, CERTIFIED REGISTERED

## 2022-08-24 PROCEDURE — 36415 COLL VENOUS BLD VENIPUNCTURE: CPT | Performed by: STUDENT IN AN ORGANIZED HEALTH CARE EDUCATION/TRAINING PROGRAM

## 2022-08-24 PROCEDURE — 94761 N-INVAS EAR/PLS OXIMETRY MLT: CPT

## 2022-08-24 PROCEDURE — 71000016 HC POSTOP RECOV ADDL HR: Performed by: COLON & RECTAL SURGERY

## 2022-08-24 PROCEDURE — 49320 DIAG LAPARO SEPARATE PROC: CPT | Mod: 78,,, | Performed by: COLON & RECTAL SURGERY

## 2022-08-24 PROCEDURE — 86850 RBC ANTIBODY SCREEN: CPT | Performed by: COLON & RECTAL SURGERY

## 2022-08-24 RX ORDER — FAMOTIDINE 10 MG/ML
20 INJECTION INTRAVENOUS 2 TIMES DAILY
Status: DISCONTINUED | OUTPATIENT
Start: 2022-08-24 | End: 2022-08-24

## 2022-08-24 RX ORDER — ALPRAZOLAM 0.25 MG/1
0.25 TABLET ORAL 3 TIMES DAILY PRN
Status: DISCONTINUED | OUTPATIENT
Start: 2022-08-24 | End: 2022-08-26

## 2022-08-24 RX ORDER — ONDANSETRON 2 MG/ML
4 INJECTION INTRAMUSCULAR; INTRAVENOUS DAILY PRN
Status: COMPLETED | OUTPATIENT
Start: 2022-08-24 | End: 2022-08-27

## 2022-08-24 RX ORDER — HYDROMORPHONE HYDROCHLORIDE 1 MG/ML
0.5 INJECTION, SOLUTION INTRAMUSCULAR; INTRAVENOUS; SUBCUTANEOUS EVERY 4 HOURS PRN
Status: DISCONTINUED | OUTPATIENT
Start: 2022-08-24 | End: 2022-09-01 | Stop reason: HOSPADM

## 2022-08-24 RX ORDER — FENTANYL CITRATE 50 UG/ML
INJECTION, SOLUTION INTRAMUSCULAR; INTRAVENOUS
Status: DISCONTINUED | OUTPATIENT
Start: 2022-08-24 | End: 2022-08-24

## 2022-08-24 RX ORDER — HALOPERIDOL 5 MG/ML
INJECTION INTRAMUSCULAR
Status: DISCONTINUED | OUTPATIENT
Start: 2022-08-24 | End: 2022-08-24

## 2022-08-24 RX ORDER — ROCURONIUM BROMIDE 10 MG/ML
INJECTION, SOLUTION INTRAVENOUS
Status: DISCONTINUED | OUTPATIENT
Start: 2022-08-24 | End: 2022-08-24

## 2022-08-24 RX ORDER — SCOLOPAMINE TRANSDERMAL SYSTEM 1 MG/1
1 PATCH, EXTENDED RELEASE TRANSDERMAL
Status: DISCONTINUED | OUTPATIENT
Start: 2022-08-24 | End: 2022-09-01 | Stop reason: HOSPADM

## 2022-08-24 RX ORDER — METOCLOPRAMIDE HYDROCHLORIDE 5 MG/ML
10 INJECTION INTRAMUSCULAR; INTRAVENOUS ONCE
Status: COMPLETED | OUTPATIENT
Start: 2022-08-24 | End: 2022-08-24

## 2022-08-24 RX ORDER — BUPIVACAINE HYDROCHLORIDE 2.5 MG/ML
INJECTION, SOLUTION EPIDURAL; INFILTRATION; INTRACAUDAL
Status: DISCONTINUED | OUTPATIENT
Start: 2022-08-24 | End: 2022-08-24 | Stop reason: HOSPADM

## 2022-08-24 RX ORDER — METOCLOPRAMIDE HYDROCHLORIDE 5 MG/ML
10 INJECTION INTRAMUSCULAR; INTRAVENOUS EVERY 6 HOURS PRN
Status: DISCONTINUED | OUTPATIENT
Start: 2022-08-24 | End: 2022-09-01 | Stop reason: HOSPADM

## 2022-08-24 RX ORDER — GLUCAGON 1 MG
1 KIT INJECTION
Status: DISCONTINUED | OUTPATIENT
Start: 2022-08-24 | End: 2022-09-01 | Stop reason: HOSPADM

## 2022-08-24 RX ORDER — FENTANYL CITRATE 50 UG/ML
INJECTION, SOLUTION INTRAMUSCULAR; INTRAVENOUS
Status: COMPLETED
Start: 2022-08-24 | End: 2022-08-24

## 2022-08-24 RX ORDER — IBUPROFEN 200 MG
24 TABLET ORAL
Status: DISCONTINUED | OUTPATIENT
Start: 2022-08-24 | End: 2022-09-01 | Stop reason: HOSPADM

## 2022-08-24 RX ORDER — MIDAZOLAM HYDROCHLORIDE 1 MG/ML
INJECTION INTRAMUSCULAR; INTRAVENOUS
Status: DISCONTINUED | OUTPATIENT
Start: 2022-08-24 | End: 2022-08-24

## 2022-08-24 RX ORDER — PROPOFOL 10 MG/ML
VIAL (ML) INTRAVENOUS
Status: DISCONTINUED | OUTPATIENT
Start: 2022-08-24 | End: 2022-08-24

## 2022-08-24 RX ORDER — LIDOCAINE HCL/PF 100 MG/5ML
SYRINGE (ML) INTRAVENOUS
Status: DISCONTINUED | OUTPATIENT
Start: 2022-08-24 | End: 2022-08-24

## 2022-08-24 RX ORDER — SODIUM CHLORIDE 9 MG/ML
INJECTION, SOLUTION INTRAVENOUS CONTINUOUS PRN
Status: DISCONTINUED | OUTPATIENT
Start: 2022-08-24 | End: 2022-08-24

## 2022-08-24 RX ORDER — FENTANYL CITRATE 50 UG/ML
25 INJECTION, SOLUTION INTRAMUSCULAR; INTRAVENOUS EVERY 5 MIN PRN
Status: COMPLETED | OUTPATIENT
Start: 2022-08-24 | End: 2022-08-24

## 2022-08-24 RX ORDER — PANTOPRAZOLE SODIUM 40 MG/10ML
40 INJECTION, POWDER, LYOPHILIZED, FOR SOLUTION INTRAVENOUS EVERY 24 HOURS
Status: DISCONTINUED | OUTPATIENT
Start: 2022-08-24 | End: 2022-08-25

## 2022-08-24 RX ORDER — IBUPROFEN 200 MG
16 TABLET ORAL
Status: DISCONTINUED | OUTPATIENT
Start: 2022-08-24 | End: 2022-09-01 | Stop reason: HOSPADM

## 2022-08-24 RX ADMIN — METOCLOPRAMIDE 10 MG: 5 INJECTION, SOLUTION INTRAMUSCULAR; INTRAVENOUS at 11:08

## 2022-08-24 RX ADMIN — HALOPERIDOL 1 MG: 5 INJECTION INTRAMUSCULAR at 01:08

## 2022-08-24 RX ADMIN — ENOXAPARIN SODIUM 40 MG: 40 INJECTION SUBCUTANEOUS at 05:08

## 2022-08-24 RX ADMIN — PIPERACILLIN SODIUM AND TAZOBACTAM SODIUM 4.5 G: 4; .5 INJECTION, POWDER, LYOPHILIZED, FOR SOLUTION INTRAVENOUS at 08:08

## 2022-08-24 RX ADMIN — FLUOXETINE 40 MG: 20 CAPSULE ORAL at 08:08

## 2022-08-24 RX ADMIN — PROPOFOL 200 MG: 10 INJECTION, EMULSION INTRAVENOUS at 02:08

## 2022-08-24 RX ADMIN — PANTOPRAZOLE SODIUM 40 MG: 40 INJECTION, POWDER, FOR SOLUTION INTRAVENOUS at 10:08

## 2022-08-24 RX ADMIN — BUPROPION HYDROCHLORIDE 300 MG: 150 TABLET, FILM COATED, EXTENDED RELEASE ORAL at 08:08

## 2022-08-24 RX ADMIN — LIDOCAINE HYDROCHLORIDE 50 MG: 20 INJECTION, SOLUTION INTRAVENOUS at 02:08

## 2022-08-24 RX ADMIN — SUGAMMADEX 200 MG: 100 INJECTION, SOLUTION INTRAVENOUS at 03:08

## 2022-08-24 RX ADMIN — MIDAZOLAM HYDROCHLORIDE 2 MG: 1 INJECTION, SOLUTION INTRAMUSCULAR; INTRAVENOUS at 02:08

## 2022-08-24 RX ADMIN — MUPIROCIN: 20 OINTMENT TOPICAL at 08:08

## 2022-08-24 RX ADMIN — DEXTROSE MONOHYDRATE, SODIUM CHLORIDE, AND POTASSIUM CHLORIDE: 50; 4.5; 1.49 INJECTION, SOLUTION INTRAVENOUS at 05:08

## 2022-08-24 RX ADMIN — FENTANYL CITRATE 100 MCG: 50 INJECTION, SOLUTION INTRAMUSCULAR; INTRAVENOUS at 02:08

## 2022-08-24 RX ADMIN — ROCURONIUM BROMIDE 50 MG: 10 INJECTION, SOLUTION INTRAVENOUS at 02:08

## 2022-08-24 RX ADMIN — SODIUM CHLORIDE: 0.9 INJECTION, SOLUTION INTRAVENOUS at 02:08

## 2022-08-24 RX ADMIN — PIPERACILLIN SODIUM AND TAZOBACTAM SODIUM 4.5 G: 4; .5 INJECTION, POWDER, LYOPHILIZED, FOR SOLUTION INTRAVENOUS at 11:08

## 2022-08-24 RX ADMIN — SCOPALAMINE 1 PATCH: 1 PATCH, EXTENDED RELEASE TRANSDERMAL at 01:08

## 2022-08-24 RX ADMIN — FENTANYL CITRATE 25 MCG: 50 INJECTION, SOLUTION INTRAMUSCULAR; INTRAVENOUS at 03:08

## 2022-08-24 RX ADMIN — PROMETHAZINE HYDROCHLORIDE 12.5 MG: 25 INJECTION INTRAMUSCULAR; INTRAVENOUS at 02:08

## 2022-08-24 RX ADMIN — MUPIROCIN: 20 OINTMENT TOPICAL at 09:08

## 2022-08-24 RX ADMIN — METOCLOPRAMIDE 10 MG: 5 INJECTION, SOLUTION INTRAMUSCULAR; INTRAVENOUS at 12:08

## 2022-08-24 RX ADMIN — ALPRAZOLAM 0.25 MG: 0.25 TABLET ORAL at 11:08

## 2022-08-24 RX ADMIN — FENTANYL CITRATE 25 MCG: 50 INJECTION, SOLUTION INTRAMUSCULAR; INTRAVENOUS at 04:08

## 2022-08-24 RX ADMIN — METOCLOPRAMIDE 10 MG: 5 INJECTION, SOLUTION INTRAMUSCULAR; INTRAVENOUS at 08:08

## 2022-08-24 RX ADMIN — FAMOTIDINE 20 MG: 10 INJECTION, SOLUTION INTRAVENOUS at 08:08

## 2022-08-24 RX ADMIN — DEXTROSE MONOHYDRATE, SODIUM CHLORIDE, AND POTASSIUM CHLORIDE: 50; 4.5; 1.49 INJECTION, SOLUTION INTRAVENOUS at 03:08

## 2022-08-24 RX ADMIN — IOHEXOL 75 ML: 350 INJECTION, SOLUTION INTRAVENOUS at 02:08

## 2022-08-24 RX ADMIN — PIPERACILLIN SODIUM AND TAZOBACTAM SODIUM 4.5 G: 4; .5 INJECTION, POWDER, LYOPHILIZED, FOR SOLUTION INTRAVENOUS at 04:08

## 2022-08-24 RX ADMIN — ONDANSETRON 8 MG: 2 INJECTION INTRAMUSCULAR; INTRAVENOUS at 08:08

## 2022-08-24 RX ADMIN — OXYCODONE 5 MG: 5 TABLET ORAL at 04:08

## 2022-08-24 NOTE — PROGRESS NOTES
[facility]  Colorectal Surgery  Progress Note    Patient Name: Nyasia Middleton  MRN: [id number]  Admission Date: 8/22/2022  Hospital Length of Stay: 2 days  Attending Physician: CORETTA Doyle MD    Subjective:     Interval History: AF, tachycardic. NGT in place yesterday 1550 output of gastric/bilious content (950 overnight). Overnight had 1 episode of emesis and complained of nausea. CT was done which was unchanged from prior imaging. Continues with  high NGT output.  from 404 yesterday    Post-Op Info:  * No surgery found *          Medications:  Continuous Infusions:   dextrose 5 % and 0.45 % NaCl with KCl 20 mEq 125 mL/hr at 08/24/22 0317     Scheduled Meds:   buPROPion  300 mg Oral Nightly    enoxaparin  40 mg Subcutaneous Daily    famotidine (PF)  20 mg Intravenous BID    FLUoxetine  40 mg Oral Nightly    lactated ringers  1,000 mL Intravenous Once    mupirocin   Nasal BID    piperacillin-tazobactam (ZOSYN) IVPB  4.5 g Intravenous Q8H    scopolamine  1 patch Transdermal Q3 Days     PRN Meds:   acetaminophen    acetaminophen    ALPRAZolam    melatonin    ondansetron    oxyCODONE    oxyCODONE    promethazine (PHENERGAN) IVPB    sodium chloride 0.9%        Objective:     Vital Signs (Most Recent):  Temp: 98 °F (36.7 °C) (08/24/22 0738)  Pulse: 103 (08/24/22 0738)  Resp: 16 (08/24/22 0738)  BP: 122/80 (08/24/22 0738)  SpO2: 96 % (08/24/22 0738)   Vital Signs (24h Range):  Temp:  [96.9 °F (36.1 °C)-99.3 °F (37.4 °C)] 98 °F (36.7 °C)  Pulse:  [103-123] 103  Resp:  [16-20] 16  SpO2:  [94 %-98 %] 96 %  BP: (107-133)/(57-96) 122/80     Intake/Output - Last 3 Shifts         08/22 0700  08/23 0659 08/23 0700 08/24 0659 08/24 0700 08/25 0659    I.V.  1585.1     IV Piggyback  466     Total Intake  2051.1     Urine  0     Emesis/NG output  300     Drains 2900 2750 200    Stool 450 1550 350    Total Output 3350 4600 550    Net -3350 -2548.9 -550           Urine Occurrence  2 x     Stool  Occurrence  3 x             Physical Exam  Constitutional:       General: She is not in acute distress.     Appearance: Normal appearance.   HENT:      Nose:      Comments: NGT @ [facility] w/ bilious/gastric content     Mouth/Throat:      Mouth: Mucous membranes are moist.   Eyes:      Extraocular Movements: Extraocular movements intact.   Cardiovascular:      Rate and Rhythm: Normal rate and regular rhythm.   Pulmonary:      Effort: Pulmonary effort is normal. No respiratory distress.      Breath sounds: Normal breath sounds.   Abdominal:      General: There is no distension.      Palpations: Abdomen is soft.      Tenderness: There is no abdominal tenderness. There is no guarding.      Comments: RUQ ostomy pouch with some fecal content     Skin:     General: Skin is warm.   Neurological:      Mental Status: She is alert and oriented to person, place, and time. Mental status is at baseline.         Significant Labs:  CBC (Last 3 Results):   Recent Labs   Lab 08/18/22 0827 08/22/22 0228 08/23/22  0256   WBC 8.75 15.74* 6.85   RBC 3.28* 4.44 4.14   HGB 10.4* 14.1 13.2   HCT 31.6* 41.6 38.5    323 252   MCV 96 94 93   MCH 31.7* 31.8* 31.9*   MCHC 32.9 33.9 34.3       CMP (Last 3 Results):   Recent Labs   Lab 08/18/22 0827 08/22/22 0228 08/23/22  1231   GLU 72 136* 124*   CALCIUM 7.9* 9.7 9.1   ALBUMIN  --  4.6 3.0*   PROT  --  8.4 7.0    140 133*   K 3.2* 3.6 3.3*   CO2 27 31* 30*    96 95   BUN 7 9 26*   CREATININE 0.6 0.82 0.7   ALKPHOS  --  74 53*   ALT  --  15 9*   AST  --  25 11   BILITOT  --  1.0 1.5*           CRP (Last 3 Results):   Recent Labs   Lab 08/22/22  1905   .2*       Microbiology Results (last 7 days)       ** No results found for the last 168 hours. **          Recent Labs   Lab 08/22/22  0448   COLORU Yellow   SPECGRAV >=1.030*   PHUR 6.0   PROTEINUA 1+*   BACTERIA Moderate*   NITRITE Negative   LEUKOCYTESUR 1+*   UROBILINOGEN Negative   HYALINECASTS 0       All pertinent  lab results within the last 24 hours have been reviewed.     Significant Diagnostics:  I have reviewed all pertinent imaging results/findings within the past 24 hours.    8/23 AP CT w/ IV and PO contrast: CT Abdomen Pelvis With Contrast    Result Date: 8/24/2022  No significant detrimental change from prior exam. Stable findings consistent with small-bowel obstruction with questionable zone transition just proximal to the patient's ileostomy/stoma. Additional findings as above. Electronically signed by resident: Angie Moreau Date:    08/24/2022 Time:    02:52 Electronically signed by: Maurisio Matthews MD Date:    08/24/2022 Time:    03:27      Assessment/Plan:     * Partial small bowel obstruction  Nyasia Middleton is a 41 y.o. female who is s/p diverting loop ileostomy 8/17/22, readmitted due to small bowel obstruction as evidenced by CT. Urinalysis and clinical presentation also indicative of symptomatic UTI    - Pain control: multimodal  - Diet: NPO   - NGT @ [facility]  - [facility]  - antiemetics PRN, zofran, phenergan, scopolamine patch  - tylenol for fever  - Abx zosyn for UTI  - CBC, CMP, CRP  - OOBTC    Dispo: Continue inpatient care. Monitor NGT output and bowel function. In discussion observation vs revision in OR if symptoms fail to resolve            Natan Hopson MD  Colorectal Surgery  [facility]

## 2022-08-24 NOTE — NURSING TRANSFER
Nursing Transfer Note      8/24/2022     Reason patient is being transferred: inpt    Transfer To: 1003    Transfer via stretcher    Transfer with none    Transported by pct    Medicines sent: none    Any special needs or follow-up needed: none    Chart send with patient: Yes    Notified: spouse    Patient reassessed at: 8/24/22

## 2022-08-24 NOTE — BRIEF OP NOTE
[facility]  Brief Operative Note    SUMMARY     Surgery Date: 8/24/2022     Surgeon(s) and Role:     * CORETTA Doyle MD - Primary     * Chica Brenner MD - Fellow    Assisting Surgeon: None    Pre-op Diagnosis:  Small bowel obstruction [K56.609]    Post-op Diagnosis:  Post-Op Diagnosis Codes:     * Small bowel obstruction [K56.609]    Procedure(s) (LRB):  LAPAROSCOPY, DIAGNOSTIC (N/A)    Anesthesia: General    Operative Findings: No abnormal pathology    Estimated Blood Loss: * No values recorded between 8/24/2022  2:34 PM and 8/24/2022  3:11 PM *    Estimated Blood Loss has been documented.         Specimens:   Specimen (24h ago, onward)            None          [id number]

## 2022-08-24 NOTE — SUBJECTIVE & OBJECTIVE
Subjective:     Interval History: AF, tachycardic. NGT in place yesterday 1550 output of gastric/bilious content (950 overnight). Overnight had 1 episode of emesis and complained of nausea. CT was done which was unchanged from prior imaging. Continues with  high NGT output.  from 404 yesterday    Post-Op Info:  * No surgery found *          Medications:  Continuous Infusions:   dextrose 5 % and 0.45 % NaCl with KCl 20 mEq 125 mL/hr at 08/24/22 0317     Scheduled Meds:   buPROPion  300 mg Oral Nightly    enoxaparin  40 mg Subcutaneous Daily    famotidine (PF)  20 mg Intravenous BID    FLUoxetine  40 mg Oral Nightly    lactated ringers  1,000 mL Intravenous Once    mupirocin   Nasal BID    piperacillin-tazobactam (ZOSYN) IVPB  4.5 g Intravenous Q8H    scopolamine  1 patch Transdermal Q3 Days     PRN Meds:   acetaminophen    acetaminophen    ALPRAZolam    melatonin    ondansetron    oxyCODONE    oxyCODONE    promethazine (PHENERGAN) IVPB    sodium chloride 0.9%        Objective:     Vital Signs (Most Recent):  Temp: 98 °F (36.7 °C) (08/24/22 0738)  Pulse: 103 (08/24/22 0738)  Resp: 16 (08/24/22 0738)  BP: 122/80 (08/24/22 0738)  SpO2: 96 % (08/24/22 0738)   Vital Signs (24h Range):  Temp:  [96.9 °F (36.1 °C)-99.3 °F (37.4 °C)] 98 °F (36.7 °C)  Pulse:  [103-123] 103  Resp:  [16-20] 16  SpO2:  [94 %-98 %] 96 %  BP: (107-133)/(57-96) 122/80     Intake/Output - Last 3 Shifts         08/22 0700 08/23 0659 08/23 0700 08/24 0659 08/24 0700 08/25 0659    I.V.  1585.1     IV Piggyback  466     Total Intake  2051.1     Urine  0     Emesis/NG output  300     Drains 2900 2750 200    Stool 450 1550 350    Total Output 3350 4600 550    Net -3350 -2548.9 -550           Urine Occurrence  2 x     Stool Occurrence  3 x             Physical Exam  Constitutional:       General: She is not in acute distress.     Appearance: Normal appearance.   HENT:      Nose:      Comments: NGT @ [facility] w/ bilious/gastric content      Mouth/Throat:      Mouth: Mucous membranes are moist.   Eyes:      Extraocular Movements: Extraocular movements intact.   Cardiovascular:      Rate and Rhythm: Normal rate and regular rhythm.   Pulmonary:      Effort: Pulmonary effort is normal. No respiratory distress.      Breath sounds: Normal breath sounds.   Abdominal:      General: There is no distension.      Palpations: Abdomen is soft.      Tenderness: There is no abdominal tenderness. There is no guarding.      Comments: RUQ ostomy pouch with some fecal content     Skin:     General: Skin is warm.   Neurological:      Mental Status: She is alert and oriented to person, place, and time. Mental status is at baseline.         Significant Labs:  CBC (Last 3 Results):   Recent Labs   Lab 08/18/22 0827 08/22/22 0228 08/23/22  0256   WBC 8.75 15.74* 6.85   RBC 3.28* 4.44 4.14   HGB 10.4* 14.1 13.2   HCT 31.6* 41.6 38.5    323 252   MCV 96 94 93   MCH 31.7* 31.8* 31.9*   MCHC 32.9 33.9 34.3       CMP (Last 3 Results):   Recent Labs   Lab 08/18/22 0827 08/22/22 0228 08/23/22  1231   GLU 72 136* 124*   CALCIUM 7.9* 9.7 9.1   ALBUMIN  --  4.6 3.0*   PROT  --  8.4 7.0    140 133*   K 3.2* 3.6 3.3*   CO2 27 31* 30*    96 95   BUN 7 9 26*   CREATININE 0.6 0.82 0.7   ALKPHOS  --  74 53*   ALT  --  15 9*   AST  --  25 11   BILITOT  --  1.0 1.5*           CRP (Last 3 Results):   Recent Labs   Lab 08/22/22  1905   .2*       Microbiology Results (last 7 days)       ** No results found for the last 168 hours. **          Recent Labs   Lab 08/22/22  0448   COLORU Yellow   SPECGRAV >=1.030*   PHUR 6.0   PROTEINUA 1+*   BACTERIA Moderate*   NITRITE Negative   LEUKOCYTESUR 1+*   UROBILINOGEN Negative   HYALINECASTS 0       All pertinent lab results within the last 24 hours have been reviewed.     Significant Diagnostics:  I have reviewed all pertinent imaging results/findings within the past 24 hours.    8/23 AP CT w/ IV and PO contrast: CT Abdomen  Pelvis With Contrast    Result Date: 8/24/2022  No significant detrimental change from prior exam. Stable findings consistent with small-bowel obstruction with questionable zone transition just proximal to the patient's ileostomy/stoma. Additional findings as above. Electronically signed by resident: Angie Moreau Date:    08/24/2022 Time:    02:52 Electronically signed by: Maurisio Matthews MD Date:    08/24/2022 Time:    03:27

## 2022-08-24 NOTE — ASSESSMENT & PLAN NOTE
Nyasia Middleton is a 41 y.o. female who is s/p diverting loop ileostomy 8/17/22, readmitted due to small bowel obstruction as evidenced by CT. Urinalysis and clinical presentation also indicative of symptomatic UTI    - Pain control: multimodal  - Diet: NPO   - [facility]  - antiemetics PRN, zofran, phenergan, scopolamine patch  - tylenol for fever  - Abx zosyn for UTI  - CBC, CMP, CRP  - OOBTC    Dispo: Continue inpatient care. Monitor NGT output and bowel function. In discussion observation vs revision in OR if symptoms fail to resolve

## 2022-08-24 NOTE — ANESTHESIA PREPROCEDURE EVALUATION
[facility]  Anesthesia Pre-Operative Evaluation         Patient Name/: Nyasia Middleton, 1980  MRN: [id number]    SUBJECTIVE:     Pre-operative evaluation for Procedure(s) (LRB):  CREATION, ILEOSTOMY, LAPAROSCOPIC (N/A)  SIGMOIDOSCOPY, FLEXIBLE (N/A)     2022  Per prior visit:    Nyasia Middleton is a 41 y.o. female w/ a significant PMHx of GERD, anxiety, depression, protocolectomy for presumed ulcerative colitis in , was then diagnosed with Crohn's once fistula's were noted. Has since had multiple fistulotomy as well as LIFT procedures for anovaginal fistula. She presented to [facility] 2022 with increased perineal and vaginal drainage as well as recent pouchoscopy with inflammation noted. Now electing for ileostomy.     Patient now presents for the above procedure(s).   now scheduled for diagnostic LAP, NG in place diagnosed with SBO this AM at OSH.  Ill appearing, NPO apropriate, NG to suction and functioning.    Prev airway: None documented.      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    MARION (generalized anxiety disorder)    Screening for colon cancer    Crohn's disease of small intestine with other complication    Partial small bowel obstruction    UTI (urinary tract infection)       Review of patient's allergies indicates:  No Known Allergies    Current Inpatient Medications:       Current Facility-Administered Medications on File Prior to Visit   Medication Dose Route Frequency Provider Last Rate Last Admin    acetaminophen tablet 650 mg  650 mg Oral Q8H PRN Trena Figueroa MD        acetaminophen tablet 650 mg  650 mg Oral Q4H PRN Trena Figueroa MD        ALPRAZolam tablet 0.25 mg  0.25 mg Oral TID PRN CORETTA Doyle MD   0.25 mg  at 08/24/22 1121    buPROPion TB24 tablet 300 mg  300 mg Oral Nightly Mumtaz Holden MD   300 mg at 08/23/22 2120    dextrose 5 % and 0.45 % NaCl with KCl 20 mEq infusion   Intravenous Continuous W. Wade Doyle  mL/hr at 08/24/22 0317 New Bag at 08/24/22 0317    enoxaparin injection 40 mg  40 mg Subcutaneous Daily Trena Figueroa MD   40 mg at 08/23/22 1639    famotidine (PF) injection 20 mg  20 mg Intravenous BID Natan Hopson MD   20 mg at 08/24/22 0853    FLUoxetine capsule 40 mg  40 mg Oral Nightly Mumtaz Holden MD   40 mg at 08/23/22 2119    lactated ringers bolus 1,000 mL  1,000 mL Intravenous Once W. Wade Doyle MD        melatonin tablet 6 mg  6 mg Oral Nightly PRN Trena Figueroa MD        metoclopramide HCl injection 10 mg  10 mg Intravenous Q6H PRN W. Wade Doyle MD   10 mg at 08/24/22 1120    mupirocin 2 % ointment   Nasal BID W. Wade Doyle MD   Given at 08/24/22 0853    ondansetron injection 8 mg  8 mg Intravenous Q6H PRN W. Wade Doyle MD   8 mg at 08/24/22 0853    oxyCODONE immediate release tablet 5 mg  5 mg Oral Q4H PRN Trena Figueroa MD        oxyCODONE immediate release tablet Tab 10 mg  10 mg Oral Q4H PRN Trena Figueroa MD        piperacillin-tazobactam 4.5 g in sodium chloride 0.9% 100 mL IVPB (ready to mix system)  4.5 g Intravenous Q8H Mumtaz Holden MD 25 mL/hr at 08/24/22 1131 4.5 g at 08/24/22 1131    promethazine (PHENERGAN) 12.5 mg in dextrose 5 % 50 mL IVPB  12.5 mg Intravenous Q6H PRN W. Wade Doyle MD   Stopped at 08/23/22 2128    scopolamine 1.3-1.5 mg (1 mg over 3 days) 1 patch  1 patch Transdermal Q3 Days Natan Hopson MD   1 patch at 08/24/22 0115    sodium chloride 0.9% flush 10 mL  10 mL Intravenous PRN Trena Figueroa MD         Current Outpatient Medications on File Prior to Visit   Medication Sig Dispense Refill    ALPRAZolam (XANAX) 0.25 MG tablet  Take 1 tablet (0.25 mg total) by mouth daily as needed for Anxiety. 30 tablet 0    buPROPion (WELLBUTRIN XL) 300 MG 24 hr tablet Take 1 tablet (300 mg total) by mouth once daily. (Patient taking differently: Take 300 mg by mouth nightly.) 90 tablet 3    ciprofloxacin HCl (CIPRO) 500 MG tablet Take 500 mg by mouth every 12 (twelve) hours.      clotrimazole-betamethasone 1-0.05% (LOTRISONE) cream 2 (two) times daily. Apply to affected area      dicyclomine (BENTYL) 20 mg tablet TAKE 1 TABLET BY MOUTH EVERY SIX HOURS 120 tablet 0    diphenoxylate-atropine 2.5-0.025 mg (LOMOTIL) 2.5-0.025 mg per tablet TAKE 2 TABLETS BY MOUTH FOUR TIMES DAILY AS NEEDED FOR DIARRHEA 100 tablet 0    fluconazole (DIFLUCAN) 150 MG Tab Take 1 tablet (150 mg total) by mouth once daily. (Patient not taking: No sig reported) 2 tablet 9    FLUoxetine 40 MG capsule Take 1 capsule (40 mg total) by mouth once daily. (Patient taking differently: Take 40 mg by mouth nightly.) 30 capsule 11    loperamide (IMODIUM) 2 mg capsule TAKE 2 CAPSULE BY MOUTH FOUR TIMES DAILY 240 capsule 3    metroNIDAZOLE (FLAGYL) 500 MG tablet Take 500 mg by mouth every 8 (eight) hours.      ondansetron (ZOFRAN-ODT) 8 MG TbDL DISSOLVE 1 TABLET UNDER TONGUE EVERY 8 HOURS AS NEEDED FOR NAUSEA 20 tablet 2    oxyCODONE-acetaminophen (PERCOCET) 7.5-325 mg per tablet Take 1 tablet by mouth every 4 (four) hours as needed for Pain. 20 tablet 0    pantoprazole (PROTONIX) 40 MG tablet TAKE 1 TABLET BY MOUTH TWICE DAILY 180 tablet 2    promethazine (PHENERGAN) 12.5 MG Tab Take 1 tablet (12.5 mg total) by mouth every 6 (six) hours as needed (nausea). 40 tablet 0       Past Surgical History:   Procedure Laterality Date     SECTION, CLASSIC      FLEXIBLE SIGMOIDOSCOPY N/A 2018    Procedure: SIGMOIDOSCOPY, FLEXIBLE;  Surgeon: Jairo Peralta MD;  Location: [address];  Service: Endoscopy;  Laterality: N/A;  no enemas per [facility]    FLEXIBLE  SIGMOIDOSCOPY N/A 8/17/2022    Procedure: SIGMOIDOSCOPY, FLEXIBLE;  Surgeon: CORETTA Doyle MD;  Location: [facility] OR 2ND FLR;  Service: Colon and Rectal;  Laterality: N/A;    HYSTERECTOMY      partial    ILEOSTOMY CLOSURE      LAPAROSCOPIC ILEOSTOMY N/A 8/17/2022    Procedure: CREATION, ILEOSTOMY, LAPAROSCOPIC;  Surgeon: CORETTA Doyle MD;  Location: [facility] OR 2ND FLR;  Service: Colon and Rectal;  Laterality: N/A;    POUCHOSCOPY N/A 11/6/2018    Procedure: ENDOSCOPY, SMALL INTESTINAL POUCH, DIAGNOSTIC, possible seton placement;  Surgeon: Jairo Peralta MD;  Location: [facility] ENDO (4TH FLR);  Service: Endoscopy;  Laterality: N/A;  Schedule early Nov 2018; No prep    rectovaginal fistula repair      RESTORATIVE PROCTOCOLECTOMY      TONSILLECTOMY      adenoids    TUBAL LIGATION      june 2016       Social History:  Tobacco Use: High Risk    Smoking Tobacco Use: Current Some Day Smoker    Smokeless Tobacco Use: Never Used       Alcohol Use: Heavy Drinker    Frequency of Alcohol Consumption: 2-3 times a week    Average Number of Drinks: 5 or 6    Frequency of Binge Drinking: Less than monthly       OBJECTIVE:     Vital Signs Range:  BMI Readings from Last 1 Encounters:   08/22/22 19.01 kg/m²       Temp:  [36.7 °C (98 °F)-36.8 °C (98.3 °F)]   Pulse:  [103-109]   Resp:  [16-20]   BP: (121-133)/(66-80)   SpO2:  [96 %-98 %]        Significant Labs:        Component Value Date/Time    WBC 6.85 08/23/2022 0256    HGB 13.2 08/23/2022 0256    HCT 38.5 08/23/2022 0256     08/23/2022 0256     (L) 08/23/2022 1231    K 3.3 (L) 08/23/2022 1231    CL 95 08/23/2022 1231    CO2 30 (H) 08/23/2022 1231     (H) 08/23/2022 1231    BUN 26 (H) 08/23/2022 1231    CREATININE 0.7 08/23/2022 1231    MG 2.1 07/22/2014 1148    PHOS 3.8 07/22/2014 1148    CALCIUM 9.1 08/23/2022 1231    ALBUMIN 3.0 (L) 08/23/2022 1231    PROT 7.0 08/23/2022 1231    ALKPHOS 53 (L) 08/23/2022 1231    BILITOT 1.5 (H) 08/23/2022 1231     AST 11 08/23/2022 1231    ALT 9 (L) 08/23/2022 1231        Please see Results Review for additional labs.     Diagnostic Studies: No relevant studies.    EKG:   Results for orders placed or performed in visit on 08/24/22   EKG 12-lead    Collection Time: 08/24/22  8:58 AM    Narrative    Test Reason :     Vent. Rate : 109 BPM     Atrial Rate : 109 BPM     P-R Int : 132 ms          QRS Dur : 074 ms      QT Int : 334 ms       P-R-T Axes : 065 027 054 degrees     QTc Int : 449 ms    Sinus tachycardia  Biatrial enlargement  Abnormal ECG  When compared with ECG of 22-AUG-2022 10:00,  T wave inversions no longer present in the inferolateral leads  Confirmed by Bonita Christensen MD ([id number]) on 8/24/2022 12:43:08 PM    Referred By: PILI HENNESSY           Confirmed By:Bonita hCristensen MD       ECHO:  No results found for this or any previous visit.        ASSESSMENT/PLAN:       Pre-op Assessment    I have reviewed the Patient Summary Reports.     I have reviewed the Nursing Notes. I have reviewed the NPO Status.   I have reviewed the Medications.     Review of Systems  Anesthesia Hx:  No problems with previous Anesthesia  History of prior surgery of interest to airway management or planning:  Denies Personal Hx of Anesthesia complications.   Social:  Social Alcohol Use Used to vape   Hematology/Oncology:     Oncology Normal     Cardiovascular:   Denies Hypertension.   Denies Angina.    Pulmonary:   Denies Shortness of breath.    Renal/:   Denies Chronic Renal Disease.     Hepatic/GI:   GERD    Neurological:   Denies CVA. Denies Seizures.    Endocrine:  Endocrine Normal    Psych:   Psychiatric History          Physical Exam  General: Well nourished, Cooperative, Alert and Oriented    Airway:  Mallampati: II / I  Mouth Opening: Normal  TM Distance: Normal  Tongue: Normal  Neck ROM: Normal ROM    Dental:  Intact        Anesthesia Plan  Type of Anesthesia, risks & benefits discussed:    Anesthesia Type: Gen ETT  Intra-op Monitoring  Plan: Standard ASA Monitors  Post Op Pain Control Plan: IV/PO Opioids PRN and multimodal analgesia  Induction:  IV  Airway Plan: Direct, Post-Induction  Informed Consent: Informed consent signed with the Patient and all parties understand the risks and agree with anesthesia plan.  All questions answered. Patient consented to blood products? Yes  ASA Score: 3 Emergent  Day of Surgery Review of History & Physical: H&P Update referred to the surgeon/provider.    Ready For Surgery From Anesthesia Perspective.     .

## 2022-08-24 NOTE — NURSING
Pt. Requested additional N/V medication due to current regimen not being effective. Ciaran was contacted and recommended to give PRN zofran. Pt wanted to see doc. In person. Ciaran was re contacted.

## 2022-08-24 NOTE — PLAN OF CARE
Pt vital signs wnl.  Pt verbalizes pain is tolerable.  Pt verbalizes no nausea.  Rt nare ng tube patent.  Abdominal lapsites intact.

## 2022-08-24 NOTE — PROGRESS NOTES
1310 - Patient arrived c/o extreme nausea. NG tube in place. Attached NG to continuous suction, then intermittent suction. By discharge from [facility], patient had put out 60mls of dark green, viscous fluid.

## 2022-08-24 NOTE — PLAN OF CARE
A&Ox4. N/V throughout the night. PRN meds ordered and given. Rt. Nare NG tube to ILWS w/ green output. Redness and warmth to abdominal area near ostomy. On call doc was in the room and exchanged ostomy bags. Lft. FA 20 g w/ D5 1/2 NS KCL@125ml/hr. All VS are stable.   Problem: Adult Inpatient Plan of Care  Goal: Plan of Care Review  Outcome: Ongoing, Progressing  Goal: Patient-Specific Goal (Individualized)  Outcome: Ongoing, Progressing  Goal: Absence of Hospital-Acquired Illness or Injury  Outcome: Ongoing, Progressing  Goal: Optimal Comfort and Wellbeing  Outcome: Ongoing, Progressing  Goal: Readiness for Transition of Care  Outcome: Ongoing, Progressing

## 2022-08-24 NOTE — TRANSFER OF CARE
"Anesthesia Transfer of Care Note    Patient: Nyasia Middleton    Procedure(s) Performed: Procedure(s) (LRB):  LAPAROSCOPY, DIAGNOSTIC (N/A)    Patient location: PACU    Anesthesia Type: general    Transport from OR: Transported from OR on 6-10 L/min O2 by face mask with adequate spontaneous ventilation    Post pain: adequate analgesia    Post assessment: no apparent anesthetic complications    Post vital signs: stable    Level of consciousness: sedated    Nausea/Vomiting: no nausea/vomiting    Complications: none    Transfer of care protocol was followed      Last vitals:   Visit Vitals  /81 (BP Location: Right arm, Patient Position: Lying)   Pulse (!) 112   Temp 36.9 °C (98.5 °F) (Oral)   Resp 20   Ht 5' 8" (1.727 m)   Wt 56.7 kg (125 lb)   LMP 07/14/2017   SpO2 98%   Breastfeeding No   BMI 19.01 kg/m²     "

## 2022-08-24 NOTE — PLAN OF CARE
Pt alert and oriented x4, no distress, pt seems anxious regarding NGT suctioning, explained to pt the settings for the suctioning which are not continuos how she expected. Pt stated when the suctioning stops, fluids backs up and makes her nauseous, prn nausea medication given with mild improvements. Pt empties her own stoma bag, tolerated well, pt moves independently and uses call light when needed. Will continue to monitor.

## 2022-08-24 NOTE — OP NOTE
MAGGIE MIDDLETON  [id number]  [id number]    OPERATIVE NOTE:    DATE OF OPERATION: 08/24/2022    PREOPERATIVE DIAGNOSIS:  Concern for ischemic bowel    POSTOPERATIVE DIAGNOSIS:  Normal appearing bowel    PROCEDURE PERFORMED:  Diagnostic laparoscopy    ATTENDING SURGEON: CORETTA Doyle MD    RESIDENT/FELLOW SURGEON: Chica Brenner MD    ANESTHESIA: general    ESTIMATED BLOOD LOSS: 0 mL    FINDINGS:  1. Diagnostic laparoscopy performed.  No fluid in the abdomen.  Small bowel dilated but overall appeared healthy.  Ileostomy twisted 180° but both sides appeared healthy and viable.  Mesentery straight.  Gallbladder appeared healthy and normal.  Stomach decompressed.  Ovarian cyst on the right lateral side.  Left ovary normal.    SPECIMENS:  None    COMPLICATIONS:  None apparent    DISPOSITION: PACU    CONDITION: good    INDICATION FOR PROCEDURE:  Maggie Middleton is a 41 y.o. female with perianal Crohn's disease who recently underwent laparoscopic diverting loop ileostomy.  She presented with small-bowel obstruction complicated by fevers and significantly elevated CRP.  She had persistent tachycardia despite fluid collection and ongoing nausea refractory to antiemetics.  Erythematous rash to her anterior abdomen.  Multiple CT scans have been performed demonstrating no obvious internal abnormality.  However, scans were limited by lack of intra-abdominal adipose tissue.  Due to concern for underlying bowel ischemia, repeat laparoscopy was recommended.    DESCRIPTION OF PROCEDURE:   After informed consent was reviewed, the patient was taken to the operating room and transferred to the OR table.  she was placed under general anesthesia.    The prior ileostomy was prepped with Betadine the remainder of the abdomen was prepped with ChloraPrep.  A time-out was then performed.  The abdomen was entered through a small stab incision in the left upper quadrant with insertion of the Veress needle.  Pneumoperitoneum was achieved.   Through her prior port site in the left upper quadrant, a 5 mm port and camera were then inserted.  No injury from the Veress needle.  Diagnostic laparoscopy was then performed.  No fluid throughout the abdomen.  The bowel appeared grossly dilated but otherwise healthy.  Two additional 5 mm trocars were then placed with 1 at the umbilicus and 1 in the left lower quadrant.  The small bowel was then run from the ileostomy back to the ligament of Treitz.  The bowel appeared dilated but healthy.  No twisting.  No ischemia.  Bowel appeared dilated to the level of the fascia.  However, digital exam of the ostomy allow passage of a pinky finger into the limb of the ostomy below the level of the fascia.  The remainder of the abdomen was inspected.  The gallbladder appeared healthy as well as bilateral ovaries.  Bilateral tap blocks were then performed with 30 mL of 0.25% Marcaine.  All ports were then removed and the abdomen was desufflated.  Skin incisions were closed with 4-0 Vicryl and Steri-Strips were applied.    The patient tolerated the procedure well.  There were no apparent complications.  All sponge, needle, and instruments counts were correct x 2.  she was then extubated and taken to PACU in stable condition.        CORETTA Doyle MD, FACS, FASCRS  Staff Surgeon  Colon & Rectal Surgery

## 2022-08-24 NOTE — INTERVAL H&P NOTE
The patient has been examined and the H&P has been reviewed:    I concur with the findings and changes have been noted since the H&P was written: Patient with worsening nausea, pain. Elevated CRP, fevers. These factors are prompting return to OR    Surgery risks, benefits and alternative options discussed and understood by patient/family.          Active Hospital Problems    Diagnosis  POA    *Partial small bowel obstruction [K56.600]  Yes    UTI (urinary tract infection) [N39.0]  Yes      Resolved Hospital Problems   No resolved problems to display.

## 2022-08-25 LAB — CRP SERPL-MCNC: 158.17 MG/L (ref 0–3.19)

## 2022-08-25 PROCEDURE — 36415 COLL VENOUS BLD VENIPUNCTURE: CPT | Performed by: STUDENT IN AN ORGANIZED HEALTH CARE EDUCATION/TRAINING PROGRAM

## 2022-08-25 PROCEDURE — 25000003 PHARM REV CODE 250

## 2022-08-25 PROCEDURE — 25000003 PHARM REV CODE 250: Performed by: COLON & RECTAL SURGERY

## 2022-08-25 PROCEDURE — 63600175 PHARM REV CODE 636 W HCPCS: Performed by: STUDENT IN AN ORGANIZED HEALTH CARE EDUCATION/TRAINING PROGRAM

## 2022-08-25 PROCEDURE — 63600175 PHARM REV CODE 636 W HCPCS: Performed by: ANESTHESIOLOGY

## 2022-08-25 PROCEDURE — 94761 N-INVAS EAR/PLS OXIMETRY MLT: CPT

## 2022-08-25 PROCEDURE — 63600175 PHARM REV CODE 636 W HCPCS: Performed by: COLON & RECTAL SURGERY

## 2022-08-25 PROCEDURE — 25000003 PHARM REV CODE 250: Performed by: STUDENT IN AN ORGANIZED HEALTH CARE EDUCATION/TRAINING PROGRAM

## 2022-08-25 PROCEDURE — 20600001 HC STEP DOWN PRIVATE ROOM

## 2022-08-25 PROCEDURE — 86141 C-REACTIVE PROTEIN HS: CPT | Performed by: STUDENT IN AN ORGANIZED HEALTH CARE EDUCATION/TRAINING PROGRAM

## 2022-08-25 PROCEDURE — 63600175 PHARM REV CODE 636 W HCPCS

## 2022-08-25 RX ORDER — ALPRAZOLAM 0.25 MG/1
0.25 TABLET ORAL ONCE
Status: DISCONTINUED | OUTPATIENT
Start: 2022-08-25 | End: 2022-08-25

## 2022-08-25 RX ORDER — HALOPERIDOL 5 MG/ML
5 INJECTION INTRAMUSCULAR ONCE
Status: COMPLETED | OUTPATIENT
Start: 2022-08-25 | End: 2022-08-25

## 2022-08-25 RX ORDER — PANTOPRAZOLE SODIUM 40 MG/1
40 TABLET, DELAYED RELEASE ORAL DAILY
Status: DISCONTINUED | OUTPATIENT
Start: 2022-08-25 | End: 2022-09-01 | Stop reason: HOSPADM

## 2022-08-25 RX ADMIN — PIPERACILLIN SODIUM AND TAZOBACTAM SODIUM 4.5 G: 4; .5 INJECTION, POWDER, LYOPHILIZED, FOR SOLUTION INTRAVENOUS at 09:08

## 2022-08-25 RX ADMIN — METOCLOPRAMIDE 10 MG: 5 INJECTION, SOLUTION INTRAMUSCULAR; INTRAVENOUS at 03:08

## 2022-08-25 RX ADMIN — HYDROMORPHONE HYDROCHLORIDE 0.5 MG: 1 INJECTION, SOLUTION INTRAMUSCULAR; INTRAVENOUS; SUBCUTANEOUS at 01:08

## 2022-08-25 RX ADMIN — BUPROPION HYDROCHLORIDE 300 MG: 150 TABLET, FILM COATED, EXTENDED RELEASE ORAL at 09:08

## 2022-08-25 RX ADMIN — HYDROMORPHONE HYDROCHLORIDE 0.5 MG: 1 INJECTION, SOLUTION INTRAMUSCULAR; INTRAVENOUS; SUBCUTANEOUS at 09:08

## 2022-08-25 RX ADMIN — MUPIROCIN: 20 OINTMENT TOPICAL at 09:08

## 2022-08-25 RX ADMIN — ONDANSETRON 4 MG: 2 INJECTION INTRAMUSCULAR; INTRAVENOUS at 09:08

## 2022-08-25 RX ADMIN — HALOPERIDOL LACTATE 5 MG: 5 INJECTION, SOLUTION INTRAMUSCULAR at 04:08

## 2022-08-25 RX ADMIN — FLUOXETINE 40 MG: 20 CAPSULE ORAL at 09:08

## 2022-08-25 RX ADMIN — DEXTROSE MONOHYDRATE, SODIUM CHLORIDE, AND POTASSIUM CHLORIDE: 50; 4.5; 1.49 INJECTION, SOLUTION INTRAVENOUS at 09:08

## 2022-08-25 RX ADMIN — PIPERACILLIN SODIUM AND TAZOBACTAM SODIUM 4.5 G: 4; .5 INJECTION, POWDER, LYOPHILIZED, FOR SOLUTION INTRAVENOUS at 03:08

## 2022-08-25 RX ADMIN — METOCLOPRAMIDE 10 MG: 5 INJECTION, SOLUTION INTRAMUSCULAR; INTRAVENOUS at 09:08

## 2022-08-25 RX ADMIN — PANTOPRAZOLE SODIUM 40 MG: 40 TABLET, DELAYED RELEASE ORAL at 04:08

## 2022-08-25 RX ADMIN — MELATONIN TAB 3 MG 6 MG: 3 TAB at 09:08

## 2022-08-25 RX ADMIN — ALPRAZOLAM 0.25 MG: 0.25 TABLET ORAL at 01:08

## 2022-08-25 RX ADMIN — PROMETHAZINE HYDROCHLORIDE 12.5 MG: 25 INJECTION INTRAMUSCULAR; INTRAVENOUS at 10:08

## 2022-08-25 RX ADMIN — METOCLOPRAMIDE 10 MG: 5 INJECTION, SOLUTION INTRAMUSCULAR; INTRAVENOUS at 02:08

## 2022-08-25 RX ADMIN — PIPERACILLIN SODIUM AND TAZOBACTAM SODIUM 4.5 G: 4; .5 INJECTION, POWDER, LYOPHILIZED, FOR SOLUTION INTRAVENOUS at 11:08

## 2022-08-25 RX ADMIN — ENOXAPARIN SODIUM 40 MG: 40 INJECTION SUBCUTANEOUS at 04:08

## 2022-08-25 NOTE — PLAN OF CARE
A&ox4. C/o nausea has decreased. Rt. Nare NG tube to ILWS. X3 abdominal incisions w/ ster-strips in place. C/o pain PRN pain med given. Lower abdomen is still reddened. 20g lft. FA w/ d5 1/2NS 20MEQ KCL@125ml/hr. Pt. Is ambulatory to and from bathroom w/ x1 assist.   Problem: Adult Inpatient Plan of Care  Goal: Plan of Care Review  Outcome: Ongoing, Progressing  Goal: Patient-Specific Goal (Individualized)  Outcome: Ongoing, Progressing  Goal: Absence of Hospital-Acquired Illness or Injury  Outcome: Ongoing, Progressing  Goal: Optimal Comfort and Wellbeing  Outcome: Ongoing, Progressing  Goal: Readiness for Transition of Care  Outcome: Ongoing, Progressing     Problem: Fall Injury Risk  Goal: Absence of Fall and Fall-Related Injury  Outcome: Ongoing, Progressing

## 2022-08-25 NOTE — ASSESSMENT & PLAN NOTE
Nyasia Middleton is a 41 y.o. female who is s/p diverting loop ileostomy 8/17/22, readmitted due to small bowel obstruction as evidenced by CT. Urinalysis and clinical presentation also indicative of symptomatic UTI. S/p diagnostic lap 8/24/22 unremarkable aside from dilated bowels.    - Pain control: multimodal  - Diet: CLD  - D/c NGT  - mIVF: D5 1/2NS 20meqK @125cc/hr  - antiemetics PRN, zofran, phenergan, scopolamine patch  - tylenol for fever  - Abx zosyn for UTI  - CRP daily  - OOBTC    Dispo: Continue inpatient care. Monitor bowel function

## 2022-08-25 NOTE — PROGRESS NOTES
[facility]  Colorectal Surgery  Progress Note    Patient Name: Nyasia Middleton  MRN: [id number]  Admission Date: 8/22/2022  Hospital Length of Stay: 3 days  Attending Physician: CORETTA Doyle MD    Subjective:     Interval History: Pt had a diagnostic laparoscopy yesterday for revision of ileostomy. There were no signs of ischemic bowel with findings only of some dilation. Today patient feels better, no N/V. NGT output is clear. Pain is well controlled.  -> 158    Post-Op Info:  Procedure(s) (LRB):  LAPAROSCOPY, DIAGNOSTIC (N/A)   1 Day Post-Op      Medications:  Continuous Infusions:   dextrose 5 % and 0.45 % NaCl with KCl 20 mEq 125 mL/hr at 08/24/22 1714     Scheduled Meds:   buPROPion  300 mg Oral Nightly    enoxaparin  40 mg Subcutaneous Daily    FLUoxetine  40 mg Oral Nightly    lactated ringers  1,000 mL Intravenous Once    mupirocin   Nasal BID    pantoprozole (PROTONIX) IV  40 mg Intravenous Daily    piperacillin-tazobactam (ZOSYN) IVPB  4.5 g Intravenous Q8H    scopolamine  1 patch Transdermal Q3 Days     PRN Meds:   acetaminophen    acetaminophen    ALPRAZolam    dextrose 10%    dextrose 10%    glucagon (human recombinant)    glucose    glucose    HYDROmorphone    melatonin    metoclopramide HCl    ondansetron    ondansetron    oxyCODONE    promethazine (PHENERGAN) IVPB    sodium chloride 0.9%        Objective:     Vital Signs (Most Recent):  Temp: 97.5 °F (36.4 °C) (08/25/22 0417)  Pulse: 99 (08/25/22 0417)  Resp: 18 (08/25/22 0417)  BP: 116/69 (08/25/22 0417)  SpO2: 97 % (08/25/22 0417)   Vital Signs (24h Range):  Temp:  [97.3 °F (36.3 °C)-98.5 °F (36.9 °C)] 97.5 °F (36.4 °C)  Pulse:  [] 99  Resp:  [13-22] 18  SpO2:  [96 %-100 %] 97 %  BP: (104-126)/(67-86) 116/69     Intake/Output - Last 3 Shifts         08/23 0700  08/24 0659 08/24 0700 08/25 0659 08/25 0700 08/26 0659    P.O.  30     I.V. (mL/kg) 1585.1 1309.4 (23.1)     IV Piggyback 466 314.4      Total Intake(mL/kg) 2051.1 1653.7 (29.2)     Urine (mL/kg/hr) 0 0 (0)     Emesis/NG output 300      Drains 2750 200     Stool 1550 900     Total Output 4600 1100     Net -2548.9 +553.7            Urine Occurrence 2 x 1 x     Stool Occurrence 3 x 301 x             Physical Exam  Constitutional:       General: She is not in acute distress.     Appearance: Normal appearance.   HENT:      Nose:      Comments: NGT @ LWIS      Mouth/Throat:      Mouth: Mucous membranes are moist.   Eyes:      Extraocular Movements: Extraocular movements intact.   Cardiovascular:      Rate and Rhythm: Normal rate and regular rhythm.   Pulmonary:      Effort: Pulmonary effort is normal. No respiratory distress.      Breath sounds: Normal breath sounds.   Abdominal:      General: There is no distension.      Palpations: Abdomen is soft.      Tenderness: There is no guarding.      Comments: RUQ ostomy pouch with some fecal content  Laparoscopic incisions c/d/i   Skin:     General: Skin is warm.   Neurological:      Mental Status: She is alert and oriented to person, place, and time. Mental status is at baseline.         Significant Labs:  CBC (Last 3 Results):   Recent Labs   Lab 08/18/22 0827 08/22/22 0228 08/23/22  0256   WBC 8.75 15.74* 6.85   RBC 3.28* 4.44 4.14   HGB 10.4* 14.1 13.2   HCT 31.6* 41.6 38.5    323 252   MCV 96 94 93   MCH 31.7* 31.8* 31.9*   MCHC 32.9 33.9 34.3     CMP (Last 3 Results):   Recent Labs   Lab 08/18/22 0827 08/22/22 0228 08/23/22  1231   GLU 72 136* 124*   CALCIUM 7.9* 9.7 9.1   ALBUMIN  --  4.6 3.0*   PROT  --  8.4 7.0    140 133*   K 3.2* 3.6 3.3*   CO2 27 31* 30*    96 95   BUN 7 9 26*   CREATININE 0.6 0.82 0.7   ALKPHOS  --  74 53*   ALT  --  15 9*   AST  --  25 11   BILITOT  --  1.0 1.5*         CRP (Last 3 Results):   Recent Labs   Lab 08/22/22  1905   .2*     Microbiology Results (last 7 days)       ** No results found for the last 168 hours. **          Recent Labs   Lab  08/22/22  0448   COLORU Yellow   SPECGRAV >=1.030*   PHUR 6.0   PROTEINUA 1+*   BACTERIA Moderate*   NITRITE Negative   LEUKOCYTESUR 1+*   UROBILINOGEN Negative   HYALINECASTS 0     All pertinent lab results within the last 24 hours have been reviewed.     Significant Diagnostics:  I have reviewed all pertinent imaging results/findings within the past 24 hours.    8/23 AP CT w/ IV and PO contrast: CT Abdomen Pelvis With Contrast    Result Date: 8/24/2022  No significant detrimental change from prior exam. Stable findings consistent with small-bowel obstruction with questionable zone transition just proximal to the patient's ileostomy/stoma. Additional findings as above. Electronically signed by resident: Angie Moreau Date:    08/24/2022 Time:    02:52 Electronically signed by: Maurisio Matthews MD Date:    08/24/2022 Time:    03:27      Assessment/Plan:     * Partial small bowel obstruction  Nyasia Middleton is a 41 y.o. female who is s/p diverting loop ileostomy 8/17/22, readmitted due to small bowel obstruction as evidenced by CT. Urinalysis and clinical presentation also indicative of symptomatic UTI. S/p diagnostic lap 8/24/22 unremarkable aside from dilated bowels.    - Pain control: multimodal  - Diet: CLD  - D/c NGT  - mIVF: D5 1/2NS 20meqK @125cc/hr  - antiemetics PRN, zofran, phenergan, scopolamine patch  - tylenol for fever  - Abx zosyn for UTI  - CRP daily  - OOBTC    Dispo: Continue inpatient care. Monitor bowel function            Natan Hopson MD  Colorectal Surgery  [facility]

## 2022-08-25 NOTE — SUBJECTIVE & OBJECTIVE
Subjective:     Interval History: Pt had a diagnostic laparoscopy yesterday for revision of ileostomy. There were no signs of ischemic bowel with findings only of some dilation. Today patient feels better, no N/V. NGT output is clear. Pain is well controlled.  -> 158    Post-Op Info:  Procedure(s) (LRB):  LAPAROSCOPY, DIAGNOSTIC (N/A)   1 Day Post-Op      Medications:  Continuous Infusions:   dextrose 5 % and 0.45 % NaCl with KCl 20 mEq 125 mL/hr at 08/24/22 1714     Scheduled Meds:   buPROPion  300 mg Oral Nightly    enoxaparin  40 mg Subcutaneous Daily    FLUoxetine  40 mg Oral Nightly    lactated ringers  1,000 mL Intravenous Once    mupirocin   Nasal BID    pantoprozole (PROTONIX) IV  40 mg Intravenous Daily    piperacillin-tazobactam (ZOSYN) IVPB  4.5 g Intravenous Q8H    scopolamine  1 patch Transdermal Q3 Days     PRN Meds:   acetaminophen    acetaminophen    ALPRAZolam    dextrose 10%    dextrose 10%    glucagon (human recombinant)    glucose    glucose    HYDROmorphone    melatonin    metoclopramide HCl    ondansetron    ondansetron    oxyCODONE    promethazine (PHENERGAN) IVPB    sodium chloride 0.9%        Objective:     Vital Signs (Most Recent):  Temp: 97.5 °F (36.4 °C) (08/25/22 0417)  Pulse: 99 (08/25/22 0417)  Resp: 18 (08/25/22 0417)  BP: 116/69 (08/25/22 0417)  SpO2: 97 % (08/25/22 0417)   Vital Signs (24h Range):  Temp:  [97.3 °F (36.3 °C)-98.5 °F (36.9 °C)] 97.5 °F (36.4 °C)  Pulse:  [] 99  Resp:  [13-22] 18  SpO2:  [96 %-100 %] 97 %  BP: (104-126)/(67-86) 116/69     Intake/Output - Last 3 Shifts         08/23 0700 08/24 0659 08/24 0700 08/25 0659 08/25 0700 08/26 0659    P.O.  30     I.V. (mL/kg) 1585.1 1309.4 (23.1)     IV Piggyback 466 314.4     Total Intake(mL/kg) 2051.1 1653.7 (29.2)     Urine (mL/kg/hr) 0 0 (0)     Emesis/NG output 300      Drains 2750 200     Stool 1550 900     Total Output 4600 1100     Net -2548.9 +553.7            Urine Occurrence 2 x 1 x     Stool  Occurrence 3 x 301 x             Physical Exam  Constitutional:       General: She is not in acute distress.     Appearance: Normal appearance.   HENT:      Nose:      Comments: NGT @ LWIS      Mouth/Throat:      Mouth: Mucous membranes are moist.   Eyes:      Extraocular Movements: Extraocular movements intact.   Cardiovascular:      Rate and Rhythm: Normal rate and regular rhythm.   Pulmonary:      Effort: Pulmonary effort is normal. No respiratory distress.      Breath sounds: Normal breath sounds.   Abdominal:      General: There is no distension.      Palpations: Abdomen is soft.      Tenderness: There is no guarding.      Comments: RUQ ostomy pouch with some fecal content  Laparoscopic incisions c/d/i   Skin:     General: Skin is warm.   Neurological:      Mental Status: She is alert and oriented to person, place, and time. Mental status is at baseline.         Significant Labs:  CBC (Last 3 Results):   Recent Labs   Lab 08/18/22 0827 08/22/22 0228 08/23/22  0256   WBC 8.75 15.74* 6.85   RBC 3.28* 4.44 4.14   HGB 10.4* 14.1 13.2   HCT 31.6* 41.6 38.5    323 252   MCV 96 94 93   MCH 31.7* 31.8* 31.9*   MCHC 32.9 33.9 34.3     CMP (Last 3 Results):   Recent Labs   Lab 08/18/22 0827 08/22/22 0228 08/23/22  1231   GLU 72 136* 124*   CALCIUM 7.9* 9.7 9.1   ALBUMIN  --  4.6 3.0*   PROT  --  8.4 7.0    140 133*   K 3.2* 3.6 3.3*   CO2 27 31* 30*    96 95   BUN 7 9 26*   CREATININE 0.6 0.82 0.7   ALKPHOS  --  74 53*   ALT  --  15 9*   AST  --  25 11   BILITOT  --  1.0 1.5*         CRP (Last 3 Results):   Recent Labs   Lab 08/22/22  1905   .2*     Microbiology Results (last 7 days)       ** No results found for the last 168 hours. **          Recent Labs   Lab 08/22/22  0448   COLORU Yellow   SPECGRAV >=1.030*   PHUR 6.0   PROTEINUA 1+*   BACTERIA Moderate*   NITRITE Negative   LEUKOCYTESUR 1+*   UROBILINOGEN Negative   HYALINECASTS 0     All pertinent lab results within the last 24 hours  have been reviewed.     Significant Diagnostics:  I have reviewed all pertinent imaging results/findings within the past 24 hours.    8/23 AP CT w/ IV and PO contrast: CT Abdomen Pelvis With Contrast    Result Date: 8/24/2022  No significant detrimental change from prior exam. Stable findings consistent with small-bowel obstruction with questionable zone transition just proximal to the patient's ileostomy/stoma. Additional findings as above. Electronically signed by resident: Angie Moreau Date:    08/24/2022 Time:    02:52 Electronically signed by: Maurisio Matthews MD Date:    08/24/2022 Time:    03:27

## 2022-08-25 NOTE — ANESTHESIA POSTPROCEDURE EVALUATION
Anesthesia Post Evaluation    Patient: Nyasia Middleton    Procedure(s) Performed: Procedure(s) (LRB):  LAPAROSCOPY, DIAGNOSTIC (N/A)    Final Anesthesia Type: general      Patient location during evaluation: PACU  Patient participation: Yes- Able to Participate  Level of consciousness: awake and alert  Post-procedure vital signs: reviewed and stable  Pain management: adequate  Airway patency: patent    PONV status at discharge: No PONV  Anesthetic complications: no      Cardiovascular status: blood pressure returned to baseline  Respiratory status: unassisted, spontaneous ventilation and room air  Hydration status: euvolemic  Follow-up not needed.          Vitals Value Taken Time   /67 08/25/22 0829   Temp 36.4 °C (97.6 °F) 08/25/22 0829   Pulse 115 08/25/22 0829   Resp 18 08/25/22 0829   SpO2 97 % 08/25/22 0829         Event Time   Out of Recovery 08/24/2022 15:43:00         Pain/George Score: Pain Rating Prior to Med Admin: 7 (8/25/2022  1:29 AM)  Pain Rating Post Med Admin: 3 (8/25/2022  1:59 AM)  George Score: 9 (8/24/2022  3:43 PM)

## 2022-08-26 LAB
BASOPHILS # BLD AUTO: 0.09 K/UL (ref 0–0.2)
BASOPHILS NFR BLD: 1.1 % (ref 0–1.9)
CRP SERPL-MCNC: 99.98 MG/L (ref 0–3.19)
DIFFERENTIAL METHOD: ABNORMAL
EOSINOPHIL # BLD AUTO: 0.2 K/UL (ref 0–0.5)
EOSINOPHIL NFR BLD: 1.9 % (ref 0–8)
ERYTHROCYTE [DISTWIDTH] IN BLOOD BY AUTOMATED COUNT: 13.7 % (ref 11.5–14.5)
HCT VFR BLD AUTO: 45.5 % (ref 37–48.5)
HGB BLD-MCNC: 16 G/DL (ref 12–16)
HYPOCHROMIA BLD QL SMEAR: ABNORMAL
IMM GRANULOCYTES # BLD AUTO: 0.31 K/UL (ref 0–0.04)
IMM GRANULOCYTES NFR BLD AUTO: 3.7 % (ref 0–0.5)
LYMPHOCYTES # BLD AUTO: 0.9 K/UL (ref 1–4.8)
LYMPHOCYTES NFR BLD: 10.8 % (ref 18–48)
MCH RBC QN AUTO: 31.9 PG (ref 27–31)
MCHC RBC AUTO-ENTMCNC: 35.2 G/DL (ref 32–36)
MCV RBC AUTO: 91 FL (ref 82–98)
MONOCYTES # BLD AUTO: 1.6 K/UL (ref 0.3–1)
MONOCYTES NFR BLD: 19.8 % (ref 4–15)
NEUTROPHILS # BLD AUTO: 5.2 K/UL (ref 1.8–7.7)
NEUTROPHILS NFR BLD: 62.7 % (ref 38–73)
NRBC BLD-RTO: 0 /100 WBC
PLATELET # BLD AUTO: 442 K/UL (ref 150–450)
PLATELET BLD QL SMEAR: ABNORMAL
PMV BLD AUTO: 9.7 FL (ref 9.2–12.9)
POCT GLUCOSE: 126 MG/DL (ref 70–110)
POCT GLUCOSE: 129 MG/DL (ref 70–110)
POCT GLUCOSE: 142 MG/DL (ref 70–110)
POCT GLUCOSE: 154 MG/DL (ref 70–110)
POCT GLUCOSE: 97 MG/DL (ref 70–110)
RBC # BLD AUTO: 5.02 M/UL (ref 4–5.4)
WBC # BLD AUTO: 8.27 K/UL (ref 3.9–12.7)

## 2022-08-26 PROCEDURE — 25000003 PHARM REV CODE 250: Performed by: STUDENT IN AN ORGANIZED HEALTH CARE EDUCATION/TRAINING PROGRAM

## 2022-08-26 PROCEDURE — 63600175 PHARM REV CODE 636 W HCPCS: Performed by: STUDENT IN AN ORGANIZED HEALTH CARE EDUCATION/TRAINING PROGRAM

## 2022-08-26 PROCEDURE — 36415 COLL VENOUS BLD VENIPUNCTURE: CPT | Performed by: STUDENT IN AN ORGANIZED HEALTH CARE EDUCATION/TRAINING PROGRAM

## 2022-08-26 PROCEDURE — 85025 COMPLETE CBC W/AUTO DIFF WBC: CPT | Performed by: STUDENT IN AN ORGANIZED HEALTH CARE EDUCATION/TRAINING PROGRAM

## 2022-08-26 PROCEDURE — 86141 C-REACTIVE PROTEIN HS: CPT | Performed by: STUDENT IN AN ORGANIZED HEALTH CARE EDUCATION/TRAINING PROGRAM

## 2022-08-26 PROCEDURE — 63600175 PHARM REV CODE 636 W HCPCS: Performed by: COLON & RECTAL SURGERY

## 2022-08-26 PROCEDURE — 63600175 PHARM REV CODE 636 W HCPCS

## 2022-08-26 PROCEDURE — 20600001 HC STEP DOWN PRIVATE ROOM

## 2022-08-26 PROCEDURE — 94761 N-INVAS EAR/PLS OXIMETRY MLT: CPT

## 2022-08-26 PROCEDURE — 25000003 PHARM REV CODE 250: Performed by: COLON & RECTAL SURGERY

## 2022-08-26 RX ORDER — BISMUTH SUBSALICYLATE 525 MG/30ML
30 LIQUID ORAL EVERY 6 HOURS PRN
Status: DISCONTINUED | OUTPATIENT
Start: 2022-08-26 | End: 2022-09-01 | Stop reason: HOSPADM

## 2022-08-26 RX ORDER — BISMUTH SUBSALICYLATE 525 MG/30ML
30 LIQUID ORAL ONCE
Status: COMPLETED | OUTPATIENT
Start: 2022-08-26 | End: 2022-08-26

## 2022-08-26 RX ORDER — OLANZAPINE 5 MG/1
10 TABLET, ORALLY DISINTEGRATING ORAL 2 TIMES DAILY
Status: DISCONTINUED | OUTPATIENT
Start: 2022-08-26 | End: 2022-09-01 | Stop reason: HOSPADM

## 2022-08-26 RX ORDER — PROMETHAZINE HYDROCHLORIDE 12.5 MG/1
12.5 TABLET ORAL ONCE
Status: COMPLETED | OUTPATIENT
Start: 2022-08-26 | End: 2022-08-26

## 2022-08-26 RX ORDER — ALPRAZOLAM 0.25 MG/1
0.25 TABLET ORAL 3 TIMES DAILY
Status: DISCONTINUED | OUTPATIENT
Start: 2022-08-26 | End: 2022-09-01 | Stop reason: HOSPADM

## 2022-08-26 RX ADMIN — PROMETHAZINE HYDROCHLORIDE 12.5 MG: 12.5 TABLET ORAL at 06:08

## 2022-08-26 RX ADMIN — HYDROMORPHONE HYDROCHLORIDE 0.5 MG: 1 INJECTION, SOLUTION INTRAMUSCULAR; INTRAVENOUS; SUBCUTANEOUS at 05:08

## 2022-08-26 RX ADMIN — FLUOXETINE 40 MG: 20 CAPSULE ORAL at 08:08

## 2022-08-26 RX ADMIN — PROMETHAZINE HYDROCHLORIDE 25 MG: 25 INJECTION INTRAMUSCULAR; INTRAVENOUS at 08:08

## 2022-08-26 RX ADMIN — SODIUM CHLORIDE, POTASSIUM CHLORIDE, SODIUM LACTATE AND CALCIUM CHLORIDE 1000 ML: 600; 310; 30; 20 INJECTION, SOLUTION INTRAVENOUS at 08:08

## 2022-08-26 RX ADMIN — BUPROPION HYDROCHLORIDE 300 MG: 150 TABLET, FILM COATED, EXTENDED RELEASE ORAL at 08:08

## 2022-08-26 RX ADMIN — MUPIROCIN: 20 OINTMENT TOPICAL at 08:08

## 2022-08-26 RX ADMIN — DEXTROSE MONOHYDRATE, SODIUM CHLORIDE, AND POTASSIUM CHLORIDE: 50; 4.5; 1.49 INJECTION, SOLUTION INTRAVENOUS at 01:08

## 2022-08-26 RX ADMIN — PANTOPRAZOLE SODIUM 40 MG: 40 TABLET, DELAYED RELEASE ORAL at 09:08

## 2022-08-26 RX ADMIN — ALPRAZOLAM 0.25 MG: 0.25 TABLET ORAL at 03:08

## 2022-08-26 RX ADMIN — DEXTROSE MONOHYDRATE, SODIUM CHLORIDE, AND POTASSIUM CHLORIDE: 50; 4.5; 1.49 INJECTION, SOLUTION INTRAVENOUS at 09:08

## 2022-08-26 RX ADMIN — OLANZAPINE 10 MG: 5 TABLET, ORALLY DISINTEGRATING ORAL at 08:08

## 2022-08-26 RX ADMIN — MUPIROCIN: 20 OINTMENT TOPICAL at 09:08

## 2022-08-26 RX ADMIN — PROMETHAZINE HYDROCHLORIDE 25 MG: 25 INJECTION INTRAMUSCULAR; INTRAVENOUS at 11:08

## 2022-08-26 RX ADMIN — ONDANSETRON 8 MG: 2 INJECTION INTRAMUSCULAR; INTRAVENOUS at 05:08

## 2022-08-26 RX ADMIN — METOCLOPRAMIDE 10 MG: 5 INJECTION, SOLUTION INTRAMUSCULAR; INTRAVENOUS at 01:08

## 2022-08-26 RX ADMIN — ENOXAPARIN SODIUM 40 MG: 40 INJECTION SUBCUTANEOUS at 05:08

## 2022-08-26 RX ADMIN — ALPRAZOLAM 0.25 MG: 0.25 TABLET ORAL at 09:08

## 2022-08-26 RX ADMIN — BISMUTH SUBSALICYLATE 30 ML: 525 LIQUID ORAL at 01:08

## 2022-08-26 RX ADMIN — ALPRAZOLAM 0.25 MG: 0.25 TABLET ORAL at 08:08

## 2022-08-26 RX ADMIN — OLANZAPINE 10 MG: 5 TABLET, ORALLY DISINTEGRATING ORAL at 12:08

## 2022-08-26 RX ADMIN — ONDANSETRON 8 MG: 2 INJECTION INTRAMUSCULAR; INTRAVENOUS at 09:08

## 2022-08-26 RX ADMIN — ALPRAZOLAM 0.25 MG: 0.25 TABLET ORAL at 01:08

## 2022-08-26 RX ADMIN — PIPERACILLIN SODIUM AND TAZOBACTAM SODIUM 4.5 G: 4; .5 INJECTION, POWDER, LYOPHILIZED, FOR SOLUTION INTRAVENOUS at 03:08

## 2022-08-26 RX ADMIN — ONDANSETRON 8 MG: 2 INJECTION INTRAMUSCULAR; INTRAVENOUS at 03:08

## 2022-08-26 NOTE — PLAN OF CARE
POC reviewed, pt verbalized understanding, AAOX4, Independent with ADL's, complains of nausea and anxiety, no vomiting, phenergan given IV with moderate relief, new orders noted for anxiety meds,  ileostomy to right abd with liquid green contents, pt emptys herself, continues on IVF's, call light and belongings in reach, will continue to monitor.    Problem: Adult Inpatient Plan of Care  Goal: Plan of Care Review  Outcome: Ongoing, Progressing  Goal: Patient-Specific Goal (Individualized)  Outcome: Ongoing, Progressing  Goal: Absence of Hospital-Acquired Illness or Injury  Outcome: Ongoing, Progressing  Goal: Optimal Comfort and Wellbeing  Outcome: Ongoing, Progressing  Goal: Readiness for Transition of Care  Outcome: Ongoing, Progressing     Problem: Fall Injury Risk  Goal: Absence of Fall and Fall-Related Injury  Outcome: Ongoing, Progressing

## 2022-08-26 NOTE — PLAN OF CARE
[facility]  [facility]    Primary Care Provider: Bisi Tolbert DO    Expected Discharge Date: 8/27/2022    Reassessment (most recent)     Discharge Reassessment - 08/26/22 1058        Discharge Reassessment    Assessment Type Discharge Planning Reassessment     Discharge Plan A Home     Discharge Plan B Home Health     DME Needed Upon Discharge  colostomy/ostomy supplies     Discharge Barriers Identified None     Why the patient remains in the hospital Requires continued medical care               Jennifer Sloan RN, CM   Ext: [phone]

## 2022-08-26 NOTE — SUBJECTIVE & OBJECTIVE
Subjective:     Interval History: No acute overnight events. C/o of nausea and anxiety. Reports having 2 small episodes of vomiting. Pain is well controlled. Adequate output from ostomy and adequate UO.     Post-Op Info:  Procedure(s) (LRB):  LAPAROSCOPY, DIAGNOSTIC (N/A)   2 Days Post-Op      Medications:  Continuous Infusions:   dextrose 5 % and 0.45 % NaCl with KCl 20 mEq Stopped (08/26/22 0304)     Scheduled Meds:   buPROPion  300 mg Oral Nightly    enoxaparin  40 mg Subcutaneous Daily    FLUoxetine  40 mg Oral Nightly    mupirocin   Nasal BID    pantoprazole  40 mg Oral Daily    piperacillin-tazobactam (ZOSYN) IVPB  4.5 g Intravenous Q8H    scopolamine  1 patch Transdermal Q3 Days     PRN Meds:   acetaminophen    acetaminophen    ALPRAZolam    dextrose 10%    dextrose 10%    glucagon (human recombinant)    glucose    glucose    HYDROmorphone    melatonin    metoclopramide HCl    ondansetron    ondansetron    oxyCODONE    promethazine (PHENERGAN) IVPB    sodium chloride 0.9%        Objective:     Vital Signs (Most Recent):  Temp: 97.5 °F (36.4 °C) (08/26/22 0434)  Pulse: 109 (08/26/22 0434)  Resp: 18 (08/26/22 0434)  BP: 123/82 (08/26/22 0434)  SpO2: 97 % (08/26/22 0434)   Vital Signs (24h Range):  Temp:  [96.1 °F (35.6 °C)-98.8 °F (37.1 °C)] 97.5 °F (36.4 °C)  Pulse:  [105-122] 109  Resp:  [16-20] 18  SpO2:  [95 %-99 %] 97 %  BP: (101-123)/(59-82) 123/82     Intake/Output - Last 3 Shifts         08/24 0700  08/25 0659 08/25 0700 08/26 0659    P.O. 30 990    I.V. (mL/kg) 1309.4 (23.1)     IV Piggyback 314.4 480.4    Total Intake(mL/kg) 1653.7 (29.2) 1470.4 (25.9)    Urine (mL/kg/hr) 0 (0) 600 (0.4)    Drains 200 1050    Stool 900 1750    Total Output 1100 3400    Net +553.7 -1929.6          Urine Occurrence 1 x 3 x    Stool Occurrence 301 x 1 x            Physical Exam  Constitutional:       General: She is not in acute distress.     Appearance: Normal appearance.   HENT:      Mouth/Throat:      Mouth: Mucous  membranes are moist.   Eyes:      Extraocular Movements: Extraocular movements intact.   Cardiovascular:      Rate and Rhythm: Normal rate and regular rhythm.   Pulmonary:      Effort: Pulmonary effort is normal. No respiratory distress.      Breath sounds: Normal breath sounds.   Abdominal:      General: There is no distension.      Palpations: Abdomen is soft.      Tenderness: There is no guarding.      Comments: RUQ ostomy pouch with some fecal content  Laparoscopic incisions c/d/i   Skin:     General: Skin is warm.   Neurological:      Mental Status: She is alert and oriented to person, place, and time. Mental status is at baseline.         Significant Labs:  CBC (Last 3 Results):   Recent Labs   Lab 08/22/22 0228 08/23/22  0256   WBC 15.74* 6.85   RBC 4.44 4.14   HGB 14.1 13.2   HCT 41.6 38.5    252   MCV 94 93   MCH 31.8* 31.9*   MCHC 33.9 34.3       CMP (Last 3 Results):   Recent Labs   Lab 08/22/22 0228 08/23/22  1231   * 124*   CALCIUM 9.7 9.1   ALBUMIN 4.6 3.0*   PROT 8.4 7.0    133*   K 3.6 3.3*   CO2 31* 30*   CL 96 95   BUN 9 26*   CREATININE 0.82 0.7   ALKPHOS 74 53*   ALT 15 9*   AST 25 11   BILITOT 1.0 1.5*           CRP (Last 3 Results):   Recent Labs   Lab 08/22/22  1905   .2*       Microbiology Results (last 7 days)       ** No results found for the last 168 hours. **          Recent Labs   Lab 08/22/22  0448   COLORU Yellow   SPECGRAV >=1.030*   PHUR 6.0   PROTEINUA 1+*   BACTERIA Moderate*   NITRITE Negative   LEUKOCYTESUR 1+*   UROBILINOGEN Negative   HYALINECASTS 0       All pertinent lab results within the last 24 hours have been reviewed.     Significant Diagnostics:  I have reviewed all pertinent imaging results/findings within the past 24 hours.

## 2022-08-26 NOTE — PLAN OF CARE
Plan of care reviewed with pt  - high anxiety, xanax given X1 - pt is concerned with feeling bad  - Nausea all night, Reglan, phenergan and Zofran given   - ostomy draining Green liquid  - urinating adequately     Problem: Adult Inpatient Plan of Care  Goal: Plan of Care Review  Outcome: Ongoing, Progressing  Goal: Patient-Specific Goal (Individualized)  Outcome: Ongoing, Progressing  Goal: Absence of Hospital-Acquired Illness or Injury  Outcome: Ongoing, Progressing  Goal: Optimal Comfort and Wellbeing  Outcome: Ongoing, Progressing  Goal: Readiness for Transition of Care  Outcome: Ongoing, Progressing     Problem: Fall Injury Risk  Goal: Absence of Fall and Fall-Related Injury  Outcome: Ongoing, Progressing

## 2022-08-26 NOTE — CONSULTS
Wound consult performed for ileostomy.  Bag intact.  No leaks.  Pt states that she knows how to change bag and needs no teaching.  Supplies given to pt for care.  Will sign off.  Please re consult if needed.

## 2022-08-26 NOTE — ASSESSMENT & PLAN NOTE
Nyasia Middleton is a 41 y.o. female who is s/p diverting loop ileostomy 8/17/22, readmitted due to small bowel obstruction as evidenced by CT. Urinalysis and clinical presentation also indicative of symptomatic UTI. S/p diagnostic lap 8/24/22 unremarkable aside from dilated bowels.    - Pain control: multimodal  - Diet: CLD  - mIVF: D5 1/2NS 20meqK @125cc/hr  - antiemetics PRN, zofran, scopolamine patch, phenergan - adjusted phenergan to 25mg due to continued nausea  - tylenol for fever  - Abx zosyn for UTI for 5 days, tomorrow last dose  - CRP daily  - OOBTC    Dispo: Continue inpatient care

## 2022-08-26 NOTE — PROGRESS NOTES
[facility]  Colorectal Surgery  Progress Note    Patient Name: Nyasia Middleton  MRN: [id number]  Admission Date: 8/22/2022  Hospital Length of Stay: 4 days  Attending Physician: CORETTA Doyle MD    Subjective:     Interval History: No acute overnight events. C/o of nausea and anxiety. Reports having 2 small episodes of vomiting. Pain is well controlled. Adequate output from ostomy and adequate UO.     Post-Op Info:  Procedure(s) (LRB):  LAPAROSCOPY, DIAGNOSTIC (N/A)   2 Days Post-Op      Medications:  Continuous Infusions:   dextrose 5 % and 0.45 % NaCl with KCl 20 mEq Stopped (08/26/22 0304)     Scheduled Meds:   buPROPion  300 mg Oral Nightly    enoxaparin  40 mg Subcutaneous Daily    FLUoxetine  40 mg Oral Nightly    mupirocin   Nasal BID    pantoprazole  40 mg Oral Daily    piperacillin-tazobactam (ZOSYN) IVPB  4.5 g Intravenous Q8H    scopolamine  1 patch Transdermal Q3 Days     PRN Meds:   acetaminophen    acetaminophen    ALPRAZolam    dextrose 10%    dextrose 10%    glucagon (human recombinant)    glucose    glucose    HYDROmorphone    melatonin    metoclopramide HCl    ondansetron    ondansetron    oxyCODONE    promethazine (PHENERGAN) IVPB    sodium chloride 0.9%        Objective:     Vital Signs (Most Recent):  Temp: 97.5 °F (36.4 °C) (08/26/22 0434)  Pulse: 109 (08/26/22 0434)  Resp: 18 (08/26/22 0434)  BP: 123/82 (08/26/22 0434)  SpO2: 97 % (08/26/22 0434)   Vital Signs (24h Range):  Temp:  [96.1 °F (35.6 °C)-98.8 °F (37.1 °C)] 97.5 °F (36.4 °C)  Pulse:  [105-122] 109  Resp:  [16-20] 18  SpO2:  [95 %-99 %] 97 %  BP: (101-123)/(59-82) 123/82     Intake/Output - Last 3 Shifts         08/24 0700  08/25 0659 08/25 0700  08/26 0659    P.O. 30 990    I.V. (mL/kg) 1309.4 (23.1)     IV Piggyback 314.4 480.4    Total Intake(mL/kg) 1653.7 (29.2) 1470.4 (25.9)    Urine (mL/kg/hr) 0 (0) 600 (0.4)    Drains 200 1050    Stool 900 1750    Total Output 1100 3400    Net +553.7 -1929.6           Urine Occurrence 1 x 3 x    Stool Occurrence 301 x 1 x            Physical Exam  Constitutional:       General: She is not in acute distress.     Appearance: Normal appearance.   HENT:      Mouth/Throat:      Mouth: Mucous membranes are moist.   Eyes:      Extraocular Movements: Extraocular movements intact.   Cardiovascular:      Rate and Rhythm: Normal rate and regular rhythm.   Pulmonary:      Effort: Pulmonary effort is normal. No respiratory distress.      Breath sounds: Normal breath sounds.   Abdominal:      General: There is no distension.      Palpations: Abdomen is soft.      Tenderness: There is no guarding.      Comments: RUQ ostomy pouch with some fecal content  Laparoscopic incisions c/d/i   Skin:     General: Skin is warm.   Neurological:      Mental Status: She is alert and oriented to person, place, and time. Mental status is at baseline.         Significant Labs:  CBC (Last 3 Results):   Recent Labs   Lab 08/22/22 0228 08/23/22  0256   WBC 15.74* 6.85   RBC 4.44 4.14   HGB 14.1 13.2   HCT 41.6 38.5    252   MCV 94 93   MCH 31.8* 31.9*   MCHC 33.9 34.3       CMP (Last 3 Results):   Recent Labs   Lab 08/22/22 0228 08/23/22  1231   * 124*   CALCIUM 9.7 9.1   ALBUMIN 4.6 3.0*   PROT 8.4 7.0    133*   K 3.6 3.3*   CO2 31* 30*   CL 96 95   BUN 9 26*   CREATININE 0.82 0.7   ALKPHOS 74 53*   ALT 15 9*   AST 25 11   BILITOT 1.0 1.5*           CRP (Last 3 Results):   Recent Labs   Lab 08/22/22  1905   .2*       Microbiology Results (last 7 days)       ** No results found for the last 168 hours. **          Recent Labs   Lab 08/22/22  0448   COLORU Yellow   SPECGRAV >=1.030*   PHUR 6.0   PROTEINUA 1+*   BACTERIA Moderate*   NITRITE Negative   LEUKOCYTESUR 1+*   UROBILINOGEN Negative   HYALINECASTS 0       All pertinent lab results within the last 24 hours have been reviewed.     Significant Diagnostics:  I have reviewed all pertinent imaging results/findings within the  past 24 hours.      Assessment/Plan:     * Partial small bowel obstruction  Nyasia Middleton is a 41 y.o. female who is s/p diverting loop ileostomy 8/17/22, readmitted due to small bowel obstruction as evidenced by CT. Urinalysis and clinical presentation also indicative of symptomatic UTI. S/p diagnostic lap 8/24/22 unremarkable aside from dilated bowels.    - Pain control: multimodal  - Diet: LRD  - mIVF: D5 1/2NS 20meqK @50cc/hr  - antiemetics PRN, zofran, scopolamine patch, phenergan - adjusted phenergan to 25mg due to continued nausea  - tylenol for fever  - Abx zosyn for UTI for 5 days, last dose today  - CRP daily  - OOBTC    Dispo: Continue inpatient care            Natan Hopson MD  Colorectal Surgery  [facility]

## 2022-08-27 LAB
ANION GAP SERPL CALC-SCNC: 11 MMOL/L (ref 8–16)
ANION GAP SERPL CALC-SCNC: 12 MMOL/L (ref 8–16)
BUN SERPL-MCNC: 13 MG/DL (ref 6–20)
BUN SERPL-MCNC: 17 MG/DL (ref 6–20)
CALCIUM SERPL-MCNC: 10.3 MG/DL (ref 8.7–10.5)
CALCIUM SERPL-MCNC: 10.6 MG/DL (ref 8.7–10.5)
CHLORIDE SERPL-SCNC: 87 MMOL/L (ref 95–110)
CHLORIDE SERPL-SCNC: 89 MMOL/L (ref 95–110)
CO2 SERPL-SCNC: 25 MMOL/L (ref 23–29)
CO2 SERPL-SCNC: 26 MMOL/L (ref 23–29)
CREAT SERPL-MCNC: 1.2 MG/DL (ref 0.5–1.4)
CREAT SERPL-MCNC: 1.5 MG/DL (ref 0.5–1.4)
CRP SERPL-MCNC: 49.1 MG/L (ref 0–8.2)
EST. GFR  (NO RACE VARIABLE): 44.9 ML/MIN/1.73 M^2
EST. GFR  (NO RACE VARIABLE): 58.7 ML/MIN/1.73 M^2
GLUCOSE SERPL-MCNC: 110 MG/DL (ref 70–110)
GLUCOSE SERPL-MCNC: 115 MG/DL (ref 70–110)
MAGNESIUM SERPL-MCNC: 1.8 MG/DL (ref 1.6–2.6)
PHOSPHATE SERPL-MCNC: 3.2 MG/DL (ref 2.7–4.5)
POTASSIUM SERPL-SCNC: 3.6 MMOL/L (ref 3.5–5.1)
POTASSIUM SERPL-SCNC: 3.7 MMOL/L (ref 3.5–5.1)
SODIUM SERPL-SCNC: 124 MMOL/L (ref 136–145)
SODIUM SERPL-SCNC: 126 MMOL/L (ref 136–145)

## 2022-08-27 PROCEDURE — 25000003 PHARM REV CODE 250

## 2022-08-27 PROCEDURE — 63600175 PHARM REV CODE 636 W HCPCS

## 2022-08-27 PROCEDURE — 63600175 PHARM REV CODE 636 W HCPCS: Performed by: STUDENT IN AN ORGANIZED HEALTH CARE EDUCATION/TRAINING PROGRAM

## 2022-08-27 PROCEDURE — 63600175 PHARM REV CODE 636 W HCPCS: Performed by: ANESTHESIOLOGY

## 2022-08-27 PROCEDURE — 80048 BASIC METABOLIC PNL TOTAL CA: CPT | Performed by: STUDENT IN AN ORGANIZED HEALTH CARE EDUCATION/TRAINING PROGRAM

## 2022-08-27 PROCEDURE — 25000003 PHARM REV CODE 250: Performed by: STUDENT IN AN ORGANIZED HEALTH CARE EDUCATION/TRAINING PROGRAM

## 2022-08-27 PROCEDURE — 83735 ASSAY OF MAGNESIUM: CPT | Performed by: STUDENT IN AN ORGANIZED HEALTH CARE EDUCATION/TRAINING PROGRAM

## 2022-08-27 PROCEDURE — 36415 COLL VENOUS BLD VENIPUNCTURE: CPT | Performed by: STUDENT IN AN ORGANIZED HEALTH CARE EDUCATION/TRAINING PROGRAM

## 2022-08-27 PROCEDURE — 84100 ASSAY OF PHOSPHORUS: CPT | Performed by: STUDENT IN AN ORGANIZED HEALTH CARE EDUCATION/TRAINING PROGRAM

## 2022-08-27 PROCEDURE — 63600175 PHARM REV CODE 636 W HCPCS: Performed by: COLON & RECTAL SURGERY

## 2022-08-27 PROCEDURE — 25000242 PHARM REV CODE 250 ALT 637 W/ HCPCS: Performed by: STUDENT IN AN ORGANIZED HEALTH CARE EDUCATION/TRAINING PROGRAM

## 2022-08-27 PROCEDURE — 93005 ELECTROCARDIOGRAM TRACING: CPT

## 2022-08-27 PROCEDURE — 93010 EKG 12-LEAD: ICD-10-PCS | Mod: ,,, | Performed by: INTERNAL MEDICINE

## 2022-08-27 PROCEDURE — 80048 BASIC METABOLIC PNL TOTAL CA: CPT | Mod: 91 | Performed by: STUDENT IN AN ORGANIZED HEALTH CARE EDUCATION/TRAINING PROGRAM

## 2022-08-27 PROCEDURE — 86140 C-REACTIVE PROTEIN: CPT | Performed by: STUDENT IN AN ORGANIZED HEALTH CARE EDUCATION/TRAINING PROGRAM

## 2022-08-27 PROCEDURE — 20600001 HC STEP DOWN PRIVATE ROOM

## 2022-08-27 PROCEDURE — 93010 ELECTROCARDIOGRAM REPORT: CPT | Mod: ,,, | Performed by: INTERNAL MEDICINE

## 2022-08-27 PROCEDURE — 87449 NOS EACH ORGANISM AG IA: CPT | Performed by: STUDENT IN AN ORGANIZED HEALTH CARE EDUCATION/TRAINING PROGRAM

## 2022-08-27 PROCEDURE — 27000207 HC ISOLATION

## 2022-08-27 RX ORDER — DEXTROSE MONOHYDRATE AND SODIUM CHLORIDE 5; .9 G/100ML; G/100ML
INJECTION, SOLUTION INTRAVENOUS CONTINUOUS
Status: DISCONTINUED | OUTPATIENT
Start: 2022-08-27 | End: 2022-08-29

## 2022-08-27 RX ADMIN — Medication 1 PACKET: at 08:08

## 2022-08-27 RX ADMIN — FLUOXETINE 40 MG: 20 CAPSULE ORAL at 08:08

## 2022-08-27 RX ADMIN — ALPRAZOLAM 0.25 MG: 0.25 TABLET ORAL at 02:08

## 2022-08-27 RX ADMIN — PROMETHAZINE HYDROCHLORIDE 25 MG: 25 INJECTION INTRAMUSCULAR; INTRAVENOUS at 03:08

## 2022-08-27 RX ADMIN — BUPROPION HYDROCHLORIDE 300 MG: 150 TABLET, FILM COATED, EXTENDED RELEASE ORAL at 08:08

## 2022-08-27 RX ADMIN — ONDANSETRON 8 MG: 2 INJECTION INTRAMUSCULAR; INTRAVENOUS at 05:08

## 2022-08-27 RX ADMIN — MUPIROCIN: 20 OINTMENT TOPICAL at 08:08

## 2022-08-27 RX ADMIN — SODIUM CHLORIDE 1000 ML: 0.9 INJECTION, SOLUTION INTRAVENOUS at 06:08

## 2022-08-27 RX ADMIN — HYDROMORPHONE HYDROCHLORIDE 0.5 MG: 1 INJECTION, SOLUTION INTRAMUSCULAR; INTRAVENOUS; SUBCUTANEOUS at 01:08

## 2022-08-27 RX ADMIN — ALPRAZOLAM 0.25 MG: 0.25 TABLET ORAL at 08:08

## 2022-08-27 RX ADMIN — OLANZAPINE 10 MG: 5 TABLET, ORALLY DISINTEGRATING ORAL at 10:08

## 2022-08-27 RX ADMIN — DEXTROSE AND SODIUM CHLORIDE: 5; .9 INJECTION, SOLUTION INTRAVENOUS at 05:08

## 2022-08-27 RX ADMIN — PROMETHAZINE HYDROCHLORIDE 25 MG: 25 INJECTION INTRAMUSCULAR; INTRAVENOUS at 04:08

## 2022-08-27 RX ADMIN — PROMETHAZINE HYDROCHLORIDE 25 MG: 25 INJECTION INTRAMUSCULAR; INTRAVENOUS at 08:08

## 2022-08-27 RX ADMIN — SODIUM CHLORIDE, POTASSIUM CHLORIDE, SODIUM LACTATE AND CALCIUM CHLORIDE 500 ML: 600; 310; 30; 20 INJECTION, SOLUTION INTRAVENOUS at 12:08

## 2022-08-27 RX ADMIN — ONDANSETRON 4 MG: 2 INJECTION INTRAMUSCULAR; INTRAVENOUS at 12:08

## 2022-08-27 RX ADMIN — PANTOPRAZOLE SODIUM 40 MG: 40 TABLET, DELAYED RELEASE ORAL at 08:08

## 2022-08-27 RX ADMIN — OLANZAPINE 10 MG: 5 TABLET, ORALLY DISINTEGRATING ORAL at 08:08

## 2022-08-27 RX ADMIN — ONDANSETRON 8 MG: 2 INJECTION INTRAMUSCULAR; INTRAVENOUS at 06:08

## 2022-08-27 RX ADMIN — PROMETHAZINE HYDROCHLORIDE 25 MG: 25 INJECTION INTRAMUSCULAR; INTRAVENOUS at 09:08

## 2022-08-27 RX ADMIN — SCOPALAMINE 1 PATCH: 1 PATCH, EXTENDED RELEASE TRANSDERMAL at 01:08

## 2022-08-27 RX ADMIN — ENOXAPARIN SODIUM 40 MG: 40 INJECTION SUBCUTANEOUS at 06:08

## 2022-08-27 RX ADMIN — OXYCODONE 5 MG: 5 TABLET ORAL at 08:08

## 2022-08-27 NOTE — ASSESSMENT & PLAN NOTE
Nyasia Middleton is a 41 y.o. female who is s/p diverting loop ileostomy 8/17/22, readmitted due to small bowel obstruction as evidenced by CT. Urinalysis and clinical presentation also indicative of symptomatic UTI. S/p diagnostic lap 8/24/22 unremarkable aside from dilated bowels.    - Pain control: multimodal  - Diet: LRD, add boost supplement  - mIVF: D5 1/2NS 20meqK @125cc/hr  - antiemetics PRN, zofran, scopolamine patch, phenergan - adjusted phenergan to 25mg due to continued nausea  - tylenol for fever  - Abx zosyn for UTI for 5 days, tomorrow last dose  - CRP daily, BMP, MG, Phos today  - OOBTC    Dispo: Continue inpatient care

## 2022-08-27 NOTE — PLAN OF CARE
Plan of care reviewed with pt.  -nausea persist but improving, gave phenergan IV x2, last dose around 0300  -complaints of frequent liquid stool, pt requested imodium - Md stated he will reassess in am   - Zyprexa and xanax given at bedtime, pt slept well and no anxiety issues   -Pt got placed on Q4 hr accuchecks due to stating she thought her glucose was low - requested to not be so frequent  -VS stable, adequate output WCTM    Problem: Adult Inpatient Plan of Care  Goal: Plan of Care Review  8/27/2022 0318 by Shawna Wright RN  Outcome: Ongoing, Progressing  8/27/2022 0318 by Shawna Wright RN  Outcome: Ongoing, Progressing  Goal: Patient-Specific Goal (Individualized)  8/27/2022 0318 by Shawna Wright RN  Outcome: Ongoing, Progressing  8/27/2022 0318 by Shawna Wright RN  Outcome: Ongoing, Progressing  Goal: Absence of Hospital-Acquired Illness or Injury  8/27/2022 0318 by Shawna Wright RN  Outcome: Ongoing, Progressing  8/27/2022 0318 by Shawna Wright RN  Outcome: Ongoing, Progressing  Goal: Optimal Comfort and Wellbeing  8/27/2022 0318 by Shawna Wright RN  Outcome: Ongoing, Progressing  8/27/2022 0318 by Shawna Wright RN  Outcome: Ongoing, Progressing  Goal: Readiness for Transition of Care  8/27/2022 0318 by Shawna Wright RN  Outcome: Ongoing, Progressing  8/27/2022 0318 by Shawna Wright RN  Outcome: Ongoing, Progressing     Problem: Fall Injury Risk  Goal: Absence of Fall and Fall-Related Injury  8/27/2022 0318 by Shawna Wright RN  Outcome: Ongoing, Progressing  8/27/2022 0318 by Shawna Wright RN  Outcome: Ongoing, Progressing     Problem: Activity and Energy Impairment (Anxiety Signs/Symptoms)  Goal: Optimized Energy Level (Anxiety Signs/Symptoms)  Outcome: Ongoing, Progressing     Problem: Cognitive Impairment (Anxiety Signs/Symptoms)  Goal: Optimized Cognitive Function (Anxiety Signs/Symptoms)  Outcome: Ongoing, Progressing     Problem: Mood Impairment (Anxiety Signs/Symptoms)  Goal: Improved Mood Symptoms (Anxiety  Signs/Symptoms)  Outcome: Ongoing, Progressing     Problem: Sleep Impairment (Anxiety Signs/Symptoms)  Goal: Improved Sleep (Anxiety Signs/Symptoms)  Outcome: Ongoing, Progressing     Problem: Social, Occupational or Functional Impairment (Anxiety Signs/Symptoms)  Goal: Enhanced Social, Occupational or Functional Skills (Anxiety Signs/Symptoms)  Outcome: Ongoing, Progressing     Problem: Somatic Disturbance (Anxiety Signs/Symptoms)  Goal: Improved Somatic Symptoms (Anxiety Signs/Symptoms)  Outcome: Ongoing, Progressing

## 2022-08-27 NOTE — PROGRESS NOTES
[facility]  Colorectal Surgery  Progress Note    Patient Name: Nyasia Middleton  MRN: [id number]  Admission Date: 8/22/2022  Hospital Length of Stay: 5 days  Attending Physician: CORETTA Doyle MD    Subjective:     Interval History: No acute overnight events. Pain is well controlled. Adequate output from ostomy and adequate UO. Ambulating halls. Currenlty on LRD, taking it easy.     Post-Op Info:  Procedure(s) (LRB):  LAPAROSCOPY, DIAGNOSTIC (N/A)   3 Days Post-Op      Medications:  Continuous Infusions:   dextrose 5 % and 0.45 % NaCl with KCl 20 mEq 100 mL/hr at 08/27/22 0526     Scheduled Meds:   ALPRAZolam  0.25 mg Oral TID    buPROPion  300 mg Oral Nightly    enoxaparin  40 mg Subcutaneous Daily    FLUoxetine  40 mg Oral Nightly    mupirocin   Nasal BID    olanzapine zydis  10 mg Oral BID    pantoprazole  40 mg Oral Daily    scopolamine  1 patch Transdermal Q3 Days     PRN Meds:   acetaminophen    acetaminophen    bismuth subsalicylate    dextrose 10%    dextrose 10%    glucagon (human recombinant)    glucose    glucose    HYDROmorphone    melatonin    metoclopramide HCl    ondansetron    ondansetron    oxyCODONE    promethazine (PHENERGAN) IVPB    sodium chloride 0.9%        Objective:     Vital Signs (Most Recent):  Temp: 98.6 °F (37 °C) (08/27/22 0441)  Pulse: (!) 115 (08/27/22 0441)  Resp: 18 (08/27/22 0441)  BP: 116/68 (08/27/22 0441)  SpO2: 97 % (08/27/22 0441)   Vital Signs (24h Range):  Temp:  [97.7 °F (36.5 °C)-98.6 °F (37 °C)] 98.6 °F (37 °C)  Pulse:  [101-132] 115  Resp:  [16-20] 18  SpO2:  [95 %-100 %] 97 %  BP: (105-142)/(55-97) 116/68     Intake/Output - Last 3 Shifts         08/25 0700  08/26 0659 08/26 0700  08/27 0659 08/27 0700 08/28 0659    P.O. 990 1240     I.V. (mL/kg)  3634 (64.1)     IV Piggyback 480.4 809.1     Total Intake(mL/kg) 1470.4 (25.9) 5683.1 (100.2)     Urine (mL/kg/hr) 600 (0.4) 1390 (1)     Drains 1050      Stool 1750 1950     Total Output 3400 3340     Net -1929.6  +2343.1            Urine Occurrence 3 x      Stool Occurrence 1 x 1 x             Physical Exam  Constitutional:       General: She is not in acute distress.     Appearance: Normal appearance.   HENT:      Mouth/Throat:      Mouth: Mucous membranes are moist.   Eyes:      Extraocular Movements: Extraocular movements intact.   Cardiovascular:      Rate and Rhythm: Normal rate and regular rhythm.   Pulmonary:      Effort: Pulmonary effort is normal. No respiratory distress.      Breath sounds: Normal breath sounds.   Abdominal:      General: There is no distension.      Palpations: Abdomen is soft.      Tenderness: There is no guarding.      Comments: RUQ ostomy pouch with some fecal content  Laparoscopic incisions c/d/i   Skin:     General: Skin is warm.   Neurological:      Mental Status: She is alert and oriented to person, place, and time. Mental status is at baseline.         Significant Labs:  CBC (Last 3 Results):   Recent Labs   Lab 08/22/22 0228 08/23/22  0256 08/26/22  0653   WBC 15.74* 6.85 8.27   RBC 4.44 4.14 5.02   HGB 14.1 13.2 16.0   HCT 41.6 38.5 45.5    252 442   MCV 94 93 91   MCH 31.8* 31.9* 31.9*   MCHC 33.9 34.3 35.2       CMP (Last 3 Results):   Recent Labs   Lab 08/22/22 0228 08/23/22  1231   * 124*   CALCIUM 9.7 9.1   ALBUMIN 4.6 3.0*   PROT 8.4 7.0    133*   K 3.6 3.3*   CO2 31* 30*   CL 96 95   BUN 9 26*   CREATININE 0.82 0.7   ALKPHOS 74 53*   ALT 15 9*   AST 25 11   BILITOT 1.0 1.5*           CRP (Last 3 Results):   Recent Labs   Lab 08/22/22  1905 08/27/22  0421   .2* 49.1*       Microbiology Results (last 7 days)       ** No results found for the last 168 hours. **          Recent Labs   Lab 08/22/22  0448   COLORU Yellow   SPECGRAV >=1.030*   PHUR 6.0   PROTEINUA 1+*   BACTERIA Moderate*   NITRITE Negative   LEUKOCYTESUR 1+*   UROBILINOGEN Negative   HYALINECASTS 0       All pertinent lab results within the last 24 hours have been reviewed.     Significant  Diagnostics:  I have reviewed all pertinent imaging results/findings within the past 24 hours.      Assessment/Plan:     * Partial small bowel obstruction  Nyasia Middleton is a 41 y.o. female who is s/p diverting loop ileostomy 8/17/22, readmitted due to small bowel obstruction as evidenced by CT. Urinalysis and clinical presentation also indicative of symptomatic UTI. S/p diagnostic lap 8/24/22 unremarkable aside from dilated bowels.    - Pain control: multimodal  - Diet: regular diet, add boost supplement  - mIVF: D5 1/2NS 20meqK @125cc/hr  - antiemetics PRN, zofran, scopolamine patch, phenergan  - tylenol for fever  - CRP daily; BMP, MG, Phos today  - Cdiff studies from ostomy output  - Bowel regimen: Psyllium BID  - OOBTC    Dispo: Continue inpatient care            Natan Hopson MD  Colorectal Surgery  [facility]

## 2022-08-27 NOTE — SUBJECTIVE & OBJECTIVE
Subjective:     Interval History: No acute overnight events. Pain is well controlled. Adequate output from ostomy and adequate UO. Ambulating halls. Currenlty on LRD, taking it easy.     Post-Op Info:  Procedure(s) (LRB):  LAPAROSCOPY, DIAGNOSTIC (N/A)   3 Days Post-Op      Medications:  Continuous Infusions:   dextrose 5 % and 0.45 % NaCl with KCl 20 mEq 100 mL/hr at 08/27/22 0526     Scheduled Meds:   ALPRAZolam  0.25 mg Oral TID    buPROPion  300 mg Oral Nightly    enoxaparin  40 mg Subcutaneous Daily    FLUoxetine  40 mg Oral Nightly    mupirocin   Nasal BID    olanzapine zydis  10 mg Oral BID    pantoprazole  40 mg Oral Daily    scopolamine  1 patch Transdermal Q3 Days     PRN Meds:   acetaminophen    acetaminophen    bismuth subsalicylate    dextrose 10%    dextrose 10%    glucagon (human recombinant)    glucose    glucose    HYDROmorphone    melatonin    metoclopramide HCl    ondansetron    ondansetron    oxyCODONE    promethazine (PHENERGAN) IVPB    sodium chloride 0.9%        Objective:     Vital Signs (Most Recent):  Temp: 98.6 °F (37 °C) (08/27/22 0441)  Pulse: (!) 115 (08/27/22 0441)  Resp: 18 (08/27/22 0441)  BP: 116/68 (08/27/22 0441)  SpO2: 97 % (08/27/22 0441)   Vital Signs (24h Range):  Temp:  [97.7 °F (36.5 °C)-98.6 °F (37 °C)] 98.6 °F (37 °C)  Pulse:  [101-132] 115  Resp:  [16-20] 18  SpO2:  [95 %-100 %] 97 %  BP: (105-142)/(55-97) 116/68     Intake/Output - Last 3 Shifts         08/25 0700  08/26 0659 08/26 0700 08/27 0659 08/27 0700 08/28 0659    P.O. 990 1240     I.V. (mL/kg)  3634 (64.1)     IV Piggyback 480.4 809.1     Total Intake(mL/kg) 1470.4 (25.9) 5683.1 (100.2)     Urine (mL/kg/hr) 600 (0.4) 1390 (1)     Drains 1050      Stool 1750 1950     Total Output 3400 3340     Net -1929.6 +2343.1            Urine Occurrence 3 x      Stool Occurrence 1 x 1 x             Physical Exam  Constitutional:       General: She is not in acute distress.     Appearance: Normal appearance.   HENT:       Mouth/Throat:      Mouth: Mucous membranes are moist.   Eyes:      Extraocular Movements: Extraocular movements intact.   Cardiovascular:      Rate and Rhythm: Normal rate and regular rhythm.   Pulmonary:      Effort: Pulmonary effort is normal. No respiratory distress.      Breath sounds: Normal breath sounds.   Abdominal:      General: There is no distension.      Palpations: Abdomen is soft.      Tenderness: There is no guarding.      Comments: RUQ ostomy pouch with some fecal content  Laparoscopic incisions c/d/i   Skin:     General: Skin is warm.   Neurological:      Mental Status: She is alert and oriented to person, place, and time. Mental status is at baseline.         Significant Labs:  CBC (Last 3 Results):   Recent Labs   Lab 08/22/22 0228 08/23/22  0256 08/26/22  0653   WBC 15.74* 6.85 8.27   RBC 4.44 4.14 5.02   HGB 14.1 13.2 16.0   HCT 41.6 38.5 45.5    252 442   MCV 94 93 91   MCH 31.8* 31.9* 31.9*   MCHC 33.9 34.3 35.2       CMP (Last 3 Results):   Recent Labs   Lab 08/22/22 0228 08/23/22  1231   * 124*   CALCIUM 9.7 9.1   ALBUMIN 4.6 3.0*   PROT 8.4 7.0    133*   K 3.6 3.3*   CO2 31* 30*   CL 96 95   BUN 9 26*   CREATININE 0.82 0.7   ALKPHOS 74 53*   ALT 15 9*   AST 25 11   BILITOT 1.0 1.5*           CRP (Last 3 Results):   Recent Labs   Lab 08/22/22  1905 08/27/22  0421   .2* 49.1*       Microbiology Results (last 7 days)       ** No results found for the last 168 hours. **          Recent Labs   Lab 08/22/22  0448   COLORU Yellow   SPECGRAV >=1.030*   PHUR 6.0   PROTEINUA 1+*   BACTERIA Moderate*   NITRITE Negative   LEUKOCYTESUR 1+*   UROBILINOGEN Negative   HYALINECASTS 0       All pertinent lab results within the last 24 hours have been reviewed.     Significant Diagnostics:  I have reviewed all pertinent imaging results/findings within the past 24 hours.

## 2022-08-28 LAB
ANION GAP SERPL CALC-SCNC: 11 MMOL/L (ref 8–16)
ANION GAP SERPL CALC-SCNC: 11 MMOL/L (ref 8–16)
ANION GAP SERPL CALC-SCNC: 15 MMOL/L (ref 8–16)
BUN SERPL-MCNC: 14 MG/DL (ref 6–20)
BUN SERPL-MCNC: 14 MG/DL (ref 6–20)
BUN SERPL-MCNC: 17 MG/DL (ref 6–20)
C DIFF GDH STL QL: NEGATIVE
C DIFF TOX A+B STL QL IA: NEGATIVE
CALCIUM SERPL-MCNC: 10.4 MG/DL (ref 8.7–10.5)
CALCIUM SERPL-MCNC: 9.6 MG/DL (ref 8.7–10.5)
CALCIUM SERPL-MCNC: 9.8 MG/DL (ref 8.7–10.5)
CHLORIDE SERPL-SCNC: 100 MMOL/L (ref 95–110)
CHLORIDE SERPL-SCNC: 91 MMOL/L (ref 95–110)
CHLORIDE SERPL-SCNC: 99 MMOL/L (ref 95–110)
CO2 SERPL-SCNC: 18 MMOL/L (ref 23–29)
CO2 SERPL-SCNC: 23 MMOL/L (ref 23–29)
CO2 SERPL-SCNC: 24 MMOL/L (ref 23–29)
CREAT SERPL-MCNC: 0.9 MG/DL (ref 0.5–1.4)
CREAT SERPL-MCNC: 1 MG/DL (ref 0.5–1.4)
CREAT SERPL-MCNC: 1.2 MG/DL (ref 0.5–1.4)
EST. GFR  (NO RACE VARIABLE): 58.7 ML/MIN/1.73 M^2
EST. GFR  (NO RACE VARIABLE): >60 ML/MIN/1.73 M^2
EST. GFR  (NO RACE VARIABLE): >60 ML/MIN/1.73 M^2
GLUCOSE SERPL-MCNC: 100 MG/DL (ref 70–110)
GLUCOSE SERPL-MCNC: 141 MG/DL (ref 70–110)
GLUCOSE SERPL-MCNC: 73 MG/DL (ref 70–110)
MAGNESIUM SERPL-MCNC: 1.8 MG/DL (ref 1.6–2.6)
PHOSPHATE SERPL-MCNC: 4.2 MG/DL (ref 2.7–4.5)
POTASSIUM SERPL-SCNC: 3.6 MMOL/L (ref 3.5–5.1)
POTASSIUM SERPL-SCNC: 3.7 MMOL/L (ref 3.5–5.1)
POTASSIUM SERPL-SCNC: 4.4 MMOL/L (ref 3.5–5.1)
SODIUM SERPL-SCNC: 126 MMOL/L (ref 136–145)
SODIUM SERPL-SCNC: 133 MMOL/L (ref 136–145)
SODIUM SERPL-SCNC: 133 MMOL/L (ref 136–145)

## 2022-08-28 PROCEDURE — 25000003 PHARM REV CODE 250: Performed by: STUDENT IN AN ORGANIZED HEALTH CARE EDUCATION/TRAINING PROGRAM

## 2022-08-28 PROCEDURE — 25000242 PHARM REV CODE 250 ALT 637 W/ HCPCS: Performed by: STUDENT IN AN ORGANIZED HEALTH CARE EDUCATION/TRAINING PROGRAM

## 2022-08-28 PROCEDURE — 80048 BASIC METABOLIC PNL TOTAL CA: CPT | Performed by: STUDENT IN AN ORGANIZED HEALTH CARE EDUCATION/TRAINING PROGRAM

## 2022-08-28 PROCEDURE — 83735 ASSAY OF MAGNESIUM: CPT | Performed by: STUDENT IN AN ORGANIZED HEALTH CARE EDUCATION/TRAINING PROGRAM

## 2022-08-28 PROCEDURE — 63600175 PHARM REV CODE 636 W HCPCS: Performed by: COLON & RECTAL SURGERY

## 2022-08-28 PROCEDURE — 25000003 PHARM REV CODE 250

## 2022-08-28 PROCEDURE — 20600001 HC STEP DOWN PRIVATE ROOM

## 2022-08-28 PROCEDURE — 36415 COLL VENOUS BLD VENIPUNCTURE: CPT | Performed by: STUDENT IN AN ORGANIZED HEALTH CARE EDUCATION/TRAINING PROGRAM

## 2022-08-28 PROCEDURE — 63600175 PHARM REV CODE 636 W HCPCS: Performed by: STUDENT IN AN ORGANIZED HEALTH CARE EDUCATION/TRAINING PROGRAM

## 2022-08-28 PROCEDURE — 63600175 PHARM REV CODE 636 W HCPCS

## 2022-08-28 PROCEDURE — 84100 ASSAY OF PHOSPHORUS: CPT | Performed by: STUDENT IN AN ORGANIZED HEALTH CARE EDUCATION/TRAINING PROGRAM

## 2022-08-28 RX ORDER — LOPERAMIDE HYDROCHLORIDE 2 MG/1
2 CAPSULE ORAL 2 TIMES DAILY
Status: DISCONTINUED | OUTPATIENT
Start: 2022-08-28 | End: 2022-08-29

## 2022-08-28 RX ADMIN — OLANZAPINE 10 MG: 5 TABLET, ORALLY DISINTEGRATING ORAL at 09:08

## 2022-08-28 RX ADMIN — HYDROMORPHONE HYDROCHLORIDE 0.5 MG: 1 INJECTION, SOLUTION INTRAMUSCULAR; INTRAVENOUS; SUBCUTANEOUS at 01:08

## 2022-08-28 RX ADMIN — ONDANSETRON 8 MG: 2 INJECTION INTRAMUSCULAR; INTRAVENOUS at 12:08

## 2022-08-28 RX ADMIN — ALPRAZOLAM 0.25 MG: 0.25 TABLET ORAL at 09:08

## 2022-08-28 RX ADMIN — PROMETHAZINE HYDROCHLORIDE 25 MG: 25 INJECTION INTRAMUSCULAR; INTRAVENOUS at 04:08

## 2022-08-28 RX ADMIN — METOCLOPRAMIDE 10 MG: 5 INJECTION, SOLUTION INTRAMUSCULAR; INTRAVENOUS at 08:08

## 2022-08-28 RX ADMIN — ALPRAZOLAM 0.25 MG: 0.25 TABLET ORAL at 08:08

## 2022-08-28 RX ADMIN — LOPERAMIDE HYDROCHLORIDE 2 MG: 2 CAPSULE ORAL at 09:08

## 2022-08-28 RX ADMIN — OXYCODONE 5 MG: 5 TABLET ORAL at 09:08

## 2022-08-28 RX ADMIN — PANTOPRAZOLE SODIUM 40 MG: 40 TABLET, DELAYED RELEASE ORAL at 08:08

## 2022-08-28 RX ADMIN — HYDROMORPHONE HYDROCHLORIDE 0.5 MG: 1 INJECTION, SOLUTION INTRAMUSCULAR; INTRAVENOUS; SUBCUTANEOUS at 08:08

## 2022-08-28 RX ADMIN — FLUOXETINE 40 MG: 20 CAPSULE ORAL at 09:08

## 2022-08-28 RX ADMIN — SODIUM CHLORIDE 1000 ML: 0.9 INJECTION, SOLUTION INTRAVENOUS at 09:08

## 2022-08-28 RX ADMIN — OXYCODONE 5 MG: 5 TABLET ORAL at 03:08

## 2022-08-28 RX ADMIN — ENOXAPARIN SODIUM 40 MG: 40 INJECTION SUBCUTANEOUS at 04:08

## 2022-08-28 RX ADMIN — BUPROPION HYDROCHLORIDE 300 MG: 150 TABLET, FILM COATED, EXTENDED RELEASE ORAL at 09:08

## 2022-08-28 RX ADMIN — DEXTROSE AND SODIUM CHLORIDE: 5; .9 INJECTION, SOLUTION INTRAVENOUS at 06:08

## 2022-08-28 RX ADMIN — ONDANSETRON 8 MG: 2 INJECTION INTRAMUSCULAR; INTRAVENOUS at 08:08

## 2022-08-28 RX ADMIN — OXYCODONE 5 MG: 5 TABLET ORAL at 11:08

## 2022-08-28 RX ADMIN — SODIUM CHLORIDE 1000 ML: 0.9 INJECTION, SOLUTION INTRAVENOUS at 01:08

## 2022-08-28 RX ADMIN — Medication 1 PACKET: at 09:08

## 2022-08-28 RX ADMIN — ALPRAZOLAM 0.25 MG: 0.25 TABLET ORAL at 03:08

## 2022-08-28 RX ADMIN — LOPERAMIDE HYDROCHLORIDE 2 MG: 2 CAPSULE ORAL at 11:08

## 2022-08-28 NOTE — ASSESSMENT & PLAN NOTE
Nyasia Middleton is a 41 y.o. female who is s/p diverting loop ileostomy 8/17/22, readmitted due to small bowel obstruction as evidenced by CT. Urinalysis and clinical presentation also indicative of symptomatic UTI. S/p diagnostic lap 8/24/22 unremarkable aside from dilated bowels.    - Pain control: multimodal  - Diet: LRD + boost plus  - mIVF: D5 NS @ 75cc/hr      - correcting hyponatremia  - antiemetics PRN, zofran, scopolamine patch, phenergan - adjusted phenergan to 25mg due to continued nausea  - OOBTC  - Will plan to start imodium for high ostomy output once nausea and vomiting subside    Dispo: Continue inpatient care

## 2022-08-28 NOTE — SUBJECTIVE & OBJECTIVE
Subjective:     Interval History: Yesterday patient was tachycardic and complained of right sided chest pain, EKG revealed sinus tach and pain subsided. BMP revealed Na 126 --> 133. Pain is well controlled. Adequate output from ostomy. Patient also had an episode of vomiting yesterday, on antiemetics prn.    Post-Op Info:  Procedure(s) (LRB):  LAPAROSCOPY, DIAGNOSTIC (N/A)   4 Days Post-Op      Medications:  Continuous Infusions:   dextrose 5 % and 0.9 % NaCl 75 mL/hr at 08/28/22 0720     Scheduled Meds:   ALPRAZolam  0.25 mg Oral TID    buPROPion  300 mg Oral Nightly    enoxaparin  40 mg Subcutaneous Daily    FLUoxetine  40 mg Oral Nightly    olanzapine zydis  10 mg Oral BID    pantoprazole  40 mg Oral Daily    psyllium husk (aspartame)  1 packet Oral BID    scopolamine  1 patch Transdermal Q3 Days     PRN Meds:   acetaminophen    acetaminophen    bismuth subsalicylate    dextrose 10%    dextrose 10%    glucagon (human recombinant)    glucose    glucose    HYDROmorphone    melatonin    metoclopramide HCl    ondansetron    oxyCODONE    promethazine (PHENERGAN) IVPB    sodium chloride 0.9%        Objective:     Vital Signs (Most Recent):  Temp: 96.7 °F (35.9 °C) (08/28/22 0743)  Pulse: 108 (08/28/22 0743)  Resp: 18 (08/28/22 0846)  BP: 137/88 (08/28/22 0743)  SpO2: 99 % (08/28/22 0743)   Vital Signs (24h Range):  Temp:  [96.7 °F (35.9 °C)-97.9 °F (36.6 °C)] 96.7 °F (35.9 °C)  Pulse:  [101-128] 108  Resp:  [16-22] 18  SpO2:  [96 %-100 %] 99 %  BP: (115-138)/(70-88) 137/88     Intake/Output - Last 3 Shifts         08/26 0700 08/27 0659 08/27 0700 08/28 0659 08/28 0700 08/29 0659    P.O. 1240      I.V. (mL/kg) 3634 (64.1) 708.6 (12.5)     IV Piggyback 809.1 1900     Total Intake(mL/kg) 5683.1 (100.2) 2608.5 (46)     Urine (mL/kg/hr) 1390 (1) 1600 (1.2)     Emesis/NG output  200     Drains       Stool 1950 2450 400    Total Output 3340 4250 400    Net +2343.1 -1641.5 -400           Stool Occurrence 1 x 1 x 1 x     Emesis Occurrence  1 x             Physical Exam  Constitutional:       General: She is not in acute distress.     Appearance: Normal appearance.   HENT:      Mouth/Throat:      Mouth: Mucous membranes are moist.   Eyes:      Extraocular Movements: Extraocular movements intact.   Cardiovascular:      Rate and Rhythm: Normal rate and regular rhythm.   Pulmonary:      Effort: Pulmonary effort is normal. No respiratory distress.      Breath sounds: Normal breath sounds.   Abdominal:      General: There is no distension.      Palpations: Abdomen is soft.      Tenderness: There is no guarding.      Comments: RUQ ostomy pouch with some fecal content  Laparoscopic incisions c/d/i   Skin:     General: Skin is warm.   Neurological:      Mental Status: She is alert and oriented to person, place, and time. Mental status is at baseline.         Significant Labs:  CBC (Last 3 Results):   Recent Labs   Lab 08/22/22 0228 08/23/22  0256 08/26/22  0653   WBC 15.74* 6.85 8.27   RBC 4.44 4.14 5.02   HGB 14.1 13.2 16.0   HCT 41.6 38.5 45.5    252 442   MCV 94 93 91   MCH 31.8* 31.9* 31.9*   MCHC 33.9 34.3 35.2       CMP (Last 3 Results):   Recent Labs   Lab 08/22/22 0228 08/23/22  1231 08/27/22  0901 08/27/22  1830 08/27/22  2339 08/28/22  0543   * 124*   < > 110 141* 100   CALCIUM 9.7 9.1   < > 10.6* 9.8 9.6   ALBUMIN 4.6 3.0*  --   --   --   --    PROT 8.4 7.0  --   --   --   --     133*   < > 124* 126* 133*   K 3.6 3.3*   < > 3.6 4.4 3.7   CO2 31* 30*   < > 25 24 23   CL 96 95   < > 87* 91* 99   BUN 9 26*   < > 17 17 14   CREATININE 0.82 0.7   < > 1.5* 1.2 1.0   ALKPHOS 74 53*  --   --   --   --    ALT 15 9*  --   --   --   --    AST 25 11  --   --   --   --    BILITOT 1.0 1.5*  --   --   --   --     < > = values in this interval not displayed.           CRP (Last 3 Results):   Recent Labs   Lab 08/22/22  1905 08/27/22  0421   .2* 49.1*       Microbiology Results (last 7 days)       Procedure Component  Value Units Date/Time    Clostridium difficile EIA [[id number]] Collected: 08/27/22 0953    Order Status: Completed Specimen: Stool Updated: 08/28/22 0019     C. diff Antigen Negative     C difficile Toxins A+B, EIA Negative     Comment: Testing not recommended for children <24 months old.       Narrative:      Please check C Diff of ileostomy output (NOT stool from  rectum)          Recent Labs   Lab 08/22/22  0448   COLORU Yellow   SPECGRAV >=1.030*   PHUR 6.0   PROTEINUA 1+*   BACTERIA Moderate*   NITRITE Negative   LEUKOCYTESUR 1+*   UROBILINOGEN Negative   HYALINECASTS 0       All pertinent lab results within the last 24 hours have been reviewed.     Significant Diagnostics:  I have reviewed all pertinent imaging results/findings within the past 24 hours.

## 2022-08-28 NOTE — PLAN OF CARE
POC reviewed with pt, NA level improving, safety maintained over night, VSS, Rec'd Bolus of 1000cc of NS, Adequate urine out. Still having green liquid from ostomy. Cdiff test negative - taken off isolation. Complaints of nausea and pain through out the night. Refused accu checks Q 4hr. WCTM    Problem: Adult Inpatient Plan of Care  Goal: Plan of Care Review  Outcome: Ongoing, Progressing  Goal: Patient-Specific Goal (Individualized)  Outcome: Ongoing, Progressing  Goal: Absence of Hospital-Acquired Illness or Injury  Outcome: Ongoing, Progressing  Goal: Optimal Comfort and Wellbeing  Outcome: Ongoing, Progressing  Goal: Readiness for Transition of Care  Outcome: Ongoing, Progressing     Problem: Fall Injury Risk  Goal: Absence of Fall and Fall-Related Injury  Outcome: Ongoing, Progressing     Problem: Activity and Energy Impairment (Anxiety Signs/Symptoms)  Goal: Optimized Energy Level (Anxiety Signs/Symptoms)  Outcome: Ongoing, Progressing     Problem: Cognitive Impairment (Anxiety Signs/Symptoms)  Goal: Optimized Cognitive Function (Anxiety Signs/Symptoms)  Outcome: Ongoing, Progressing     Problem: Mood Impairment (Anxiety Signs/Symptoms)  Goal: Improved Mood Symptoms (Anxiety Signs/Symptoms)  Outcome: Ongoing, Progressing     Problem: Sleep Impairment (Anxiety Signs/Symptoms)  Goal: Improved Sleep (Anxiety Signs/Symptoms)  Outcome: Ongoing, Progressing     Problem: Social, Occupational or Functional Impairment (Anxiety Signs/Symptoms)  Goal: Enhanced Social, Occupational or Functional Skills (Anxiety Signs/Symptoms)  Outcome: Ongoing, Progressing     Problem: Somatic Disturbance (Anxiety Signs/Symptoms)  Goal: Improved Somatic Symptoms (Anxiety Signs/Symptoms)  Outcome: Ongoing, Progressing     Problem: Infection  Goal: Absence of Infection Signs and Symptoms  Outcome: Ongoing, Progressing

## 2022-08-28 NOTE — PLAN OF CARE
POC reviewed, pt verbalized understanding, complains of N/V, antiemetics given with moderate relief, pt had fall today, no injuries, vital signs remain unchanged, neuro intact, Avasys put in room, bed alarm set, MD and charge nurse aware, pt notified family, heart rate tachy, complain of right sided CP, MD at bedside, new orders noted, will continue to monitor,    Problem: Adult Inpatient Plan of Care  Goal: Plan of Care Review  Outcome: Ongoing, Progressing  Goal: Patient-Specific Goal (Individualized)  Outcome: Ongoing, Progressing  Goal: Absence of Hospital-Acquired Illness or Injury  Outcome: Ongoing, Progressing  Goal: Optimal Comfort and Wellbeing  Outcome: Ongoing, Progressing  Goal: Readiness for Transition of Care  Outcome: Ongoing, Progressing     Problem: Fall Injury Risk  Goal: Absence of Fall and Fall-Related Injury  Outcome: Ongoing, Progressing     Problem: Activity and Energy Impairment (Anxiety Signs/Symptoms)  Goal: Optimized Energy Level (Anxiety Signs/Symptoms)  Outcome: Ongoing, Progressing     Problem: Cognitive Impairment (Anxiety Signs/Symptoms)  Goal: Optimized Cognitive Function (Anxiety Signs/Symptoms)  Outcome: Ongoing, Progressing     Problem: Mood Impairment (Anxiety Signs/Symptoms)  Goal: Improved Mood Symptoms (Anxiety Signs/Symptoms)  Outcome: Ongoing, Progressing     Problem: Sleep Impairment (Anxiety Signs/Symptoms)  Goal: Improved Sleep (Anxiety Signs/Symptoms)  Outcome: Ongoing, Progressing     Problem: Social, Occupational or Functional Impairment (Anxiety Signs/Symptoms)  Goal: Enhanced Social, Occupational or Functional Skills (Anxiety Signs/Symptoms)  Outcome: Ongoing, Progressing     Problem: Somatic Disturbance (Anxiety Signs/Symptoms)  Goal: Improved Somatic Symptoms (Anxiety Signs/Symptoms)  Outcome: Ongoing, Progressing     Problem: Infection  Goal: Absence of Infection Signs and Symptoms  Outcome: Ongoing, Progressing

## 2022-08-28 NOTE — PROGRESS NOTES
[facility]  Colorectal Surgery  Progress Note    Patient Name: Nyasia Middleton  MRN: [id number]  Admission Date: 8/22/2022  Hospital Length of Stay: 6 days  Attending Physician: CORETTA Doyle MD    Subjective:     Interval History: Yesterday patient was tachycardic and complained of right sided chest pain, EKG revealed sinus tach and pain subsided. BMP revealed Na 126 --> 133. Pain is well controlled. Adequate output from ostomy. Patient also had an episode of vomiting yesterday, on antiemetics prn.    Post-Op Info:  Procedure(s) (LRB):  LAPAROSCOPY, DIAGNOSTIC (N/A)   4 Days Post-Op      Medications:  Continuous Infusions:   dextrose 5 % and 0.9 % NaCl 75 mL/hr at 08/28/22 0720     Scheduled Meds:   ALPRAZolam  0.25 mg Oral TID    buPROPion  300 mg Oral Nightly    enoxaparin  40 mg Subcutaneous Daily    FLUoxetine  40 mg Oral Nightly    olanzapine zydis  10 mg Oral BID    pantoprazole  40 mg Oral Daily    psyllium husk (aspartame)  1 packet Oral BID    scopolamine  1 patch Transdermal Q3 Days     PRN Meds:   acetaminophen    acetaminophen    bismuth subsalicylate    dextrose 10%    dextrose 10%    glucagon (human recombinant)    glucose    glucose    HYDROmorphone    melatonin    metoclopramide HCl    ondansetron    oxyCODONE    promethazine (PHENERGAN) IVPB    sodium chloride 0.9%        Objective:     Vital Signs (Most Recent):  Temp: 96.7 °F (35.9 °C) (08/28/22 0743)  Pulse: 108 (08/28/22 0743)  Resp: 18 (08/28/22 0846)  BP: 137/88 (08/28/22 0743)  SpO2: 99 % (08/28/22 0743)   Vital Signs (24h Range):  Temp:  [96.7 °F (35.9 °C)-97.9 °F (36.6 °C)] 96.7 °F (35.9 °C)  Pulse:  [101-128] 108  Resp:  [16-22] 18  SpO2:  [96 %-100 %] 99 %  BP: (115-138)/(70-88) 137/88     Intake/Output - Last 3 Shifts         08/26 0700 08/27 0659 08/27 0700 08/28 0659 08/28 0700  08/29 0659    P.O. 1240      I.V. (mL/kg) 3634 (64.1) 708.6 (12.5)     IV Piggyback 809.1 1900     Total Intake(mL/kg)  5683.1 (100.2) 2608.5 (46)     Urine (mL/kg/hr) 1390 (1) 1600 (1.2)     Emesis/NG output  200     Drains       Stool 1950 2450 400    Total Output 3340 4250 400    Net +2343.1 -1641.5 -400           Stool Occurrence 1 x 1 x 1 x    Emesis Occurrence  1 x             Physical Exam  Constitutional:       General: She is not in acute distress.     Appearance: Normal appearance.   HENT:      Mouth/Throat:      Mouth: Mucous membranes are moist.   Eyes:      Extraocular Movements: Extraocular movements intact.   Cardiovascular:      Rate and Rhythm: Normal rate and regular rhythm.   Pulmonary:      Effort: Pulmonary effort is normal. No respiratory distress.      Breath sounds: Normal breath sounds.   Abdominal:      General: There is no distension.      Palpations: Abdomen is soft.      Tenderness: There is no guarding.      Comments: RUQ ostomy pouch with some fecal content  Laparoscopic incisions c/d/i   Skin:     General: Skin is warm.   Neurological:      Mental Status: She is alert and oriented to person, place, and time. Mental status is at baseline.         Significant Labs:  CBC (Last 3 Results):   Recent Labs   Lab 08/22/22 0228 08/23/22  0256 08/26/22  0653   WBC 15.74* 6.85 8.27   RBC 4.44 4.14 5.02   HGB 14.1 13.2 16.0   HCT 41.6 38.5 45.5    252 442   MCV 94 93 91   MCH 31.8* 31.9* 31.9*   MCHC 33.9 34.3 35.2       CMP (Last 3 Results):   Recent Labs   Lab 08/22/22 0228 08/23/22  1231 08/27/22  0901 08/27/22  1830 08/27/22  2339 08/28/22  0543   * 124*   < > 110 141* 100   CALCIUM 9.7 9.1   < > 10.6* 9.8 9.6   ALBUMIN 4.6 3.0*  --   --   --   --    PROT 8.4 7.0  --   --   --   --     133*   < > 124* 126* 133*   K 3.6 3.3*   < > 3.6 4.4 3.7   CO2 31* 30*   < > 25 24 23   CL 96 95   < > 87* 91* 99   BUN 9 26*   < > 17 17 14   CREATININE 0.82 0.7   < > 1.5* 1.2 1.0   ALKPHOS 74 53*  --   --   --   --    ALT 15 9*  --   --   --   --    AST 25 11  --   --   --   --    BILITOT 1.0 1.5*  --    --   --   --     < > = values in this interval not displayed.           CRP (Last 3 Results):   Recent Labs   Lab 08/22/22  1905 08/27/22  0421   .2* 49.1*       Microbiology Results (last 7 days)       Procedure Component Value Units Date/Time    Clostridium difficile EIA [[id number]] Collected: 08/27/22 0953    Order Status: Completed Specimen: Stool Updated: 08/28/22 0019     C. diff Antigen Negative     C difficile Toxins A+B, EIA Negative     Comment: Testing not recommended for children <24 months old.       Narrative:      Please check C Diff of ileostomy output (NOT stool from  rectum)          Recent Labs   Lab 08/22/22 0448   COLORU Yellow   SPECGRAV >=1.030*   PHUR 6.0   PROTEINUA 1+*   BACTERIA Moderate*   NITRITE Negative   LEUKOCYTESUR 1+*   UROBILINOGEN Negative   HYALINECASTS 0       All pertinent lab results within the last 24 hours have been reviewed.     Significant Diagnostics:  I have reviewed all pertinent imaging results/findings within the past 24 hours.      Assessment/Plan:     * Partial small bowel obstruction  Nyasia Middleton is a 41 y.o. female who is s/p diverting loop ileostomy 8/17/22, readmitted due to small bowel obstruction as evidenced by CT. Urinalysis and clinical presentation also indicative of symptomatic UTI. S/p diagnostic lap 8/24/22 unremarkable aside from dilated bowels.    - Pain control: multimodal  - Diet: LRD + boost plus  - mIVF: D5 NS @ 75cc/hr      - correcting hyponatremia  - antiemetics PRN, zofran, scopolamine patch, phenergan - adjusted phenergan to 25mg due to continued nausea  - OOBTC  - Will plan to start imodium for high ostomy output once nausea and vomiting subside    Dispo: Continue inpatient care            Natan Hopson MD  Colorectal Surgery  [facility]

## 2022-08-28 NOTE — PLAN OF CARE
POC reviewed, pt verbalized understanding, AAOX4, Avasys in use for safety, continues on IVF's, complains of pain, dilaudid and oxycodone given with moderate relief, phenergan and zofran given PRN for nausea, continues to have large amounts of liquid stool from ostomy, SBA with ambulation, tolerating regular diet, PO intake good, call light and belongings in reach, will continue to monitor.    Problem: Adult Inpatient Plan of Care  Goal: Plan of Care Review  Outcome: Ongoing, Progressing  Goal: Patient-Specific Goal (Individualized)  Outcome: Ongoing, Progressing  Goal: Absence of Hospital-Acquired Illness or Injury  Outcome: Ongoing, Progressing  Goal: Optimal Comfort and Wellbeing  Outcome: Ongoing, Progressing  Goal: Readiness for Transition of Care  Outcome: Ongoing, Progressing     Problem: Fall Injury Risk  Goal: Absence of Fall and Fall-Related Injury  Outcome: Ongoing, Progressing     Problem: Activity and Energy Impairment (Anxiety Signs/Symptoms)  Goal: Optimized Energy Level (Anxiety Signs/Symptoms)  Outcome: Ongoing, Progressing     Problem: Cognitive Impairment (Anxiety Signs/Symptoms)  Goal: Optimized Cognitive Function (Anxiety Signs/Symptoms)  Outcome: Ongoing, Progressing     Problem: Mood Impairment (Anxiety Signs/Symptoms)  Goal: Improved Mood Symptoms (Anxiety Signs/Symptoms)  Outcome: Ongoing, Progressing     Problem: Sleep Impairment (Anxiety Signs/Symptoms)  Goal: Improved Sleep (Anxiety Signs/Symptoms)  Outcome: Ongoing, Progressing     Problem: Social, Occupational or Functional Impairment (Anxiety Signs/Symptoms)  Goal: Enhanced Social, Occupational or Functional Skills (Anxiety Signs/Symptoms)  Outcome: Ongoing, Progressing     Problem: Somatic Disturbance (Anxiety Signs/Symptoms)  Goal: Improved Somatic Symptoms (Anxiety Signs/Symptoms)  Outcome: Ongoing, Progressing     Problem: Infection  Goal: Absence of Infection Signs and Symptoms  Outcome: Ongoing, Progressing

## 2022-08-29 LAB
ANION GAP SERPL CALC-SCNC: 8 MMOL/L (ref 8–16)
ANION GAP SERPL CALC-SCNC: 9 MMOL/L (ref 8–16)
BILIRUB UR QL STRIP: NEGATIVE
BUN SERPL-MCNC: 12 MG/DL (ref 6–20)
BUN SERPL-MCNC: 12 MG/DL (ref 6–20)
CALCIUM SERPL-MCNC: 9.5 MG/DL (ref 8.7–10.5)
CALCIUM SERPL-MCNC: 9.7 MG/DL (ref 8.7–10.5)
CHLORIDE SERPL-SCNC: 100 MMOL/L (ref 95–110)
CHLORIDE SERPL-SCNC: 101 MMOL/L (ref 95–110)
CLARITY UR REFRACT.AUTO: CLEAR
CO2 SERPL-SCNC: 20 MMOL/L (ref 23–29)
CO2 SERPL-SCNC: 22 MMOL/L (ref 23–29)
COLOR UR AUTO: YELLOW
CREAT SERPL-MCNC: 0.7 MG/DL (ref 0.5–1.4)
CREAT SERPL-MCNC: 0.7 MG/DL (ref 0.5–1.4)
CRP SERPL-MCNC: 32.8 MG/L (ref 0–8.2)
EST. GFR  (NO RACE VARIABLE): >60 ML/MIN/1.73 M^2
EST. GFR  (NO RACE VARIABLE): >60 ML/MIN/1.73 M^2
GLUCOSE SERPL-MCNC: 74 MG/DL (ref 70–110)
GLUCOSE SERPL-MCNC: 95 MG/DL (ref 70–110)
GLUCOSE UR QL STRIP: NEGATIVE
HGB UR QL STRIP: NEGATIVE
KETONES UR QL STRIP: NEGATIVE
LEUKOCYTE ESTERASE UR QL STRIP: NEGATIVE
NITRITE UR QL STRIP: NEGATIVE
PH UR STRIP: 6 [PH] (ref 5–8)
POTASSIUM SERPL-SCNC: 3.6 MMOL/L (ref 3.5–5.1)
POTASSIUM SERPL-SCNC: 4 MMOL/L (ref 3.5–5.1)
PROT UR QL STRIP: ABNORMAL
SODIUM SERPL-SCNC: 129 MMOL/L (ref 136–145)
SODIUM SERPL-SCNC: 131 MMOL/L (ref 136–145)
SP GR UR STRIP: 1.02 (ref 1–1.03)
URN SPEC COLLECT METH UR: ABNORMAL

## 2022-08-29 PROCEDURE — 81003 URINALYSIS AUTO W/O SCOPE: CPT | Performed by: COLON & RECTAL SURGERY

## 2022-08-29 PROCEDURE — 80048 BASIC METABOLIC PNL TOTAL CA: CPT | Performed by: STUDENT IN AN ORGANIZED HEALTH CARE EDUCATION/TRAINING PROGRAM

## 2022-08-29 PROCEDURE — 63600175 PHARM REV CODE 636 W HCPCS: Performed by: STUDENT IN AN ORGANIZED HEALTH CARE EDUCATION/TRAINING PROGRAM

## 2022-08-29 PROCEDURE — 25000003 PHARM REV CODE 250: Performed by: STUDENT IN AN ORGANIZED HEALTH CARE EDUCATION/TRAINING PROGRAM

## 2022-08-29 PROCEDURE — 25000003 PHARM REV CODE 250

## 2022-08-29 PROCEDURE — 20600001 HC STEP DOWN PRIVATE ROOM

## 2022-08-29 PROCEDURE — 25000003 PHARM REV CODE 250: Performed by: COLON & RECTAL SURGERY

## 2022-08-29 PROCEDURE — 36415 COLL VENOUS BLD VENIPUNCTURE: CPT | Performed by: STUDENT IN AN ORGANIZED HEALTH CARE EDUCATION/TRAINING PROGRAM

## 2022-08-29 PROCEDURE — 86140 C-REACTIVE PROTEIN: CPT | Performed by: STUDENT IN AN ORGANIZED HEALTH CARE EDUCATION/TRAINING PROGRAM

## 2022-08-29 PROCEDURE — 63600175 PHARM REV CODE 636 W HCPCS: Performed by: COLON & RECTAL SURGERY

## 2022-08-29 PROCEDURE — 25000242 PHARM REV CODE 250 ALT 637 W/ HCPCS: Performed by: STUDENT IN AN ORGANIZED HEALTH CARE EDUCATION/TRAINING PROGRAM

## 2022-08-29 PROCEDURE — 63600175 PHARM REV CODE 636 W HCPCS

## 2022-08-29 RX ORDER — LOPERAMIDE HYDROCHLORIDE 2 MG/1
2 CAPSULE ORAL 4 TIMES DAILY
Status: DISCONTINUED | OUTPATIENT
Start: 2022-08-29 | End: 2022-08-29

## 2022-08-29 RX ORDER — ACETAMINOPHEN 650 MG/20.3ML
650 LIQUID ORAL EVERY 6 HOURS PRN
Status: DISCONTINUED | OUTPATIENT
Start: 2022-08-29 | End: 2022-09-01 | Stop reason: HOSPADM

## 2022-08-29 RX ORDER — OXYCODONE HCL 5 MG/5 ML
5 SOLUTION, ORAL ORAL EVERY 4 HOURS PRN
Status: DISCONTINUED | OUTPATIENT
Start: 2022-08-29 | End: 2022-09-01 | Stop reason: HOSPADM

## 2022-08-29 RX ORDER — LOPERAMIDE HYDROCHLORIDE 2 MG/1
4 CAPSULE ORAL 4 TIMES DAILY
Status: DISCONTINUED | OUTPATIENT
Start: 2022-08-29 | End: 2022-08-31

## 2022-08-29 RX ORDER — SODIUM CHLORIDE 9 MG/ML
INJECTION, SOLUTION INTRAVENOUS CONTINUOUS
Status: DISCONTINUED | OUTPATIENT
Start: 2022-08-29 | End: 2022-09-01 | Stop reason: HOSPADM

## 2022-08-29 RX ADMIN — LOPERAMIDE HYDROCHLORIDE 4 MG: 2 CAPSULE ORAL at 09:08

## 2022-08-29 RX ADMIN — PANTOPRAZOLE SODIUM 40 MG: 40 TABLET, DELAYED RELEASE ORAL at 09:08

## 2022-08-29 RX ADMIN — SODIUM CHLORIDE: 0.9 INJECTION, SOLUTION INTRAVENOUS at 09:08

## 2022-08-29 RX ADMIN — HYDROMORPHONE HYDROCHLORIDE 0.5 MG: 1 INJECTION, SOLUTION INTRAMUSCULAR; INTRAVENOUS; SUBCUTANEOUS at 03:08

## 2022-08-29 RX ADMIN — Medication 1 PACKET: at 09:08

## 2022-08-29 RX ADMIN — OLANZAPINE 10 MG: 5 TABLET, ORALLY DISINTEGRATING ORAL at 09:08

## 2022-08-29 RX ADMIN — ONDANSETRON 8 MG: 2 INJECTION INTRAMUSCULAR; INTRAVENOUS at 07:08

## 2022-08-29 RX ADMIN — ALPRAZOLAM 0.25 MG: 0.25 TABLET ORAL at 03:08

## 2022-08-29 RX ADMIN — FLUOXETINE 40 MG: 20 CAPSULE ORAL at 09:08

## 2022-08-29 RX ADMIN — ALPRAZOLAM 0.25 MG: 0.25 TABLET ORAL at 09:08

## 2022-08-29 RX ADMIN — OXYCODONE 5 MG: 5 TABLET ORAL at 09:08

## 2022-08-29 RX ADMIN — SODIUM CHLORIDE 1000 ML: 0.9 INJECTION, SOLUTION INTRAVENOUS at 11:08

## 2022-08-29 RX ADMIN — LOPERAMIDE HYDROCHLORIDE 4 MG: 2 CAPSULE ORAL at 04:08

## 2022-08-29 RX ADMIN — ENOXAPARIN SODIUM 40 MG: 40 INJECTION SUBCUTANEOUS at 04:08

## 2022-08-29 RX ADMIN — LOPERAMIDE HYDROCHLORIDE 4 MG: 2 CAPSULE ORAL at 12:08

## 2022-08-29 RX ADMIN — OXYCODONE HYDROCHLORIDE 5 MG: 5 SOLUTION ORAL at 06:08

## 2022-08-29 RX ADMIN — PROMETHAZINE HYDROCHLORIDE 25 MG: 25 INJECTION INTRAMUSCULAR; INTRAVENOUS at 01:08

## 2022-08-29 RX ADMIN — PROMETHAZINE HYDROCHLORIDE 25 MG: 25 INJECTION INTRAMUSCULAR; INTRAVENOUS at 10:08

## 2022-08-29 RX ADMIN — OXYCODONE 5 MG: 5 TABLET ORAL at 01:08

## 2022-08-29 RX ADMIN — HYDROMORPHONE HYDROCHLORIDE 0.5 MG: 1 INJECTION, SOLUTION INTRAMUSCULAR; INTRAVENOUS; SUBCUTANEOUS at 11:08

## 2022-08-29 RX ADMIN — HYDROMORPHONE HYDROCHLORIDE 0.5 MG: 1 INJECTION, SOLUTION INTRAMUSCULAR; INTRAVENOUS; SUBCUTANEOUS at 01:08

## 2022-08-29 RX ADMIN — ONDANSETRON 8 MG: 2 INJECTION INTRAMUSCULAR; INTRAVENOUS at 11:08

## 2022-08-29 RX ADMIN — HYDROMORPHONE HYDROCHLORIDE 0.5 MG: 1 INJECTION, SOLUTION INTRAMUSCULAR; INTRAVENOUS; SUBCUTANEOUS at 07:08

## 2022-08-29 RX ADMIN — BUPROPION HYDROCHLORIDE 300 MG: 150 TABLET, FILM COATED, EXTENDED RELEASE ORAL at 09:08

## 2022-08-29 NOTE — ASSESSMENT & PLAN NOTE
Nyasia Middleton is a 41 y.o. female who is s/p diverting loop ileostomy 8/17/22, readmitted due to small bowel obstruction as evidenced by CT. Urinalysis and clinical presentation also indicative of symptomatic UTI. S/p diagnostic lap 8/24/22 unremarkable aside from dilated bowels.    - Pain control: multimodal  - Diet: LRD + boost plus  - mIVF: D5 NS @ 75cc/hr      - correcting hyponatremia  - antiemetics PRN, zofran, scopolamine patch, phenergan   - OOBTC  - Increase Imodium to QID

## 2022-08-29 NOTE — NURSING
Notified  about seeing the whole pills pt took earlier coming out to her ostomy bag ( Oxycodone and Tylenol), pt also stated her pain is not relieved after taking these two meds. MD changed both of these to liquid form  Pt also kept complaining about muscle pain on right chest radiated to her right back, pt stated it has been like that for a few days, pain is barely under control with PRN pain meds given around the clock. Pt nail beds is turning to purple color, cap refill -2 secs, pulses palpable, no numbness/tingling, notified   Ostomy output in the last 4 hours is 800cc, stool was thicken up this morning but now changing back to thin liquid form,  made awared.

## 2022-08-29 NOTE — NURSING
Patient has had copious amount of thin clear green output (~2500cc) from her ileostomy, just for overnight. An order was placed for 1000cc NS bolus at the start of shift when her HR was in the 130s. Since then her VSS have been stable with HR in the low 100-110s. Patient states that she feels dehydrated and due to her intermittent nausea is unable to drink as much as she would like. I mentioned that she has also only had x2 doses of 2mg of Loperamide. I aslo noted that her daily BMP labs were not scheduled for this morning; which were monitoring her hyponatremia. I spoke with the MD on call about the above. MD said she would look into the lab orders and to notify her if anything else changed with the patient or if she became symptomatic. MD said she would relay the message to the primary team about the copious amount of ileostomy output. Will continue to monitor POC.

## 2022-08-29 NOTE — NURSING
Per , pt is ok to walk with family member in the hallway. Notified MD about large ostomy output so far during shift -1100cc . MD stated they already increased her Imodium dosage today to lower the output. WCTM

## 2022-08-29 NOTE — PROGRESS NOTES
[facility]  Colorectal Surgery  Progress Note    Patient Name: Nyasia Middleton  MRN: [id number]  Admission Date: 8/22/2022  Hospital Length of Stay: 7 days  Attending Physician: CORETTA Doyle MD    Subjective:     Interval History: No acute events. Na stable at 133 yesterday afternoon. Tolerating a diet, though still with high output from ostomy - 4.5 L.     Post-Op Info:  Procedure(s) (LRB):  LAPAROSCOPY, DIAGNOSTIC (N/A)   5 Days Post-Op      Medications:  Continuous Infusions:   dextrose 5 % and 0.9 % NaCl 75 mL/hr at 08/28/22 1829     Scheduled Meds:   ALPRAZolam  0.25 mg Oral TID    buPROPion  300 mg Oral Nightly    enoxaparin  40 mg Subcutaneous Daily    FLUoxetine  40 mg Oral Nightly    loperamide  2 mg Oral QID    olanzapine zydis  10 mg Oral BID    pantoprazole  40 mg Oral Daily    psyllium husk (aspartame)  1 packet Oral BID    scopolamine  1 patch Transdermal Q3 Days     PRN Meds:   acetaminophen    acetaminophen    bismuth subsalicylate    dextrose 10%    dextrose 10%    glucagon (human recombinant)    glucose    glucose    HYDROmorphone    melatonin    metoclopramide HCl    ondansetron    oxyCODONE    promethazine (PHENERGAN) IVPB    sodium chloride 0.9%        Objective:     Vital Signs (Most Recent):  Temp: 97 °F (36.1 °C) (08/29/22 0434)  Pulse: 88 (08/29/22 0434)  Resp: 16 (08/29/22 0434)  BP: 118/79 (08/29/22 0434)  SpO2: 99 % (08/29/22 0434)   Vital Signs (24h Range):  Temp:  [96.5 °F (35.8 °C)-98.7 °F (37.1 °C)] 97 °F (36.1 °C)  Pulse:  [] 88  Resp:  [16-18] 16  SpO2:  [97 %-100 %] 99 %  BP: (118-149)/(69-88) 118/79     Intake/Output - Last 3 Shifts         08/27 0700  08/28 0659 08/28 0700 08/29 0659    P.O.  1080    I.V. (mL/kg) 708.6 (12.5) 1712.1 (30.2)    IV Piggyback 1900 1081.7    Total Intake(mL/kg) 2608.5 (46) 3873.8 (68.3)    Urine (mL/kg/hr) 1600 (1.2) 1800 (1.3)    Emesis/NG output 200     Stool 2450 4550    Total Output 4250 6350    Net  -7271.5 -6106.2          Urine Occurrence  1 x    Stool Occurrence 1 x 1 x    Emesis Occurrence 1 x             Physical Exam  Constitutional:       General: She is not in acute distress.     Appearance: Normal appearance.   HENT:      Mouth/Throat:      Mouth: Mucous membranes are moist.   Eyes:      Extraocular Movements: Extraocular movements intact.   Cardiovascular:      Rate and Rhythm: Normal rate and regular rhythm.   Pulmonary:      Effort: Pulmonary effort is normal. No respiratory distress.      Breath sounds: Normal breath sounds.   Abdominal:      General: There is no distension.      Palpations: Abdomen is soft.      Tenderness: There is no guarding.      Comments: RUQ ostomy pouch with liquid stool present  Laparoscopic incisions c/d/i   Skin:     General: Skin is warm.   Neurological:      Mental Status: She is alert and oriented to person, place, and time. Mental status is at baseline.         Significant Labs:  CBC (Last 3 Results):   Recent Labs   Lab 08/23/22  0256 08/26/22  0653   WBC 6.85 8.27   RBC 4.14 5.02   HGB 13.2 16.0   HCT 38.5 45.5    442   MCV 93 91   MCH 31.9* 31.9*   MCHC 34.3 35.2       CMP (Last 3 Results):   Recent Labs   Lab 08/23/22  1231 08/27/22  0901 08/27/22  2339 08/28/22  0543 08/28/22  1551   *   < > 141* 100 73   CALCIUM 9.1   < > 9.8 9.6 10.4   ALBUMIN 3.0*  --   --   --   --    PROT 7.0  --   --   --   --    *   < > 126* 133* 133*   K 3.3*   < > 4.4 3.7 3.6   CO2 30*   < > 24 23 18*   CL 95   < > 91* 99 100   BUN 26*   < > 17 14 14   CREATININE 0.7   < > 1.2 1.0 0.9   ALKPHOS 53*  --   --   --   --    ALT 9*  --   --   --   --    AST 11  --   --   --   --    BILITOT 1.5*  --   --   --   --     < > = values in this interval not displayed.           CRP (Last 3 Results):   Recent Labs   Lab 08/22/22  1905 08/27/22  0421   .2* 49.1*       Microbiology Results (last 7 days)       Procedure Component Value Units Date/Time    Clostridium difficile  EIA [[id number]] Collected: 08/27/22 0953    Order Status: Completed Specimen: Stool Updated: 08/28/22 0019     C. diff Antigen Negative     C difficile Toxins A+B, EIA Negative     Comment: Testing not recommended for children <24 months old.       Narrative:      Please check C Diff of ileostomy output (NOT stool from  rectum)          No results for input(s): COLORU, CLARITYU, SPECGRAV, PHUR, PROTEINUA, GLUCOSEU, BILIRUBINCON, BLOODU, WBCU, RBCU, BACTERIA, MUCUS, NITRITE, LEUKOCYTESUR, UROBILINOGEN, HYALINECASTS in the last 168 hours.    All pertinent lab results within the last 24 hours have been reviewed.     Significant Diagnostics:  I have reviewed all pertinent imaging results/findings within the past 24 hours.      Assessment/Plan:     * Partial small bowel obstruction  Nyasia Middleton is a 41 y.o. female who is s/p diverting loop ileostomy 8/17/22, readmitted due to small bowel obstruction as evidenced by CT. Urinalysis and clinical presentation also indicative of symptomatic UTI. S/p diagnostic lap 8/24/22 unremarkable aside from dilated bowels.    - Pain control: multimodal  - Diet: LRD + boost plus  - mIVF: D5 NS @ 75cc/hr      - correcting hyponatremia  - antiemetics PRN, zofran, scopolamine patch, phenergan   - OOBTC  - Increase Imodium to QID            Mumtaz Holden MD  Colorectal Surgery  [facility]

## 2022-08-29 NOTE — SUBJECTIVE & OBJECTIVE
Subjective:     Interval History: No acute events. Na stable at 133 yesterday afternoon. Tolerating a diet, though still with high output from ostomy - 4.5 L.     Post-Op Info:  Procedure(s) (LRB):  LAPAROSCOPY, DIAGNOSTIC (N/A)   5 Days Post-Op      Medications:  Continuous Infusions:   dextrose 5 % and 0.9 % NaCl 75 mL/hr at 08/28/22 1829     Scheduled Meds:   ALPRAZolam  0.25 mg Oral TID    buPROPion  300 mg Oral Nightly    enoxaparin  40 mg Subcutaneous Daily    FLUoxetine  40 mg Oral Nightly    loperamide  2 mg Oral QID    olanzapine zydis  10 mg Oral BID    pantoprazole  40 mg Oral Daily    psyllium husk (aspartame)  1 packet Oral BID    scopolamine  1 patch Transdermal Q3 Days     PRN Meds:   acetaminophen    acetaminophen    bismuth subsalicylate    dextrose 10%    dextrose 10%    glucagon (human recombinant)    glucose    glucose    HYDROmorphone    melatonin    metoclopramide HCl    ondansetron    oxyCODONE    promethazine (PHENERGAN) IVPB    sodium chloride 0.9%        Objective:     Vital Signs (Most Recent):  Temp: 97 °F (36.1 °C) (08/29/22 0434)  Pulse: 88 (08/29/22 0434)  Resp: 16 (08/29/22 0434)  BP: 118/79 (08/29/22 0434)  SpO2: 99 % (08/29/22 0434)   Vital Signs (24h Range):  Temp:  [96.5 °F (35.8 °C)-98.7 °F (37.1 °C)] 97 °F (36.1 °C)  Pulse:  [] 88  Resp:  [16-18] 16  SpO2:  [97 %-100 %] 99 %  BP: (118-149)/(69-88) 118/79     Intake/Output - Last 3 Shifts         08/27 0700  08/28 0659 08/28 0700  08/29 0659    P.O.  1080    I.V. (mL/kg) 708.6 (12.5) 1712.1 (30.2)    IV Piggyback 1900 1081.7    Total Intake(mL/kg) 2608.5 (46) 3873.8 (68.3)    Urine (mL/kg/hr) 1600 (1.2) 1800 (1.3)    Emesis/NG output 200     Stool 2450 4550    Total Output 4250 6350    Net -1641.5 -2476.2          Urine Occurrence  1 x    Stool Occurrence 1 x 1 x    Emesis Occurrence 1 x             Physical Exam  Constitutional:       General: She is not in acute distress.     Appearance: Normal appearance.   HENT:       Mouth/Throat:      Mouth: Mucous membranes are moist.   Eyes:      Extraocular Movements: Extraocular movements intact.   Cardiovascular:      Rate and Rhythm: Normal rate and regular rhythm.   Pulmonary:      Effort: Pulmonary effort is normal. No respiratory distress.      Breath sounds: Normal breath sounds.   Abdominal:      General: There is no distension.      Palpations: Abdomen is soft.      Tenderness: There is no guarding.      Comments: RUQ ostomy pouch with liquid stool present  Laparoscopic incisions c/d/i   Skin:     General: Skin is warm.   Neurological:      Mental Status: She is alert and oriented to person, place, and time. Mental status is at baseline.         Significant Labs:  CBC (Last 3 Results):   Recent Labs   Lab 08/23/22  0256 08/26/22  0653   WBC 6.85 8.27   RBC 4.14 5.02   HGB 13.2 16.0   HCT 38.5 45.5    442   MCV 93 91   MCH 31.9* 31.9*   MCHC 34.3 35.2       CMP (Last 3 Results):   Recent Labs   Lab 08/23/22  1231 08/27/22  0901 08/27/22  2339 08/28/22  0543 08/28/22  1551   *   < > 141* 100 73   CALCIUM 9.1   < > 9.8 9.6 10.4   ALBUMIN 3.0*  --   --   --   --    PROT 7.0  --   --   --   --    *   < > 126* 133* 133*   K 3.3*   < > 4.4 3.7 3.6   CO2 30*   < > 24 23 18*   CL 95   < > 91* 99 100   BUN 26*   < > 17 14 14   CREATININE 0.7   < > 1.2 1.0 0.9   ALKPHOS 53*  --   --   --   --    ALT 9*  --   --   --   --    AST 11  --   --   --   --    BILITOT 1.5*  --   --   --   --     < > = values in this interval not displayed.           CRP (Last 3 Results):   Recent Labs   Lab 08/22/22  1905 08/27/22  0421   .2* 49.1*       Microbiology Results (last 7 days)       Procedure Component Value Units Date/Time    Clostridium difficile EIA [[id number]] Collected: 08/27/22 0953    Order Status: Completed Specimen: Stool Updated: 08/28/22 0019     C. diff Antigen Negative     C difficile Toxins A+B, EIA Negative     Comment: Testing not recommended for children <24  months old.       Narrative:      Please check C Diff of ileostomy output (NOT stool from  rectum)          No results for input(s): COLORU, CLARITYU, SPECGRAV, PHUR, PROTEINUA, GLUCOSEU, BILIRUBINCON, BLOODU, WBCU, RBCU, BACTERIA, MUCUS, NITRITE, LEUKOCYTESUR, UROBILINOGEN, HYALINECASTS in the last 168 hours.    All pertinent lab results within the last 24 hours have been reviewed.     Significant Diagnostics:  I have reviewed all pertinent imaging results/findings within the past 24 hours.

## 2022-08-29 NOTE — PLAN OF CARE
Plan of  care reviewed with pt:  -AAOx4, on RA, VS stable with HR in the 110s  -Ileostomy bag with very large amount of stool, thick loose stool this morning, now changed to liquid stool,  and  notified about the large output  -Voided with adequate amount of estefany color urine  -Avasys at bedside due to recent hx of fall, pt knows to call nurse when she needs to get OOB   -Poor appetite, consumed less than 50% of her meals, encouraged oral fluid intake. IVF at 100  -Intermittent nausea, relieved with Zofran and Phenergan, no emesis.   -Complained of muscle pain on her right chest radiated to her back, pain is under control with pain meds given around the clock, MD is awared  -Has a new ulcer on her tongue  -Ambulated in the [place]ellano x1. Ambulated to the bathroom with standby assistance at least 6 times.   -Call light in reach. [facility]

## 2022-08-29 NOTE — PLAN OF CARE
POC reviewed w/ pt, verbalized understanding. Pt AAOx4.      - patient HR elevated to 130s at start of shift, treated with 1L NS bolus, HR now 100-110s. All other VSS  - pain managed with PRN pain meds  - lap sites with steri-strips, c/d/i  - RLQ ileostomy, copious amounts of thin clear green liquid (see previous note)  - tolerating small amounts of regular diet w/ intermittent complaints of nausea; treated with PRN medications  - ambulates to the bathroom w/ standby assist, adequate concentrated UOP overnight.   - camera and bed alarm in use for patient safety.     Pt slept between care. Frequent rounds made for pt safety. Call light in reach and bed in lowest position. Will continue to monitor POC.       Problem: Adult Inpatient Plan of Care  Goal: Plan of Care Review  Outcome: Ongoing, Progressing  Goal: Patient-Specific Goal (Individualized)  Outcome: Ongoing, Progressing  Goal: Absence of Hospital-Acquired Illness or Injury  Outcome: Ongoing, Progressing  Goal: Optimal Comfort and Wellbeing  Outcome: Ongoing, Progressing  Goal: Readiness for Transition of Care  Outcome: Ongoing, Progressing     Problem: Fall Injury Risk  Goal: Absence of Fall and Fall-Related Injury  Outcome: Ongoing, Progressing     Problem: Activity and Energy Impairment (Anxiety Signs/Symptoms)  Goal: Optimized Energy Level (Anxiety Signs/Symptoms)  Outcome: Ongoing, Progressing     Problem: Cognitive Impairment (Anxiety Signs/Symptoms)  Goal: Optimized Cognitive Function (Anxiety Signs/Symptoms)  Outcome: Ongoing, Progressing     Problem: Mood Impairment (Anxiety Signs/Symptoms)  Goal: Improved Mood Symptoms (Anxiety Signs/Symptoms)  Outcome: Ongoing, Progressing     Problem: Sleep Impairment (Anxiety Signs/Symptoms)  Goal: Improved Sleep (Anxiety Signs/Symptoms)  Outcome: Ongoing, Progressing     Problem: Social, Occupational or Functional Impairment (Anxiety Signs/Symptoms)  Goal: Enhanced Social, Occupational or Functional Skills  (Anxiety Signs/Symptoms)  Outcome: Ongoing, Progressing     Problem: Somatic Disturbance (Anxiety Signs/Symptoms)  Goal: Improved Somatic Symptoms (Anxiety Signs/Symptoms)  Outcome: Ongoing, Progressing     Problem: Infection  Goal: Absence of Infection Signs and Symptoms  Outcome: Ongoing, Progressing

## 2022-08-30 LAB
ANION GAP SERPL CALC-SCNC: 11 MMOL/L (ref 8–16)
ANION GAP SERPL CALC-SCNC: 9 MMOL/L (ref 8–16)
BUN SERPL-MCNC: 11 MG/DL (ref 6–20)
BUN SERPL-MCNC: 11 MG/DL (ref 6–20)
CALCIUM SERPL-MCNC: 9.1 MG/DL (ref 8.7–10.5)
CALCIUM SERPL-MCNC: 9.4 MG/DL (ref 8.7–10.5)
CHLORIDE SERPL-SCNC: 101 MMOL/L (ref 95–110)
CHLORIDE SERPL-SCNC: 102 MMOL/L (ref 95–110)
CO2 SERPL-SCNC: 20 MMOL/L (ref 23–29)
CO2 SERPL-SCNC: 21 MMOL/L (ref 23–29)
CREAT SERPL-MCNC: 0.6 MG/DL (ref 0.5–1.4)
CREAT SERPL-MCNC: 0.6 MG/DL (ref 0.5–1.4)
EST. GFR  (NO RACE VARIABLE): >60 ML/MIN/1.73 M^2
EST. GFR  (NO RACE VARIABLE): >60 ML/MIN/1.73 M^2
GLUCOSE SERPL-MCNC: 83 MG/DL (ref 70–110)
GLUCOSE SERPL-MCNC: 86 MG/DL (ref 70–110)
POTASSIUM SERPL-SCNC: 3.6 MMOL/L (ref 3.5–5.1)
POTASSIUM SERPL-SCNC: 3.7 MMOL/L (ref 3.5–5.1)
SODIUM SERPL-SCNC: 131 MMOL/L (ref 136–145)
SODIUM SERPL-SCNC: 133 MMOL/L (ref 136–145)

## 2022-08-30 PROCEDURE — 80048 BASIC METABOLIC PNL TOTAL CA: CPT | Performed by: STUDENT IN AN ORGANIZED HEALTH CARE EDUCATION/TRAINING PROGRAM

## 2022-08-30 PROCEDURE — 36415 COLL VENOUS BLD VENIPUNCTURE: CPT | Performed by: STUDENT IN AN ORGANIZED HEALTH CARE EDUCATION/TRAINING PROGRAM

## 2022-08-30 PROCEDURE — 25000003 PHARM REV CODE 250: Performed by: COLON & RECTAL SURGERY

## 2022-08-30 PROCEDURE — 20600001 HC STEP DOWN PRIVATE ROOM

## 2022-08-30 PROCEDURE — 63600175 PHARM REV CODE 636 W HCPCS: Performed by: STUDENT IN AN ORGANIZED HEALTH CARE EDUCATION/TRAINING PROGRAM

## 2022-08-30 PROCEDURE — 25000003 PHARM REV CODE 250: Performed by: STUDENT IN AN ORGANIZED HEALTH CARE EDUCATION/TRAINING PROGRAM

## 2022-08-30 PROCEDURE — 25000242 PHARM REV CODE 250 ALT 637 W/ HCPCS: Performed by: STUDENT IN AN ORGANIZED HEALTH CARE EDUCATION/TRAINING PROGRAM

## 2022-08-30 PROCEDURE — 25000003 PHARM REV CODE 250

## 2022-08-30 PROCEDURE — 63600175 PHARM REV CODE 636 W HCPCS

## 2022-08-30 PROCEDURE — 63600175 PHARM REV CODE 636 W HCPCS: Performed by: COLON & RECTAL SURGERY

## 2022-08-30 RX ORDER — DIPHENOXYLATE HYDROCHLORIDE AND ATROPINE SULFATE 2.5; .025 MG/1; MG/1
2 TABLET ORAL 4 TIMES DAILY
Status: DISCONTINUED | OUTPATIENT
Start: 2022-08-30 | End: 2022-09-01 | Stop reason: HOSPADM

## 2022-08-30 RX ORDER — ACETAMINOPHEN 325 MG/1
650 TABLET ORAL EVERY 6 HOURS
Status: CANCELLED | OUTPATIENT
Start: 2022-08-30

## 2022-08-30 RX ORDER — DIPHENOXYLATE HCL/ATROPINE 2.5-.025/5
5 LIQUID (ML) ORAL 4 TIMES DAILY
Status: DISCONTINUED | OUTPATIENT
Start: 2022-08-30 | End: 2022-08-30

## 2022-08-30 RX ORDER — ACYCLOVIR 200 MG/1
200 CAPSULE ORAL
Status: DISCONTINUED | OUTPATIENT
Start: 2022-08-30 | End: 2022-08-30

## 2022-08-30 RX ORDER — METHOCARBAMOL 500 MG/1
500 TABLET, FILM COATED ORAL 3 TIMES DAILY
Status: DISCONTINUED | OUTPATIENT
Start: 2022-08-30 | End: 2022-09-01 | Stop reason: HOSPADM

## 2022-08-30 RX ORDER — VALACYCLOVIR HYDROCHLORIDE 500 MG/1
500 TABLET, FILM COATED ORAL 2 TIMES DAILY
Status: DISCONTINUED | OUTPATIENT
Start: 2022-08-30 | End: 2022-09-01 | Stop reason: HOSPADM

## 2022-08-30 RX ADMIN — FLUOXETINE 40 MG: 20 CAPSULE ORAL at 09:08

## 2022-08-30 RX ADMIN — SODIUM CHLORIDE: 0.9 INJECTION, SOLUTION INTRAVENOUS at 12:08

## 2022-08-30 RX ADMIN — OLANZAPINE 10 MG: 5 TABLET, ORALLY DISINTEGRATING ORAL at 09:08

## 2022-08-30 RX ADMIN — ONDANSETRON 8 MG: 2 INJECTION INTRAMUSCULAR; INTRAVENOUS at 03:08

## 2022-08-30 RX ADMIN — OXYCODONE HYDROCHLORIDE 5 MG: 5 SOLUTION ORAL at 10:08

## 2022-08-30 RX ADMIN — ACETAMINOPHEN 650 MG: 160 SOLUTION ORAL at 04:08

## 2022-08-30 RX ADMIN — OXYCODONE HYDROCHLORIDE 5 MG: 5 SOLUTION ORAL at 04:08

## 2022-08-30 RX ADMIN — Medication 1 PACKET: at 09:08

## 2022-08-30 RX ADMIN — PROMETHAZINE HYDROCHLORIDE 25 MG: 25 INJECTION INTRAMUSCULAR; INTRAVENOUS at 12:08

## 2022-08-30 RX ADMIN — HYDROMORPHONE HYDROCHLORIDE 0.5 MG: 1 INJECTION, SOLUTION INTRAMUSCULAR; INTRAVENOUS; SUBCUTANEOUS at 11:08

## 2022-08-30 RX ADMIN — DIPHENOXYLATE HYDROCHLORIDE AND ATROPINE SULFATE 2 TABLET: 2.5; .025 TABLET ORAL at 04:08

## 2022-08-30 RX ADMIN — VALACYCLOVIR HYDROCHLORIDE 500 MG: 500 TABLET, FILM COATED ORAL at 09:08

## 2022-08-30 RX ADMIN — MELATONIN TAB 3 MG 6 MG: 3 TAB at 09:08

## 2022-08-30 RX ADMIN — OXYCODONE HYDROCHLORIDE 5 MG: 5 SOLUTION ORAL at 09:08

## 2022-08-30 RX ADMIN — PROMETHAZINE HYDROCHLORIDE 25 MG: 25 INJECTION INTRAMUSCULAR; INTRAVENOUS at 11:08

## 2022-08-30 RX ADMIN — LOPERAMIDE HYDROCHLORIDE 4 MG: 2 CAPSULE ORAL at 09:08

## 2022-08-30 RX ADMIN — DIPHENOXYLATE HYDROCHLORIDE AND ATROPINE SULFATE 2 TABLET: 2.5; .025 TABLET ORAL at 12:08

## 2022-08-30 RX ADMIN — METHOCARBAMOL 500 MG: 500 TABLET ORAL at 09:08

## 2022-08-30 RX ADMIN — HYDROMORPHONE HYDROCHLORIDE 0.5 MG: 1 INJECTION, SOLUTION INTRAMUSCULAR; INTRAVENOUS; SUBCUTANEOUS at 03:08

## 2022-08-30 RX ADMIN — SCOPALAMINE 1 PATCH: 1 PATCH, EXTENDED RELEASE TRANSDERMAL at 12:08

## 2022-08-30 RX ADMIN — ALPRAZOLAM 0.25 MG: 0.25 TABLET ORAL at 03:08

## 2022-08-30 RX ADMIN — BUPROPION HYDROCHLORIDE 300 MG: 150 TABLET, FILM COATED, EXTENDED RELEASE ORAL at 09:08

## 2022-08-30 RX ADMIN — ONDANSETRON 8 MG: 2 INJECTION INTRAMUSCULAR; INTRAVENOUS at 06:08

## 2022-08-30 RX ADMIN — DIPHENOXYLATE HYDROCHLORIDE AND ATROPINE SULFATE 2 TABLET: 2.5; .025 TABLET ORAL at 09:08

## 2022-08-30 RX ADMIN — METOCLOPRAMIDE 10 MG: 5 INJECTION, SOLUTION INTRAMUSCULAR; INTRAVENOUS at 03:08

## 2022-08-30 RX ADMIN — OXYCODONE HYDROCHLORIDE 5 MG: 5 SOLUTION ORAL at 12:08

## 2022-08-30 RX ADMIN — LOPERAMIDE HYDROCHLORIDE 4 MG: 2 CAPSULE ORAL at 04:08

## 2022-08-30 RX ADMIN — LOPERAMIDE HYDROCHLORIDE 4 MG: 2 CAPSULE ORAL at 12:08

## 2022-08-30 RX ADMIN — ALPRAZOLAM 0.25 MG: 0.25 TABLET ORAL at 09:08

## 2022-08-30 RX ADMIN — PANTOPRAZOLE SODIUM 40 MG: 40 TABLET, DELAYED RELEASE ORAL at 09:08

## 2022-08-30 RX ADMIN — ENOXAPARIN SODIUM 40 MG: 40 INJECTION SUBCUTANEOUS at 04:08

## 2022-08-30 NOTE — ASSESSMENT & PLAN NOTE
Nyasia Middleton is a 41 y.o. female who is s/p diverting loop ileostomy 8/17/22, readmitted due to small bowel obstruction as evidenced by CT. Urinalysis and clinical presentation also indicative of symptomatic UTI. S/p diagnostic lap 8/24/22 unremarkable aside from dilated bowels.    - Pain control: multimodal  - Diet: LRD + boost plus  - mIVF: D5 NS @ 100cc/hr      - correcting hyponatremia  - antiemetics PRN, zofran, scopolamine patch, phenergan   - OOBTC  - Imodium to QID, will add lomotil 2tablets QID to bowel regimen  - Valtrex 500mg q12 PO x3days

## 2022-08-30 NOTE — PROGRESS NOTES
[facility]  Colorectal Surgery  Progress Note    Patient Name: Nyasia Middleton  MRN: [id number]  Admission Date: 8/22/2022  Hospital Length of Stay: 8 days  Attending Physician: CORETTA Doyle MD    Subjective:     Interval History: Was given 1L LR bolus due to tachycardia. AF, VSS. On regular diet, tolerating well. Occasional nausea but no vomiting. More solid consistency of ostomy output.      Post-Op Info:  Procedure(s) (LRB):  LAPAROSCOPY, DIAGNOSTIC (N/A)   6 Days Post-Op      Medications:  Continuous Infusions:   sodium chloride 0.9% 100 mL/hr at 08/30/22 0054     Scheduled Meds:   ALPRAZolam  0.25 mg Oral TID    buPROPion  300 mg Oral Nightly    diphenoxylate-atropine 2.5-0.025 mg  2 tablet Oral QID    enoxaparin  40 mg Subcutaneous Daily    FLUoxetine  40 mg Oral Nightly    loperamide  4 mg Oral QID    olanzapine zydis  10 mg Oral BID    pantoprazole  40 mg Oral Daily    psyllium husk (aspartame)  1 packet Oral BID    scopolamine  1 patch Transdermal Q3 Days    valACYclovir  500 mg Oral BID     PRN Meds:   acetaminophen    bismuth subsalicylate    dextrose 10%    dextrose 10%    glucagon (human recombinant)    glucose    glucose    HYDROmorphone    melatonin    metoclopramide HCl    ondansetron    oxyCODONE    promethazine (PHENERGAN) IVPB    sodium chloride 0.9%        Objective:     Vital Signs (Most Recent):  Temp: 97.6 °F (36.4 °C) (08/30/22 0409)  Pulse: 96 (08/30/22 0409)  Resp: 18 (08/30/22 0409)  BP: 122/68 (08/30/22 0409)  SpO2: 99 % (08/30/22 0409)   Vital Signs (24h Range):  Temp:  [97.3 °F (36.3 °C)-98.7 °F (37.1 °C)] 97.6 °F (36.4 °C)  Pulse:  [] 96  Resp:  [18-20] 18  SpO2:  [95 %-100 %] 99 %  BP: (113-142)/(67-78) 122/68     Intake/Output - Last 3 Shifts         08/28 0700 08/29 0659 08/29 0700 08/30 0659 08/30 0700 08/31 0659    P.O. 1080 2340     I.V. (mL/kg) 1712.1 (30.2) 1143.1 (20.2)     IV Piggyback 1081.7 50     Total Intake(mL/kg) 3873.8  (68.3) 3533.1 (62.3)     Urine (mL/kg/hr) 1800 (1.3) 2600 (1.9)     Emesis/NG output  0     Other  0     Stool 4550 4450     Total Output 6350 7050     Net -2476.2 -3516.9            Urine Occurrence 1 x 5 x     Stool Occurrence 1 x      Emesis Occurrence  0 x             Physical Exam  Constitutional:       General: She is not in acute distress.     Appearance: Normal appearance.   HENT:      Mouth/Throat:      Mouth: Mucous membranes are moist.   Eyes:      Extraocular Movements: Extraocular movements intact.   Cardiovascular:      Rate and Rhythm: Normal rate and regular rhythm.   Pulmonary:      Effort: Pulmonary effort is normal. No respiratory distress.      Breath sounds: Normal breath sounds.   Abdominal:      General: There is no distension.      Palpations: Abdomen is soft.      Tenderness: There is no guarding.      Comments: RUQ ostomy pouch with liquid stool present  Laparoscopic incisions c/d/i   Skin:     General: Skin is warm.   Neurological:      Mental Status: She is alert and oriented to person, place, and time. Mental status is at baseline.         Significant Labs:  CBC (Last 3 Results):   Recent Labs   Lab 08/26/22  0653   WBC 8.27   RBC 5.02   HGB 16.0   HCT 45.5      MCV 91   MCH 31.9*   MCHC 35.2     CMP (Last 3 Results):   Recent Labs   Lab 08/23/22  1231 08/27/22  0901 08/29/22  0733 08/29/22  1752 08/30/22  0325   *   < > 95 74 86   CALCIUM 9.1   < > 9.7 9.5 9.4   ALBUMIN 3.0*  --   --   --   --    PROT 7.0  --   --   --   --    *   < > 129* 131* 133*   K 3.3*   < > 4.0 3.6 3.7   CO2 30*   < > 20* 22* 21*   CL 95   < > 101 100 101   BUN 26*   < > 12 12 11   CREATININE 0.7   < > 0.7 0.7 0.6   ALKPHOS 53*  --   --   --   --    ALT 9*  --   --   --   --    AST 11  --   --   --   --    BILITOT 1.5*  --   --   --   --     < > = values in this interval not displayed.         CRP (Last 3 Results):   Recent Labs   Lab 08/27/22  0421 08/29/22  0733   CRP 49.1* 32.8*      Microbiology Results (last 7 days)       Procedure Component Value Units Date/Time    Clostridium difficile EIA [[id number]] Collected: 08/27/22 0953    Order Status: Completed Specimen: Stool Updated: 08/28/22 0019     C. diff Antigen Negative     C difficile Toxins A+B, EIA Negative     Comment: Testing not recommended for children <24 months old.       Narrative:      Please check C Diff of ileostomy output (NOT stool from  rectum)          Recent Labs   Lab 08/29/22  0934   COLORU Yellow   SPECGRAV 1.020   PHUR 6.0   PROTEINUA Trace*   NITRITE Negative   LEUKOCYTESUR Negative     All pertinent lab results within the last 24 hours have been reviewed.     Significant Diagnostics:  I have reviewed all pertinent imaging results/findings within the past 24 hours.      Assessment/Plan:     * Partial small bowel obstruction  Nyasia Middleton is a 41 y.o. female who is s/p diverting loop ileostomy 8/17/22, readmitted due to small bowel obstruction as evidenced by CT. Urinalysis and clinical presentation also indicative of symptomatic UTI. S/p diagnostic lap 8/24/22 unremarkable aside from dilated bowels.    - Pain control: multimodal  - Diet: LRD + boost plus  - mIVF: D5 NS @ 100cc/hr      - correcting hyponatremia  - antiemetics PRN, zofran, scopolamine patch, phenergan   - OOBTC  - Imodium to QID, will add lomotil 2tablets QID to bowel regimen  - Valtrex 500mg q12 PO x3days            Natan Hopson MD  Colorectal Surgery  [facility]

## 2022-08-30 NOTE — PLAN OF CARE
[id number]  Plan of  care reviewed with pt:  -AAOx4, on RA, VS stable with HR in the 110s  -Ileostomy bag with very large amount of  liquid, green stool, this am started having more food particles in it  -Voided with adequate amount of yellow urine  -Avasys at bedside due to recent hx of fall, pt knows to call nurse when she needs to get OOB   -Poor appetite during day per RN, encouraged oral fluid intake. IVF at 100 cc/hr  -Intermittent nausea, relieved with Zofran/Phenergan/reglan, no emesis.   - c/o  of muscle pain on her right chest radiated to her back, pain is under control with prn pain meds given around the clock, per MAR  -Has a new ulcer on her tongue, MD aware. RN gave pt lukewarm salt water to gargle, pt tolerated well.   -Ambulated to the bathroom w/ SBA    -Call light in reach. [facility]    2315  Pt c/o of feeling very weak/fatigued, RN checked VS early, VSS except HR at 123. RN contacted MD on call, Elle Spencer, orders given to put pt on tele, and give 1L NS bolus. HR sustained 85-98 rest of shift after pt received bolus. Also stated that she felt a lot better. [facility]            Problem: Adult Inpatient Plan of Care  Goal: Plan of Care Review  Outcome: Ongoing, Progressing  Goal: Patient-Specific Goal (Individualized)  Outcome: Ongoing, Progressing  Goal: Absence of Hospital-Acquired Illness or Injury  Outcome: Ongoing, Progressing  Goal: Optimal Comfort and Wellbeing  Outcome: Ongoing, Progressing  Goal: Readiness for Transition of Care  Outcome: Ongoing, Progressing     Problem: Fall Injury Risk  Goal: Absence of Fall and Fall-Related Injury  Outcome: Ongoing, Progressing     Problem: Activity and Energy Impairment (Anxiety Signs/Symptoms)  Goal: Optimized Energy Level (Anxiety Signs/Symptoms)  Outcome: Ongoing, Progressing     Problem: Cognitive Impairment (Anxiety Signs/Symptoms)  Goal: Optimized Cognitive Function (Anxiety Signs/Symptoms)  Outcome: Ongoing, Progressing     Problem: Mood Impairment  (Anxiety Signs/Symptoms)  Goal: Improved Mood Symptoms (Anxiety Signs/Symptoms)  Outcome: Ongoing, Progressing     Problem: Sleep Impairment (Anxiety Signs/Symptoms)  Goal: Improved Sleep (Anxiety Signs/Symptoms)  Outcome: Ongoing, Progressing     Problem: Social, Occupational or Functional Impairment (Anxiety Signs/Symptoms)  Goal: Enhanced Social, Occupational or Functional Skills (Anxiety Signs/Symptoms)  Outcome: Ongoing, Progressing     Problem: Somatic Disturbance (Anxiety Signs/Symptoms)  Goal: Improved Somatic Symptoms (Anxiety Signs/Symptoms)  Outcome: Ongoing, Progressing     Problem: Infection  Goal: Absence of Infection Signs and Symptoms  Outcome: Ongoing, Progressing

## 2022-08-30 NOTE — PLAN OF CARE
[facility]  Discharge Reassessment    Primary Care Provider: Bisi Tolbert DO    Expected Discharge Date: 9/1/2022    Reassessment (most recent)       Discharge Reassessment - 08/30/22 1500          Discharge Reassessment    Assessment Type Discharge Planning Reassessment     Discharge Plan A Home     Discharge Plan B Home Health     DME Needed Upon Discharge  colostomy/ostomy supplies     Discharge Barriers Identified None     Why the patient remains in the hospital Requires continued medical care                     Jennifer Sloan RN, CM   Ext: [phone]

## 2022-08-30 NOTE — SUBJECTIVE & OBJECTIVE
Subjective:     Interval History: Was given 1L LR bolus due to tachycardia. AF, VSS. On regular diet, tolerating well. Occasional nausea but no vomiting. More solid consistency of ostomy output.      Post-Op Info:  Procedure(s) (LRB):  LAPAROSCOPY, DIAGNOSTIC (N/A)   6 Days Post-Op      Medications:  Continuous Infusions:   sodium chloride 0.9% 100 mL/hr at 08/30/22 0054     Scheduled Meds:   ALPRAZolam  0.25 mg Oral TID    buPROPion  300 mg Oral Nightly    diphenoxylate-atropine 2.5-0.025 mg  2 tablet Oral QID    enoxaparin  40 mg Subcutaneous Daily    FLUoxetine  40 mg Oral Nightly    loperamide  4 mg Oral QID    olanzapine zydis  10 mg Oral BID    pantoprazole  40 mg Oral Daily    psyllium husk (aspartame)  1 packet Oral BID    scopolamine  1 patch Transdermal Q3 Days    valACYclovir  500 mg Oral BID     PRN Meds:   acetaminophen    bismuth subsalicylate    dextrose 10%    dextrose 10%    glucagon (human recombinant)    glucose    glucose    HYDROmorphone    melatonin    metoclopramide HCl    ondansetron    oxyCODONE    promethazine (PHENERGAN) IVPB    sodium chloride 0.9%        Objective:     Vital Signs (Most Recent):  Temp: 97.6 °F (36.4 °C) (08/30/22 0409)  Pulse: 96 (08/30/22 0409)  Resp: 18 (08/30/22 0409)  BP: 122/68 (08/30/22 0409)  SpO2: 99 % (08/30/22 0409)   Vital Signs (24h Range):  Temp:  [97.3 °F (36.3 °C)-98.7 °F (37.1 °C)] 97.6 °F (36.4 °C)  Pulse:  [] 96  Resp:  [18-20] 18  SpO2:  [95 %-100 %] 99 %  BP: (113-142)/(67-78) 122/68     Intake/Output - Last 3 Shifts         08/28 0700 08/29 0659 08/29 0700 08/30 0659 08/30 0700 08/31 0659    P.O. 1080 2340     I.V. (mL/kg) 1712.1 (30.2) 1143.1 (20.2)     IV Piggyback 1081.7 50     Total Intake(mL/kg) 3873.8 (68.3) 3533.1 (62.3)     Urine (mL/kg/hr) 1800 (1.3) 2600 (1.9)     Emesis/NG output  0     Other  0     Stool 4550 4450     Total Output 6350 7050     Net -2476.2 -3516.9            Urine Occurrence 1 x 5 x     Stool Occurrence 1 x       Emesis Occurrence  0 x             Physical Exam  Constitutional:       General: She is not in acute distress.     Appearance: Normal appearance.   HENT:      Mouth/Throat:      Mouth: Mucous membranes are moist.   Eyes:      Extraocular Movements: Extraocular movements intact.   Cardiovascular:      Rate and Rhythm: Normal rate and regular rhythm.   Pulmonary:      Effort: Pulmonary effort is normal. No respiratory distress.      Breath sounds: Normal breath sounds.   Abdominal:      General: There is no distension.      Palpations: Abdomen is soft.      Tenderness: There is no guarding.      Comments: RUQ ostomy pouch with liquid stool present  Laparoscopic incisions c/d/i   Skin:     General: Skin is warm.   Neurological:      Mental Status: She is alert and oriented to person, place, and time. Mental status is at baseline.         Significant Labs:  CBC (Last 3 Results):   Recent Labs   Lab 08/26/22  0653   WBC 8.27   RBC 5.02   HGB 16.0   HCT 45.5      MCV 91   MCH 31.9*   MCHC 35.2     CMP (Last 3 Results):   Recent Labs   Lab 08/23/22  1231 08/27/22  0901 08/29/22  0733 08/29/22  1752 08/30/22  0325   *   < > 95 74 86   CALCIUM 9.1   < > 9.7 9.5 9.4   ALBUMIN 3.0*  --   --   --   --    PROT 7.0  --   --   --   --    *   < > 129* 131* 133*   K 3.3*   < > 4.0 3.6 3.7   CO2 30*   < > 20* 22* 21*   CL 95   < > 101 100 101   BUN 26*   < > 12 12 11   CREATININE 0.7   < > 0.7 0.7 0.6   ALKPHOS 53*  --   --   --   --    ALT 9*  --   --   --   --    AST 11  --   --   --   --    BILITOT 1.5*  --   --   --   --     < > = values in this interval not displayed.         CRP (Last 3 Results):   Recent Labs   Lab 08/27/22  0421 08/29/22  0733   CRP 49.1* 32.8*     Microbiology Results (last 7 days)       Procedure Component Value Units Date/Time    Clostridium difficile EIA [[id number]] Collected: 08/27/22 0953    Order Status: Completed Specimen: Stool Updated: 08/28/22 0019     C. diff Antigen  Negative     C difficile Toxins A+B, EIA Negative     Comment: Testing not recommended for children <24 months old.       Narrative:      Please check C Diff of ileostomy output (NOT stool from  rectum)          Recent Labs   Lab 08/29/22  0934   COLORU Yellow   SPECGRAV 1.020   PHUR 6.0   PROTEINUA Trace*   NITRITE Negative   LEUKOCYTESUR Negative     All pertinent lab results within the last 24 hours have been reviewed.     Significant Diagnostics:  I have reviewed all pertinent imaging results/findings within the past 24 hours.

## 2022-08-31 ENCOUNTER — EXTERNAL HOME HEALTH (OUTPATIENT)
Dept: HOME HEALTH SERVICES | Facility: HOSPITAL | Age: 42
End: 2022-08-31
Payer: COMMERCIAL

## 2022-08-31 ENCOUNTER — PATIENT MESSAGE (OUTPATIENT)
Dept: SURGERY | Facility: CLINIC | Age: 42
End: 2022-08-31
Payer: COMMERCIAL

## 2022-08-31 LAB
ANION GAP SERPL CALC-SCNC: 12 MMOL/L (ref 8–16)
ANION GAP SERPL CALC-SCNC: 7 MMOL/L (ref 8–16)
BUN SERPL-MCNC: 8 MG/DL (ref 6–20)
BUN SERPL-MCNC: 9 MG/DL (ref 6–20)
CALCIUM SERPL-MCNC: 9.1 MG/DL (ref 8.7–10.5)
CALCIUM SERPL-MCNC: 9.2 MG/DL (ref 8.7–10.5)
CHLORIDE SERPL-SCNC: 103 MMOL/L (ref 95–110)
CHLORIDE SERPL-SCNC: 106 MMOL/L (ref 95–110)
CO2 SERPL-SCNC: 15 MMOL/L (ref 23–29)
CO2 SERPL-SCNC: 21 MMOL/L (ref 23–29)
CREAT SERPL-MCNC: 0.6 MG/DL (ref 0.5–1.4)
CREAT SERPL-MCNC: 0.6 MG/DL (ref 0.5–1.4)
EST. GFR  (NO RACE VARIABLE): >60 ML/MIN/1.73 M^2
EST. GFR  (NO RACE VARIABLE): >60 ML/MIN/1.73 M^2
GLUCOSE SERPL-MCNC: 100 MG/DL (ref 70–110)
GLUCOSE SERPL-MCNC: 87 MG/DL (ref 70–110)
POTASSIUM SERPL-SCNC: 3.4 MMOL/L (ref 3.5–5.1)
POTASSIUM SERPL-SCNC: 3.9 MMOL/L (ref 3.5–5.1)
SODIUM SERPL-SCNC: 131 MMOL/L (ref 136–145)
SODIUM SERPL-SCNC: 133 MMOL/L (ref 136–145)

## 2022-08-31 PROCEDURE — 63600175 PHARM REV CODE 636 W HCPCS: Performed by: STUDENT IN AN ORGANIZED HEALTH CARE EDUCATION/TRAINING PROGRAM

## 2022-08-31 PROCEDURE — C1751 CATH, INF, PER/CENT/MIDLINE: HCPCS

## 2022-08-31 PROCEDURE — 80048 BASIC METABOLIC PNL TOTAL CA: CPT | Performed by: STUDENT IN AN ORGANIZED HEALTH CARE EDUCATION/TRAINING PROGRAM

## 2022-08-31 PROCEDURE — 63600175 PHARM REV CODE 636 W HCPCS

## 2022-08-31 PROCEDURE — 36415 COLL VENOUS BLD VENIPUNCTURE: CPT | Performed by: STUDENT IN AN ORGANIZED HEALTH CARE EDUCATION/TRAINING PROGRAM

## 2022-08-31 PROCEDURE — 36415 COLL VENOUS BLD VENIPUNCTURE: CPT | Performed by: COLON & RECTAL SURGERY

## 2022-08-31 PROCEDURE — 25000242 PHARM REV CODE 250 ALT 637 W/ HCPCS: Performed by: STUDENT IN AN ORGANIZED HEALTH CARE EDUCATION/TRAINING PROGRAM

## 2022-08-31 PROCEDURE — 80048 BASIC METABOLIC PNL TOTAL CA: CPT | Mod: 91 | Performed by: COLON & RECTAL SURGERY

## 2022-08-31 PROCEDURE — 25000003 PHARM REV CODE 250

## 2022-08-31 PROCEDURE — 76937 US GUIDE VASCULAR ACCESS: CPT

## 2022-08-31 PROCEDURE — 63600175 PHARM REV CODE 636 W HCPCS: Performed by: COLON & RECTAL SURGERY

## 2022-08-31 PROCEDURE — 25000003 PHARM REV CODE 250: Performed by: STUDENT IN AN ORGANIZED HEALTH CARE EDUCATION/TRAINING PROGRAM

## 2022-08-31 PROCEDURE — 36573 INSJ PICC RS&I 5 YR+: CPT

## 2022-08-31 PROCEDURE — 20600001 HC STEP DOWN PRIVATE ROOM

## 2022-08-31 RX ORDER — SODIUM CHLORIDE 0.9 % (FLUSH) 0.9 %
10 SYRINGE (ML) INJECTION
Status: DISCONTINUED | OUTPATIENT
Start: 2022-08-31 | End: 2022-09-01 | Stop reason: HOSPADM

## 2022-08-31 RX ORDER — LOPERAMIDE HYDROCHLORIDE 2 MG/1
8 CAPSULE ORAL 4 TIMES DAILY
Status: DISCONTINUED | OUTPATIENT
Start: 2022-08-31 | End: 2022-09-01 | Stop reason: HOSPADM

## 2022-08-31 RX ORDER — SODIUM,POTASSIUM PHOSPHATES 280-250MG
2 POWDER IN PACKET (EA) ORAL ONCE
Status: COMPLETED | OUTPATIENT
Start: 2022-08-31 | End: 2022-08-31

## 2022-08-31 RX ORDER — SODIUM CHLORIDE 0.9 % (FLUSH) 0.9 %
10 SYRINGE (ML) INJECTION EVERY 6 HOURS
Status: DISCONTINUED | OUTPATIENT
Start: 2022-08-31 | End: 2022-09-01 | Stop reason: HOSPADM

## 2022-08-31 RX ADMIN — OLANZAPINE 10 MG: 5 TABLET, ORALLY DISINTEGRATING ORAL at 08:08

## 2022-08-31 RX ADMIN — DIPHENOXYLATE HYDROCHLORIDE AND ATROPINE SULFATE 2 TABLET: 2.5; .025 TABLET ORAL at 12:08

## 2022-08-31 RX ADMIN — POTASSIUM & SODIUM PHOSPHATES POWDER PACK 280-160-250 MG 2 PACKET: 280-160-250 PACK at 11:08

## 2022-08-31 RX ADMIN — ALPRAZOLAM 0.25 MG: 0.25 TABLET ORAL at 08:08

## 2022-08-31 RX ADMIN — HYDROMORPHONE HYDROCHLORIDE 0.5 MG: 1 INJECTION, SOLUTION INTRAMUSCULAR; INTRAVENOUS; SUBCUTANEOUS at 11:08

## 2022-08-31 RX ADMIN — VALACYCLOVIR HYDROCHLORIDE 500 MG: 500 TABLET, FILM COATED ORAL at 08:08

## 2022-08-31 RX ADMIN — LOPERAMIDE HYDROCHLORIDE 8 MG: 2 CAPSULE ORAL at 08:08

## 2022-08-31 RX ADMIN — ONDANSETRON 8 MG: 2 INJECTION INTRAMUSCULAR; INTRAVENOUS at 08:08

## 2022-08-31 RX ADMIN — OXYCODONE HYDROCHLORIDE 5 MG: 5 SOLUTION ORAL at 02:08

## 2022-08-31 RX ADMIN — METHOCARBAMOL 500 MG: 500 TABLET ORAL at 03:08

## 2022-08-31 RX ADMIN — DIPHENOXYLATE HYDROCHLORIDE AND ATROPINE SULFATE 2 TABLET: 2.5; .025 TABLET ORAL at 08:08

## 2022-08-31 RX ADMIN — ALPRAZOLAM 0.25 MG: 0.25 TABLET ORAL at 03:08

## 2022-08-31 RX ADMIN — BUPROPION HYDROCHLORIDE 300 MG: 150 TABLET, FILM COATED, EXTENDED RELEASE ORAL at 08:08

## 2022-08-31 RX ADMIN — LOPERAMIDE HYDROCHLORIDE 8 MG: 2 CAPSULE ORAL at 05:08

## 2022-08-31 RX ADMIN — DIPHENOXYLATE HYDROCHLORIDE AND ATROPINE SULFATE 2 TABLET: 2.5; .025 TABLET ORAL at 05:08

## 2022-08-31 RX ADMIN — PROMETHAZINE HYDROCHLORIDE 25 MG: 25 INJECTION INTRAMUSCULAR; INTRAVENOUS at 09:08

## 2022-08-31 RX ADMIN — METHOCARBAMOL 500 MG: 500 TABLET ORAL at 08:08

## 2022-08-31 RX ADMIN — Medication 1 PACKET: at 08:08

## 2022-08-31 RX ADMIN — ONDANSETRON 8 MG: 2 INJECTION INTRAMUSCULAR; INTRAVENOUS at 03:08

## 2022-08-31 RX ADMIN — PROMETHAZINE HYDROCHLORIDE 25 MG: 25 INJECTION INTRAMUSCULAR; INTRAVENOUS at 11:08

## 2022-08-31 RX ADMIN — OXYCODONE HYDROCHLORIDE 5 MG: 5 SOLUTION ORAL at 06:08

## 2022-08-31 RX ADMIN — FLUOXETINE 40 MG: 20 CAPSULE ORAL at 08:08

## 2022-08-31 RX ADMIN — OXYCODONE HYDROCHLORIDE 5 MG: 5 SOLUTION ORAL at 08:08

## 2022-08-31 RX ADMIN — LOPERAMIDE HYDROCHLORIDE 8 MG: 2 CAPSULE ORAL at 12:08

## 2022-08-31 RX ADMIN — ENOXAPARIN SODIUM 40 MG: 40 INJECTION SUBCUTANEOUS at 05:08

## 2022-08-31 RX ADMIN — PANTOPRAZOLE SODIUM 40 MG: 40 TABLET, DELAYED RELEASE ORAL at 08:08

## 2022-08-31 RX ADMIN — OXYCODONE HYDROCHLORIDE 5 MG: 5 SOLUTION ORAL at 12:08

## 2022-08-31 NOTE — CONSULTS
Double lumen PICC to right brachial  vein.  34  cm in length, 26 cm arm circumference and 0 cm exposed.   Lot # [id number].

## 2022-08-31 NOTE — PLAN OF CARE
Pt has been up and down to the restroom several times throughout the night. Ostomy putting out copious amount of output. Pt is asking to be unhooked from IV fluids every time she goes to the restroom to prevent falls. Pain meds and nausea meds given on almost a schedule time prn. Pt seemed to have a good night thus far. Will continue to monitor pt for changes.       Problem: Adult Inpatient Plan of Care  Goal: Plan of Care Review  Outcome: Ongoing, Progressing  Goal: Patient-Specific Goal (Individualized)  Outcome: Ongoing, Progressing  Goal: Absence of Hospital-Acquired Illness or Injury  Outcome: Ongoing, Progressing  Goal: Optimal Comfort and Wellbeing  Outcome: Ongoing, Progressing  Goal: Readiness for Transition of Care  Outcome: Ongoing, Progressing     Problem: Fall Injury Risk  Goal: Absence of Fall and Fall-Related Injury  Outcome: Ongoing, Progressing     Problem: Activity and Energy Impairment (Anxiety Signs/Symptoms)  Goal: Optimized Energy Level (Anxiety Signs/Symptoms)  Outcome: Ongoing, Progressing     Problem: Cognitive Impairment (Anxiety Signs/Symptoms)  Goal: Optimized Cognitive Function (Anxiety Signs/Symptoms)  Outcome: Ongoing, Progressing     Problem: Mood Impairment (Anxiety Signs/Symptoms)  Goal: Improved Mood Symptoms (Anxiety Signs/Symptoms)  Outcome: Ongoing, Progressing     Problem: Sleep Impairment (Anxiety Signs/Symptoms)  Goal: Improved Sleep (Anxiety Signs/Symptoms)  Outcome: Ongoing, Progressing     Problem: Social, Occupational or Functional Impairment (Anxiety Signs/Symptoms)  Goal: Enhanced Social, Occupational or Functional Skills (Anxiety Signs/Symptoms)  Outcome: Ongoing, Progressing     Problem: Somatic Disturbance (Anxiety Signs/Symptoms)  Goal: Improved Somatic Symptoms (Anxiety Signs/Symptoms)  Outcome: Ongoing, Progressing     Problem: Infection  Goal: Absence of Infection Signs and Symptoms  Outcome: Ongoing, Progressing

## 2022-08-31 NOTE — PLAN OF CARE
[facility]      HOME HEALTH ORDERS  FACE TO FACE ENCOUNTER    Patient Name: Nyasia Middleton  YOB: 1980    PCP: Bisi Tolbert DO   PCP Address: [address] [place] [address] / [id number]  PCP Phone Number: [phone]  PCP Fax: [phone]    Encounter Date: 8/22/22    Admit to Home Health    Diagnoses:  Active Hospital Problems    Diagnosis  POA    *Partial small bowel obstruction [K56.600]  Yes    UTI (urinary tract infection) [N39.0]  Yes      Resolved Hospital Problems   No resolved problems to display.       Follow Up Appointments:  Future Appointments   Date Time Provider Department Center   9/2/2022  2:30 PM Apurva Holt NP [facility] COLON [facility]   9/7/2022  1:00 PM Patrick Weber MD [facility] GANDIBD [facility]   9/9/2022  1:40 PM CORETTA Doyle MD [facility] COLON [facility]   9/9/2022  3:15 PM RAMON Dupree [facility] ENTERO [facility]       Allergies:Review of patient's allergies indicates:  No Known Allergies    Medications: Review discharge medications with patient and family and provide education.    Current Facility-Administered Medications   Medication Dose Route Frequency Provider Last Rate Last Admin    0.9%  NaCl infusion   Intravenous Continuous Mumtaz Holden  mL/hr at 08/30/22 1638 Rate Verify at 08/30/22 1638    acetaminophen oral solution 650 mg  650 mg Oral Q6H PRN Natan Hopson MD   650 mg at 08/30/22 1623    ALPRAZolam tablet 0.25 mg  0.25 mg Oral TID Mumtaz Holden MD   0.25 mg at 08/31/22 1502    bismuth subsalicylate 262 mg/15 mL suspension 30 mL  30 mL Oral Q6H PRN CORETTA Doyle MD        buPROPion TB24 tablet 300 mg  300 mg Oral Nightly Mumtaz Holden MD   300 mg at 08/30/22 2106    dextrose 10% bolus 125 mL  12.5 g Intravenous PRN Mila Betancourt MD        dextrose 10% bolus 250 mL  25 g Intravenous PRN Mila Betancourt MD        diphenoxylate-atropine 2.5-0.025 mg per tablet 2 tablet  2 tablet Oral QID Natan Wagoner  MD Mark Anthony   2 tablet at 08/31/22 1204    enoxaparin injection 40 mg  40 mg Subcutaneous Daily Trena Tapia MD   40 mg at 08/30/22 1624    FLUoxetine capsule 40 mg  40 mg Oral Nightly Mumtaz Holden MD   40 mg at 08/30/22 2106    glucagon (human recombinant) injection 1 mg  1 mg Intramuscular PRN Mila Betancourt MD        glucose chewable tablet 16 g  16 g Oral PRN Mila Betancourt MD        glucose chewable tablet 24 g  24 g Oral PRN Mila Betancourt MD        HYDROmorphone injection 0.5 mg  0.5 mg Intravenous Q4H PRN Mumtaz Holden MD   0.5 mg at 08/31/22 1100    loperamide capsule 8 mg  8 mg Oral QID Mumtaz Holden MD   8 mg at 08/31/22 1203    melatonin tablet 6 mg  6 mg Oral Nightly PRN Trena Tapia MD   6 mg at 08/30/22 2106    methocarbamoL tablet 500 mg  500 mg Oral TID Flori Paul MD   500 mg at 08/31/22 1502    metoclopramide HCl injection 10 mg  10 mg Intravenous Q6H PRN WDarrian Doyle MD   10 mg at 08/30/22 0322    olanzapine zydis disintegrating tablet 10 mg  10 mg Oral BID Mumtaz Holden MD   10 mg at 08/31/22 0844    ondansetron injection 8 mg  8 mg Intravenous Q6H PRN W. Wade Doyle MD   8 mg at 08/31/22 1538    oxyCODONE 5 mg/5 mL solution 5 mg  5 mg Oral Q4H PRN Natan Hopson MD   5 mg at 08/31/22 1211    pantoprazole EC tablet 40 mg  40 mg Oral Daily Natan Hopson MD   40 mg at 08/31/22 0844    promethazine (PHENERGAN) 25 mg in dextrose 5 % 50 mL IVPB  25 mg Intravenous Q6H PRN Natan Hopson MD   Stopped at 08/31/22 0954    psyllium husk (aspartame) 3.4 gram packet 1 packet  1 packet Oral BID Gen Abebe MD   1 packet at 08/31/22 0843    scopolamine 1.3-1.5 mg (1 mg over 3 days) 1 patch  1 patch Transdermal Q3 Days Natan Hopson MD   1 patch at 08/30/22 0052    sodium chloride 0.9% flush 10 mL  10 mL Intravenous PRN Trena Tapia MD        sodium chloride 0.9% flush 10 mL  10 mL  Intravenous Q6H W. Wade Doyle MD        And    sodium chloride 0.9% flush 10 mL  10 mL Intravenous PRN W. Wade Doyle MD        valACYclovir tablet 500 mg  500 mg Oral BID Natan Hopson MD   500 mg at 08/31/22 0843     Current Discharge Medication List        CONTINUE these medications which have NOT CHANGED    Details   ALPRAZolam (XANAX) 0.25 MG tablet Take 1 tablet (0.25 mg total) by mouth daily as needed for Anxiety.  Qty: 30 tablet, Refills: 0      buPROPion (WELLBUTRIN XL) 300 MG 24 hr tablet Take 1 tablet (300 mg total) by mouth once daily.  Qty: 90 tablet, Refills: 3      ciprofloxacin HCl (CIPRO) 500 MG tablet Take 500 mg by mouth every 12 (twelve) hours.      clotrimazole-betamethasone 1-0.05% (LOTRISONE) cream 2 (two) times daily. Apply to affected area      dicyclomine (BENTYL) 20 mg tablet TAKE 1 TABLET BY MOUTH EVERY SIX HOURS  Qty: 120 tablet, Refills: 0    Comments: Generic For:*BENTYL    20MG   N O T I C E    PRESCRIPTION PREVIOUSLY AUTHORIZED BY DOCTOR:JACQUELINE MACDONALD [phone]      diphenoxylate-atropine 2.5-0.025 mg (LOMOTIL) 2.5-0.025 mg per tablet TAKE 2 TABLETS BY MOUTH FOUR TIMES DAILY AS NEEDED FOR DIARRHEA  Qty: 100 tablet, Refills: 0    Comments: 05/13/2022 1:46:43 PM      fluconazole (DIFLUCAN) 150 MG Tab Take 1 tablet (150 mg total) by mouth once daily.  Qty: 2 tablet, Refills: 9      FLUoxetine 40 MG capsule Take 1 capsule (40 mg total) by mouth once daily.  Qty: 30 capsule, Refills: 11      loperamide (IMODIUM) 2 mg capsule TAKE 2 CAPSULE BY MOUTH FOUR TIMES DAILY  Qty: 240 capsule, Refills: 3      metroNIDAZOLE (FLAGYL) 500 MG tablet Take 500 mg by mouth every 8 (eight) hours.      ondansetron (ZOFRAN-ODT) 8 MG TbDL DISSOLVE 1 TABLET UNDER TONGUE EVERY 8 HOURS AS NEEDED FOR NAUSEA  Qty: 20 tablet, Refills: 2    Comments: 12/11/2020 4:46:10 PM      oxyCODONE-acetaminophen (PERCOCET) 7.5-325 mg per tablet Take 1 tablet by mouth every 4 (four) hours as needed for  Pain.  Qty: 20 tablet, Refills: 0    Comments: Quantity prescribed more than 7 day supply? No      pantoprazole (PROTONIX) 40 MG tablet TAKE 1 TABLET BY MOUTH TWICE DAILY  Qty: 180 tablet, Refills: 2    Comments: 05/03/2021 10:01:18 AM      promethazine (PHENERGAN) 12.5 MG Tab Take 1 tablet (12.5 mg total) by mouth every 6 (six) hours as needed (nausea).  Qty: 40 tablet, Refills: 0               I have seen and examined this patient within the last 30 days. My clinical findings that support the need for the home health skilled services and home bound status are the following:no   Medical restrictions requiring assistance of another human to leave home due to  IV infusion Needs.     Diet:   regular diet    Labs:  Per protocol    Referrals/ Consults  Aide to provide assistance with personal care, ADLs, and vital signs.    Activities:   activity as tolerated    Nursing:   Agency to admit patient within 24 hours of hospital discharge unless specified on physician order or at patient request    SN to complete comprehensive assessment including routine vital signs. Instruct on disease process and s/s of complications to report to MD. Review/verify medication list sent home with the patient at time of discharge  and instruct patient/caregiver as needed. Frequency may be adjusted depending on start of care date.     Skilled nurse to perform up to 3 visits PRN for symptoms related to diagnosis    Notify MD if SBP > 160 or < 90; DBP > 90 or < 50; HR > 120 or < 50; Temp > 101; O2 < 88%;     Ok to schedule additional visits based on staff availability and patient request on consecutive days within the home health episode.    When multiple disciplines ordered:    Start of Care occurs on Sunday - Wednesday schedule remaining discipline evaluations as ordered on separate consecutive days following the start of care.    Thursday SOC -schedule subsequent evaluations Friday and Monday the following week.     Friday - Saturday SOC -  schedule subsequent discipline evaluations on consecutive days starting Monday of the following week.    For all post-discharge communication and subsequent orders please contact patient's primary care physician. If unable to reach primary care physician or do not receive response within 30 minutes, please contact our [facility] for clinical staff order clarification    Miscellaneous   Home Infusion Therapy:   SN to perform Infusion Therapy/Central Line Care.  Review Central Line Care & Central Line Flush with patient.    Administer (drug and dose): Normal Saline 1L bolus (3x per week)  Last dose given: 8/31/22                         Home dose due: 9/2/22    Scrub the Hub: Prior to accessing the line, always perform a 30 second alcohol scrub  Each lumen of the central line is to be flushed at least daily with 10 mL Normal Saline and 3 mL Heparin flush (10 units/mL)  Skilled Nurse (SN) may draw blood from IV access  Blood Draw Procedure:   - Aspirate at least 5 mL of blood   - Discard   - Obtain specimen   - Change injection cap   - Flush with 20 mL Normal Saline followed by a                 3-5 mL Heparin flush (10 units/mL)  Central :   - Sterile dressing changes are done weekly and as needed.   - Use chlor-hexadine scrub to cleanse site, apply Biopatch to insertion site,       apply securement device dressing   - Injection caps are changed weekly and after EVERY lab draw.   - If sterile gauze is under dressing to control oozing,                 dressing change must be performed every 24 hours until gauze is not needed.    Home Health Aide:  Nursing Three times weekly    Wound Care Orders  no    I certify that this patient is confined to her home and needs intermittent skilled nursing care.

## 2022-08-31 NOTE — ASSESSMENT & PLAN NOTE
Nyasia Middleton is a 41 y.o. female who is s/p diverting loop ileostomy 8/17/22, readmitted due to small bowel obstruction as evidenced by CT. Urinalysis and clinical presentation also indicative of symptomatic UTI. S/p diagnostic lap 8/24/22 unremarkable aside from dilated bowels.    - Pain control: multimodal  - Diet: LRD + boost plus  - mIVF: NS @ 100cc/hr      - correcting hyponatremia  - antiemetics PRN, zofran, scopolamine patch, phenergan   - OOBTC  - Imodium QID inc to 8 mg, lomotil 2tablets QID   - Valtrex 500mg q12 PO x3days  - PICC consult, plan for home IVF

## 2022-08-31 NOTE — PROCEDURES
"Nyasia Middleton is a 41 y.o. female patient.    Temp: 98.6 °F (37 °C) (08/31/22 0738)  Pulse: 107 (08/31/22 0738)  Resp: 16 (08/31/22 0738)  BP: 106/68 (08/31/22 0738)  SpO2: 98 % (08/31/22 0738)  Weight: 56.7 kg (125 lb) (08/24/22 1339)  Height: 5' 8" (172.7 cm) (08/24/22 1339)    PICC  Performed by: Isela Cowart, RN  Assisting provider: Gen Yoon RN  Time out: Immediately prior to procedure a time out was called to verify the correct patient, procedure, equipment, support staff and site/side marked as required  Indications: med administration and vascular access  Anesthesia: local infiltration  Local anesthetic: lidocaine 1% without epinephrine  Anesthetic Total (mL): 3  Description of findings: PICC  Preparation: skin prepped with ChloraPrep  Skin prep agent dried: skin prep agent completely dried prior to procedure  Sterile barriers: all five maximum sterile barriers used - cap, mask, sterile gown, sterile gloves, and large sterile sheet  Hand hygiene: hand hygiene performed prior to central venous catheter insertion  Location details: right brachial  Catheter type: double lumen  Catheter size: 5 Fr  Catheter Length: 34cm    Ultrasound guidance: yes  Vessel Caliber: medium and patent, compressibility normal  Vascular Doppler: not done  Needle advanced into vessel with real time Ultrasound guidance.  Guidewire confirmed in vessel.  Image recorded and saved.  Sterile sheath used.  Number of attempts: 1  Post-procedure: blood return through all ports, chlorhexidine patch and sterile dressing applied  Technical procedures used: 3CG  Specimens: No  Implants: No  Assessment: placement verified by x-ray  Complications: none        Name   8/31/2022    "

## 2022-08-31 NOTE — PROGRESS NOTES
[facility]  Colorectal Surgery  Progress Note    Patient Name: Nyasia Middleton  MRN: [id number]  Admission Date: 8/22/2022  Hospital Length of Stay: 9 days  Attending Physician: CORETTA Doyle MD    Subjective:     Interval History: No acute events overnight. Ostomy output down somewhat, 1200 cc overnight. She is tolerating a diet.     Post-Op Info:  Procedure(s) (LRB):  LAPAROSCOPY, DIAGNOSTIC (N/A)   7 Days Post-Op      Medications:  Continuous Infusions:   sodium chloride 0.9% 100 mL/hr at 08/30/22 1638     Scheduled Meds:   ALPRAZolam  0.25 mg Oral TID    buPROPion  300 mg Oral Nightly    diphenoxylate-atropine 2.5-0.025 mg  2 tablet Oral QID    enoxaparin  40 mg Subcutaneous Daily    FLUoxetine  40 mg Oral Nightly    loperamide  8 mg Oral QID    methocarbamoL  500 mg Oral TID    olanzapine zydis  10 mg Oral BID    pantoprazole  40 mg Oral Daily    psyllium husk (aspartame)  1 packet Oral BID    scopolamine  1 patch Transdermal Q3 Days    valACYclovir  500 mg Oral BID     PRN Meds:   acetaminophen    bismuth subsalicylate    dextrose 10%    dextrose 10%    glucagon (human recombinant)    glucose    glucose    HYDROmorphone    melatonin    metoclopramide HCl    ondansetron    oxyCODONE    promethazine (PHENERGAN) IVPB    sodium chloride 0.9%        Objective:     Vital Signs (Most Recent):  Temp: 98.6 °F (37 °C) (08/31/22 0738)  Pulse: 107 (08/31/22 0738)  Resp: 16 (08/31/22 0738)  BP: 106/68 (08/31/22 0738)  SpO2: 98 % (08/31/22 0738)   Vital Signs (24h Range):  Temp:  [97.3 °F (36.3 °C)-98.7 °F (37.1 °C)] 98.6 °F (37 °C)  Pulse:  [] 107  Resp:  [16-20] 16  SpO2:  [98 %-100 %] 98 %  BP: (102-173)/(59-88) 106/68     Intake/Output - Last 3 Shifts         08/29 0700  08/30 0659 08/30 0700  08/31 0659 08/31 0700  09/01 0659    P.O. 2340 480     I.V. (mL/kg) 1143.1 (20.2) 1768 (31.2)     IV Piggyback 50 90.9     Total Intake(mL/kg) 3533.1 (62.3) 2339 (41.3)     Urine  (mL/kg/hr) 2600 (1.9) 1450 (1.1)     Emesis/NG output 0      Other 0      Stool 4450 3700     Total Output 7050 5150     Net -7396.9 -2811            Urine Occurrence 5 x      Stool Occurrence  1 x 1 x    Emesis Occurrence 0 x              Physical Exam  Constitutional:       General: She is not in acute distress.     Appearance: Normal appearance. She is well-developed.   HENT:      Head: Normocephalic and atraumatic.      Mouth/Throat:      Mouth: Mucous membranes are moist.   Eyes:      General:         Right eye: No discharge.         Left eye: No discharge.      Extraocular Movements: Extraocular movements intact.      Conjunctiva/sclera: Conjunctivae normal.   Cardiovascular:      Rate and Rhythm: Normal rate and regular rhythm.   Pulmonary:      Effort: Pulmonary effort is normal. No respiratory distress.   Abdominal:      General: There is no distension.      Palpations: Abdomen is soft.      Tenderness: There is no abdominal tenderness. There is no guarding.      Comments: RUQ ostomy pouch with liquid stool present, slightly more formed  Laparoscopic incisions c/d/i   Musculoskeletal:         General: No deformity. Normal range of motion.      Cervical back: Normal range of motion.   Skin:     General: Skin is warm and dry.   Neurological:      Mental Status: She is alert and oriented to person, place, and time. Mental status is at baseline.   Psychiatric:         Behavior: Behavior normal.         Significant Labs:  CBC (Last 3 Results):   Recent Labs   Lab 08/26/22  0653   WBC 8.27   RBC 5.02   HGB 16.0   HCT 45.5      MCV 91   MCH 31.9*   MCHC 35.2       CMP (Last 3 Results):   Recent Labs   Lab 08/30/22  0325 08/30/22  1731 08/31/22  0429   GLU 86 83 87   CALCIUM 9.4 9.1 9.1   * 131* 131*   K 3.7 3.6 3.4*   CO2 21* 20* 21*    102 103   BUN 11 11 9   CREATININE 0.6 0.6 0.6           CRP (Last 3 Results):   Recent Labs   Lab 08/27/22  0421 08/29/22  0733   CRP 49.1* 32.8*        Microbiology Results (last 7 days)       Procedure Component Value Units Date/Time    Clostridium difficile EIA [[id number]] Collected: 08/27/22 0953    Order Status: Completed Specimen: Stool Updated: 08/28/22 0019     C. diff Antigen Negative     C difficile Toxins A+B, EIA Negative     Comment: Testing not recommended for children <24 months old.       Narrative:      Please check C Diff of ileostomy output (NOT stool from  rectum)          Recent Labs   Lab 08/29/22  0934   COLORU Yellow   SPECGRAV 1.020   PHUR 6.0   PROTEINUA Trace*   NITRITE Negative   LEUKOCYTESUR Negative       All pertinent lab results within the last 24 hours have been reviewed.     Significant Diagnostics:  I have reviewed all pertinent imaging results/findings within the past 24 hours.      Assessment/Plan:     * Partial small bowel obstruction  Nyasia Middleton is a 41 y.o. female who is s/p diverting loop ileostomy 8/17/22, readmitted due to small bowel obstruction as evidenced by CT. Urinalysis and clinical presentation also indicative of symptomatic UTI. S/p diagnostic lap 8/24/22 unremarkable aside from dilated bowels.    - Pain control: multimodal  - Diet: LRD + boost plus  - mIVF: NS @ 100cc/hr      - correcting hyponatremia  - antiemetics PRN, zofran, scopolamine patch, phenergan   - OOBTC  - Imodium QID inc to 8 mg, lomotil 2tablets QID   - Valtrex 500mg q12 PO x3days  - PICC consult, plan for home IVF            Mumtaz Holden MD  Colorectal Surgery  [facility]

## 2022-08-31 NOTE — SUBJECTIVE & OBJECTIVE
Subjective:     Interval History: No acute events overnight. Ostomy output down somewhat, 1200 cc overnight. She is tolerating a diet.     Post-Op Info:  Procedure(s) (LRB):  LAPAROSCOPY, DIAGNOSTIC (N/A)   7 Days Post-Op      Medications:  Continuous Infusions:   sodium chloride 0.9% 100 mL/hr at 08/30/22 1638     Scheduled Meds:   ALPRAZolam  0.25 mg Oral TID    buPROPion  300 mg Oral Nightly    diphenoxylate-atropine 2.5-0.025 mg  2 tablet Oral QID    enoxaparin  40 mg Subcutaneous Daily    FLUoxetine  40 mg Oral Nightly    loperamide  8 mg Oral QID    methocarbamoL  500 mg Oral TID    olanzapine zydis  10 mg Oral BID    pantoprazole  40 mg Oral Daily    psyllium husk (aspartame)  1 packet Oral BID    scopolamine  1 patch Transdermal Q3 Days    valACYclovir  500 mg Oral BID     PRN Meds:   acetaminophen    bismuth subsalicylate    dextrose 10%    dextrose 10%    glucagon (human recombinant)    glucose    glucose    HYDROmorphone    melatonin    metoclopramide HCl    ondansetron    oxyCODONE    promethazine (PHENERGAN) IVPB    sodium chloride 0.9%        Objective:     Vital Signs (Most Recent):  Temp: 98.6 °F (37 °C) (08/31/22 0738)  Pulse: 107 (08/31/22 0738)  Resp: 16 (08/31/22 0738)  BP: 106/68 (08/31/22 0738)  SpO2: 98 % (08/31/22 0738)   Vital Signs (24h Range):  Temp:  [97.3 °F (36.3 °C)-98.7 °F (37.1 °C)] 98.6 °F (37 °C)  Pulse:  [] 107  Resp:  [16-20] 16  SpO2:  [98 %-100 %] 98 %  BP: (102-173)/(59-88) 106/68     Intake/Output - Last 3 Shifts         08/29 0700 08/30 0659 08/30 0700 08/31 0659 08/31 0700 09/01 0659    P.O. 2340 480     I.V. (mL/kg) 1143.1 (20.2) 1768 (31.2)     IV Piggyback 50 90.9     Total Intake(mL/kg) 3533.1 (62.3) 2339 (41.3)     Urine (mL/kg/hr) 2600 (1.9) 1450 (1.1)     Emesis/NG output 0      Other 0      Stool 4450 3700     Total Output 7050 5150     Net -3516.9 -2811            Urine Occurrence 5 x      Stool Occurrence  1 x 1 x    Emesis Occurrence 0 x               Physical Exam  Constitutional:       General: She is not in acute distress.     Appearance: Normal appearance. She is well-developed.   HENT:      Head: Normocephalic and atraumatic.      Mouth/Throat:      Mouth: Mucous membranes are moist.   Eyes:      General:         Right eye: No discharge.         Left eye: No discharge.      Extraocular Movements: Extraocular movements intact.      Conjunctiva/sclera: Conjunctivae normal.   Cardiovascular:      Rate and Rhythm: Normal rate and regular rhythm.   Pulmonary:      Effort: Pulmonary effort is normal. No respiratory distress.   Abdominal:      General: There is no distension.      Palpations: Abdomen is soft.      Tenderness: There is no abdominal tenderness. There is no guarding.      Comments: RUQ ostomy pouch with liquid stool present, slightly more formed  Laparoscopic incisions c/d/i   Musculoskeletal:         General: No deformity. Normal range of motion.      Cervical back: Normal range of motion.   Skin:     General: Skin is warm and dry.   Neurological:      Mental Status: She is alert and oriented to person, place, and time. Mental status is at baseline.   Psychiatric:         Behavior: Behavior normal.         Significant Labs:  CBC (Last 3 Results):   Recent Labs   Lab 08/26/22  0653   WBC 8.27   RBC 5.02   HGB 16.0   HCT 45.5      MCV 91   MCH 31.9*   MCHC 35.2       CMP (Last 3 Results):   Recent Labs   Lab 08/30/22  0325 08/30/22  1731 08/31/22  0429   GLU 86 83 87   CALCIUM 9.4 9.1 9.1   * 131* 131*   K 3.7 3.6 3.4*   CO2 21* 20* 21*    102 103   BUN 11 11 9   CREATININE 0.6 0.6 0.6           CRP (Last 3 Results):   Recent Labs   Lab 08/27/22  0421 08/29/22  0733   CRP 49.1* 32.8*       Microbiology Results (last 7 days)       Procedure Component Value Units Date/Time    Clostridium difficile EIA [[id number]] Collected: 08/27/22 0953    Order Status: Completed Specimen: Stool Updated: 08/28/22 0019     C. diff Antigen  Negative     C difficile Toxins A+B, EIA Negative     Comment: Testing not recommended for children <24 months old.       Narrative:      Please check C Diff of ileostomy output (NOT stool from  rectum)          Recent Labs   Lab 08/29/22  0934   COLORU Yellow   SPECGRAV 1.020   PHUR 6.0   PROTEINUA Trace*   NITRITE Negative   LEUKOCYTESUR Negative       All pertinent lab results within the last 24 hours have been reviewed.     Significant Diagnostics:  I have reviewed all pertinent imaging results/findings within the past 24 hours.

## 2022-09-01 ENCOUNTER — PATIENT MESSAGE (OUTPATIENT)
Dept: SURGERY | Facility: CLINIC | Age: 42
End: 2022-09-01
Payer: COMMERCIAL

## 2022-09-01 ENCOUNTER — TELEPHONE (OUTPATIENT)
Dept: SURGERY | Facility: CLINIC | Age: 42
End: 2022-09-01
Payer: COMMERCIAL

## 2022-09-01 VITALS
HEIGHT: 68 IN | RESPIRATION RATE: 19 BRPM | SYSTOLIC BLOOD PRESSURE: 117 MMHG | TEMPERATURE: 99 F | OXYGEN SATURATION: 98 % | DIASTOLIC BLOOD PRESSURE: 61 MMHG | BODY MASS INDEX: 18.94 KG/M2 | HEART RATE: 99 BPM | WEIGHT: 125 LBS

## 2022-09-01 LAB
ANION GAP SERPL CALC-SCNC: 8 MMOL/L (ref 8–16)
BUN SERPL-MCNC: 10 MG/DL (ref 6–20)
CALCIUM SERPL-MCNC: 9.6 MG/DL (ref 8.7–10.5)
CHLORIDE SERPL-SCNC: 104 MMOL/L (ref 95–110)
CO2 SERPL-SCNC: 22 MMOL/L (ref 23–29)
CREAT SERPL-MCNC: 0.7 MG/DL (ref 0.5–1.4)
EST. GFR  (NO RACE VARIABLE): >60 ML/MIN/1.73 M^2
GLUCOSE SERPL-MCNC: 88 MG/DL (ref 70–110)
POTASSIUM SERPL-SCNC: 4.2 MMOL/L (ref 3.5–5.1)
SODIUM SERPL-SCNC: 134 MMOL/L (ref 136–145)

## 2022-09-01 PROCEDURE — 25000003 PHARM REV CODE 250: Performed by: COLON & RECTAL SURGERY

## 2022-09-01 PROCEDURE — 36415 COLL VENOUS BLD VENIPUNCTURE: CPT | Performed by: COLON & RECTAL SURGERY

## 2022-09-01 PROCEDURE — 25000003 PHARM REV CODE 250: Performed by: STUDENT IN AN ORGANIZED HEALTH CARE EDUCATION/TRAINING PROGRAM

## 2022-09-01 PROCEDURE — 63600175 PHARM REV CODE 636 W HCPCS: Performed by: STUDENT IN AN ORGANIZED HEALTH CARE EDUCATION/TRAINING PROGRAM

## 2022-09-01 PROCEDURE — 25000242 PHARM REV CODE 250 ALT 637 W/ HCPCS: Performed by: STUDENT IN AN ORGANIZED HEALTH CARE EDUCATION/TRAINING PROGRAM

## 2022-09-01 PROCEDURE — A4216 STERILE WATER/SALINE, 10 ML: HCPCS | Performed by: COLON & RECTAL SURGERY

## 2022-09-01 PROCEDURE — 80048 BASIC METABOLIC PNL TOTAL CA: CPT | Performed by: COLON & RECTAL SURGERY

## 2022-09-01 PROCEDURE — 63600175 PHARM REV CODE 636 W HCPCS: Performed by: COLON & RECTAL SURGERY

## 2022-09-01 PROCEDURE — 25000003 PHARM REV CODE 250

## 2022-09-01 RX ORDER — ONDANSETRON 4 MG/1
4 TABLET, ORALLY DISINTEGRATING ORAL 2 TIMES DAILY
Qty: 60 TABLET | Refills: 1 | Status: SHIPPED | OUTPATIENT
Start: 2022-09-01 | End: 2022-09-07

## 2022-09-01 RX ORDER — DIPHENOXYLATE HYDROCHLORIDE AND ATROPINE SULFATE 2.5; .025 MG/1; MG/1
2 TABLET ORAL 4 TIMES DAILY
Qty: 120 TABLET | Refills: 3 | Status: SHIPPED | OUTPATIENT
Start: 2022-09-01 | End: 2022-12-30

## 2022-09-01 RX ORDER — LOPERAMIDE HYDROCHLORIDE 2 MG/1
8 CAPSULE ORAL 4 TIMES DAILY
Qty: 120 CAPSULE | Refills: 3 | Status: SHIPPED | OUTPATIENT
Start: 2022-09-01 | End: 2022-09-02 | Stop reason: SDUPTHER

## 2022-09-01 RX ADMIN — ONDANSETRON 8 MG: 2 INJECTION INTRAMUSCULAR; INTRAVENOUS at 06:09

## 2022-09-01 RX ADMIN — METHOCARBAMOL 500 MG: 500 TABLET ORAL at 09:09

## 2022-09-01 RX ADMIN — PROMETHAZINE HYDROCHLORIDE 25 MG: 25 INJECTION INTRAMUSCULAR; INTRAVENOUS at 12:09

## 2022-09-01 RX ADMIN — HYDROMORPHONE HYDROCHLORIDE 0.5 MG: 1 INJECTION, SOLUTION INTRAMUSCULAR; INTRAVENOUS; SUBCUTANEOUS at 09:09

## 2022-09-01 RX ADMIN — HYDROMORPHONE HYDROCHLORIDE 0.5 MG: 1 INJECTION, SOLUTION INTRAMUSCULAR; INTRAVENOUS; SUBCUTANEOUS at 02:09

## 2022-09-01 RX ADMIN — LOPERAMIDE HYDROCHLORIDE 8 MG: 2 CAPSULE ORAL at 12:09

## 2022-09-01 RX ADMIN — ALPRAZOLAM 0.25 MG: 0.25 TABLET ORAL at 03:09

## 2022-09-01 RX ADMIN — METHOCARBAMOL 500 MG: 500 TABLET ORAL at 03:09

## 2022-09-01 RX ADMIN — ALPRAZOLAM 0.25 MG: 0.25 TABLET ORAL at 09:09

## 2022-09-01 RX ADMIN — Medication 10 ML: at 12:09

## 2022-09-01 RX ADMIN — PANTOPRAZOLE SODIUM 40 MG: 40 TABLET, DELAYED RELEASE ORAL at 09:09

## 2022-09-01 RX ADMIN — LOPERAMIDE HYDROCHLORIDE 8 MG: 2 CAPSULE ORAL at 09:09

## 2022-09-01 RX ADMIN — VALACYCLOVIR HYDROCHLORIDE 500 MG: 500 TABLET, FILM COATED ORAL at 09:09

## 2022-09-01 RX ADMIN — DIPHENOXYLATE HYDROCHLORIDE AND ATROPINE SULFATE 2 TABLET: 2.5; .025 TABLET ORAL at 09:09

## 2022-09-01 RX ADMIN — DIPHENOXYLATE HYDROCHLORIDE AND ATROPINE SULFATE 2 TABLET: 2.5; .025 TABLET ORAL at 12:09

## 2022-09-01 RX ADMIN — ONDANSETRON 8 MG: 2 INJECTION INTRAMUSCULAR; INTRAVENOUS at 03:09

## 2022-09-01 RX ADMIN — OLANZAPINE 10 MG: 5 TABLET, ORALLY DISINTEGRATING ORAL at 09:09

## 2022-09-01 RX ADMIN — SODIUM CHLORIDE: 0.9 INJECTION, SOLUTION INTRAVENOUS at 02:09

## 2022-09-01 RX ADMIN — SODIUM CHLORIDE: 0.9 INJECTION, SOLUTION INTRAVENOUS at 12:09

## 2022-09-01 RX ADMIN — OXYCODONE HYDROCHLORIDE 5 MG: 5 SOLUTION ORAL at 12:09

## 2022-09-01 NOTE — TELEPHONE ENCOUNTER
Spoke with patient regarding upcoming appointment with Apurva for Friday.Instructed patient that if she is still inpatient, we will cancel appointment for her. Patient verbalizes understanding.

## 2022-09-01 NOTE — PLAN OF CARE
Pt ostomy output slightly more formed. Pt complains of back pain, medicated pt accordingly. Flush pt PICC as scheduled. Will continue to monitor pt for changes   Problem: Adult Inpatient Plan of Care  Goal: Plan of Care Review  Outcome: Ongoing, Progressing  Goal: Patient-Specific Goal (Individualized)  Outcome: Ongoing, Progressing  Goal: Absence of Hospital-Acquired Illness or Injury  Outcome: Ongoing, Progressing  Goal: Optimal Comfort and Wellbeing  Outcome: Ongoing, Progressing  Goal: Readiness for Transition of Care  Outcome: Ongoing, Progressing     Problem: Fall Injury Risk  Goal: Absence of Fall and Fall-Related Injury  Outcome: Ongoing, Progressing     Problem: Activity and Energy Impairment (Anxiety Signs/Symptoms)  Goal: Optimized Energy Level (Anxiety Signs/Symptoms)  Outcome: Ongoing, Progressing     Problem: Cognitive Impairment (Anxiety Signs/Symptoms)  Goal: Optimized Cognitive Function (Anxiety Signs/Symptoms)  Outcome: Ongoing, Progressing     Problem: Mood Impairment (Anxiety Signs/Symptoms)  Goal: Improved Mood Symptoms (Anxiety Signs/Symptoms)  Outcome: Ongoing, Progressing     Problem: Sleep Impairment (Anxiety Signs/Symptoms)  Goal: Improved Sleep (Anxiety Signs/Symptoms)  Outcome: Ongoing, Progressing     Problem: Social, Occupational or Functional Impairment (Anxiety Signs/Symptoms)  Goal: Enhanced Social, Occupational or Functional Skills (Anxiety Signs/Symptoms)  Outcome: Ongoing, Progressing     Problem: Somatic Disturbance (Anxiety Signs/Symptoms)  Goal: Improved Somatic Symptoms (Anxiety Signs/Symptoms)  Outcome: Ongoing, Progressing     Problem: Infection  Goal: Absence of Infection Signs and Symptoms  Outcome: Ongoing, Progressing

## 2022-09-01 NOTE — PLAN OF CARE
[facility]  Discharge Final Note    Primary Care Provider: Bisi Tolbert DO    Expected Discharge Date: 9/1/2022    Patient will be discharged with [facility]     Final Discharge Note (most recent)       Final Note - 09/01/22 1615          Final Note    Assessment Type Final Discharge Note     Anticipated Discharge Disposition Home-Health Care [facility]     Hospital Resources/Appts/Education Provided Appointments scheduled and added to AVS        Post-Acute Status    Post-Acute Authorization Home Health     Home Health Status Set-up Complete/Auth obtained                   Contact Info       W Wade Doyle MD   Specialty: Colon and Rectal Surgery    [address] [facility] [address]   Phone: [phone]       Next Steps: Follow up on 9/9/2022    Instructions: Postop Visit at 1:40PM    Apurva Holt NP   Specialty: Colon and Rectal Surgery, Wound Care    [address] [facility] [address]   Phone: [phone]       Next Steps: Follow up on 9/2/2022    Instructions: Ostomy follow up at 2:30PM    RAMON Dupree   Specialty: Wound Care    [address] [facility] [address]   Phone: [phone]       Next Steps: Follow up on 9/9/2022    Instructions: ostomy follow up at 3:15PM          Jennifer Sloan RN, CM   Ext: [phone]

## 2022-09-01 NOTE — PLAN OF CARE
Problem: Adult Inpatient Plan of Care  Goal: Plan of Care Review  9/1/2022 1559 by Brina Rowland RN  Outcome: Met  9/1/2022 1521 by Brina Rowland RN  Outcome: Ongoing, Progressing  Goal: Patient-Specific Goal (Individualized)  9/1/2022 1559 by Brina Rowland RN  Outcome: Met  9/1/2022 1521 by Brina Rowland RN  Outcome: Ongoing, Progressing  Goal: Absence of Hospital-Acquired Illness or Injury  9/1/2022 1559 by Brina Rowland RN  Outcome: Met  9/1/2022 1521 by Brina Rowland RN  Outcome: Ongoing, Progressing  Goal: Optimal Comfort and Wellbeing  9/1/2022 1559 by Brina Rowland RN  Outcome: Met  9/1/2022 1521 by Brina Rowland RN  Outcome: Ongoing, Progressing  Goal: Readiness for Transition of Care  9/1/2022 1559 by Brina Rowland RN  Outcome: Met  9/1/2022 1521 by Brina Rowland RN  Outcome: Ongoing, Progressing    Patient instructed on discharge instructions and follow up appointments

## 2022-09-01 NOTE — HOSPITAL COURSE
Nyasia Middleton was admitted with a postop ileus from prior lap ileostomy creation. She was managed conservatively initially. With ongoing low output from her ileostomy, she was taken to the OR on 8/24/22 for a diagnostic lap but there were no abnormalities requiring intervention. Her ileus eventually resolved, and she began having high output of liquid stool from her ostomy. She was started on fiber, Imodium, and Lomotil with gradual improvement in the output. Her out was still around 2L though, so a PICC was placed and she was arranged for home IVF. The patient was tolerating a regular diet and ambulating without difficulty. Pain was well-controlled with prn meds. Follow up information was provided and Nyasia Middleton was deemed ready for discharge on 91/22.

## 2022-09-01 NOTE — PROGRESS NOTES
[facility] Home IV Fluids Education/Discharge Planning Note:     [facility] educator met with patient and spouse and discussed discharge plan for home IV Fluids. Ms Nyasia Middleton will discharge home with family support, but may be able to self infuse. Patient will infuse IV fluids via Dial-a-Flow. Patient and spouse educated on S.A.S.H procedure & [facility] S.A.S.[facility] mat provided.  Patient education checklist reviewed and acknowledged by the patient and she is agreeable to discharge with home infusion plan of care. IV administration process using aspetic technique was reviewed with successful return demonstration by the patient and her spouse. Patient feels comfortable with home infusion process after bedside education provided. Patient will discharge home with IV Normal Saline 1L 3x/week for estimated end of therapy date 10/1/22. Dosing schedule times will be TBD by patient, as timing is not as critical for normal saline in the home. Extension set to be placed on double lumen PICC by bedside nurse should the patient wish to have them attached. Patient to be followed by [facility] for weekly dressing changes and lab draws.     Time allotted for questions; questions addressed appropriately. Patients home IV Fluids & supplies to be delivered to patient's home to be delivered on 9/1/22. Provided patient with [facility] support number [phone]. Patient is ready discharge home from [facility] perspective. Patient's discharge planning team and bedside nurse updated with the information above.      -Patient accepted to care by [facility] and report called to Keiko. Keiko confirmed [facility] has accepted patient onto services (she was current with them prior to admit) for skilled nursing needs.   -Phone number [phone]     Please do not hesitate to reach out for any additional needs.        Brandi Allen MS, RDN, LDN  Clinical Dietitian  [facility]   Desk: [phone]  Other Phone: [phone]  [email or web address]

## 2022-09-01 NOTE — DISCHARGE SUMMARY
[facility]  Colorectal Surgery  Discharge Summary      Patient Name: Nyasia Middleton  MRN: [id number]  Admission Date: 8/22/2022  Hospital Length of Stay: 10 days  Discharge Date and Time: 9/1/2022  5:40 PM  Attending Physician: Nory att. providers found   Discharging Provider: Mumtaz Holden MD  Primary Care Provider: Bisi Tolbert DO     HPI:  Nyasia Middleton is a 41-year-old who is 5 days status post laparoscopic diverting loop ileostomy. She began vomiting yesterday 8/21/22  with associated upper abdominal pain. She was discharged with phenergan but without any improvement. She has been having output to her ileostomy bag. She has been eating small amounts of food prior to the vomiting. Since being admitted patient has had several episodes of fever that respond to tylenol.    Patient had an abdominopelvic CT which showed diffusely dilated fluid-filled loops of small bowel throughout the abdomen and pelvis concerning for small bowel obstruction.  There is a questionable zone of transition just proximal to the patient's ileostomy/stoma.         Procedure(s) (LRB):  LAPAROSCOPY, DIAGNOSTIC (N/A)     Hospital Course:  Nyasia Middleton was admitted with a postop ileus from prior lap ileostomy creation. She was managed conservatively initially. With ongoing low output from her ileostomy, she was taken to the OR on 8/24/22 for a diagnostic lap but there were no abnormalities requiring intervention. Her ileus eventually resolved, and she began having high output of liquid stool from her ostomy. She was started on fiber, Imodium, and Lomotil with gradual improvement in the output. Her out was still around 2L though, so a PICC was placed and she was arranged for home IVF. The patient was tolerating a regular diet and ambulating without difficulty. Pain was well-controlled with prn meds. Follow up information was provided and Nyasia Middleton was deemed ready for discharge on 91/22.         Goals of Care Treatment  Preferences:  Code Status: Full Code      Consults (From admission, onward)        Status Ordering Provider     Inpatient consult to Social Work  Once        Provider:  (Not yet assigned)    Acknowledged REINA CRUZ     Inpatient consult to PICC team ([facility])  Once        Provider:  (Not yet assigned)    Completed REINA CRUZ     Inpatient consult to Midline team  Once        Provider:  (Not yet assigned)    Completed CORETTA ZIMMER          Significant Diagnostic Studies: Labs:   CMP   Recent Labs   Lab 08/31/22  0429 08/31/22  1752 09/01/22  0343   * 133* 134*   K 3.4* 3.9 4.2    106 104   CO2 21* 15* 22*   GLU 87 100 88   BUN 9 8 10   CREATININE 0.6 0.6 0.7   CALCIUM 9.1 9.2 9.6   ANIONGAP 7* 12 8    and CBC No results for input(s): WBC, HGB, HCT, PLT in the last 48 hours.    Pending Diagnostic Studies:     None        Final Active Diagnoses:    Diagnosis Date Noted POA    PRINCIPAL PROBLEM:  Partial small bowel obstruction [K56.600] 08/22/2022 Yes    UTI (urinary tract infection) [N39.0] 08/22/2022 Yes      Problems Resolved During this Admission:      Discharged Condition: good    [place] [facility] Up:   Follow-up Information     PHILIP Zimmer MD Follow up on 9/9/2022.    Specialty: Colon and Rectal Surgery  Why: Postop Visit at 1:40PM  Contact information:  [address] [address]  [phone]             Apurva Holt NP Follow up on 9/2/2022.    Specialties: Colon and Rectal Surgery, Wound Care  Why: Ostomy follow up at 2:30PM  Contact information:  [address] [facility] [place] [address]  [phone]             RAMON Dupree Follow up on 9/9/2022.    Specialty: Wound Care  Why: ostomy follow up at 3:15PM  Contact information:  [address] [facility] [place] [address]  [phone]                       Patient Instructions:      Diet Adult Regular     Notify your health care provider if you experience any of the  following:  temperature >100.4     Notify your health care provider if you experience any of the following:  persistent nausea and vomiting or diarrhea     Notify your health care provider if you experience any of the following:  severe uncontrolled pain     Notify your health care provider if you experience any of the following:  redness, tenderness, or signs of infection (pain, swelling, redness, odor or green/yellow discharge around incision site)     Notify your health care provider if you experience any of the following:  difficulty breathing or increased cough     Notify your health care provider if you experience any of the following:  severe persistent headache     Notify your health care provider if you experience any of the following:  persistent dizziness, light-headedness, or visual disturbances     Notify your health care provider if you experience any of the following:  increased confusion or weakness     Activity as tolerated     Medications:  Reconciled Home Medications:      Medication List      CHANGE how you take these medications    diphenoxylate-atropine 2.5-0.025 mg 2.5-0.025 mg per tablet  Commonly known as: LOMOTIL  Take 2 tablets by mouth 4 (four) times daily.  What changed: See the new instructions.     loperamide 2 mg capsule  Commonly known as: IMODIUM  Take 4 capsules (8 mg total) by mouth 4 (four) times daily.  What changed:   · how much to take  · how to take this  · when to take this  · additional instructions     * ondansetron 8 MG Tbdl  Commonly known as: ZOFRAN-ODT  DISSOLVE 1 TABLET UNDER TONGUE EVERY 8 HOURS AS NEEDED FOR NAUSEA  What changed: Another medication with the same name was added. Make sure you understand how and when to take each.     * ondansetron 4 MG Tbdl  Commonly known as: ZOFRAN-ODT  Dissolve 1 tablet (4 mg total) by mouth 2 (two) times daily.  What changed: You were already taking a medication with the same name, and this prescription was added. Make sure you  understand how and when to take each.         * This list has 2 medication(s) that are the same as other medications prescribed for you. Read the directions carefully, and ask your doctor or other care provider to review them with you.            CONTINUE taking these medications    ALPRAZolam 0.25 MG tablet  Commonly known as: XANAX  Take 1 tablet (0.25 mg total) by mouth daily as needed for Anxiety.     dicyclomine 20 mg tablet  Commonly known as: BENTYL  TAKE 1 TABLET BY MOUTH EVERY SIX HOURS     oxyCODONE-acetaminophen 7.5-325 mg per tablet  Commonly known as: PERCOCET  Take 1 tablet by mouth every 4 (four) hours as needed for Pain.     pantoprazole 40 MG tablet  Commonly known as: PROTONIX  TAKE 1 TABLET BY MOUTH TWICE DAILY     promethazine 12.5 MG Tab  Commonly known as: PHENERGAN  Take 1 tablet (12.5 mg total) by mouth every 6 (six) hours as needed (nausea).        ASK your doctor about these medications    buPROPion 300 MG 24 hr tablet  Commonly known as: WELLBUTRIN XL  Take 1 tablet (300 mg total) by mouth once daily.     ciprofloxacin HCl 500 MG tablet  Commonly known as: CIPRO  Take 500 mg by mouth every 12 (twelve) hours.     clotrimazole-betamethasone 1-0.05% cream  Commonly known as: LOTRISONE  2 (two) times daily. Apply to affected area     fluconazole 150 MG Tab  Commonly known as: DIFLUCAN  Take 1 tablet (150 mg total) by mouth once daily.     FLUoxetine 40 MG capsule  Take 1 capsule (40 mg total) by mouth once daily.     metroNIDAZOLE 500 MG tablet  Commonly known as: FLAGYL  Take 500 mg by mouth every 8 (eight) hours.            Mumtaz Holden MD  Colorectal Surgery  [facility]

## 2022-09-01 NOTE — PROGRESS NOTES
[facility]  Colorectal Surgery  Progress Note    Patient Name: Nyasia Middleton  MRN: [id number]  Admission Date: 8/22/2022  Hospital Length of Stay: 10 days  Attending Physician: CORETTA Doyle MD    Subjective:     Interval History: No acute events overnight. Ostomy output improved, 800 cc overnight and thicker consistency. Received PICC yesterday. Tolerating a reg diet.     Post-Op Info:  Procedure(s) (LRB):  LAPAROSCOPY, DIAGNOSTIC (N/A)   8 Days Post-Op      Medications:  Continuous Infusions:   sodium chloride 0.9% 100 mL/hr at 09/01/22 0226     Scheduled Meds:   ALPRAZolam  0.25 mg Oral TID    buPROPion  300 mg Oral Nightly    diphenoxylate-atropine 2.5-0.025 mg  2 tablet Oral QID    enoxaparin  40 mg Subcutaneous Daily    FLUoxetine  40 mg Oral Nightly    loperamide  8 mg Oral QID    methocarbamoL  500 mg Oral TID    olanzapine zydis  10 mg Oral BID    pantoprazole  40 mg Oral Daily    psyllium husk (aspartame)  1 packet Oral BID    scopolamine  1 patch Transdermal Q3 Days    sodium chloride 0.9%  10 mL Intravenous Q6H    valACYclovir  500 mg Oral BID     PRN Meds:   acetaminophen    benzocaine    bismuth subsalicylate    dextrose 10%    dextrose 10%    glucagon (human recombinant)    glucose    glucose    HYDROmorphone    melatonin    metoclopramide HCl    ondansetron    oxyCODONE    promethazine (PHENERGAN) IVPB    sodium chloride 0.9%    sodium chloride 0.9%        Objective:     Vital Signs (Most Recent):  Temp: 97.4 °F (36.3 °C) (09/01/22 0402)  Pulse: 93 (09/01/22 0402)  Resp: 18 (09/01/22 0402)  BP: 112/64 (09/01/22 0402)  SpO2: 99 % (09/01/22 0402)   Vital Signs (24h Range):  Temp:  [97.1 °F (36.2 °C)-98.7 °F (37.1 °C)] 97.4 °F (36.3 °C)  Pulse:  [] 93  Resp:  [16-18] 18  SpO2:  [96 %-99 %] 99 %  BP: (106-134)/(57-89) 112/64     Intake/Output - Last 3 Shifts         08/30 0700 08/31 0659 08/31 0700 09/01 0659 09/01 0700 09/02 0659    P.O. 480      I.V.  (mL/kg) 1768 (31.2) 2106.7 (37.2)     IV Piggyback 90.9 98.8     Total Intake(mL/kg) 2339 (41.3) 2205.5 (38.9)     Urine (mL/kg/hr) 1450 (1.1) 400 (0.3)     Emesis/NG output       Other       Stool 3700 2075     Total Output 5150 2475     Net -2811 -269.5            Urine Occurrence  3 x     Stool Occurrence 1 x 1 x             Physical Exam  Constitutional:       General: She is not in acute distress.     Appearance: Normal appearance. She is well-developed.   HENT:      Head: Normocephalic and atraumatic.      Mouth/Throat:      Mouth: Mucous membranes are moist.   Eyes:      General:         Right eye: No discharge.         Left eye: No discharge.      Extraocular Movements: Extraocular movements intact.      Conjunctiva/sclera: Conjunctivae normal.   Cardiovascular:      Rate and Rhythm: Normal rate and regular rhythm.   Pulmonary:      Effort: Pulmonary effort is normal. No respiratory distress.   Abdominal:      General: There is no distension.      Palpations: Abdomen is soft.      Tenderness: There is no abdominal tenderness. There is no guarding.      Comments: RUQ ostomy pouch with soft consistency  Laparoscopic incisions c/d/i   Musculoskeletal:         General: No deformity. Normal range of motion.      Cervical back: Normal range of motion.   Skin:     General: Skin is warm and dry.   Neurological:      Mental Status: She is alert and oriented to person, place, and time. Mental status is at baseline.   Psychiatric:         Behavior: Behavior normal.         Significant Labs:  CBC (Last 3 Results):   Recent Labs   Lab 08/26/22  0653   WBC 8.27   RBC 5.02   HGB 16.0   HCT 45.5      MCV 91   MCH 31.9*   MCHC 35.2       CMP (Last 3 Results):   Recent Labs   Lab 08/31/22  0429 08/31/22  1752 09/01/22  0343   GLU 87 100 88   CALCIUM 9.1 9.2 9.6   * 133* 134*   K 3.4* 3.9 4.2   CO2 21* 15* 22*    106 104   BUN 9 8 10   CREATININE 0.6 0.6 0.7           CRP (Last 3 Results):   Recent Labs    Lab 08/27/22  0421 08/29/22  0733   CRP 49.1* 32.8*       Microbiology Results (last 7 days)       Procedure Component Value Units Date/Time    Clostridium difficile EIA [[id number]] Collected: 08/27/22 0953    Order Status: Completed Specimen: Stool Updated: 08/28/22 0019     C. diff Antigen Negative     C difficile Toxins A+B, EIA Negative     Comment: Testing not recommended for children <24 months old.       Narrative:      Please check C Diff of ileostomy output (NOT stool from  rectum)          Recent Labs   Lab 08/29/22  0934   COLORU Yellow   SPECGRAV 1.020   PHUR 6.0   PROTEINUA Trace*   NITRITE Negative   LEUKOCYTESUR Negative       All pertinent lab results within the last 24 hours have been reviewed.     Significant Diagnostics:  I have reviewed all pertinent imaging results/findings within the past 24 hours.      Assessment/Plan:     * Partial small bowel obstruction  Nyasia Middleton is a 41 y.o. female who is s/p diverting loop ileostomy 8/17/22, readmitted due to small bowel obstruction as evidenced by CT. Urinalysis and clinical presentation also indicative of symptomatic UTI. S/p diagnostic lap 8/24/22 unremarkable aside from dilated bowels.    - Pain control: multimodal  - Diet: LRD + boost plus  - mIVF: NS @ 100cc/hr  - antiemetics PRN, zofran, scopolamine patch, phenergan   - OOBTC  - Imodium 8 mg QID, lomotil 2tablets QID   - Valtrex 500mg q12 PO x3days, add Benzocaine spray  - PICC consult, plan for home IVF    Dispo: ready for DC when home IVF arranged            Mumtaz Holden MD  Colorectal Surgery  [facility]

## 2022-09-01 NOTE — NURSING
Nurse at bedside providing discharge instructions for IV fluid connecting, flushing and maintaining aseptic technique.  Spouse attended education.

## 2022-09-01 NOTE — ASSESSMENT & PLAN NOTE
Nyasia Middleton is a 41 y.o. female who is s/p diverting loop ileostomy 8/17/22, readmitted due to small bowel obstruction as evidenced by CT. Urinalysis and clinical presentation also indicative of symptomatic UTI. S/p diagnostic lap 8/24/22 unremarkable aside from dilated bowels.    - Pain control: multimodal  - Diet: LRD + boost plus  - mIVF: NS @ 100cc/hr  - antiemetics PRN, zofran, scopolamine patch, phenergan   - OOBTC  - Imodium 8 mg QID, lomotil 2tablets QID   - Valtrex 500mg q12 PO x3days, add Benzocaine spray  - PICC consult, plan for home IVF    Dispo: ready for DC when home IVF arranged

## 2022-09-01 NOTE — SUBJECTIVE & OBJECTIVE
Subjective:     Interval History: No acute events overnight. Ostomy output improved, 800 cc overnight and thicker consistency. Received PICC yesterday. Tolerating a reg diet.     Post-Op Info:  Procedure(s) (LRB):  LAPAROSCOPY, DIAGNOSTIC (N/A)   8 Days Post-Op      Medications:  Continuous Infusions:   sodium chloride 0.9% 100 mL/hr at 09/01/22 0226     Scheduled Meds:   ALPRAZolam  0.25 mg Oral TID    buPROPion  300 mg Oral Nightly    diphenoxylate-atropine 2.5-0.025 mg  2 tablet Oral QID    enoxaparin  40 mg Subcutaneous Daily    FLUoxetine  40 mg Oral Nightly    loperamide  8 mg Oral QID    methocarbamoL  500 mg Oral TID    olanzapine zydis  10 mg Oral BID    pantoprazole  40 mg Oral Daily    psyllium husk (aspartame)  1 packet Oral BID    scopolamine  1 patch Transdermal Q3 Days    sodium chloride 0.9%  10 mL Intravenous Q6H    valACYclovir  500 mg Oral BID     PRN Meds:   acetaminophen    benzocaine    bismuth subsalicylate    dextrose 10%    dextrose 10%    glucagon (human recombinant)    glucose    glucose    HYDROmorphone    melatonin    metoclopramide HCl    ondansetron    oxyCODONE    promethazine (PHENERGAN) IVPB    sodium chloride 0.9%    sodium chloride 0.9%        Objective:     Vital Signs (Most Recent):  Temp: 97.4 °F (36.3 °C) (09/01/22 0402)  Pulse: 93 (09/01/22 0402)  Resp: 18 (09/01/22 0402)  BP: 112/64 (09/01/22 0402)  SpO2: 99 % (09/01/22 0402)   Vital Signs (24h Range):  Temp:  [97.1 °F (36.2 °C)-98.7 °F (37.1 °C)] 97.4 °F (36.3 °C)  Pulse:  [] 93  Resp:  [16-18] 18  SpO2:  [96 %-99 %] 99 %  BP: (106-134)/(57-89) 112/64     Intake/Output - Last 3 Shifts         08/30 0700 08/31 0659 08/31 0700 09/01 0659 09/01 0700 09/02 0659    P.O. 480      I.V. (mL/kg) 1768 (31.2) 2106.7 (37.2)     IV Piggyback 90.9 98.8     Total Intake(mL/kg) 2339 (41.3) 2205.5 (38.9)     Urine (mL/kg/hr) 1450 (1.1) 400 (0.3)     Emesis/NG output       Other       Stool 3700 2075     Total Output 7877 7729      Net -2811 -269.5            Urine Occurrence  3 x     Stool Occurrence 1 x 1 x             Physical Exam  Constitutional:       General: She is not in acute distress.     Appearance: Normal appearance. She is well-developed.   HENT:      Head: Normocephalic and atraumatic.      Mouth/Throat:      Mouth: Mucous membranes are moist.   Eyes:      General:         Right eye: No discharge.         Left eye: No discharge.      Extraocular Movements: Extraocular movements intact.      Conjunctiva/sclera: Conjunctivae normal.   Cardiovascular:      Rate and Rhythm: Normal rate and regular rhythm.   Pulmonary:      Effort: Pulmonary effort is normal. No respiratory distress.   Abdominal:      General: There is no distension.      Palpations: Abdomen is soft.      Tenderness: There is no abdominal tenderness. There is no guarding.      Comments: RUQ ostomy pouch with soft consistency  Laparoscopic incisions c/d/i   Musculoskeletal:         General: No deformity. Normal range of motion.      Cervical back: Normal range of motion.   Skin:     General: Skin is warm and dry.   Neurological:      Mental Status: She is alert and oriented to person, place, and time. Mental status is at baseline.   Psychiatric:         Behavior: Behavior normal.         Significant Labs:  CBC (Last 3 Results):   Recent Labs   Lab 08/26/22  0653   WBC 8.27   RBC 5.02   HGB 16.0   HCT 45.5      MCV 91   MCH 31.9*   MCHC 35.2       CMP (Last 3 Results):   Recent Labs   Lab 08/31/22  0429 08/31/22  1752 09/01/22  0343   GLU 87 100 88   CALCIUM 9.1 9.2 9.6   * 133* 134*   K 3.4* 3.9 4.2   CO2 21* 15* 22*    106 104   BUN 9 8 10   CREATININE 0.6 0.6 0.7           CRP (Last 3 Results):   Recent Labs   Lab 08/27/22  0421 08/29/22  0733   CRP 49.1* 32.8*       Microbiology Results (last 7 days)       Procedure Component Value Units Date/Time    Clostridium difficile EIA [[id number]] Collected: 08/27/22 0953    Order Status: Completed  Specimen: Stool Updated: 08/28/22 0019     C. diff Antigen Negative     C difficile Toxins A+B, EIA Negative     Comment: Testing not recommended for children <24 months old.       Narrative:      Please check C Diff of ileostomy output (NOT stool from  rectum)          Recent Labs   Lab 08/29/22  0934   COLORU Yellow   SPECGRAV 1.020   PHUR 6.0   PROTEINUA Trace*   NITRITE Negative   LEUKOCYTESUR Negative       All pertinent lab results within the last 24 hours have been reviewed.     Significant Diagnostics:  I have reviewed all pertinent imaging results/findings within the past 24 hours.

## 2022-09-01 NOTE — PLAN OF CARE
Problem: Adult Inpatient Plan of Care  Goal: Plan of Care Review  Outcome: Ongoing, Progressing     Problem: Adult Inpatient Plan of Care  Goal: Readiness for Transition of Care  Outcome: Ongoing, Progressing     Problem: Fall Injury Risk  Goal: Absence of Fall and Fall-Related Injury  Outcome: Ongoing, Progressing     Problem: Somatic Disturbance (Anxiety Signs/Symptoms)  Goal: Improved Somatic Symptoms (Anxiety Signs/Symptoms)  Outcome: Ongoing, Progressing     Problem: Pain Acute  Goal: Acceptable Pain Control and Functional Ability  Outcome: Ongoing, Progressing     Patient AAO x 4, reports pain and nausea during shift. Medicated with dilaudid 0.5 mg IVP, Oxy 5 mg elixir and prn Zofran and phenergan IVPB.  Ileostomy with liquid green stool.  Patient sis on an antidiarrheal  medication regimen.  Ambulated in [place] with spouse, get OOB to chair and toilet.  Tele sitter is in room and monitoring patient for unassisted ambulation.  Patient educated on calling for assistance.  POC reviewed with patient and spouse, discharge date is pending.

## 2022-09-02 ENCOUNTER — PATIENT OUTREACH (OUTPATIENT)
Dept: ADMINISTRATIVE | Facility: CLINIC | Age: 42
End: 2022-09-02
Payer: COMMERCIAL

## 2022-09-02 ENCOUNTER — PATIENT MESSAGE (OUTPATIENT)
Dept: SURGERY | Facility: CLINIC | Age: 42
End: 2022-09-02

## 2022-09-02 ENCOUNTER — OFFICE VISIT (OUTPATIENT)
Dept: SURGERY | Facility: CLINIC | Age: 42
End: 2022-09-02
Payer: COMMERCIAL

## 2022-09-02 VITALS
HEIGHT: 68 IN | SYSTOLIC BLOOD PRESSURE: 104 MMHG | DIASTOLIC BLOOD PRESSURE: 71 MMHG | WEIGHT: 121.88 LBS | HEART RATE: 119 BPM | BODY MASS INDEX: 18.47 KG/M2

## 2022-09-02 DIAGNOSIS — K50.919 CROHN'S DISEASE WITH COMPLICATION, UNSPECIFIED GASTROINTESTINAL TRACT LOCATION: ICD-10-CM

## 2022-09-02 DIAGNOSIS — R19.7 DIARRHEA, UNSPECIFIED TYPE: ICD-10-CM

## 2022-09-02 DIAGNOSIS — Z93.2 ILEOSTOMY IN PLACE: Primary | ICD-10-CM

## 2022-09-02 PROCEDURE — 3074F PR MOST RECENT SYSTOLIC BLOOD PRESSURE < 130 MM HG: ICD-10-PCS | Mod: CPTII,S$GLB,, | Performed by: NURSE PRACTITIONER

## 2022-09-02 PROCEDURE — G0180 PR HOME HEALTH MD CERTIFICATION: ICD-10-PCS | Mod: ,,, | Performed by: COLON & RECTAL SURGERY

## 2022-09-02 PROCEDURE — G0180 MD CERTIFICATION HHA PATIENT: HCPCS | Mod: ,,, | Performed by: COLON & RECTAL SURGERY

## 2022-09-02 PROCEDURE — 1159F PR MEDICATION LIST DOCUMENTED IN MEDICAL RECORD: ICD-10-PCS | Mod: CPTII,S$GLB,, | Performed by: NURSE PRACTITIONER

## 2022-09-02 PROCEDURE — 3078F PR MOST RECENT DIASTOLIC BLOOD PRESSURE < 80 MM HG: ICD-10-PCS | Mod: CPTII,S$GLB,, | Performed by: NURSE PRACTITIONER

## 2022-09-02 PROCEDURE — 3078F DIAST BP <80 MM HG: CPT | Mod: CPTII,S$GLB,, | Performed by: NURSE PRACTITIONER

## 2022-09-02 PROCEDURE — 99999 PR PBB SHADOW E&M-EST. PATIENT-LVL V: ICD-10-PCS | Mod: PBBFAC,,, | Performed by: NURSE PRACTITIONER

## 2022-09-02 PROCEDURE — 99999 PR PBB SHADOW E&M-EST. PATIENT-LVL V: CPT | Mod: PBBFAC,,, | Performed by: NURSE PRACTITIONER

## 2022-09-02 PROCEDURE — 3074F SYST BP LT 130 MM HG: CPT | Mod: CPTII,S$GLB,, | Performed by: NURSE PRACTITIONER

## 2022-09-02 PROCEDURE — 3008F PR BODY MASS INDEX (BMI) DOCUMENTED: ICD-10-PCS | Mod: CPTII,S$GLB,, | Performed by: NURSE PRACTITIONER

## 2022-09-02 PROCEDURE — 1159F MED LIST DOCD IN RCRD: CPT | Mod: CPTII,S$GLB,, | Performed by: NURSE PRACTITIONER

## 2022-09-02 PROCEDURE — 1160F RVW MEDS BY RX/DR IN RCRD: CPT | Mod: CPTII,S$GLB,, | Performed by: NURSE PRACTITIONER

## 2022-09-02 PROCEDURE — 99024 POSTOP FOLLOW-UP VISIT: CPT | Mod: S$GLB,,, | Performed by: NURSE PRACTITIONER

## 2022-09-02 PROCEDURE — 1160F PR REVIEW ALL MEDS BY PRESCRIBER/CLIN PHARMACIST DOCUMENTED: ICD-10-PCS | Mod: CPTII,S$GLB,, | Performed by: NURSE PRACTITIONER

## 2022-09-02 PROCEDURE — 99024 PR POST-OP FOLLOW-UP VISIT: ICD-10-PCS | Mod: S$GLB,,, | Performed by: NURSE PRACTITIONER

## 2022-09-02 PROCEDURE — 3008F BODY MASS INDEX DOCD: CPT | Mod: CPTII,S$GLB,, | Performed by: NURSE PRACTITIONER

## 2022-09-02 RX ORDER — FLUCONAZOLE 150 MG/1
150 TABLET ORAL DAILY
Qty: 4 TABLET | Refills: 0 | Status: SHIPPED | OUTPATIENT
Start: 2022-09-02 | End: 2022-09-06

## 2022-09-02 RX ORDER — LOPERAMIDE HYDROCHLORIDE 2 MG/1
16 CAPSULE ORAL 4 TIMES DAILY
Qty: 960 CAPSULE | Refills: 0 | Status: SHIPPED | OUTPATIENT
Start: 2022-09-02 | End: 2022-10-02

## 2022-09-02 NOTE — PROGRESS NOTES
Mrs. Middleton is unknown to me and comes today after surgery on 8/17/22 and 8/24/22 with Dr. Doyle. Pt is seeing me for post op evaluation of ileostomy . The patient reports some problems with  new ostomy. Pain level today is 0.  ASSESSMENT:  The ileostomy is 35 mm, red, moist, budded well.    The pt wearing a 1piece pouching system by [employer],   Average wear time is 4 days.   Peristomal skin is clean and dry No rashes, no ulcers, no induration    There is no  mucocutaneous separation.  Pt is coping well with new ostomy.  Support being  provided by Home Health and Family member(s).   Currently emptying pouch of a thin water like stool hourly.   1L NS 3x/week via PICC with HH.   Taking 4 imodium 4x/day and lomotil 4x/day.  Pouching concerns include:    9/2/22    PLAN:  Patient instructed to do the following routine for pouching:  Cleanse skin with water, dry well.  Apply skin barrier film. Allow to dry  Apply thin ring  Apply convex pouch sized appropriately for stoma.   Samples from [employer] were received post discharge.  Pt counseled on DME suppliers for  ostomy pouches.  Pt also counseled on skin care, nutrition, hydration and ADL.  I spent greater than 50% of this 30 minute visit in face to face counseling.  NS increased to daily. Pt spoke with HH nurse while still in office and she states she has enough fluids to get through the holiday weekend and she will be there on Sunday.   Return clinic visit recommended in 1 weeks.

## 2022-09-02 NOTE — PROGRESS NOTES
C3 nurse spoke with Nyasia Middleton  for a TCC post hospital discharge follow up call. The patient does not have a scheduled HOSFU appointment with Bisi Tolbert DO  within 5-7 days post hospital discharge date 9/1/22. C3 nurse was unable to schedule HOSFU appointment in [facility].    Message sent to PCP staff requesting they contact patient and schedule follow up appointment.

## 2022-09-06 ENCOUNTER — PATIENT MESSAGE (OUTPATIENT)
Dept: SURGERY | Facility: CLINIC | Age: 42
End: 2022-09-06
Payer: COMMERCIAL

## 2022-09-06 PROCEDURE — 99358 PR PROLONGED SERV,NO CONTACT,1ST HR: ICD-10-PCS | Mod: S$GLB,,, | Performed by: INTERNAL MEDICINE

## 2022-09-06 PROCEDURE — 99358 PROLONG SERVICE W/O CONTACT: CPT | Mod: S$GLB,,, | Performed by: INTERNAL MEDICINE

## 2022-09-07 ENCOUNTER — OFFICE VISIT (OUTPATIENT)
Dept: GASTROENTEROLOGY | Facility: CLINIC | Age: 42
End: 2022-09-07
Payer: COMMERCIAL

## 2022-09-07 ENCOUNTER — LAB VISIT (OUTPATIENT)
Dept: LAB | Facility: HOSPITAL | Age: 42
End: 2022-09-07
Attending: INTERNAL MEDICINE
Payer: COMMERCIAL

## 2022-09-07 ENCOUNTER — CLINICAL SUPPORT (OUTPATIENT)
Dept: GASTROENTEROLOGY | Facility: CLINIC | Age: 42
End: 2022-09-07
Payer: COMMERCIAL

## 2022-09-07 VITALS
SYSTOLIC BLOOD PRESSURE: 99 MMHG | OXYGEN SATURATION: 100 % | HEART RATE: 81 BPM | BODY MASS INDEX: 19.58 KG/M2 | TEMPERATURE: 98 F | WEIGHT: 128.75 LBS | DIASTOLIC BLOOD PRESSURE: 63 MMHG

## 2022-09-07 DIAGNOSIS — K50.013 CROHN'S DISEASE OF SMALL INTESTINE WITH FISTULA: ICD-10-CM

## 2022-09-07 DIAGNOSIS — K50.913 PERIANAL FISTULA DUE TO CROHN'S DISEASE: ICD-10-CM

## 2022-09-07 DIAGNOSIS — K50.013 CROHN'S DISEASE OF SMALL INTESTINE WITH FISTULA: Primary | ICD-10-CM

## 2022-09-07 LAB
25(OH)D3+25(OH)D2 SERPL-MCNC: 30 NG/ML (ref 30–96)
CRP SERPL-MCNC: 6.5 MG/L (ref 0–8.2)
HAV IGG SER QL IA: NORMAL
HBV CORE AB SERPL QL IA: NORMAL
HBV SURFACE AG SERPL QL IA: NORMAL
HCV AB SERPL QL IA: NORMAL
HIV 1+2 AB+HIV1 P24 AG SERPL QL IA: NORMAL
IGA SERPL-MCNC: 220 MG/DL (ref 40–350)
TSH SERPL DL<=0.005 MIU/L-ACNC: 0.88 UIU/ML (ref 0.4–4)
VIT B12 SERPL-MCNC: 852 PG/ML (ref 210–950)

## 2022-09-07 PROCEDURE — 87340 HEPATITIS B SURFACE AG IA: CPT | Performed by: INTERNAL MEDICINE

## 2022-09-07 PROCEDURE — 86765 RUBEOLA ANTIBODY: CPT | Performed by: INTERNAL MEDICINE

## 2022-09-07 PROCEDURE — 1160F PR REVIEW ALL MEDS BY PRESCRIBER/CLIN PHARMACIST DOCUMENTED: ICD-10-PCS | Mod: CPTII,S$GLB,, | Performed by: INTERNAL MEDICINE

## 2022-09-07 PROCEDURE — 3074F SYST BP LT 130 MM HG: CPT | Mod: CPTII,S$GLB,, | Performed by: INTERNAL MEDICINE

## 2022-09-07 PROCEDURE — 82657 ENZYME CELL ACTIVITY: CPT | Performed by: INTERNAL MEDICINE

## 2022-09-07 PROCEDURE — 83516 IMMUNOASSAY NONANTIBODY: CPT | Performed by: INTERNAL MEDICINE

## 2022-09-07 PROCEDURE — 36415 COLL VENOUS BLD VENIPUNCTURE: CPT | Performed by: INTERNAL MEDICINE

## 2022-09-07 PROCEDURE — 84443 ASSAY THYROID STIM HORMONE: CPT | Performed by: INTERNAL MEDICINE

## 2022-09-07 PROCEDURE — 3078F PR MOST RECENT DIASTOLIC BLOOD PRESSURE < 80 MM HG: ICD-10-PCS | Mod: CPTII,S$GLB,, | Performed by: INTERNAL MEDICINE

## 2022-09-07 PROCEDURE — 1159F MED LIST DOCD IN RCRD: CPT | Mod: CPTII,S$GLB,, | Performed by: INTERNAL MEDICINE

## 2022-09-07 PROCEDURE — 3078F DIAST BP <80 MM HG: CPT | Mod: CPTII,S$GLB,, | Performed by: INTERNAL MEDICINE

## 2022-09-07 PROCEDURE — 3074F PR MOST RECENT SYSTOLIC BLOOD PRESSURE < 130 MM HG: ICD-10-PCS | Mod: CPTII,S$GLB,, | Performed by: INTERNAL MEDICINE

## 2022-09-07 PROCEDURE — 86140 C-REACTIVE PROTEIN: CPT | Performed by: INTERNAL MEDICINE

## 2022-09-07 PROCEDURE — 87389 HIV-1 AG W/HIV-1&-2 AB AG IA: CPT | Performed by: INTERNAL MEDICINE

## 2022-09-07 PROCEDURE — 86787 VARICELLA-ZOSTER ANTIBODY: CPT | Performed by: INTERNAL MEDICINE

## 2022-09-07 PROCEDURE — 99215 PR OFFICE/OUTPT VISIT, EST, LEVL V, 40-54 MIN: ICD-10-PCS | Mod: S$GLB,,, | Performed by: INTERNAL MEDICINE

## 2022-09-07 PROCEDURE — 86790 VIRUS ANTIBODY NOS: CPT | Performed by: INTERNAL MEDICINE

## 2022-09-07 PROCEDURE — 86704 HEP B CORE ANTIBODY TOTAL: CPT | Performed by: INTERNAL MEDICINE

## 2022-09-07 PROCEDURE — 1159F PR MEDICATION LIST DOCUMENTED IN MEDICAL RECORD: ICD-10-PCS | Mod: CPTII,S$GLB,, | Performed by: INTERNAL MEDICINE

## 2022-09-07 PROCEDURE — 3008F PR BODY MASS INDEX (BMI) DOCUMENTED: ICD-10-PCS | Mod: CPTII,S$GLB,, | Performed by: INTERNAL MEDICINE

## 2022-09-07 PROCEDURE — 1160F RVW MEDS BY RX/DR IN RCRD: CPT | Mod: CPTII,S$GLB,, | Performed by: INTERNAL MEDICINE

## 2022-09-07 PROCEDURE — 99215 OFFICE O/P EST HI 40 MIN: CPT | Mod: S$GLB,,, | Performed by: INTERNAL MEDICINE

## 2022-09-07 PROCEDURE — 82306 VITAMIN D 25 HYDROXY: CPT | Performed by: INTERNAL MEDICINE

## 2022-09-07 PROCEDURE — 86480 TB TEST CELL IMMUN MEASURE: CPT | Performed by: INTERNAL MEDICINE

## 2022-09-07 PROCEDURE — 3008F BODY MASS INDEX DOCD: CPT | Mod: CPTII,S$GLB,, | Performed by: INTERNAL MEDICINE

## 2022-09-07 PROCEDURE — 82784 ASSAY IGA/IGD/IGG/IGM EACH: CPT | Performed by: INTERNAL MEDICINE

## 2022-09-07 PROCEDURE — 86803 HEPATITIS C AB TEST: CPT | Performed by: INTERNAL MEDICINE

## 2022-09-07 PROCEDURE — 86706 HEP B SURFACE ANTIBODY: CPT | Performed by: INTERNAL MEDICINE

## 2022-09-07 PROCEDURE — 82607 VITAMIN B-12: CPT | Performed by: INTERNAL MEDICINE

## 2022-09-07 PROCEDURE — 86762 RUBELLA ANTIBODY: CPT | Performed by: INTERNAL MEDICINE

## 2022-09-07 PROCEDURE — 86735 MUMPS ANTIBODY: CPT | Performed by: INTERNAL MEDICINE

## 2022-09-07 NOTE — PROGRESS NOTES
I spent 45 minutes on 9/6/22 reviewing [facility] medical records prior to clinic visit on 9/7/22.

## 2022-09-07 NOTE — PROGRESS NOTES
IBD PATIENT INTAKE:    COVID symptoms in the last 7 days (runny nose, sore throat, congestion, cough, fever): No  PCP: Bisi Tolbert  If not PCP-  number given to establish [phone]: Yes    ALLERGIES REVIEWED:  Yes    CHIEF COMPLAINT:    Chief Complaint   Patient presents with    Crohn's Disease       VITAL SIGNS:  BP 99/63 (BP Location: Left arm, Patient Position: Sitting)   Pulse 81   Temp 98.1 °F (36.7 °C)   Wt 58.4 kg (128 lb 12 oz)   LMP 07/14/2017   SpO2 100%   BMI 19.58 kg/m²      Change in medical, surgical, family or social history: No    IBD THERAPY (name, dose/frequency):  none  Last dose:  n/a    Next dose:  n/a  Infusion/Pharmacy: NA    NSAIDs (aspirin, ibuprofen-advil or motrin, naproxen-aleve, diclofenac-voltaren, BC powder, excedrin, goodies): No    Alternative/Complementary Medications (i.e. probiotics, turmeric, fish oil, aloe vera):      no  Name/dose:  n/a    Vitamins:   Vit D:  no     Vit B-12:  no   Folic Acid: no       Calcium: no     Iron:  no      MVI: no    Antibiotics (past 30 Days):  yes  If yes   Indication: j pouch  Name of antibiotic: Cipro  Completion date: currently    REVIEWED MEDICATION LIST RECONCILED INCLUDING ABOVE MEDS:  yes                  Answers submitted by the patient for this visit:  New Patient Questionnaire  (Submitted on 8/31/2022)  fever: Yes  mouth sores: Yes  nausea: Yes  vomiting: Yes  heartburn: Yes  Painful Urination?: Yes

## 2022-09-07 NOTE — PROGRESS NOTES
[facility]          Inflammatory Bowel Disease New Patient Consultation Note         TODAY'S VISIT DATE:  9/7/2022    Reason for Consult:    Chief Complaint   Patient presents with    Crohn's Disease       PCP: Bisi Tolbert      Referring MD:   Adan Self    History of Present Illness:  Nyasia Middleton who is a 41 y.o. female is being seen today at the [facility] on 09/07/2022 for inflammatory bowel disease- Crohn's disease.  This my 1st time seeing her.  She has a very complicated history is noted below.  Her primary issue has been Crohn's like disease of the pouch complicated by severe perianal and anovaginal fistula as.  She has been on multiple medical therapies with either no response are minimal response.  She does not smoke cigarettes.  She has multiple family members with a history of Crohn's disease.  Currently she has a diverting loop ileostomy which she is doing quite well with.  She does have loose stools but these seem to be improving.  She is taking Imodium and Lomotil for this.  She has been off of medical therapy for Crohn's disease since prior to her surgery in August.    IBD History:  In 2010 she had an episode of C diff colitis in March and was treated with oral vancomycin.  This was while she was pregnant.  She continued to have issues with diarrhea and eventually was tested negative for C diff and was diagnosed with ulcerative colitis around May of 2010 after she delivered.  The 1st notes I have to review are from July of 2010 when she presented with ongoing rectal pain, blood in the stools, diarrhea.  At that time it was noted that she was taking prednisone and Asacol 2400 mg daily.  She had a colonoscopy on July 16, 2010 that showed severe inflammation throughout the entire colon.  It does not appear that the terminal ileum was intubated during this procedure.  Biopsies were taken but I do not have those  results.  She reports that she received infliximab in the late summer of 2010.  She can not remember how many doses she received but she thinks that was only 1 or 2 doses.  She does not recall having a significant improvement and in October of 2010 she underwent laparoscopic total abdominal colectomy and end ileostomy.  On January 4, 2011 she underwent completion proctectomy, J pouch formation, and diverting loop ileostomy.  In February 2011 she underwent loop ileostomy closure.  A follow-up pouchoscopy was done in December of 2011 and demonstrated some mild stenosis of the ileoanal anastomosis but otherwise the pouch was normal appearing.  By June of 2012 she had developed a perianal abscess and underwent exam under anesthesia with incision and drainage in September of that year.  In February of 2013 a repeat pouchoscopy showed moderate inflammation of the pouch.  Biopsies demonstrated chronic inflammatory changes consistent with acute pouchitis.  Given the perianal abscess there was concern for underlying Crohn's disease.  The pouchitis was treated with Cipro.  In April of 2013 she underwent a repeat exam under anesthesia with fistulotomy.  In 2013 she continued to have ongoing issues and was started on Lialda.  She had multiple subsequent procedures for perianal fistula management.  She continued with treatment with antibiotics with minimal improvement.  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  It appears that she did fairly well between 2014 and 2016.  She became pregnant again in 2013 and then again in 2016 and reports that during her pregnancy she actually did quite well.  In 2016 she underwent a pouchoscopy which showed an anal stricture.  There was normal-appearing mucosa but the J pouch was felt to be small in size.  In November of 2016 she underwent an upper endoscopy for epigastric pain and was notable for grade 1 esophagitis  but otherwise normal stomach and duodenum.  Biopsies of the duodenum showed mild focal duodenal lymphocytosis but with normal villous architecture.  There was also chronic gastritis noted in the stomach.  Esophageal biopsies were normal.  In April 2017 she was noted to have 2 small openings in the perineum with purulence drainage.  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  In June of 2017 she underwent a repeat exam under anesthesia with ligation of an intersphincteric fistula tract.  She was eventually started on sulfasalazine for an inflammatory arthritis.  In October of 2017 and MRI of the pelvis showed inflammation near the ileoanal anastomosis but no other significant abnormalities were identified.  In April 2018 she underwent laparoscopic lysis of adhesions for abdominal pain.  In 2018 she had recurrence of perianal fistulas and was treated with antibiotics.  A pouchoscopy revealed 2 fistulas from the pouch to the vagina.  In June of 2019 a fistulogram was done at [facility] that showed no definite vaginal fistula.  In December of 2019 she had a repeat anal fistula repair.  At some point she was treated with Humira.  She does not recall ever having her dosing optimized but she was on this for several months and maybe even up to a year are more.  She does not recall having a significant improvement in any of her fistulas or gastrointestinal symptoms and eventually the medication was stopped because of the development of antibodies to Humira.  In March of 2020 it is noted that she was taking Stelara.  She does not recall when she started taking this but she took it for a little over a year.  She felt like of all the medication she was ever on this did provide some benefit.  Her fistula issues never resolved but she did have a decrease in stool frequency and had more energy  and an overall better sense of well-being.  She eventually stop this because of multiple issues with her insurance and frequent delays in issues with receiving her medication from the mail order pharmacy she was required to use.  She does remember that her dose was increased to every 4 weeks at some point and that provided significant benefit.  Notes from May of 2022 mention that she was planning to switch from Stelara to Entyvio. It appears that her perianal fistulas were treated with stem cells as well. In July 2022 she had a pouchoscopy that showed ileoanal anastomosis stenosis, perianal fistulas, and inflammation in the pouch. Kenalog was injected in to the anastomosis.  She was started on Entyvio and received the 1st 3 loading doses but never felt well taking this and actually felt like she was getting worse while taking it.  It was stopped in preparation for her surgery.  On 8/17/22 she underwent diverting loop ileostomy due to continued perianal fistulizing disease. A subsequent laparoscopy on 8/24 due to concern for ischemic bowel did not show any ischemia but did show some twisting of the ileostomy.  She also reports being on antibiotics intermittently that which made her feel poorly overall but did seem to decrease the frequency of bowel movements sometimes.  She has never been on probiotics.  She has never been on immunomodulator or methotrexate.  She was on budesonide at 1 point and reports that this did not provide any benefit.      IBD Details:  Dx Date:  2010  Disease type/distribution:  Initially diagnosed with ulcerative colitis, now with Crohn's like disease of the pouch complicated by perianal and anal vaginal fistulas.  Also with ileoanal stenosis and pouch inflammation in the distal pouch  Current Treatment:  None Start Date:  Response:   Optimized:   Adverse reactions:   Prior surgeries:   2011-total proctocolectomy with 3 stage J pouch placement     Multiple incisions and drainage, advancement  flaps, other treatments for perianal fistulas and anovaginal fistulas     2022-diverting loop ileostomy due to ongoing perianal Crohn's disease  CRP Elevation: Y  calprotectin: Unknown  Disease Complications:  Severe fistulizing disease of the J pouch  Extraintestinal manifestations:  Joint pains  Prior treatments:   Steroids:  Incomplete response to prednisone and budesonide  5ASA:  Incomplete response to Asacol and Lialda  IMM:  None  TNF Inh:  Infliximab-received 2 doses prior to proctocolectomy, no response, not optimized, no adverse reactions    Humira-took this for a while.  Does not optimized, no improvement clinically, developed antibodies   Anti-Integrin:  Received 3 loading doses of Entyvio.  No improvement in symptoms, no maintenance dose, not optimized   IL 12/23:  Stelara-took this for over a year with dose optimization to every 4 week dosing.  Improvement in GI symptoms of diarrhea but no resolution of perianal fistulizing disease-no adverse reactions, stopped due to difficulties with   insurance  HATTIE Inh:  None    Previous Clinical Trials:  None    Last Colonoscopy:  7/22-pouchoscopy with ileoanal anastomosis stenosis, perianal fistulas, inflammation in the pouch-Kenalog injected into the anastomosis    Other Endoscopies:  Previous EGD report reviewed from 2016    Imaging:   MRE:  None   CT:  CT scan from August 2022 reviewed, other CT scans also reviewed   Other:  Other pertinent studies reviewed    Pertinent Labs:  Lab Results   Component Value Date    SEDRATE 78 (H) 07/09/2018    CRP 32.8 (H) 08/29/2022     No results found for: TTGIGA, IGA  Lab Results   Component Value Date    TSH 1.112 01/11/2022    FREET4 1.01 03/14/2017     Lab Results   Component Value Date    CPQEUIHO37XM 26 (L) 12/12/2018    WPNJBIFC71 699 12/12/2018     No results found for: HEPBSAG, HEPBCAB, HEPCAB  No results found for: ERL83VFVG  No results found for: NIL, TBAG, TBAGNIL, MITOGENNIL, TBGOLD, TSPOTSCREN  No results found  for: TPTMINTERP, TPMTRESULT  Lab Results   Component Value Date    CDIFFICILEAN Negative 08/27/2022    CDIFFTOX Negative 08/27/2022     No results found for: CALPROTECTIN    Therapeutic Drug Monitoring Labs:  No results found for: PROMETH  No results found for: ANSADAINIT, INFLIXIMAB, INFLIXINTERP    Vaccinations:  No results found for: HEPBSAB  No results found for: HEPAIGG  No results found for: VARICELLAZOS, VARICELLAINT  Immunization History   Administered Date(s) Administered    COVID-19, MRNA, LN-S, PF (MODERNA FULL 0.5 ML DOSE) 01/20/2021, 02/19/2021    Hepatitis B, Pediatric/Adolescent 06/25/2003, 07/23/2003, 01/09/2004    Influenza 09/15/2014, 09/25/2019, 12/07/2020    Influenza - Quadrivalent 09/25/2019, 12/07/2020, 11/08/2021    Influenza A (H1N1) 2009 Monovalent - IM - PF 11/06/2009    MMR 04/27/1983, 02/07/1997    Td (ADULT) 02/07/1997, 02/07/1997         Review of Systems  Review of Systems   Constitutional:  Positive for weight loss. Negative for chills and fever.   HENT:  Negative for sore throat.    Eyes:  Negative for pain, discharge and redness.   Respiratory:  Negative for cough, shortness of breath and wheezing.    Cardiovascular:  Negative for chest pain, orthopnea and leg swelling.   Gastrointestinal:  Positive for diarrhea. Negative for abdominal pain, blood in stool, constipation, heartburn, melena, nausea and vomiting.   Genitourinary:  Negative for dysuria, frequency and urgency.   Musculoskeletal:  Negative for back pain, joint pain and myalgias.   Skin:  Negative for itching and rash.   Neurological:  Negative for focal weakness and seizures.   Endo/Heme/Allergies:  Does not bruise/bleed easily.   Psychiatric/Behavioral:  Negative for depression. The patient is not nervous/anxious.      Medical History:   Past Medical History:   Diagnosis Date    Crohn's disease     GERD (gastroesophageal reflux disease)     History of Clostridium difficile infection     History of ulcerative colitis      Perineal sinus 2017       Surgical History:  Past Surgical History:   Procedure Laterality Date     SECTION, CLASSIC      DIAGNOSTIC LAPAROSCOPY N/A 2022    Procedure: LAPAROSCOPY, DIAGNOSTIC;  Surgeon: CORETTA Doyle MD;  Location: [facility] OR 2ND FLR;  Service: Colon and Rectal;  Laterality: N/A;    FLEXIBLE SIGMOIDOSCOPY N/A 2018    Procedure: SIGMOIDOSCOPY, FLEXIBLE;  Surgeon: Jairo Peralta MD;  Location: [facility] (4TH FLR);  Service: Endoscopy;  Laterality: N/A;  no enemas per [place] NP    FLEXIBLE SIGMOIDOSCOPY N/A 2022    Procedure: SIGMOIDOSCOPY, FLEXIBLE;  Surgeon: CORETTA Doyle MD;  Location: [facility] OR 2ND FLR;  Service: Colon and Rectal;  Laterality: N/A;    HYSTERECTOMY      partial    ILEOSTOMY CLOSURE      LAPAROSCOPIC ILEOSTOMY N/A 2022    Procedure: CREATION, ILEOSTOMY, LAPAROSCOPIC;  Surgeon: CORETTA Doyle MD;  Location: [facility] OR 2ND FLR;  Service: Colon and Rectal;  Laterality: N/A;    POUCHOSCOPY N/A 2018    Procedure: ENDOSCOPY, SMALL INTESTINAL POUCH, DIAGNOSTIC, possible seton placement;  Surgeon: Jairo Peralta MD;  Location: [facility] ENDO (4TH FLR);  Service: Endoscopy;  Laterality: N/A;  Schedule early 2018; No prep    rectovaginal fistula repair      RESTORATIVE PROCTOCOLECTOMY      TONSILLECTOMY      adenoids    TUBAL LIGATION      2016       Family History:   Family History   Problem Relation Age of Onset    No Known Problems Mother     No Known Problems Father     Crohn's disease Sister     Hypertension Maternal Grandmother     Colon cancer Maternal Grandfather     Cancer Paternal Grandfather 53        colon ca       Social History:   Social History     Tobacco Use    Smoking status: Some Days     Types: Vaping with nicotine     Last attempt to quit: 2009     Years since quittin.8    Smokeless tobacco: Never   Substance Use Topics    Alcohol use: No     Alcohol/week: 0.0 standard drinks    Drug use: No        Allergies: Reviewed    Home Medications:   Medication List with Changes/Refills   Current Medications    ALPRAZOLAM (XANAX) 0.25 MG TABLET    Take 1 tablet (0.25 mg total) by mouth daily as needed for Anxiety.    BUPROPION (WELLBUTRIN XL) 300 MG 24 HR TABLET    Take 1 tablet (300 mg total) by mouth once daily.    DICYCLOMINE (BENTYL) 20 MG TABLET    TAKE 1 TABLET BY MOUTH EVERY SIX HOURS    DIPHENOXYLATE-ATROPINE 2.5-0.025 MG (LOMOTIL) 2.5-0.025 MG PER TABLET    Take 2 tablets by mouth 4 (four) times daily.    FLUOXETINE 40 MG CAPSULE    Take 1 capsule (40 mg total) by mouth once daily.    LOPERAMIDE (IMODIUM) 2 MG CAPSULE    Take 8 capsules (16 mg total) by mouth 4 (four) times daily.    ONDANSETRON (ZOFRAN-ODT) 4 MG TBDL    Dissolve 1 tablet (4 mg total) by mouth 2 (two) times daily.    ONDANSETRON (ZOFRAN-ODT) 8 MG TBDL    Take 1 tablet (8 mg total) by mouth every 12 (twelve) hours as needed (vomiting).    OXYCODONE-ACETAMINOPHEN (PERCOCET) 7.5-325 MG PER TABLET    Take 1 tablet by mouth every 4 (four) hours as needed for Pain.    PANTOPRAZOLE (PROTONIX) 40 MG TABLET    TAKE 1 TABLET BY MOUTH TWICE DAILY    PROMETHAZINE (PHENERGAN) 12.5 MG TAB    Take 1 tablet (12.5 mg total) by mouth every 6 (six) hours as needed (nausea).       Physical Exam:  Vital Signs:  BP 99/63 (BP Location: Left arm, Patient Position: Sitting)   Pulse 81   Temp 98.1 °F (36.7 °C)   Wt 58.4 kg (128 lb 12 oz)   LMP 07/14/2017   SpO2 100%   BMI 19.58 kg/m²   Body mass index is 19.58 kg/m².    Physical Exam  Vitals and nursing note reviewed.   Constitutional:       General: She is not in acute distress.     Appearance: Normal appearance. She is well-developed. She is not ill-appearing or toxic-appearing.   Eyes:      General: No scleral icterus.     Conjunctiva/sclera: Conjunctivae normal.      Pupils: Pupils are equal, round, and reactive to light.   Neck:      Thyroid: No thyromegaly.   Cardiovascular:      Rate and Rhythm:  Normal rate and regular rhythm.      Heart sounds: Normal heart sounds. No murmur heard.  Pulmonary:      Effort: Pulmonary effort is normal.      Breath sounds: Normal breath sounds. No wheezing or rales.   Abdominal:      General: Bowel sounds are normal. There is no distension.      Palpations: Abdomen is soft. There is no mass.      Tenderness: There is no abdominal tenderness.      Comments: Ileostomy in the right abdomen with yellowish brown stool   Musculoskeletal:         General: No tenderness. Normal range of motion.   Lymphadenopathy:      Cervical: No cervical adenopathy.   Skin:     Findings: No erythema or rash.   Neurological:      General: No focal deficit present.      Mental Status: She is alert and oriented to person, place, and time.   Psychiatric:         Mood and Affect: Mood normal.         Behavior: Behavior normal.         Thought Content: Thought content normal.         Judgment: Judgment normal.       Labs: reviewed and pertinent noted above    Assessment/Plan:  Nyasia Middleton is a 41 y.o. female with Crohn's like disease of the J pouch complicated by perianal and anal vaginal fistula formation. The following issues were addresssed:    1. Crohn's disease of small intestine with fistula    2. Perianal fistula due to Crohn's disease      1. Crohn's disease of the pouch:  She has had a very severe and very refractory course since her proctocolectomy and J pouch placement.  She has been on multiple medical therapy see including Humira, Stelara, Entyvio with less than ideal response.  We had a long discussion today regarding the difficult nature of her current condition and the fact that there are not large studies to help guide her therapy.  We discussed the fact that most literature regarding her condition is based off of smaller case series and there are no comparison studies of different treatment options.  We also discussed that, even in the best of circumstances, the likelihood of  significant healing of the fistulas is quite low.  We also discussed the fact that if pouch resection and end ileostomy was considered there is of very low likelihood that she would develop Crohn's disease of the small intestine given the fact that she has not had any ileal disease based on the records that I have reviewed.  She is considering this option but isn't ready to decide that yet and would like to try optimize medical therapy 1st.  We discussed a few different options today including restarting Stelara, attempting treatment with infliximab again along with the use of an immunomodulator, and possibly starting Skyrizi.  While she did have some improvement with Stelara she did not have resolution of her perianal fistulizing disease so she would prefer to try an alternative treatment option.  We discussed possibly restarting infliximab and I discussed with her and her  the fact that there could be a risk of having antibodies to infliximab and that we would plan to check for antibodies a week after her 1st dose and be very aggressive in optimizing her dosing moving forward.  We discussed the risks of infection, lymphoma, other issues with these medications.  She is open to considering this but would prefer try Skyrizi.  I think this is a very reasonable option.  We discussed the fact that we do not have much data regarding the use of this medication in her situation as it is only been on the market for a very short period of time and the only dated we have is from larger trials of patient's to did not have Crohn's like disease of the pouch specifically.  Given her positive response to Stelara I think it is reasonable to expect some improvement with this medication.  We will plan to start the process and update her labs.  We discussed the fact that it will take a while before we know whether this medication is helping or not.  We also discussed reasonable expectations for response and the fact that  complete remission is unlikely to be achieved.  She has a very clear understanding of this and very realistic expectations.         # IBD specific health maintenance:  Colon cancer surveillance:  Not applicable    Annual:  - Eye exam:  Not applicable  - Skin exam (if on IMM/TNF):  Discuss in the future  - reminded pt to use sunblock/hats/sunprotective clothing  - PAP (if immunosuppressed):  Hysterectomy    DEXA:  Not applicable    Vitamin D:  Check today    Vaccines:    Influenza:  Recommend annual vaccination   Pneumovax:     PCV13:  Discuss in the future    PSV23:  Discuss in the future   HAV:  Check serology   HBV:  Check serology   Tdap:  Discuss in the future   MMR:  Check serologies   VZV:  Check serology   HZV:  Discuss in the future   HPV:  Not applicable   Meningococcus:  Not applicable   COVID: Completed 2 doses    Follow up: Follow up in about 4 months (around 1/7/2023).    Thank you again for sending Nyasia Middleton to see Dr. Joselo Weber today at the [facility]. Please don't hesitate to contact Dr. Weber if there are any questions regarding this evaluation, or if you have any other patients with inflammatory bowel disease for whom you would like a consultation. You can reach Dr. Weber at [phone] or by email at [email or web address]    Patrick Weber MD  Department of Gastroenterology  Inflammatory Bowel Disease

## 2022-09-07 NOTE — PROGRESS NOTES
[facility]  Inflammatory Bowel Disease  Pharmacy Note    TODAY'S VISIT DATE:  9/7/2022    Reason for visit: New start biologic    IBD treatment to be initiated/optimized: Remicade + immunomodulator vs. Stelara (retry) vs. Skyrizi    Consent form: No    IBD MD: Gus       Pertinent History:  IBD phenotype: Crohn's disease of the pouch with fistula   Dispensing pharmacy/infusion center:  [facility]/ [facility]  Current therapy: none       Therapeutic Drug Monitoring Labs:  None     Prior IBD Therapies:  IFX - got 2 doses, no response so d/c  Humira - developed Ab, never optimized   Stelara - optimized to Q4W but still had inflammation   Entyvio - no response after induction dose      Vaccinations:  No results found for: HEPBSAB  No results found for: HEPBSURFABQU  No results found for: HEPAIGG  No results found for: VARICELLAZOS, VARICELLAINT  Immunization History   Administered Date(s) Administered    COVID-19, MRNA, LN-S, PF (MODERNA FULL 0.5 ML DOSE) 01/20/2021, 02/19/2021    Hepatitis B, Pediatric/Adolescent 06/25/2003, 07/23/2003, 01/09/2004    Influenza 09/15/2014, 09/25/2019, 12/07/2020    Influenza - Quadrivalent 09/25/2019, 12/07/2020, 11/08/2021    Influenza A (H1N1) 2009 Monovalent - IM - PF 11/06/2009    MMR 04/27/1983, 02/07/1997    Td (ADULT) 02/07/1997, 02/07/1997     Influenza:  discussed and recommended annual flu shot  PCV 20: discuss and recommended.  Tetanus (TdaP): discuss and recommend booster every 10 years  Hepatitis B:  check immunity   Hepatitis A:  check immunity   MMR (live vaccine): check immunity        Chickenpox status/Varicella (live vaccine): check immunity   Shingrix: discuss and recommend 2 doses 2-6 months apart  Covid:  discuss and recommend. Pt eligible for booster      All Medical History/Surgical History/Family History/Social History/Allergies have been reviewed and updated in EMR    Review of patient's allergies indicates:  No Known Allergies      Current  Medications:   No outpatient medications have been marked as taking for the 9/7/22 encounter (Appointment) with IBD PHARMACIST.       Reviewed all current medications including OTC, herbals and supplements.     Labs:   No results found for: HEPBSAG, HEPBCAB  No results found for: TBGOLDPLUS  Lab Results   Component Value Date    AFLQQCHQ60JH 26 (L) 12/12/2018    ABODWZYW11 699 12/12/2018     Lab Results   Component Value Date    WBC 8.27 08/26/2022    HGB 16.0 08/26/2022    HCT 45.5 08/26/2022    MCV 91 08/26/2022     08/26/2022     Lab Results   Component Value Date    CREATININE 0.7 09/01/2022    ALBUMIN 3.0 (L) 08/23/2022    BILITOT 1.5 (H) 08/23/2022    ALKPHOS 53 (L) 08/23/2022    AST 11 08/23/2022    ALT 9 (L) 08/23/2022         Assessment/Plan:  Nyasia Middleton is a 41 y.o. female that  has a past medical history of Crohn's disease, GERD (gastroesophageal reflux disease), History of Clostridium difficile infection, History of ulcerative colitis, and Perineal sinus. Patient presents to clinic for a new patient visit with Dr. Weber. In 2010 she was initially diagnosed with ulcerative colitis, but now with Crohn's like disease of the pouch complicated by perianal and anal vaginal fistulas.  Also with ileoanal stenosis and pouch inflammation in the distal pouch. Since her initial diagnosis patient has tried multiple treatments including biologics that she either has failed or had very poor response too. She is referred to see IBD pharmacist to discuss possible treatment options moving forward including retry infliximab but add immunomodulator vs. retry stelara vs. Skyrizi. Patient is accompanied by her  in clinic today.    # Recommendations:  - Discussed in details (MOA, risks and benefits) the three different options recommended by Dr. Urena.   - Patient prefers Skyrizi, the only biologic she is naive to and has not been exposed to in the past.   - Educated patient on mechanism of action,  frequency and route of administration, onset of action, and safety profile of Skyrizi.  - Therapy plan sent to [facility] (patient willing to travel) and script sent to [facility].  - CBC, CMP, TB and Hep B updated today. Repeat CBC CMP every 6 months and TB Hep B every year.  - Encouraged pt to practice proper hand hygiene considering increased risk of infection with biologics. Pt to make us aware if she ever experiences s/sx of an infection including fever, chills, URI symptoms or UTI symptoms.   - Discussed importance of immunizations considering the increased risk of infections. Recommended flu shot, covid booster, tdap, PCV20. Will reassess when serology results come back.  - Pt to avoid live vaccines and uncooked/ raw seafood such as raw oysters or sushi.   - Recommended against use of NSAIDs including motrin,ibuprofen, advil, aleve etc   - Patient verbalized understanding instructions         Marisol Doty, PharmD  IBD Clinical Pharmacist

## 2022-09-07 NOTE — PATIENT INSTRUCTIONS
Risankizumab (Skyrizi)    Class: Interleukin 23 Blocker - Biologic product    Mechanism of Action: blocks the p19 protein subunit used by the interleukin (IL) - 23 cytokines. IL-23 are naturally occurring cytokines that are involved in inflammatory and immune responses and play a role in the inflammatory process for Inflammatory Bowel Disease (IBD).    Dosage/ Route/ Frequency: An intravenous (IV) infusion on week 0, 4, and 8, then inject under the skin in the subcutaneous tissue on week 12 then every 8 weeks.  - Loading Dose: 600mg IV infusion on week 0, 4, 8. Infusion can take 1-2 hours.  - Maintenance Dose: 360 mg SC injections via an on-body injection starting at week 12 then every 8 weeks. These injections can be done at home and take 5 minutes.    Side effects  Please notify us if you are treated with antibiotics or experience signs of infection (ie. fevers, chills, sores, etc) given we may need to hold your medication during this time.     Common side effects (>3% or 3 in 100 people): upper respiratory infection (sore throat, runny nose), headache, joint pain, injection site redness, stomach pain, low red blood cells, back pain, fever, urinary tract infection.     Please call us immediately if you develop any of the below problems:    Serious side effects:     Serious infections: This medication may increase your risk of developing viral, bacterial and/or fungal infections.     Allergic or Hypersensitivity reaction- hives (rash), difficulty breathing, chest pain/tightness, high or low blood pressure, swelling of the face and hands, fever or chills    Liver injury: one case of liver injury with rash that lead to hospitalization. Watch out for symptoms such as unexplained rash, nausea, vomiting, stomach pain, jaundice (yellowing of skin or eyes), loss of appetite, dark urine or tiredness.     Labs/Monitoring:  Prior to starting this new agent, we will be checking labs to determine the safety of taking this  medication including baseline blood counts, liver and kidney function tests, TB test and viral hepatitis testing.   Once started on the therapy we will monitor your blood count and your liver and kidney function tests as needed following evidence-based guidelines. We will repeat liver function labs before you start the maintenance dose. TB test and viral hepatitis tests will be repeated every year. If you have any risk of exposure or travel to high-risk areas please notify us so we can do this testing sooner. If indicated your provider may also choose to check drug levels to monitor or optimize your response to the medication.   We recommend you do not start the home injection on a Sunday for lab purposes.     Storage recommendations:  Keep refrigerated in temperature between 36°F to 46°F (2°C to 8°C).  When ready to use, take the carton out of the refrigerator and leave it at room temperature for at least 45 minutes up to 90 minutes to allow the medication to warm.  Do NOT shake and do NOT freeze     Vaccine counseling:   Avoid live vaccines which include:  Intranasal Influenza LAIV4 (FluMist Quadrivalent)  Measles, Mumps, Rubella (MMR)  Rotavirus (RotaTeq, Rotarix)  Typhoid oral capsule (Vivotif)  Varicella (Varivax)  Smallpox (Vaccinia)  Yellow Fever (YF-Vax)  Adenovirus  Cholera (Vaxchora)    Avoid consumption of raw seafood such as oysters/sushi.  Infliximab (Remicade, Inflectra, Avsola)    Class: TNF alpha Blocker - Biologic product    Mechanism of Action: blocks TNF alpha which plays a role in the inflammatory process for Inflammatory Bowel Disease (IBD).    Dosage/ Route/ Frequency: Intravenous (IV) Infusion that will be performed at an infusion center   - Loading Dose: 5 mg/kg IV week 0, 2, 6 (infused over 2 hours)  - Maintenance Dose: 5 mg/kg mg IV every 8 weeks  - 2-3-hour infusion    Side effects    Stop therapy due to adverse event= 10% (10/100)  Please notify us if you are treated with antibiotics or  experience signs of infection (ie. fevers) given we may need to hold your medication during this time.     Common side effects (>10% - 1 in 10 people): upper respiratory infection, sinus infection, infusion related reaction, headache, abdominal pain.    Serious side effects:     Please call us immediately if you develop any of the below problems:    Allergic reactions  - <2% (2/100) develop injection site reactions  - Hypersensitivity reaction- hives (rash), difficulty breathing, chest pain/tightness, high or low blood pressure, swelling of the face and hands, fever or chills    Serious Infections 3% (3/100): viral/bacterial and fungal infections. Symptoms you might experience include fever, tiredness, flu, open sores, warm red painful skin    Blood Disorders: fever that doesn't go away, bruising, bleeding, severe paleness    Malignancies:  Non-Hodgkin's Lymphoma 0.06% (6/10,000)  Non-Melanoma skin cancer This medication may increase your risk of skin cancer, yearly skin checks are recommended. Make sure you are using sun block and protective wear.    Drug related lupus-like reaction 1% (1/100): chest discomfort or pain that does not go away, shortness of breath, joint pain, rash on the cheeks or arms that gets worse in the sun    Psoriasis: new or worsening red scaly patches or raised bumps on the skin that are filled with pus     Case Reports Only: Multiple Sclerosis and Guillain [place] Syndrome  Numbness/weakness/tingling of your hands and feet, changes in your vision or seizures    Case Reports Only: New or worsening Congestive Heart Failure  shortness of breath, swelling in your ankles or feet, sudden weight gain    Case Reports Only: Serious Liver Injury  jaundice (yellow skin or eyes), dark brown urine, right-sided abdominal pain, fever, severe itchiness    Labs/Monitoring:  Prior to starting this new agent, we will be checking labs to determine the safety of taking this medication including baseline blood  counts, liver and kidney function tests, TB test and viral hepatitis testing.   Once started on the therapy we will monitor your blood count and your liver and kidney function tests as needed following evidence-based guidelines. TB test and viral hepatitis tests will be repeated every year. If you have any risk of exposure or travel to high-risk areas please notify us so we can do this testing sooner. If indicated your provider may also choose to check drug levels to monitor or optimize your response to the medication.     Vaccine counseling:   Avoid live vaccines which include:  Intranasal Influenza LAIV4 (FluMist Quadrivalent)  Measles, Mumps, Rubella (MMR)  Rotavirus (RotaTeq, Rotarix)  Typhoid oral capsule (Vivotif)  Varicella (Varivax)  Smallpox (Vaccinia)  Yellow Fever (YF-Vax)  Adenovirus  Cholera (Vaxchora)    Avoid consumption of raw seafood such as oysters/sushi.    Ustekinumab (Stelara)    Class: Interleukin 12 and Interleukin 23 Blocker - Biologic product    Mechanism of Action: blocks the p40 protein subunit used by both the interleukin (IL)-12 and IL-23 cytokines. IL-12 and IL-23 are naturally occurring cytokines that are involved in inflammatory and immune responses and play a role in the inflammatory process for Inflammatory Bowel Disease (IBD).    Dosage/ Route/ Frequency: One intravenous (IV) infusion and then inject under the skin in the subcutaneous tissue every 8 weeks.  - Loading Dose: Initial 1 hour IV infusion based on your weight at an infusion clinic   - Maintenance Dose: 90 mg SC every 8 weeks that can be done at home (first injection dose is 8 weeks after initial infusion)    Side effects  Please notify us if you are treated with antibiotics or experience signs of infection (ie. fevers, chills, sores, etc) given we may need to hold your medication during this time.     Common side effects (?3% or 3 in 100 people): vomiting (with the first infusion), nasopharyngitis (sore throat, runny  nose), injection site redness, vaginal infection, urinary tract infection     Please call us immediately if you develop any of the below problems:    Serious side effects:     Serious infections: This medication may increase your risk of developing viral, bacterial and/or fungal infections.     Allergic or Hypersensitivity reaction- hives (rash), difficulty breathing, chest pain/tightness, high or low blood pressure, swelling of the face and hands, fever or chills    Malignancies: This medication may increase your risk of skin cancer, yearly skin checks are recommended. Make sure you are using sun block and protective wear.    Posterior reversible encephalopathy syndrome (PRES) has been reported. Symptoms of PRES include confusion, headache, imaging changes, seizure, and visual disturbances; most cases occur within a few days to several months after initiation of therapy but may occur ?1 year. Monitor patients closely; discontinue therapy immediately if symptoms occur and administer appropriate therapy.    Lung inflammation: post marketing rare side effect reported. Happens within the first three doses of the medication and it can improve once the medication is stopped. Symptoms include shortness of breath or cough that do not go away.     Labs/Monitoring:  Prior to starting this new agent, we will be checking labs to determine the safety of taking this medication including baseline blood counts, liver and kidney function tests, TB test and viral hepatitis testing.   Once started on the therapy we will monitor your blood count and your liver and kidney function tests as needed following evidence-based guidelines. TB test and viral hepatitis tests will be repeated every year. If you have any risk of exposure or travel to high-risk areas please notify us so we can do this testing sooner. If indicated your provider may also choose to check drug levels to monitor or optimize your response to the medication.   We  recommend you do not start the home injection on a Sunday for lab purposes.     Storage recommendations:  Keep refrigerated in temperature between 36°F to 46°F (2°C to 8°C).  If necessary, can be in room temperature (86°F or 30°C) for max of 30 days in the original carton protected from light  If taken out of the fridge and left in room temperature do not put back in the fridge   Do NOT shake and do NOT freeze     Vaccine counseling:   Avoid live vaccines which include:  Intranasal Influenza LAIV4 (FluMist Quadrivalent)  Measles, Mumps, Rubella (MMR)  Rotavirus (RotaTeq, Rotarix)  Typhoid oral capsule (Vivotif)  Varicella (Varivax)  Smallpox (Vaccinia)  Yellow Fever (YF-Vax)  Adenovirus  Cholera (Vaxchora)    Avoid consumption of raw seafood such as oysters/sushi.

## 2022-09-08 ENCOUNTER — PATIENT MESSAGE (OUTPATIENT)
Dept: GASTROENTEROLOGY | Facility: CLINIC | Age: 42
End: 2022-09-08
Payer: COMMERCIAL

## 2022-09-08 ENCOUNTER — TELEPHONE (OUTPATIENT)
Dept: SURGERY | Facility: CLINIC | Age: 42
End: 2022-09-08
Payer: COMMERCIAL

## 2022-09-08 LAB
GAMMA INTERFERON BACKGROUND BLD IA-ACNC: 0.09 IU/ML
M TB IFN-G CD4+ BCKGRND COR BLD-ACNC: 0.05 IU/ML
MITOGEN IGNF BCKGRD COR BLD-ACNC: 5.34 IU/ML
MUMPS IGG INTERPRETATION: POSITIVE
MUMPS IGG SCREEN: 1.47 ISR (ref 0–0.9)
RUBEOLA IGG ANTIBODY: 1.67 ISR (ref 0–0.9)
RUBEOLA INTERPRETATION: POSITIVE
RUBV IGG SER-ACNC: 31.7 IU/ML
RUBV IGG SER-IMP: REACTIVE
TB GOLD PLUS: NEGATIVE
TB2 - NIL: 0 IU/ML
VARICELLA INTERPRETATION: POSITIVE
VARICELLA ZOSTER IGG: 3.25 ISR (ref 0–0.9)

## 2022-09-09 ENCOUNTER — OFFICE VISIT (OUTPATIENT)
Dept: SURGERY | Facility: CLINIC | Age: 42
End: 2022-09-09
Payer: COMMERCIAL

## 2022-09-09 ENCOUNTER — SPECIALTY PHARMACY (OUTPATIENT)
Dept: PHARMACY | Facility: CLINIC | Age: 42
End: 2022-09-09
Payer: COMMERCIAL

## 2022-09-09 ENCOUNTER — OFFICE VISIT (OUTPATIENT)
Dept: WOUND CARE | Facility: CLINIC | Age: 42
End: 2022-09-09
Payer: COMMERCIAL

## 2022-09-09 ENCOUNTER — PATIENT MESSAGE (OUTPATIENT)
Dept: GASTROENTEROLOGY | Facility: CLINIC | Age: 42
End: 2022-09-09
Payer: COMMERCIAL

## 2022-09-09 ENCOUNTER — LAB VISIT (OUTPATIENT)
Dept: LAB | Facility: HOSPITAL | Age: 42
End: 2022-09-09
Attending: COLON & RECTAL SURGERY
Payer: COMMERCIAL

## 2022-09-09 VITALS
DIASTOLIC BLOOD PRESSURE: 59 MMHG | BODY MASS INDEX: 19.55 KG/M2 | WEIGHT: 129 LBS | HEIGHT: 68 IN | HEART RATE: 82 BPM | SYSTOLIC BLOOD PRESSURE: 111 MMHG

## 2022-09-09 DIAGNOSIS — Z43.2 ATTENTION TO ILEOSTOMY: Primary | ICD-10-CM

## 2022-09-09 DIAGNOSIS — R19.7 DIARRHEA, UNSPECIFIED TYPE: Primary | ICD-10-CM

## 2022-09-09 DIAGNOSIS — R19.7 DIARRHEA, UNSPECIFIED TYPE: ICD-10-CM

## 2022-09-09 DIAGNOSIS — Z93.2 ILEOSTOMY IN PLACE: ICD-10-CM

## 2022-09-09 LAB
ALBUMIN SERPL BCP-MCNC: 3.1 G/DL (ref 3.5–5.2)
ALP SERPL-CCNC: 53 U/L (ref 55–135)
ALT SERPL W/O P-5'-P-CCNC: 18 U/L (ref 10–44)
ANION GAP SERPL CALC-SCNC: 10 MMOL/L (ref 8–16)
AST SERPL-CCNC: 18 U/L (ref 10–40)
BASOPHILS # BLD AUTO: 0.07 K/UL (ref 0–0.2)
BASOPHILS NFR BLD: 1.1 % (ref 0–1.9)
BILIRUB SERPL-MCNC: 0.3 MG/DL (ref 0.1–1)
BUN SERPL-MCNC: 4 MG/DL (ref 6–20)
CALCIUM SERPL-MCNC: 7.8 MG/DL (ref 8.7–10.5)
CHLORIDE SERPL-SCNC: 103 MMOL/L (ref 95–110)
CO2 SERPL-SCNC: 27 MMOL/L (ref 23–29)
CREAT SERPL-MCNC: 0.5 MG/DL (ref 0.5–1.4)
DIFFERENTIAL METHOD: ABNORMAL
EOSINOPHIL # BLD AUTO: 0.4 K/UL (ref 0–0.5)
EOSINOPHIL NFR BLD: 5.3 % (ref 0–8)
ERYTHROCYTE [DISTWIDTH] IN BLOOD BY AUTOMATED COUNT: 14.2 % (ref 11.5–14.5)
EST. GFR  (NO RACE VARIABLE): >60 ML/MIN/1.73 M^2
GLUCOSE SERPL-MCNC: 79 MG/DL (ref 70–110)
HCT VFR BLD AUTO: 29.3 % (ref 37–48.5)
HGB BLD-MCNC: 9.7 G/DL (ref 12–16)
IMM GRANULOCYTES # BLD AUTO: 0.06 K/UL (ref 0–0.04)
IMM GRANULOCYTES NFR BLD AUTO: 0.9 % (ref 0–0.5)
LYMPHOCYTES # BLD AUTO: 1 K/UL (ref 1–4.8)
LYMPHOCYTES NFR BLD: 14.9 % (ref 18–48)
MCH RBC QN AUTO: 31.9 PG (ref 27–31)
MCHC RBC AUTO-ENTMCNC: 33.1 G/DL (ref 32–36)
MCV RBC AUTO: 96 FL (ref 82–98)
MONOCYTES # BLD AUTO: 0.6 K/UL (ref 0.3–1)
MONOCYTES NFR BLD: 8.6 % (ref 4–15)
NEUTROPHILS # BLD AUTO: 4.6 K/UL (ref 1.8–7.7)
NEUTROPHILS NFR BLD: 69.2 % (ref 38–73)
NRBC BLD-RTO: 0 /100 WBC
PLATELET # BLD AUTO: 317 K/UL (ref 150–450)
PMV BLD AUTO: 10 FL (ref 9.2–12.9)
POTASSIUM SERPL-SCNC: 3.2 MMOL/L (ref 3.5–5.1)
PROT SERPL-MCNC: 6.3 G/DL (ref 6–8.4)
RBC # BLD AUTO: 3.04 M/UL (ref 4–5.4)
SODIUM SERPL-SCNC: 140 MMOL/L (ref 136–145)
WBC # BLD AUTO: 6.65 K/UL (ref 3.9–12.7)

## 2022-09-09 PROCEDURE — 3078F DIAST BP <80 MM HG: CPT | Mod: CPTII,S$GLB,, | Performed by: COLON & RECTAL SURGERY

## 2022-09-09 PROCEDURE — 3074F SYST BP LT 130 MM HG: CPT | Mod: CPTII,S$GLB,, | Performed by: COLON & RECTAL SURGERY

## 2022-09-09 PROCEDURE — 1160F PR REVIEW ALL MEDS BY PRESCRIBER/CLIN PHARMACIST DOCUMENTED: ICD-10-PCS | Mod: CPTII,S$GLB,, | Performed by: CLINICAL NURSE SPECIALIST

## 2022-09-09 PROCEDURE — 3078F PR MOST RECENT DIASTOLIC BLOOD PRESSURE < 80 MM HG: ICD-10-PCS | Mod: CPTII,S$GLB,, | Performed by: COLON & RECTAL SURGERY

## 2022-09-09 PROCEDURE — 36415 COLL VENOUS BLD VENIPUNCTURE: CPT | Performed by: COLON & RECTAL SURGERY

## 2022-09-09 PROCEDURE — 85025 COMPLETE CBC W/AUTO DIFF WBC: CPT | Performed by: COLON & RECTAL SURGERY

## 2022-09-09 PROCEDURE — 99999 PR PBB SHADOW E&M-EST. PATIENT-LVL III: ICD-10-PCS | Mod: PBBFAC,,, | Performed by: CLINICAL NURSE SPECIALIST

## 2022-09-09 PROCEDURE — 1159F PR MEDICATION LIST DOCUMENTED IN MEDICAL RECORD: ICD-10-PCS | Mod: CPTII,S$GLB,, | Performed by: CLINICAL NURSE SPECIALIST

## 2022-09-09 PROCEDURE — 1159F MED LIST DOCD IN RCRD: CPT | Mod: CPTII,S$GLB,, | Performed by: CLINICAL NURSE SPECIALIST

## 2022-09-09 PROCEDURE — 99999 PR PBB SHADOW E&M-EST. PATIENT-LVL III: CPT | Mod: PBBFAC,,, | Performed by: COLON & RECTAL SURGERY

## 2022-09-09 PROCEDURE — 3074F PR MOST RECENT SYSTOLIC BLOOD PRESSURE < 130 MM HG: ICD-10-PCS | Mod: CPTII,S$GLB,, | Performed by: COLON & RECTAL SURGERY

## 2022-09-09 PROCEDURE — 1160F RVW MEDS BY RX/DR IN RCRD: CPT | Mod: CPTII,S$GLB,, | Performed by: CLINICAL NURSE SPECIALIST

## 2022-09-09 PROCEDURE — 99999 PR PBB SHADOW E&M-EST. PATIENT-LVL III: ICD-10-PCS | Mod: PBBFAC,,, | Performed by: COLON & RECTAL SURGERY

## 2022-09-09 PROCEDURE — 99024 PR POST-OP FOLLOW-UP VISIT: ICD-10-PCS | Mod: S$GLB,,, | Performed by: CLINICAL NURSE SPECIALIST

## 2022-09-09 PROCEDURE — 1159F MED LIST DOCD IN RCRD: CPT | Mod: CPTII,S$GLB,, | Performed by: COLON & RECTAL SURGERY

## 2022-09-09 PROCEDURE — 99024 POSTOP FOLLOW-UP VISIT: CPT | Mod: S$GLB,,, | Performed by: COLON & RECTAL SURGERY

## 2022-09-09 PROCEDURE — 1159F PR MEDICATION LIST DOCUMENTED IN MEDICAL RECORD: ICD-10-PCS | Mod: CPTII,S$GLB,, | Performed by: COLON & RECTAL SURGERY

## 2022-09-09 PROCEDURE — 1160F PR REVIEW ALL MEDS BY PRESCRIBER/CLIN PHARMACIST DOCUMENTED: ICD-10-PCS | Mod: CPTII,S$GLB,, | Performed by: COLON & RECTAL SURGERY

## 2022-09-09 PROCEDURE — 3008F PR BODY MASS INDEX (BMI) DOCUMENTED: ICD-10-PCS | Mod: CPTII,S$GLB,, | Performed by: COLON & RECTAL SURGERY

## 2022-09-09 PROCEDURE — 99024 POSTOP FOLLOW-UP VISIT: CPT | Mod: S$GLB,,, | Performed by: CLINICAL NURSE SPECIALIST

## 2022-09-09 PROCEDURE — 80053 COMPREHEN METABOLIC PANEL: CPT | Performed by: COLON & RECTAL SURGERY

## 2022-09-09 PROCEDURE — 99999 PR PBB SHADOW E&M-EST. PATIENT-LVL III: CPT | Mod: PBBFAC,,, | Performed by: CLINICAL NURSE SPECIALIST

## 2022-09-09 PROCEDURE — 1160F RVW MEDS BY RX/DR IN RCRD: CPT | Mod: CPTII,S$GLB,, | Performed by: COLON & RECTAL SURGERY

## 2022-09-09 PROCEDURE — 99024 PR POST-OP FOLLOW-UP VISIT: ICD-10-PCS | Mod: S$GLB,,, | Performed by: COLON & RECTAL SURGERY

## 2022-09-09 PROCEDURE — 3008F BODY MASS INDEX DOCD: CPT | Mod: CPTII,S$GLB,, | Performed by: COLON & RECTAL SURGERY

## 2022-09-09 RX ORDER — HEPARIN 100 UNIT/ML
500 SYRINGE INTRAVENOUS
Status: CANCELLED | OUTPATIENT
Start: 2022-09-09

## 2022-09-09 RX ORDER — RISANKIZUMAB-RZAA 360 MG/2.4
360 WEARABLE INJECTOR SUBCUTANEOUS
Qty: 2.4 ML | Refills: 4 | Status: SHIPPED | OUTPATIENT
Start: 2022-09-09 | End: 2023-09-29

## 2022-09-09 RX ORDER — SODIUM CHLORIDE 9 MG/ML
INJECTION, SOLUTION INTRAVENOUS
Status: CANCELLED
Start: 2022-09-09 | End: 2022-09-09

## 2022-09-09 RX ORDER — EPINEPHRINE 0.3 MG/.3ML
0.3 INJECTION SUBCUTANEOUS
Status: CANCELLED
Start: 2022-09-09

## 2022-09-09 RX ORDER — SODIUM CHLORIDE 0.9 % (FLUSH) 0.9 %
10 SYRINGE (ML) INJECTION
Status: CANCELLED | OUTPATIENT
Start: 2022-09-09

## 2022-09-09 RX ORDER — DIPHENHYDRAMINE HYDROCHLORIDE 50 MG/ML
25 INJECTION INTRAMUSCULAR; INTRAVENOUS
Status: CANCELLED
Start: 2022-09-09

## 2022-09-09 NOTE — TELEPHONE ENCOUNTER
Dionicio, this is Anna Garcia with [facility].  We are working on your prescription that your doctor has sent us. We will be working with your insurance to get this approved for you. We will be calling you along the way with updates on your medication.  If you have any questions, you can reach us at [phone].    Welcome call outcome: Patient/caregiver reached

## 2022-09-09 NOTE — PROGRESS NOTES
Nyasia is doing better this week but has questions and a few minor skin issues, she feels the wafer is n ot lasting and skin getting red, even with use of ring   I suggested the Cera ring SLIM and to stay with convex pouch but cut it so it does not touch stoma in any place  I gave her a belt today and fit it snug . This too should help,   We discussed taking imodium consider tabs instead of caps  Get her Wellbutrin changed from XL to regular release  Gave her Ness Whitney RD as resource for  short gut issues.

## 2022-09-09 NOTE — PROGRESS NOTES
CRS Office Visit Followup    Referring Md:   Biis Tolbert,   [address] [facility] [address] [facility]  [place] [id number]    SUBJECTIVE:     Chief Complaint:  Perianal fistula    History of Present Illness:  The patient is a new patient to this practice.   Course is as follows:  Patient is a 41 y.o. female presents with perianal fistula.  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  2011:  Diagnosis changed to Crohn's disease due to perianal abscesses and fistulas.    She transferred care to Dr. Peralta, who performed the following surgeries.    4/3/2013:  Fistulotomy x2  7/12/2013: LIFT  12/13/2013: repeat LIFT  7/28/2014: LIFT for anovaginal fistula  10/20/2014: repeat LIFT with mesh for anovaginal fistula (MatriStem mesh)  6/26/2017: repeat LIFT  Following Dr. Peralta's half-way, she has been seen by Dr. Condon who has performed multiple stem cell applications.    5/12/2022: Regarding her Crohn's disease, she is followed by Dr. Gonzales and is treated with Stelara.  She is about to change medical therapy to Entyvio.     Functionally, she is active.  She previously vaped but has recently stopped 3 weeks ago.  She has 6-7 bowel movements during the day.  1-2 bowel movements overnight.  Had intermittent improvement of her pouch function as well as decreased drainage when she was on a course of antibiotics recently.  Maintains continence.  Has frequent vaginal drainage.  Strongly wishes to avoid ileostomy.   08/02/2022:  Presents for evaluation for increased vaginal and perineal draining with increased pain.  No improvement with antibiotics or with change in her medical therapy to Entyvio.  Recently underwent pouchoscopy by Dr. Condon  With significant inflammation of the pouch and the anal canal with multiple ulcers.  Interested in discussing possibility of fecal diversion.    Last Pouchoscopy:             8/17/22: lap loop ileostomy creation and  "pouchoscopy   - Flexible pouchoscopy demonstrated significant stenosis of the pouch anal anastomosis with multiple surrounding fissures.  Distal rectal mucosa was inflamed and stenotic.  Pouch with narrowing of the proximal limb consistent with Crohn's disease.  Rolan terminal ileum appeared healthy.  8/24/22: diagnostic laparoscopy for fevers/elevated CRP --> normal laparoscopy   - readmitted for 10 days for ileus followed by high output ostomy   - discharged on fluids    Current status:  9/9/22:   Presents for follow-up.  Taking  for Lomotil 3 times per day as well as 6 Imodium 3 times per day.  Intermittent high output from the ileostomy.  Continues on fluids 5 times per day.  Intermittent fatigue.  Yet to start medical therapy.  Pending approval.      Review of Systems:  Review of Systems   Constitutional:  Negative for chills, diaphoresis, fever, malaise/fatigue and weight loss.   HENT:  Negative for congestion.    Respiratory:  Negative for shortness of breath.    Cardiovascular:  Negative for chest pain and leg swelling.   Gastrointestinal:  Positive for diarrhea. Negative for abdominal pain, blood in stool, constipation, nausea and vomiting.   Genitourinary:  Negative for dysuria.   Musculoskeletal:  Negative for back pain and myalgias.   Skin:  Positive for rash.   Neurological:  Negative for dizziness and weakness.   Endo/Heme/Allergies:  Does not bruise/bleed easily.   Psychiatric/Behavioral:  Negative for depression.      OBJECTIVE:     Vital Signs (Most Recent)  BP (!) 111/59 (BP Location: Left arm, Patient Position: Sitting, BP Method: Small (Automatic))   Pulse 82   Ht 5' 8" (1.727 m)   Wt 58.5 kg (129 lb)   LMP 07/14/2017   BMI 19.61 kg/m²     Physical Exam:  General: White female in no distress   Neuro: alert and oriented x 4.  Moves all extremities.     HEENT: no icterus.  Trachea midline  Respiratory: respirations are even and unlabored  Cardiac: regular rate  Abdomen:  Soft, nontender, no " "masses.    Ostomy with decreased edema.  Pink.  Healthy appearing.  Sitz above the level of the skin.  Extremities: Warm dry and intact  Skin: no rashes  Anorectal:  Deferred from today.  From prior exam:  "External exam with posterior midline open scar measuring approximately 3 cm x 1 cm with exposed granulation tissue over the sacrum approximately 6 cm away from the anus.  Around the anus, there are multiple scars.  Slight amount of purulence seen emanating from the left anterior aspect.  Thin perineal body."      Labs:  TSH normal.  Otherwise, no recent labs    Imaging:  No recent imaging      ASSESSMENT/PLAN:     Nyasia was seen today for post-op evaluation.    Diagnoses and all orders for this visit:    Diarrhea, unspecified type  -     Comprehensive Metabolic Panel; Future  -     CBC Auto Differential; Future    Ileostomy in place  -     Comprehensive Metabolic Panel; Future  -     CBC Auto Differential; Future        41 y.o. female with restorative total proctocolectomy with ileal pouch anastomosis in 2011 who then developed Crohn's disease with multiple perianal fistulas as well as an anal vaginal fistula.  She has undergone multiple attempts at fistula repair.     She underwent fecal diversion with a loop ileostomy on 8/17/22.  Re-explored on POD7 and was normal.  Discharged with high output with fluids.     Overall doing well.    Continue IV fluids.  Continue Imodium and Lomotil.  I will follow up with her in 4 weeks.        CORETTA Doyle MD, FACS, FASCRS  Staff Surgeon  Colon & Rectal Surgery          "

## 2022-09-10 LAB
6-METHYLMERCAPTOPURINE RIBOSIDE: 4.31 NMOL/ML/H (ref 5.04–9.57)
6-METHYLMERCAPTOPURINE: 2.53 NMOL/ML/H (ref 3–6.66)
6-METHYLTHIOGUANINE RIBOSIDE: 4.08 NMOL/ML/H (ref 2.7–5.84)
TPMT INTERPRETATION: ABNORMAL
TPMT REVIEWED BY: ABNORMAL

## 2022-09-11 LAB
HBV SURFACE AB SER QL IA: POSITIVE
HBV SURFACE AB SERPL IA-ACNC: 712 MIU/ML

## 2022-09-12 ENCOUNTER — PATIENT MESSAGE (OUTPATIENT)
Dept: PHARMACY | Facility: CLINIC | Age: 42
End: 2022-09-12
Payer: COMMERCIAL

## 2022-09-12 ENCOUNTER — PATIENT MESSAGE (OUTPATIENT)
Dept: INTERNAL MEDICINE | Facility: CLINIC | Age: 42
End: 2022-09-12
Payer: COMMERCIAL

## 2022-09-12 LAB — TTG IGA SER-ACNC: 6 UNITS

## 2022-09-12 NOTE — TELEPHONE ENCOUNTER
Pt states that her wellbutrin is coming out whole in her bag    PT would like to know what she should do       LOV:  01/11/22      RTC:  03/09/23

## 2022-09-12 NOTE — TELEPHONE ENCOUNTER
Deb Case:[id number] .Status:Approved     Start Date:08/13/2022;Coverage End Date:03/11/2023.    Informed patient she must fill with [facility], [phone], my charted patient pharmacy info along with link to copay card. Patient repeated understanding.    Routing script to [facility], closing [facility] referral.

## 2022-09-13 ENCOUNTER — PATIENT MESSAGE (OUTPATIENT)
Dept: SURGERY | Facility: CLINIC | Age: 42
End: 2022-09-13
Payer: COMMERCIAL

## 2022-09-13 ENCOUNTER — PATIENT MESSAGE (OUTPATIENT)
Dept: GASTROENTEROLOGY | Facility: CLINIC | Age: 42
End: 2022-09-13
Payer: COMMERCIAL

## 2022-09-13 DIAGNOSIS — K50.013 CROHN'S DISEASE OF SMALL INTESTINE WITH FISTULA: Primary | ICD-10-CM

## 2022-09-14 ENCOUNTER — PATIENT MESSAGE (OUTPATIENT)
Dept: INTERNAL MEDICINE | Facility: CLINIC | Age: 42
End: 2022-09-14
Payer: COMMERCIAL

## 2022-09-14 ENCOUNTER — OFFICE VISIT (OUTPATIENT)
Dept: SURGERY | Facility: CLINIC | Age: 42
End: 2022-09-14
Payer: COMMERCIAL

## 2022-09-14 VITALS
DIASTOLIC BLOOD PRESSURE: 71 MMHG | BODY MASS INDEX: 19.48 KG/M2 | WEIGHT: 128.5 LBS | HEIGHT: 68 IN | SYSTOLIC BLOOD PRESSURE: 109 MMHG | HEART RATE: 86 BPM

## 2022-09-14 DIAGNOSIS — Z43.2 ATTENTION TO ILEOSTOMY: Primary | ICD-10-CM

## 2022-09-14 PROCEDURE — 3008F PR BODY MASS INDEX (BMI) DOCUMENTED: ICD-10-PCS | Mod: CPTII,S$GLB,, | Performed by: NURSE PRACTITIONER

## 2022-09-14 PROCEDURE — 99999 PR PBB SHADOW E&M-EST. PATIENT-LVL III: ICD-10-PCS | Mod: PBBFAC,,, | Performed by: NURSE PRACTITIONER

## 2022-09-14 PROCEDURE — 3074F SYST BP LT 130 MM HG: CPT | Mod: CPTII,S$GLB,, | Performed by: NURSE PRACTITIONER

## 2022-09-14 PROCEDURE — 1160F RVW MEDS BY RX/DR IN RCRD: CPT | Mod: CPTII,S$GLB,, | Performed by: NURSE PRACTITIONER

## 2022-09-14 PROCEDURE — 1159F PR MEDICATION LIST DOCUMENTED IN MEDICAL RECORD: ICD-10-PCS | Mod: CPTII,S$GLB,, | Performed by: NURSE PRACTITIONER

## 2022-09-14 PROCEDURE — 1160F PR REVIEW ALL MEDS BY PRESCRIBER/CLIN PHARMACIST DOCUMENTED: ICD-10-PCS | Mod: CPTII,S$GLB,, | Performed by: NURSE PRACTITIONER

## 2022-09-14 PROCEDURE — 99024 POSTOP FOLLOW-UP VISIT: CPT | Mod: S$GLB,,, | Performed by: NURSE PRACTITIONER

## 2022-09-14 PROCEDURE — 3074F PR MOST RECENT SYSTOLIC BLOOD PRESSURE < 130 MM HG: ICD-10-PCS | Mod: CPTII,S$GLB,, | Performed by: NURSE PRACTITIONER

## 2022-09-14 PROCEDURE — 3078F PR MOST RECENT DIASTOLIC BLOOD PRESSURE < 80 MM HG: ICD-10-PCS | Mod: CPTII,S$GLB,, | Performed by: NURSE PRACTITIONER

## 2022-09-14 PROCEDURE — 99024 PR POST-OP FOLLOW-UP VISIT: ICD-10-PCS | Mod: S$GLB,,, | Performed by: NURSE PRACTITIONER

## 2022-09-14 PROCEDURE — 1159F MED LIST DOCD IN RCRD: CPT | Mod: CPTII,S$GLB,, | Performed by: NURSE PRACTITIONER

## 2022-09-14 PROCEDURE — 99999 PR PBB SHADOW E&M-EST. PATIENT-LVL III: CPT | Mod: PBBFAC,,, | Performed by: NURSE PRACTITIONER

## 2022-09-14 PROCEDURE — 3008F BODY MASS INDEX DOCD: CPT | Mod: CPTII,S$GLB,, | Performed by: NURSE PRACTITIONER

## 2022-09-14 PROCEDURE — 3078F DIAST BP <80 MM HG: CPT | Mod: CPTII,S$GLB,, | Performed by: NURSE PRACTITIONER

## 2022-09-14 RX ORDER — BUPROPION HYDROCHLORIDE 100 MG/1
100 TABLET, EXTENDED RELEASE ORAL 2 TIMES DAILY
Qty: 60 TABLET | Refills: 11 | Status: SHIPPED | OUTPATIENT
Start: 2022-09-14 | End: 2023-03-09 | Stop reason: SDUPTHER

## 2022-09-14 NOTE — PROGRESS NOTES
Mrs. Middleton is known to me and comes today after surgery on 8/17/22 and 8/24/22 with Dr. Doyle for a 3rd post op visit.   Pt is seeing me for post op evaluation of ileostomy . The patient reports some problems with new ostomy. Pain level today is 0.  Reports anxiety affecting pouching. Currently crushing XR wellbutrin.     ASSESSMENT:  The ileostomy is 30 mm, red, moist, budded well. Bottom limb does flop over onto skin.    The pt wearing a 1piece pouching system by Marian  Average wear time is 4 days.   Peristomal skin is clean and dry No rashes, no ulcers, no induration    There is no  mucocutaneous separation.  Pt is coping well with new ostomy.  Support being  provided by Home Health and Family member(s).   1L NS daily via PICC with HH.   Taking 5 imodium 4x/day and lomotil 4x/day.  Pouching concerns include:    9/2/22 9/14/22    PLAN:  Patient instructed to do the following routine for pouching:  Cleanse skin with water, dry well.  Apply skin barrier film. Allow to dry  Apply thin ring. Up under bottom limb.   Apply convex pouch sized appropriately for stoma.   Pt also counseled on skin care, nutrition, hydration and ADL.  I spent greater than 50% of this 30 minute visit in face to face counseling.  Return visit recommended as needed.

## 2022-09-15 ENCOUNTER — PATIENT MESSAGE (OUTPATIENT)
Dept: GASTROENTEROLOGY | Facility: CLINIC | Age: 42
End: 2022-09-15
Payer: COMMERCIAL

## 2022-09-15 DIAGNOSIS — K50.013 CROHN'S DISEASE OF SMALL INTESTINE WITH FISTULA: Primary | ICD-10-CM

## 2022-09-15 RX ORDER — SUCRALFATE 1 G/1
1 TABLET ORAL
Qty: 120 TABLET | Refills: 2 | Status: SHIPPED | OUTPATIENT
Start: 2022-09-15 | End: 2022-12-14

## 2022-09-15 NOTE — TELEPHONE ENCOUNTER
Spoke with patient  She has been experiencing nausea and not being able to get full after eating and has a snawing feeling in her stomach, especially after eating    Discussed with Dr. Weber.  He will send in some carafate for her to be taken ac and hs    Notified pt of same and reviewed instruction for carafate.  Pt verbalized understanding to all and has no further questions at this time.

## 2022-09-16 ENCOUNTER — EXTERNAL HOME HEALTH (OUTPATIENT)
Dept: HOME HEALTH SERVICES | Facility: HOSPITAL | Age: 42
End: 2022-09-16
Payer: COMMERCIAL

## 2022-09-16 ENCOUNTER — PATIENT MESSAGE (OUTPATIENT)
Dept: SURGERY | Facility: CLINIC | Age: 42
End: 2022-09-16
Payer: COMMERCIAL

## 2022-09-19 ENCOUNTER — PATIENT MESSAGE (OUTPATIENT)
Dept: GASTROENTEROLOGY | Facility: CLINIC | Age: 42
End: 2022-09-19
Payer: COMMERCIAL

## 2022-09-19 ENCOUNTER — PATIENT MESSAGE (OUTPATIENT)
Dept: SURGERY | Facility: CLINIC | Age: 42
End: 2022-09-19
Payer: COMMERCIAL

## 2022-09-20 ENCOUNTER — PATIENT MESSAGE (OUTPATIENT)
Dept: SURGERY | Facility: CLINIC | Age: 42
End: 2022-09-20
Payer: COMMERCIAL

## 2022-09-20 NOTE — TELEPHONE ENCOUNTER
Has she check with home health/company who supplies her the fluids? As far as I know this was not discontinued.

## 2022-09-21 ENCOUNTER — PATIENT MESSAGE (OUTPATIENT)
Dept: SURGERY | Facility: CLINIC | Age: 42
End: 2022-09-21
Payer: COMMERCIAL

## 2022-09-21 DIAGNOSIS — Z93.2 ILEOSTOMY IN PLACE: ICD-10-CM

## 2022-09-21 DIAGNOSIS — R19.7 DIARRHEA, UNSPECIFIED TYPE: ICD-10-CM

## 2022-09-21 DIAGNOSIS — K50.013 CROHN'S DISEASE OF SMALL INTESTINE WITH FISTULA: ICD-10-CM

## 2022-09-21 DIAGNOSIS — Z43.2 ATTENTION TO ILEOSTOMY: Primary | ICD-10-CM

## 2022-09-21 NOTE — TELEPHONE ENCOUNTER
Hey   I updated the HH orders to 1 L 3x/week.     The only day we are both in clinic again is next Tuesday. Geetha you can put her in any slot in the morning. I can come to [place] if needed after my morning clinic.     Thanks  Apurva

## 2022-09-23 ENCOUNTER — PATIENT MESSAGE (OUTPATIENT)
Dept: GASTROENTEROLOGY | Facility: CLINIC | Age: 42
End: 2022-09-23
Payer: COMMERCIAL

## 2022-09-26 ENCOUNTER — PATIENT MESSAGE (OUTPATIENT)
Dept: GASTROENTEROLOGY | Facility: CLINIC | Age: 42
End: 2022-09-26
Payer: COMMERCIAL

## 2022-09-26 NOTE — TELEPHONE ENCOUNTER
Called and spoke with the patient  - She reports having a visit with Deb [employer] ambassador and was told about and enrollment form that can help her get the drug covered by the manufacturing company.   - Patient will confirm the name on the form and let us know.   - Will discuss with [facility]SP and [facility] if form previously completed

## 2022-09-27 ENCOUNTER — PATIENT MESSAGE (OUTPATIENT)
Dept: GASTROENTEROLOGY | Facility: CLINIC | Age: 42
End: 2022-09-27
Payer: COMMERCIAL

## 2022-09-27 ENCOUNTER — OFFICE VISIT (OUTPATIENT)
Dept: SURGERY | Facility: CLINIC | Age: 42
End: 2022-09-27
Payer: COMMERCIAL

## 2022-09-27 ENCOUNTER — PATIENT MESSAGE (OUTPATIENT)
Dept: SURGERY | Facility: CLINIC | Age: 42
End: 2022-09-27

## 2022-09-27 ENCOUNTER — TELEPHONE (OUTPATIENT)
Dept: SURGERY | Facility: CLINIC | Age: 42
End: 2022-09-27

## 2022-09-27 VITALS
HEIGHT: 68 IN | SYSTOLIC BLOOD PRESSURE: 115 MMHG | WEIGHT: 123.69 LBS | HEART RATE: 98 BPM | DIASTOLIC BLOOD PRESSURE: 75 MMHG | BODY MASS INDEX: 18.74 KG/M2

## 2022-09-27 DIAGNOSIS — Z93.2 ILEOSTOMY IN PLACE: Primary | ICD-10-CM

## 2022-09-27 DIAGNOSIS — Z43.2 ATTENTION TO ILEOSTOMY: Primary | ICD-10-CM

## 2022-09-27 PROCEDURE — 3074F PR MOST RECENT SYSTOLIC BLOOD PRESSURE < 130 MM HG: ICD-10-PCS | Mod: CPTII,S$GLB,, | Performed by: COLON & RECTAL SURGERY

## 2022-09-27 PROCEDURE — 3078F DIAST BP <80 MM HG: CPT | Mod: CPTII,S$GLB,, | Performed by: COLON & RECTAL SURGERY

## 2022-09-27 PROCEDURE — 99999 PR PBB SHADOW E&M-EST. PATIENT-LVL III: CPT | Mod: PBBFAC,,, | Performed by: NURSE PRACTITIONER

## 2022-09-27 PROCEDURE — 1160F PR REVIEW ALL MEDS BY PRESCRIBER/CLIN PHARMACIST DOCUMENTED: ICD-10-PCS | Mod: CPTII,S$GLB,, | Performed by: COLON & RECTAL SURGERY

## 2022-09-27 PROCEDURE — 1159F MED LIST DOCD IN RCRD: CPT | Mod: CPTII,S$GLB,, | Performed by: COLON & RECTAL SURGERY

## 2022-09-27 PROCEDURE — 1159F PR MEDICATION LIST DOCUMENTED IN MEDICAL RECORD: ICD-10-PCS | Mod: CPTII,S$GLB,, | Performed by: NURSE PRACTITIONER

## 2022-09-27 PROCEDURE — 3008F PR BODY MASS INDEX (BMI) DOCUMENTED: ICD-10-PCS | Mod: CPTII,S$GLB,, | Performed by: COLON & RECTAL SURGERY

## 2022-09-27 PROCEDURE — 99024 PR POST-OP FOLLOW-UP VISIT: ICD-10-PCS | Mod: S$GLB,,, | Performed by: COLON & RECTAL SURGERY

## 2022-09-27 PROCEDURE — 99024 POSTOP FOLLOW-UP VISIT: CPT | Mod: S$GLB,,, | Performed by: COLON & RECTAL SURGERY

## 2022-09-27 PROCEDURE — 99999 PR PBB SHADOW E&M-EST. PATIENT-LVL III: CPT | Mod: PBBFAC,,, | Performed by: COLON & RECTAL SURGERY

## 2022-09-27 PROCEDURE — 1160F PR REVIEW ALL MEDS BY PRESCRIBER/CLIN PHARMACIST DOCUMENTED: ICD-10-PCS | Mod: CPTII,S$GLB,, | Performed by: NURSE PRACTITIONER

## 2022-09-27 PROCEDURE — 3078F PR MOST RECENT DIASTOLIC BLOOD PRESSURE < 80 MM HG: ICD-10-PCS | Mod: CPTII,S$GLB,, | Performed by: COLON & RECTAL SURGERY

## 2022-09-27 PROCEDURE — 99024 PR POST-OP FOLLOW-UP VISIT: ICD-10-PCS | Mod: S$GLB,,, | Performed by: NURSE PRACTITIONER

## 2022-09-27 PROCEDURE — 3074F SYST BP LT 130 MM HG: CPT | Mod: CPTII,S$GLB,, | Performed by: COLON & RECTAL SURGERY

## 2022-09-27 PROCEDURE — 99999 PR PBB SHADOW E&M-EST. PATIENT-LVL III: ICD-10-PCS | Mod: PBBFAC,,, | Performed by: COLON & RECTAL SURGERY

## 2022-09-27 PROCEDURE — 1160F RVW MEDS BY RX/DR IN RCRD: CPT | Mod: CPTII,S$GLB,, | Performed by: NURSE PRACTITIONER

## 2022-09-27 PROCEDURE — 99999 PR PBB SHADOW E&M-EST. PATIENT-LVL III: ICD-10-PCS | Mod: PBBFAC,,, | Performed by: NURSE PRACTITIONER

## 2022-09-27 PROCEDURE — 1159F MED LIST DOCD IN RCRD: CPT | Mod: CPTII,S$GLB,, | Performed by: NURSE PRACTITIONER

## 2022-09-27 PROCEDURE — 99024 POSTOP FOLLOW-UP VISIT: CPT | Mod: S$GLB,,, | Performed by: NURSE PRACTITIONER

## 2022-09-27 PROCEDURE — 1160F RVW MEDS BY RX/DR IN RCRD: CPT | Mod: CPTII,S$GLB,, | Performed by: COLON & RECTAL SURGERY

## 2022-09-27 PROCEDURE — 1159F PR MEDICATION LIST DOCUMENTED IN MEDICAL RECORD: ICD-10-PCS | Mod: CPTII,S$GLB,, | Performed by: COLON & RECTAL SURGERY

## 2022-09-27 PROCEDURE — 3008F BODY MASS INDEX DOCD: CPT | Mod: CPTII,S$GLB,, | Performed by: COLON & RECTAL SURGERY

## 2022-09-27 NOTE — PROGRESS NOTES
CRS Office Visit Followup    Referring Md:   No referring provider defined for this encounter.    SUBJECTIVE:     Chief Complaint:  Perianal fistula    History of Present Illness:  The patient is a new patient to this practice.   Course is as follows:  Patient is a 41 y.o. female presents with perianal fistula.  [id number]: History of 3 stage restorative proctocolectomy for presumed ulcerative colitis ([facility]).  2011:  Diagnosis changed to Crohn's disease due to perianal abscesses and fistulas.    She transferred care to Dr. Peralta, who performed the following surgeries.    4/3/2013:  Fistulotomy x2  7/12/2013: LIFT  12/13/2013: repeat LIFT  7/28/2014: LIFT for anovaginal fistula  10/20/2014: repeat LIFT with mesh for anovaginal fistula (MatriStem mesh)  6/26/2017: repeat LIFT  Following Dr. Peralta's USP, she has been seen by Dr. Condon who has performed multiple stem cell applications.    5/12/2022: Regarding her Crohn's disease, she is followed by Dr. Gonzales and is treated with Stelara.  She is about to change medical therapy to Entyvio.     Functionally, she is active.  She previously vaped but has recently stopped 3 weeks ago.  She has 6-7 bowel movements during the day.  1-2 bowel movements overnight.  Had intermittent improvement of her pouch function as well as decreased drainage when she was on a course of antibiotics recently.  Maintains continence.  Has frequent vaginal drainage.  Strongly wishes to avoid ileostomy.   08/02/2022:  Presents for evaluation for increased vaginal and perineal draining with increased pain.  No improvement with antibiotics or with change in her medical therapy to Entyvio.  Recently underwent pouchoscopy by Dr. Condon  With significant inflammation of the pouch and the anal canal with multiple ulcers.  Interested in discussing possibility of fecal diversion.    Last Pouchoscopy:             8/17/22: lap loop ileostomy creation and pouchoscopy   - Flexible  "pouchoscopy demonstrated significant stenosis of the pouch anal anastomosis with multiple surrounding fissures.  Distal rectal mucosa was inflamed and stenotic.  Pouch with narrowing of the proximal limb consistent with Crohn's disease.  Rolan terminal ileum appeared healthy.  8/24/22: diagnostic laparoscopy for fevers/elevated CRP --> normal laparoscopy   - readmitted for 10 days for ileus followed by high output ostomy   - discharged on fluids    Current status:  9/9/22:   Presents for follow-up.  Taking  for Lomotil 3 times per day as well as 6 Imodium 3 times per day.  Intermittent high output from the ileostomy.  Continues on fluids 5 times per day.  Intermittent fatigue.  Yet to start medical therapy.  Pending approval.  9/27/22:  Recently decreased fluid 3 times per week from daily.  She is now decreasing the amount of Imodium she takes.  Intermittent thick output.  More accepting of her ostomy.      Review of Systems:  Review of Systems   Constitutional:  Negative for chills, diaphoresis, fever, malaise/fatigue and weight loss.   HENT:  Negative for congestion.    Respiratory:  Negative for shortness of breath.    Cardiovascular:  Negative for chest pain and leg swelling.   Gastrointestinal:  Positive for diarrhea. Negative for abdominal pain, blood in stool, constipation, nausea and vomiting.   Genitourinary:  Negative for dysuria.   Musculoskeletal:  Negative for back pain and myalgias.   Skin:  Positive for rash.   Neurological:  Negative for dizziness and weakness.   Endo/Heme/Allergies:  Does not bruise/bleed easily.   Psychiatric/Behavioral:  Negative for depression.      OBJECTIVE:     Vital Signs (Most Recent)  /75 (BP Location: Left arm, Patient Position: Sitting, BP Method: Small (Automatic))   Pulse 98   Ht 5' 8" (1.727 m)   Wt 56.1 kg (123 lb 11.2 oz)   LMP 07/14/2017   BMI 18.81 kg/m²     Physical Exam:  General: White female in no distress   Neuro: alert and oriented x 4.  Moves all " "extremities.     HEENT: no icterus.  Trachea midline  Respiratory: respirations are even and unlabored  Cardiac: regular rate  Abdomen:  Soft, nontender, no masses.    Ostomy with decreased edema.  Pink.  Healthy appearing.  Sits above the level of the skin.  Extremities: Warm dry and intact  Skin: no rashes  Anorectal:  Deferred from today.  From prior exam:  "External exam with posterior midline open scar measuring approximately 3 cm x 1 cm with exposed granulation tissue over the sacrum approximately 6 cm away from the anus.  Around the anus, there are multiple scars.  Slight amount of purulence seen emanating from the left anterior aspect.  Thin perineal body."      Labs:  TSH normal.  Otherwise, no recent labs    Imaging:  No recent imaging      ASSESSMENT/PLAN:     Diagnoses and all orders for this visit:    Ileostomy in place          41 y.o. female with restorative total proctocolectomy with ileal pouch anastomosis in 2011 who then developed Crohn's disease with multiple perianal fistulas as well as an anal vaginal fistula.  She has undergone multiple attempts at fistula repair.     She underwent fecal diversion with a loop ileostomy on 8/17/22.  Re-explored on POD7 and was normal.  Discharged with high output with fluids.       High output is now decreasing.  Will stop IV fluids and follow up with her in a week.  If she does ok without IV fluids, we can then remove the PICC line.      I asked to see her back in 2-3 months after she starts medical therapy.        CORETTA Doyle MD, FACS, FASCRS  Staff Surgeon  Colon & Rectal Surgery            "

## 2022-09-27 NOTE — TELEPHONE ENCOUNTER
Called [facility] and Dc'd her fluids.    -Spoke with patient. She will get labs on 10/4 to make sure appropriate for PICC D/c  -Appt made for 10/5 for PICC D/C

## 2022-09-27 NOTE — PROGRESS NOTES
Mrs. Middleton is known to me and comes today after surgery on 8/17/22 and 8/24/22 with Dr. Doyle for a 4 th post op visit.   Pt is seeing me for post op evaluation of ileostomy . The patient reports some problems with new ostomy. Pain level today is 0.  Reports anxiety affecting pouching. Currently crushing XR wellbutrin.     ASSESSMENT:  The ileostomy is 29mm, red, moist, budded well. Bottom limb does flop over onto skin.    The pt wearing a 1piece pouching system by [employer]  Average wear time is 4 days.   Peristomal skin is clean and dry. Slight erythema to l and r sides. No rashes no induration   There is no  mucocutaneous separation.  Pt is coping well with new ostomy.  Support being  provided by Home Health and Family member(s).   1L NS 3x/week via PICC with HH.   Taking 5 imodium 4x/day and lomotil 4x/day.  Pouching concerns include:    9/2/22 9/14/22 9/27/22    PLAN:  Patient instructed to do the following routine for pouching:  Cleanse skin with water, dry well.  +- powder to irritation  Apply skin barrier film. Allow to dry  Apply thin ring. Up under bottom limb.   Apply convex pouch sized appropriately for stoma.    Pt also counseled on skin care, nutrition, hydration and ADL.   \Rx to [facility] as hh should be ending in 2 weeks when fluids/picc dc'd  I spent greater than 50% of this 30 minute visit in face to face counseling.  Return visit recommended as needed.

## 2022-09-27 NOTE — TELEPHONE ENCOUNTER
----- Message from CORETTA Doyle MD sent at 9/27/2022 10:47 AM CDT -----  Howdy,     Can we turn off her fluids at home for the next week?  If she does ok without the fluids, can you see her in clinic and take her PICC out??    Is that ok with you?  Otherwise, I can see her and do it.    THANK YOU!    Wade

## 2022-09-28 ENCOUNTER — PATIENT MESSAGE (OUTPATIENT)
Dept: GASTROENTEROLOGY | Facility: CLINIC | Age: 42
End: 2022-09-28
Payer: COMMERCIAL

## 2022-09-28 NOTE — TELEPHONE ENCOUNTER
[facility]P assisting with Skyrizi induction infusion appeal; Urgent appeal submitted to [facility] appeals via fax [phone]; will follow up for status

## 2022-09-29 NOTE — TELEPHONE ENCOUNTER
Outgoing call to [facility], appeal has been received, reference number [id number]. Per [employer] rep, a determination should be made by 10/10/22, will continue to follow up until determination is made.

## 2022-09-30 ENCOUNTER — TELEPHONE (OUTPATIENT)
Dept: GASTROENTEROLOGY | Facility: CLINIC | Age: 42
End: 2022-09-30
Payer: COMMERCIAL

## 2022-09-30 NOTE — TELEPHONE ENCOUNTER
Deb  approved Case ID# [id number]  effective dates 8/13/2022-03/11/2023. Portal message sent. Approval sent to be scan.

## 2022-10-03 ENCOUNTER — PATIENT MESSAGE (OUTPATIENT)
Dept: GASTROENTEROLOGY | Facility: CLINIC | Age: 42
End: 2022-10-03
Payer: COMMERCIAL

## 2022-10-03 NOTE — TELEPHONE ENCOUNTER
Outgoing call to [employer] , appeals line is [phone] prompt 3. Case ID [id number] (case ending in 03 cancelled b/c previous case already on file) for [facility] is in expedited status, determination will be made by 10/13/22. Rep stated provider can check back again in 2-3 days, will continue to follow up.

## 2022-10-05 ENCOUNTER — LAB VISIT (OUTPATIENT)
Dept: LAB | Facility: HOSPITAL | Age: 42
End: 2022-10-05
Attending: NURSE PRACTITIONER
Payer: COMMERCIAL

## 2022-10-05 DIAGNOSIS — Z93.2 ILEOSTOMY IN PLACE: ICD-10-CM

## 2022-10-05 LAB
ALBUMIN SERPL BCP-MCNC: 4 G/DL (ref 3.5–5.2)
ALP SERPL-CCNC: 71 U/L (ref 38–126)
ALT SERPL W/O P-5'-P-CCNC: 21 U/L (ref 10–44)
ANION GAP SERPL CALC-SCNC: 12 MMOL/L (ref 8–16)
AST SERPL-CCNC: 22 U/L (ref 15–46)
BASOPHILS # BLD AUTO: 0.11 K/UL (ref 0–0.2)
BASOPHILS NFR BLD: 1.3 % (ref 0–1.9)
BILIRUB SERPL-MCNC: 0.5 MG/DL (ref 0.1–1)
CALCIUM SERPL-MCNC: 8.4 MG/DL (ref 8.7–10.5)
CHLORIDE SERPL-SCNC: 107 MMOL/L (ref 95–110)
CO2 SERPL-SCNC: 25 MMOL/L (ref 23–29)
CREAT SERPL-MCNC: 0.52 MG/DL (ref 0.5–1.4)
DIFFERENTIAL METHOD: ABNORMAL
EOSINOPHIL # BLD AUTO: 0.4 K/UL (ref 0–0.5)
EOSINOPHIL NFR BLD: 4.5 % (ref 0–8)
ERYTHROCYTE [DISTWIDTH] IN BLOOD BY AUTOMATED COUNT: 13.2 % (ref 11.5–14.5)
EST. GFR  (NO RACE VARIABLE): >60 ML/MIN/1.73 M^2
GLUCOSE SERPL-MCNC: 91 MG/DL (ref 70–110)
HCT VFR BLD AUTO: 35.5 % (ref 37–48.5)
HGB BLD-MCNC: 11.5 G/DL (ref 12–16)
IMM GRANULOCYTES # BLD AUTO: 0.05 K/UL (ref 0–0.04)
IMM GRANULOCYTES NFR BLD AUTO: 0.6 % (ref 0–0.5)
LYMPHOCYTES # BLD AUTO: 1.2 K/UL (ref 1–4.8)
LYMPHOCYTES NFR BLD: 14.5 % (ref 18–48)
MCH RBC QN AUTO: 31.3 PG (ref 27–31)
MCHC RBC AUTO-ENTMCNC: 32.4 G/DL (ref 32–36)
MCV RBC AUTO: 97 FL (ref 82–98)
MONOCYTES # BLD AUTO: 0.7 K/UL (ref 0.3–1)
MONOCYTES NFR BLD: 8.6 % (ref 4–15)
NEUTROPHILS # BLD AUTO: 5.8 K/UL (ref 1.8–7.7)
NEUTROPHILS NFR BLD: 70.5 % (ref 38–73)
NRBC BLD-RTO: 0 /100 WBC
PLATELET # BLD AUTO: 220 K/UL (ref 150–450)
PMV BLD AUTO: 9.6 FL (ref 9.2–12.9)
POTASSIUM SERPL-SCNC: 3.3 MMOL/L (ref 3.5–5.1)
PROT SERPL-MCNC: 6.8 G/DL (ref 6–8.4)
RBC # BLD AUTO: 3.68 M/UL (ref 4–5.4)
SODIUM SERPL-SCNC: 144 MMOL/L (ref 136–145)
UUN UR-MCNC: 8 MG/DL (ref 7–17)
WBC # BLD AUTO: 8.25 K/UL (ref 3.9–12.7)

## 2022-10-05 PROCEDURE — 85025 COMPLETE CBC W/AUTO DIFF WBC: CPT | Mod: PO | Performed by: NURSE PRACTITIONER

## 2022-10-05 PROCEDURE — 80053 COMPREHEN METABOLIC PANEL: CPT | Mod: PO | Performed by: NURSE PRACTITIONER

## 2022-10-05 PROCEDURE — 36415 COLL VENOUS BLD VENIPUNCTURE: CPT | Mod: PO | Performed by: NURSE PRACTITIONER

## 2022-10-05 NOTE — TELEPHONE ENCOUNTER
"Outgoing call to [facility], member number [id number], rep states this is request is not urgent although the request for expedited appeal was requested, rep states this request is not urgent b/c it is not "life threating".  Determination will be made by 10/13/22, will continue to follow up.  "

## 2022-10-06 ENCOUNTER — PATIENT MESSAGE (OUTPATIENT)
Dept: SURGERY | Facility: CLINIC | Age: 42
End: 2022-10-06

## 2022-10-06 ENCOUNTER — OFFICE VISIT (OUTPATIENT)
Dept: SURGERY | Facility: CLINIC | Age: 42
End: 2022-10-06
Payer: COMMERCIAL

## 2022-10-06 VITALS
SYSTOLIC BLOOD PRESSURE: 107 MMHG | DIASTOLIC BLOOD PRESSURE: 69 MMHG | HEART RATE: 88 BPM | WEIGHT: 130.75 LBS | BODY MASS INDEX: 19.82 KG/M2 | HEIGHT: 68 IN

## 2022-10-06 DIAGNOSIS — Z45.2 ENCOUNTER FOR REMOVAL OF PERIPHERALLY INSERTED CENTRAL CATHETER (PICC): Primary | ICD-10-CM

## 2022-10-06 PROCEDURE — 1160F RVW MEDS BY RX/DR IN RCRD: CPT | Mod: CPTII,S$GLB,, | Performed by: NURSE PRACTITIONER

## 2022-10-06 PROCEDURE — 3008F BODY MASS INDEX DOCD: CPT | Mod: CPTII,S$GLB,, | Performed by: NURSE PRACTITIONER

## 2022-10-06 PROCEDURE — 3074F SYST BP LT 130 MM HG: CPT | Mod: CPTII,S$GLB,, | Performed by: NURSE PRACTITIONER

## 2022-10-06 PROCEDURE — 3074F PR MOST RECENT SYSTOLIC BLOOD PRESSURE < 130 MM HG: ICD-10-PCS | Mod: CPTII,S$GLB,, | Performed by: NURSE PRACTITIONER

## 2022-10-06 PROCEDURE — 99024 POSTOP FOLLOW-UP VISIT: CPT | Mod: S$GLB,,, | Performed by: NURSE PRACTITIONER

## 2022-10-06 PROCEDURE — 99999 PR PBB SHADOW E&M-EST. PATIENT-LVL III: ICD-10-PCS | Mod: PBBFAC,,, | Performed by: NURSE PRACTITIONER

## 2022-10-06 PROCEDURE — 1159F MED LIST DOCD IN RCRD: CPT | Mod: CPTII,S$GLB,, | Performed by: NURSE PRACTITIONER

## 2022-10-06 PROCEDURE — 99024 PR POST-OP FOLLOW-UP VISIT: ICD-10-PCS | Mod: S$GLB,,, | Performed by: NURSE PRACTITIONER

## 2022-10-06 PROCEDURE — 3008F PR BODY MASS INDEX (BMI) DOCUMENTED: ICD-10-PCS | Mod: CPTII,S$GLB,, | Performed by: NURSE PRACTITIONER

## 2022-10-06 PROCEDURE — 1159F PR MEDICATION LIST DOCUMENTED IN MEDICAL RECORD: ICD-10-PCS | Mod: CPTII,S$GLB,, | Performed by: NURSE PRACTITIONER

## 2022-10-06 PROCEDURE — 99999 PR PBB SHADOW E&M-EST. PATIENT-LVL III: CPT | Mod: PBBFAC,,, | Performed by: NURSE PRACTITIONER

## 2022-10-06 PROCEDURE — 3078F PR MOST RECENT DIASTOLIC BLOOD PRESSURE < 80 MM HG: ICD-10-PCS | Mod: CPTII,S$GLB,, | Performed by: NURSE PRACTITIONER

## 2022-10-06 PROCEDURE — 1160F PR REVIEW ALL MEDS BY PRESCRIBER/CLIN PHARMACIST DOCUMENTED: ICD-10-PCS | Mod: CPTII,S$GLB,, | Performed by: NURSE PRACTITIONER

## 2022-10-06 PROCEDURE — 3078F DIAST BP <80 MM HG: CPT | Mod: CPTII,S$GLB,, | Performed by: NURSE PRACTITIONER

## 2022-10-06 NOTE — PROGRESS NOTES
CRS Office Visit History and Physical    Referring Md:   No referring provider defined for this encounter.    SUBJECTIVE:     Chief Complaint: PICC removal    History of Present Illness:  The patient is established patient to this practice.   Course is as follows:  Patient is a 41 y.o. female w restorative total proctocolectomy w ileal pouch anastomosis in  who then developed CD w multiple perianal fistulas as well as an anal vaginal fistula.    She has undergone multiple attempts at fistula repair. Most recently she is s/p fecal diversion with a loop ileostomy on 22 w Dr. Doyle.  She was on fluids at home to prevent dehydration.   Labs yesterday. WNL with the exception of K slightly decreased at 3.3. similar to 1 month ago at 3.2.       Review of patient's allergies indicates:  No Known Allergies    Past Medical History:   Diagnosis Date    Crohn's disease     GERD (gastroesophageal reflux disease)     History of Clostridium difficile infection     History of ulcerative colitis     Perineal sinus 2017     Past Surgical History:   Procedure Laterality Date     SECTION, CLASSIC      DIAGNOSTIC LAPAROSCOPY N/A 2022    Procedure: LAPAROSCOPY, DIAGNOSTIC;  Surgeon: CORETTA Doyle MD;  [facility];  Service: Colon and Rectal;  Laterality: N/A;    FLEXIBLE SIGMOIDOSCOPY N/A 2018    Procedure: SIGMOIDOSCOPY, FLEXIBLE;  Surgeon: Jairo Peralta MD;  Location: [facility] (4TH FLR);  Service: Endoscopy;  Laterality: N/A;  no enemas per [facility]    FLEXIBLE SIGMOIDOSCOPY N/A 2022    Procedure: SIGMOIDOSCOPY, FLEXIBLE;  Surgeon: CORETTA Doyle MD;  [facility];  Service: Colon and Rectal;  Laterality: N/A;    HYSTERECTOMY      partial    ILEOSTOMY CLOSURE      LAPAROSCOPIC ILEOSTOMY N/A 2022    Procedure: CREATION, ILEOSTOMY, LAPAROSCOPIC;  Surgeon: CORETTA Doyle MD;  [facility];  Service: Colon and Rectal;  Laterality:  "N/A;    POUCHOSCOPY N/A 2018    Procedure: ENDOSCOPY, SMALL INTESTINAL POUCH, DIAGNOSTIC, possible seton placement;  Surgeon: Jairo Peralta MD;  Location: [facility] ([address] [place] [address]);  Service: Endoscopy;  Laterality: N/A;  Schedule early 2018; No prep    rectovaginal fistula repair      RESTORATIVE PROCTOCOLECTOMY      TONSILLECTOMY      adenoids    TUBAL LIGATION      2016     Family History   Problem Relation Age of Onset    No Known Problems Mother     No Known Problems Father     Crohn's disease Sister     Hypertension Maternal Grandmother     Colon cancer Maternal Grandfather     Cancer Paternal Grandfather 53        colon ca     Social History     Tobacco Use    Smoking status: Some Days     Types: Vaping with nicotine     Last attempt to quit: 2009     Years since quittin.9    Smokeless tobacco: Never   Substance Use Topics    Alcohol use: No     Alcohol/week: 0.0 standard drinks    Drug use: No        Review of Systems:  Review of Systems   Constitutional:  Negative for weight loss.        Wt gain   Gastrointestinal:  Negative for blood in stool, constipation, diarrhea and melena.        Ileostomy     OBJECTIVE:     Vital Signs (Most Recent)  Blood Pressure 107/69 (BP Location: Right arm, Patient Position: Sitting, BP Method: Large (Automatic))   Pulse 88   Height 5' 8" (1.727 m)   Weight 59.3 kg (130 lb 11.7 oz)   Last Menstrual Period 2017   Body Mass Index 19.88 kg/m²     Physical Exam:  General: White female in no distress   Neuro: Alert and oriented to person, place, and time.  Moves all extremities.     HEENT: No icterus.  Trachea midline  Respiratory: Respirations are even and unlabored, no cough or audible wheezing  Skin: Warm dry and intact, No visible rashes, no jaundice    Labs reviewed today:  Lab Results   Component Value Date    WBC 8.25 10/05/2022    HGB 11.5 (L) 10/05/2022    HCT 35.5 (L) 10/05/2022     10/05/2022    CHOL 130 2022    TRIG 65 " 01/11/2022    HDL 53 01/11/2022    ALT 21 10/05/2022    AST 22 10/05/2022     10/05/2022    K 3.3 (L) 10/05/2022     10/05/2022    CREATININE 0.52 10/05/2022    BUN 8 10/05/2022    CO2 25 10/05/2022    TSH 0.884 09/07/2022     PICC removal  - dressing removed  - pt instructed on deep breath in, valsalva  - picc removed and insertion site immediately covered with petroleum gauze  - pressure held for > 5 mins, hemostasis assured  - covered with tegederm.   Pt tolerated well.     ASSESSMENT/PLAN:     Diagnoses and all orders for this visit:    Encounter for removal of peripherally inserted central catheter (PICC)      The patient was instructed to:  Maintain thick ileostomy output  Maintain adequate fluid intake  PO potassium supplement for 1 month  F/u as needed      Apurva Holt, ORESTESP-C  Colon and Rectal Surgery

## 2022-10-11 NOTE — TELEPHONE ENCOUNTER
Faxed MDO at [phone] the [facility] program PAP form for signature. Will follow up.

## 2022-10-11 NOTE — TELEPHONE ENCOUNTER
Outgoing call to [facility], Deb infusion is still pending determination. Per rep Annie STEWART, determination will be made by 10/13/22.

## 2022-10-11 NOTE — TELEPHONE ENCOUNTER
Outgoing call to patient about [facility]'s involvement on following up on appeal. Informed patient that we are moving forward with bridge program enrollment and that we should not need any additional information from her at this time. Patient agrees to follow up with [facility]SP on Thursday regarding bridge or appeal status.

## 2022-10-13 NOTE — TELEPHONE ENCOUNTER
Provider portion received and signed from MDO. Faxed [employer] Complete bridge application to [phone].     Will await bridge program determination.

## 2022-10-14 ENCOUNTER — PATIENT MESSAGE (OUTPATIENT)
Dept: SURGERY | Facility: CLINIC | Age: 42
End: 2022-10-14
Payer: COMMERCIAL

## 2022-10-14 NOTE — TELEPHONE ENCOUNTER
Outgoing call to patient that OSP did not get a determination from insurance on 10/13. OSP has escalated situation to a supervisor at plan. In[facility] that [facility] yesterday, and we will follow up with [employer] rep to have application expedited for review.    Patient informed me that she was also going to call her plan to see if they can give her a reason as to why there is no decision for the appeal yet. OSP will follow up with patient once we have an update.

## 2022-10-14 NOTE — TELEPHONE ENCOUNTER
Outgoing call to [place] , per Benjamín S plan needs another 7 days to make determination. Appeal has now been marked expedited after call escalation, reference number [id number], will continue to follow up until determination is made.

## 2022-10-14 NOTE — TELEPHONE ENCOUNTER
"Spoke with Tonia with [employer] for [employer] bridge program on the phone to request that patient be reviewed as expedited. She confirmed that patient has been marked as such, and we should be able to get a status in 48 hours. I informed her that if patient is approved, drug for IV doses should go to home infusion pharmacy.     Outgoing call to patient to provide update. Patient informed me that her medical  (Carli) told her that the appeal was in "final stages of approval" and should have a decision uploaded up early next week.     Update sent to [facility] team about current status of bridge program and appeal. OSP to follow up early next week to determine if patient will be able to start therapy.  "

## 2022-10-18 DIAGNOSIS — K50.013 CROHN'S DISEASE OF SMALL INTESTINE WITH FISTULA: Primary | ICD-10-CM

## 2022-10-18 NOTE — TELEPHONE ENCOUNTER
Spoke w/ Inocencia at the [facility] and obtained savings card info as listed below.   Had to transfer the call for specifically the bridge program status check.     [employer] savings card approved 09/12/2022 no expiration  BIN: [id number]  PCN: [id number]  Group: [id number]  ID: [id number]    Spoke with Jamila regarding bridge application status check.   Per Jamila, they needed the date of our appeal to [facility] which I provided we submitted on 09/28/2022. She will let Brett know and he is to update OSP once determination is made.     Offered to send the appeal but Jamila said not necessary at this time and once the insurance makes a decision we can use that letter. Gave Laverne as the point of contact from OSP.     Will continue to follow.

## 2022-10-18 NOTE — TELEPHONE ENCOUNTER
Incoming call from Edith with [facility] regarding final Skyrizi IV dose authorization decision. Patient has been approved to receive 3 doses of Skyrizi IV.    Authorization reference numbers and associated codes below  #[id number] with J34.90  #[id number] with C93.99  #[id number] with J35.90    Dates of approval: 9/12/22 - 11/14/22.    I informed Edith that pt has not received any IV doses of Skyrizi yet, and the approval was backdated. Edith informed me that we can reach out to clinical services team to have approval dates adjusted ([phone]).    Will inform [facility] team to ensure approval dates are corrected.    Spoke with patient that [facility] team was informed for final approval for IV doses and that she should be hearing from infusion team about scheduling.

## 2022-10-19 ENCOUNTER — OFFICE VISIT (OUTPATIENT)
Dept: WOUND CARE | Facility: CLINIC | Age: 42
End: 2022-10-19
Payer: COMMERCIAL

## 2022-10-19 DIAGNOSIS — Z43.2 ATTENTION TO ILEOSTOMY: Primary | ICD-10-CM

## 2022-10-19 DIAGNOSIS — Z71.89 ENCOUNTER FOR OSTOMY CARE EDUCATION: ICD-10-CM

## 2022-10-19 PROCEDURE — 1160F RVW MEDS BY RX/DR IN RCRD: CPT | Mod: CPTII,S$GLB,, | Performed by: CLINICAL NURSE SPECIALIST

## 2022-10-19 PROCEDURE — 99024 POSTOP FOLLOW-UP VISIT: CPT | Mod: S$GLB,,, | Performed by: CLINICAL NURSE SPECIALIST

## 2022-10-19 PROCEDURE — 1160F PR REVIEW ALL MEDS BY PRESCRIBER/CLIN PHARMACIST DOCUMENTED: ICD-10-PCS | Mod: CPTII,S$GLB,, | Performed by: CLINICAL NURSE SPECIALIST

## 2022-10-19 PROCEDURE — 1159F PR MEDICATION LIST DOCUMENTED IN MEDICAL RECORD: ICD-10-PCS | Mod: CPTII,S$GLB,, | Performed by: CLINICAL NURSE SPECIALIST

## 2022-10-19 PROCEDURE — 99999 PR PBB SHADOW E&M-EST. PATIENT-LVL III: ICD-10-PCS | Mod: PBBFAC,,, | Performed by: CLINICAL NURSE SPECIALIST

## 2022-10-19 PROCEDURE — 99999 PR PBB SHADOW E&M-EST. PATIENT-LVL III: CPT | Mod: PBBFAC,,, | Performed by: CLINICAL NURSE SPECIALIST

## 2022-10-19 PROCEDURE — 1159F MED LIST DOCD IN RCRD: CPT | Mod: CPTII,S$GLB,, | Performed by: CLINICAL NURSE SPECIALIST

## 2022-10-19 PROCEDURE — 99024 PR POST-OP FOLLOW-UP VISIT: ICD-10-PCS | Mod: S$GLB,,, | Performed by: CLINICAL NURSE SPECIALIST

## 2022-10-19 NOTE — PROGRESS NOTES
"  Subjective:       Patient ID: Nyasia Middleton is a 41 y.o. female.    Chief Complaint: Ileostomy    Pt is post ileostomy 8/17/22 and here today for another stoma check, the sore she had seems better but wants it checked     Review of Systems   Constitutional: Negative.    Respiratory: Negative.     Cardiovascular: Negative.    Gastrointestinal:  Negative for abdominal pain.   Genitourinary: Negative.    Integumentary:  Negative for rash.       Objective:      Physical Exam  Constitutional:       Appearance: Normal appearance.   Pulmonary:      Effort: Pulmonary effort is normal.   Abdominal:      General: Abdomen is flat. Bowel sounds are normal.      Palpations: Abdomen is soft.   Skin:     General: Skin is warm and dry.       Loop ileostomy with functioning limb at 6 oclock, healed areas noted, she fits in 1" cut   Tried new image today and pt liked very much   Assessment:       Problem List Items Addressed This Visit    None  Visit Diagnoses       Attention to ileostomy    -  Primary    Encounter for ostomy care education                  Plan:       Try 2 piece system gave samples  HH is discharged, to order from [facility]  Call for any issues  She feels good and to restart med for Crohn's , very excited        "

## 2022-10-20 ENCOUNTER — DOCUMENT SCAN (OUTPATIENT)
Dept: HOME HEALTH SERVICES | Facility: HOSPITAL | Age: 42
End: 2022-10-20
Payer: COMMERCIAL

## 2022-10-24 ENCOUNTER — TELEPHONE (OUTPATIENT)
Dept: GASTROENTEROLOGY | Facility: CLINIC | Age: 42
End: 2022-10-24
Payer: COMMERCIAL

## 2022-10-24 NOTE — PROGRESS NOTES
Subjective:       Patient ID: Nyasia Middleton is a 38 y.o. female.    Chief Complaint: Follow-up    Patient is a 38 y.o.female who presents today for follow up    She does note some depression; feels like celexa isnt working; no SI  Vaccines: Influenza (yearly)  Gyn exam: partial hysterectomy  Exercise: some  Diet: paleo  LMP: regular      Pt takes norco as needed for pain from fistula/ crohn's disease. She currently gets quantitiy of 90. Her colorectal doctor is retiring and pt would like for us to start giving this script to her. Last filled on 12/3.   Review of Systems   Constitutional: Positive for activity change and unexpected weight change. Negative for appetite change, chills, diaphoresis, fatigue and fever.   HENT: Negative for congestion, dental problem, ear discharge, ear pain, hearing loss, postnasal drip, rhinorrhea, sinus pressure, sore throat and trouble swallowing.    Eyes: Positive for visual disturbance. Negative for discharge, redness and itching.   Respiratory: Negative for cough, chest tightness, shortness of breath and wheezing.    Cardiovascular: Negative for chest pain, palpitations and leg swelling.   Gastrointestinal: Positive for diarrhea. Negative for abdominal pain, blood in stool, constipation, nausea and vomiting.   Endocrine: Negative for cold intolerance, heat intolerance, polydipsia and polyuria.   Genitourinary: Negative for difficulty urinating, dysuria, frequency, hematuria, menstrual problem and urgency.   Musculoskeletal: Negative for arthralgias, back pain, gait problem, joint swelling, myalgias and neck pain.   Skin: Negative for color change and rash.   Neurological: Negative for dizziness, syncope, weakness and headaches.   Hematological: Negative for adenopathy.   Psychiatric/Behavioral: Positive for dysphoric mood. Negative for behavioral problems, confusion and sleep disturbance. The patient is not nervous/anxious.        Objective:      Physical Exam   Constitutional:  [She is oriented to person, place, and time. She appears well-developed and well-nourished. No distress.   HENT:   Head: Normocephalic and atraumatic.   Right Ear: External ear normal.   Left Ear: External ear normal.   Nose: Nose normal.   Mouth/Throat: Oropharynx is clear and moist. No oropharyngeal exudate.   Eyes: Conjunctivae and EOM are normal. Pupils are equal, round, and reactive to light. Right eye exhibits no discharge. Left eye exhibits no discharge. No scleral icterus.   Neck: Normal range of motion. Neck supple. No JVD present. No thyromegaly present.   Cardiovascular: Normal rate, regular rhythm, normal heart sounds and intact distal pulses. Exam reveals no gallop and no friction rub.   No murmur heard.  Pulmonary/Chest: Effort normal and breath sounds normal. No stridor. No respiratory distress. She has no wheezes. She has no rales. She exhibits no tenderness.   Abdominal: Soft. Bowel sounds are normal. She exhibits no distension. There is no tenderness. There is no rebound.   Musculoskeletal: Normal range of motion. She exhibits no edema or tenderness.   Lymphadenopathy:     She has no cervical adenopathy.   Neurological: She is alert and oriented to person, place, and time. No cranial nerve deficit.   Skin: Skin is warm and dry. No rash noted. She is not diaphoretic. No erythema.   Psychiatric: She has a normal mood and affect. Her behavior is normal.   Nursing note and vitals reviewed.      Assessment and Plan:       1. Rectovaginal fistula    - Urinalysis; Future  - Urine culture; Future    2. Crohn's disease of small intestine with other complication  - sees colorectal at     3. Fatigue, unspecified type    - CBC auto differential; Future  - Iron and TIBC; Future      - Ferritin; Future  - TSH; Future  - Vitamin D; Future  - Comprehensive metabolic panel; Future  - Vitamin B12; Future    4. Urine retention    - Urinalysis; Future  - Urine culture; Future    Pain contract signed   checked ; no  suspicious activity; med filled  · Medication Management: The risks and benefits of medication were discussed with the patient  - norco and xanax filled  - increase celexa to 40 mg daily          No Follow-up on file.

## 2022-11-10 ENCOUNTER — PATIENT MESSAGE (OUTPATIENT)
Dept: SURGERY | Facility: CLINIC | Age: 42
End: 2022-11-10
Payer: COMMERCIAL

## 2022-11-14 ENCOUNTER — PATIENT MESSAGE (OUTPATIENT)
Dept: SURGERY | Facility: CLINIC | Age: 42
End: 2022-11-14
Payer: COMMERCIAL

## 2022-11-15 ENCOUNTER — PATIENT MESSAGE (OUTPATIENT)
Dept: SURGERY | Facility: CLINIC | Age: 42
End: 2022-11-15
Payer: COMMERCIAL

## 2022-11-21 PROBLEM — N39.0 UTI (URINARY TRACT INFECTION): Status: RESOLVED | Noted: 2022-08-22 | Resolved: 2022-11-21

## 2022-11-22 ENCOUNTER — PATIENT MESSAGE (OUTPATIENT)
Dept: SURGERY | Facility: CLINIC | Age: 42
End: 2022-11-22
Payer: COMMERCIAL

## 2022-11-22 DIAGNOSIS — K12.0 APHTHOUS ULCER: Primary | ICD-10-CM

## 2022-11-22 RX ORDER — TRIAMCINOLONE ACETONIDE 1 MG/G
PASTE DENTAL 2 TIMES DAILY
Qty: 5 G | Refills: 12 | Status: SHIPPED | OUTPATIENT
Start: 2022-11-22 | End: 2022-12-22

## 2022-12-01 ENCOUNTER — PATIENT MESSAGE (OUTPATIENT)
Dept: SURGERY | Facility: CLINIC | Age: 42
End: 2022-12-01
Payer: COMMERCIAL

## 2022-12-06 ENCOUNTER — OFFICE VISIT (OUTPATIENT)
Dept: WOUND CARE | Facility: CLINIC | Age: 42
End: 2022-12-06
Payer: COMMERCIAL

## 2022-12-06 ENCOUNTER — OFFICE VISIT (OUTPATIENT)
Dept: SURGERY | Facility: CLINIC | Age: 42
End: 2022-12-06
Payer: COMMERCIAL

## 2022-12-06 VITALS
SYSTOLIC BLOOD PRESSURE: 106 MMHG | HEIGHT: 68 IN | WEIGHT: 141.56 LBS | BODY MASS INDEX: 21.45 KG/M2 | HEART RATE: 74 BPM | DIASTOLIC BLOOD PRESSURE: 70 MMHG

## 2022-12-06 DIAGNOSIS — K50.013 CROHN'S DISEASE OF SMALL INTESTINE WITH FISTULA: Primary | ICD-10-CM

## 2022-12-06 DIAGNOSIS — L88 PYODERMA GANGRENOSUM: ICD-10-CM

## 2022-12-06 DIAGNOSIS — Z43.2 ATTENTION TO ILEOSTOMY: Primary | ICD-10-CM

## 2022-12-06 PROCEDURE — 99024 POSTOP FOLLOW-UP VISIT: CPT | Mod: S$GLB,,, | Performed by: CLINICAL NURSE SPECIALIST

## 2022-12-06 PROCEDURE — 1160F RVW MEDS BY RX/DR IN RCRD: CPT | Mod: CPTII,S$GLB,, | Performed by: COLON & RECTAL SURGERY

## 2022-12-06 PROCEDURE — 99999 PR PBB SHADOW E&M-EST. PATIENT-LVL III: CPT | Mod: PBBFAC,,, | Performed by: CLINICAL NURSE SPECIALIST

## 2022-12-06 PROCEDURE — 3074F PR MOST RECENT SYSTOLIC BLOOD PRESSURE < 130 MM HG: ICD-10-PCS | Mod: CPTII,S$GLB,, | Performed by: COLON & RECTAL SURGERY

## 2022-12-06 PROCEDURE — 99213 PR OFFICE/OUTPT VISIT, EST, LEVL III, 20-29 MIN: ICD-10-PCS | Mod: S$GLB,,, | Performed by: COLON & RECTAL SURGERY

## 2022-12-06 PROCEDURE — 1160F RVW MEDS BY RX/DR IN RCRD: CPT | Mod: CPTII,S$GLB,, | Performed by: CLINICAL NURSE SPECIALIST

## 2022-12-06 PROCEDURE — 99999 PR PBB SHADOW E&M-EST. PATIENT-LVL III: ICD-10-PCS | Mod: PBBFAC,,, | Performed by: COLON & RECTAL SURGERY

## 2022-12-06 PROCEDURE — 1160F PR REVIEW ALL MEDS BY PRESCRIBER/CLIN PHARMACIST DOCUMENTED: ICD-10-PCS | Mod: CPTII,S$GLB,, | Performed by: COLON & RECTAL SURGERY

## 2022-12-06 PROCEDURE — 3074F SYST BP LT 130 MM HG: CPT | Mod: CPTII,S$GLB,, | Performed by: COLON & RECTAL SURGERY

## 2022-12-06 PROCEDURE — 99024 PR POST-OP FOLLOW-UP VISIT: ICD-10-PCS | Mod: S$GLB,,, | Performed by: CLINICAL NURSE SPECIALIST

## 2022-12-06 PROCEDURE — 3078F DIAST BP <80 MM HG: CPT | Mod: CPTII,S$GLB,, | Performed by: COLON & RECTAL SURGERY

## 2022-12-06 PROCEDURE — 99999 PR PBB SHADOW E&M-EST. PATIENT-LVL III: ICD-10-PCS | Mod: PBBFAC,,, | Performed by: CLINICAL NURSE SPECIALIST

## 2022-12-06 PROCEDURE — 99999 PR PBB SHADOW E&M-EST. PATIENT-LVL III: CPT | Mod: PBBFAC,,, | Performed by: COLON & RECTAL SURGERY

## 2022-12-06 PROCEDURE — 1159F PR MEDICATION LIST DOCUMENTED IN MEDICAL RECORD: ICD-10-PCS | Mod: CPTII,S$GLB,, | Performed by: COLON & RECTAL SURGERY

## 2022-12-06 PROCEDURE — 1159F MED LIST DOCD IN RCRD: CPT | Mod: CPTII,S$GLB,, | Performed by: COLON & RECTAL SURGERY

## 2022-12-06 PROCEDURE — 1159F PR MEDICATION LIST DOCUMENTED IN MEDICAL RECORD: ICD-10-PCS | Mod: CPTII,S$GLB,, | Performed by: CLINICAL NURSE SPECIALIST

## 2022-12-06 PROCEDURE — 1159F MED LIST DOCD IN RCRD: CPT | Mod: CPTII,S$GLB,, | Performed by: CLINICAL NURSE SPECIALIST

## 2022-12-06 PROCEDURE — 3008F BODY MASS INDEX DOCD: CPT | Mod: CPTII,S$GLB,, | Performed by: COLON & RECTAL SURGERY

## 2022-12-06 PROCEDURE — 99213 OFFICE O/P EST LOW 20 MIN: CPT | Mod: S$GLB,,, | Performed by: COLON & RECTAL SURGERY

## 2022-12-06 PROCEDURE — 3008F PR BODY MASS INDEX (BMI) DOCUMENTED: ICD-10-PCS | Mod: CPTII,S$GLB,, | Performed by: COLON & RECTAL SURGERY

## 2022-12-06 PROCEDURE — 3078F PR MOST RECENT DIASTOLIC BLOOD PRESSURE < 80 MM HG: ICD-10-PCS | Mod: CPTII,S$GLB,, | Performed by: COLON & RECTAL SURGERY

## 2022-12-06 PROCEDURE — 1160F PR REVIEW ALL MEDS BY PRESCRIBER/CLIN PHARMACIST DOCUMENTED: ICD-10-PCS | Mod: CPTII,S$GLB,, | Performed by: CLINICAL NURSE SPECIALIST

## 2022-12-06 NOTE — PROGRESS NOTES
CRS Office Visit Followup    Referring Md:   No referring provider defined for this encounter.    SUBJECTIVE:     Chief Complaint:  Perianal fistula    History of Present Illness:  The patient is a new patient to this practice.   Course is as follows:  Patient is a 42 y.o. female presents with perianal fistula.  [id number]: History of 3 stage restorative proctocolectomy for presumed ulcerative colitis ([facility] at [facility]).  2011:  Diagnosis changed to Crohn's disease due to perianal abscesses and fistulas.    She transferred care to Dr. Peralta, who performed the following surgeries.    4/3/2013:  Fistulotomy x2  7/12/2013: LIFT  12/13/2013: repeat LIFT  7/28/2014: LIFT for anovaginal fistula  10/20/2014: repeat LIFT with mesh for anovaginal fistula (MatriStem mesh)  6/26/2017: repeat LIFT  Following Dr. Peralta's nursing home, she has been seen by Dr. Condon who has performed multiple stem cell applications.    5/12/2022: Regarding her Crohn's disease, she is followed by Dr. Gonzales and is treated with Stelara.  She is about to change medical therapy to Entyvio.     Functionally, she is active.  She previously vaped but has recently stopped 3 weeks ago.  She has 6-7 bowel movements during the day.  1-2 bowel movements overnight.  Had intermittent improvement of her pouch function as well as decreased drainage when she was on a course of antibiotics recently.  Maintains continence.  Has frequent vaginal drainage.  Strongly wishes to avoid ileostomy.   08/02/2022:  Presents for evaluation for increased vaginal and perineal draining with increased pain.  No improvement with antibiotics or with change in her medical therapy to Entyvio.  Recently underwent pouchoscopy by Dr. Condon  With significant inflammation of the pouch and the anal canal with multiple ulcers.  Interested in discussing possibility of fecal diversion.    Last Pouchoscopy:             8/17/22: lap loop ileostomy creation and pouchoscopy   - Flexible  "pouchoscopy demonstrated significant stenosis of the pouch anal anastomosis with multiple surrounding fissures.  Distal rectal mucosa was inflamed and stenotic.  Pouch with narrowing of the proximal limb consistent with Crohn's disease.  Rolan terminal ileum appeared healthy.  8/24/22: diagnostic laparoscopy for fevers/elevated CRP --> normal laparoscopy   - readmitted for 10 days for ileus followed by high output ostomy   - discharged on fluids    Current status:  9/9/22:   Presents for follow-up.  Taking  for Lomotil 3 times per day as well as 6 Imodium 3 times per day.  Intermittent high output from the ileostomy.  Continues on fluids 5 times per day.  Intermittent fatigue.  Yet to start medical therapy.  Pending approval.  9/27/22:  Recently decreased fluid 3 times per week from daily.  She is now decreasing the amount of Imodium she takes.  Intermittent thick output.  More accepting of her ostomy.    12/6/22:  Doing very well.  She is started on medical therapy.  Weight is improving.  PICC line removed in October.  Emptying the ostomy 5-6 times per day    Review of Systems:  Review of Systems   Constitutional:  Negative for chills, diaphoresis, fever, malaise/fatigue and weight loss.   HENT:  Negative for congestion.    Respiratory:  Negative for shortness of breath.    Cardiovascular:  Negative for chest pain and leg swelling.   Gastrointestinal:  Positive for diarrhea. Negative for abdominal pain, blood in stool, constipation, nausea and vomiting.   Genitourinary:  Negative for dysuria.   Musculoskeletal:  Negative for back pain and myalgias.   Skin:  Positive for rash.   Neurological:  Negative for dizziness and weakness.   Endo/Heme/Allergies:  Does not bruise/bleed easily.   Psychiatric/Behavioral:  Negative for depression.      OBJECTIVE:     Vital Signs (Most Recent)  /70 (BP Location: Left arm, Patient Position: Sitting, BP Method: Large (Automatic))   Pulse 74   Ht 5' 8" (1.727 m)   Wt 64.2 kg " (141 lb 8.6 oz)   LMP 07/14/2017   BMI 21.52 kg/m²     Physical Exam:  General: White female in no distress   Neuro: alert and oriented x 4.  Moves all extremities.     HEENT: no icterus.  Trachea midline  Respiratory: respirations are even and unlabored  Cardiac: regular rate  Abdomen:  Soft, nontender, no masses.    Ostomy appears healthy with pyoderma lesion lateral to the ostomy.  Extremities: Warm dry and intact  Skin: no rashes  Anorectal:  External exam with healing posterior midline scar approximately 3 cm away from the anal verge.  Overlying the perineal body, multiple remaining fistulas.  No abscess.  Decreased erythema.  Significantly improved from prior exam.      Labs:  TSH normal.  Otherwise, no recent labs    Imaging:  No recent imaging      ASSESSMENT/PLAN:     Nyasia was seen today for follow-up.    Diagnoses and all orders for this visit:    Crohn's disease of small intestine with fistula            42 y.o. female with restorative total proctocolectomy with ileal pouch anastomosis in 2011 who then developed Crohn's disease with multiple perianal fistulas as well as an anal vaginal fistula.  She has undergone multiple attempts at fistula repair.     She underwent fecal diversion with a loop ileostomy on 8/17/22.  Re-explored on POD7 and was normal.  Discharged with high output with fluids.       She is overall doing remarkably well.  She will continue on medical therapy.  Significant improvement in her perianal disease.  Okay to return to work on 01/04/2023.  She does have pyoderma that will be locally treated.  May improve with ongoing medical treatment.      CORETTA Doyle MD, FACS, FASCRS  Staff Surgeon  Colon & Rectal Surgery

## 2022-12-06 NOTE — PROGRESS NOTES
"  Subjective:       Patient ID: Nyasia Middleton is a 42 y.o. female.    Chief Complaint: Ileostomy and Wound Check    Pt is post ileostomy 8/17/22 and here today for another stoma check, the sore she had has advanced into a full thickness ulcer on the 3 oclock side of stoma, she has several openings that are draining, has been using triamcinolone in paste til this visit.     Review of Systems   Constitutional: Negative.    Respiratory: Negative.     Cardiovascular: Negative.    Gastrointestinal:  Negative for abdominal pain.   Genitourinary: Negative.    Integumentary:  Negative for rash.       Objective:      Physical Exam  Constitutional:       Appearance: Normal appearance.   Pulmonary:      Effort: Pulmonary effort is normal.   Abdominal:      General: Abdomen is flat. Bowel sounds are normal.      Palpations: Abdomen is soft.   Skin:     General: Skin is warm and dry.       12/6/22 purulence noted and cleansed   Wound post cleaning,   Most likely PYODERMA LESION/ULCER  Will begin HYDROFERA BLUE READY to ulceration with each pouch change , cut small piece to cover and applied,  reviewed how this will react to wound and lose its coloring.   Gave her a large 5x5 piece to take   She was able to tolerate my pressing on the area to express drg.   I sent her with a HFB ring as well in case this should fill in and she has reduced drainage. That ring can handle   Discussed with Dr Doyle           Loop ileostomy with functioning limb at 6 oclock, healed areas noted, she fits in 1" cut   Tried new image today and pt liked very much   Assessment:       Problem List Items Addressed This Visit    None  Visit Diagnoses       Attention to ileostomy    -  Primary    Pyoderma gangrenosum                  Plan:       HFBR started to ulceration /Pyoderma supply give to pt ,  Call for any issues  FU in one month       "

## 2022-12-08 ENCOUNTER — OFFICE VISIT (OUTPATIENT)
Dept: URGENT CARE | Facility: CLINIC | Age: 42
End: 2022-12-08
Payer: COMMERCIAL

## 2022-12-08 VITALS
HEIGHT: 68 IN | OXYGEN SATURATION: 99 % | WEIGHT: 140 LBS | BODY MASS INDEX: 21.22 KG/M2 | RESPIRATION RATE: 18 BRPM | DIASTOLIC BLOOD PRESSURE: 82 MMHG | TEMPERATURE: 98 F | SYSTOLIC BLOOD PRESSURE: 122 MMHG | HEART RATE: 85 BPM

## 2022-12-08 DIAGNOSIS — J06.9 URI, ACUTE: Primary | ICD-10-CM

## 2022-12-08 DIAGNOSIS — J30.1 SEASONAL ALLERGIC RHINITIS DUE TO POLLEN: ICD-10-CM

## 2022-12-08 PROCEDURE — 3079F PR MOST RECENT DIASTOLIC BLOOD PRESSURE 80-89 MM HG: ICD-10-PCS | Mod: CPTII,S$GLB,, | Performed by: FAMILY MEDICINE

## 2022-12-08 PROCEDURE — 96372 PR INJECTION,THERAP/PROPH/DIAG2ST, IM OR SUBCUT: ICD-10-PCS | Mod: S$GLB,,, | Performed by: FAMILY MEDICINE

## 2022-12-08 PROCEDURE — 3079F DIAST BP 80-89 MM HG: CPT | Mod: CPTII,S$GLB,, | Performed by: FAMILY MEDICINE

## 2022-12-08 PROCEDURE — 1159F PR MEDICATION LIST DOCUMENTED IN MEDICAL RECORD: ICD-10-PCS | Mod: CPTII,S$GLB,, | Performed by: FAMILY MEDICINE

## 2022-12-08 PROCEDURE — 3008F BODY MASS INDEX DOCD: CPT | Mod: CPTII,S$GLB,, | Performed by: FAMILY MEDICINE

## 2022-12-08 PROCEDURE — 99203 OFFICE O/P NEW LOW 30 MIN: CPT | Mod: 25,S$GLB,, | Performed by: FAMILY MEDICINE

## 2022-12-08 PROCEDURE — 96372 THER/PROPH/DIAG INJ SC/IM: CPT | Mod: S$GLB,,, | Performed by: FAMILY MEDICINE

## 2022-12-08 PROCEDURE — 3074F PR MOST RECENT SYSTOLIC BLOOD PRESSURE < 130 MM HG: ICD-10-PCS | Mod: CPTII,S$GLB,, | Performed by: FAMILY MEDICINE

## 2022-12-08 PROCEDURE — 3074F SYST BP LT 130 MM HG: CPT | Mod: CPTII,S$GLB,, | Performed by: FAMILY MEDICINE

## 2022-12-08 PROCEDURE — 3008F PR BODY MASS INDEX (BMI) DOCUMENTED: ICD-10-PCS | Mod: CPTII,S$GLB,, | Performed by: FAMILY MEDICINE

## 2022-12-08 PROCEDURE — 99203 PR OFFICE/OUTPT VISIT, NEW, LEVL III, 30-44 MIN: ICD-10-PCS | Mod: 25,S$GLB,, | Performed by: FAMILY MEDICINE

## 2022-12-08 PROCEDURE — 1159F MED LIST DOCD IN RCRD: CPT | Mod: CPTII,S$GLB,, | Performed by: FAMILY MEDICINE

## 2022-12-08 PROCEDURE — 1160F RVW MEDS BY RX/DR IN RCRD: CPT | Mod: CPTII,S$GLB,, | Performed by: FAMILY MEDICINE

## 2022-12-08 PROCEDURE — 1160F PR REVIEW ALL MEDS BY PRESCRIBER/CLIN PHARMACIST DOCUMENTED: ICD-10-PCS | Mod: CPTII,S$GLB,, | Performed by: FAMILY MEDICINE

## 2022-12-08 RX ORDER — FLUTICASONE PROPIONATE 50 MCG
1 SPRAY, SUSPENSION (ML) NASAL DAILY
Qty: 18 G | Refills: 3 | Status: SHIPPED | OUTPATIENT
Start: 2022-12-08 | End: 2023-01-07

## 2022-12-08 RX ORDER — BENZONATATE 100 MG/1
200 CAPSULE ORAL 3 TIMES DAILY PRN
Qty: 30 CAPSULE | Refills: 1 | Status: SHIPPED | OUTPATIENT
Start: 2022-12-08 | End: 2023-02-17

## 2022-12-08 RX ORDER — LORATADINE 10 MG/1
10 TABLET ORAL DAILY
Qty: 30 TABLET | Refills: 2 | Status: SHIPPED | OUTPATIENT
Start: 2022-12-08 | End: 2023-02-17

## 2022-12-08 RX ORDER — DEXAMETHASONE SODIUM PHOSPHATE 100 MG/10ML
10 INJECTION INTRAMUSCULAR; INTRAVENOUS
Status: COMPLETED | OUTPATIENT
Start: 2022-12-08 | End: 2022-12-08

## 2022-12-08 RX ADMIN — DEXAMETHASONE SODIUM PHOSPHATE 10 MG: 100 INJECTION INTRAMUSCULAR; INTRAVENOUS at 10:12

## 2022-12-08 NOTE — PROGRESS NOTES
"Subjective:       Patient ID: Nyasia Middleton is a 42 y.o. female.    Vitals:  height is 5' 8" (1.727 m) and weight is 63.5 kg (140 lb). Her temperature is 98.4 °F (36.9 °C). Her blood pressure is 122/82 and her pulse is 85. Her respiration is 18 and oxygen saturation is 99%.     Chief Complaint: Cough    Patient presents to clinic with cough/sinus/laryngitis x 4 days.  Pt has hx of Crohns      Cough  This is a new problem. The current episode started in the past 7 days. The problem has been unchanged. The problem occurs constantly. The cough is Non-productive. Nothing aggravates the symptoms. She has tried nothing for the symptoms.     Respiratory:  Positive for cough.      Objective:      Physical Exam   Constitutional: She is oriented to person, place, and time. She appears well-developed. She is cooperative.  Non-toxic appearance. She does not appear ill. No distress.   HENT:   Head: Normocephalic and atraumatic.   Ears:   Right Ear: Hearing, tympanic membrane, external ear and ear canal normal.   Left Ear: Hearing, tympanic membrane, external ear and ear canal normal.   Nose: Nose normal. No mucosal edema, rhinorrhea or nasal deformity. No epistaxis. Right sinus exhibits no maxillary sinus tenderness and no frontal sinus tenderness. Left sinus exhibits no maxillary sinus tenderness and no frontal sinus tenderness.   Mouth/Throat: Uvula is midline, oropharynx is clear and moist and mucous membranes are normal. Mucous membranes are moist. No trismus in the jaw. Normal dentition. No uvula swelling. No posterior oropharyngeal erythema. Oropharynx is clear.   Eyes: Conjunctivae and lids are normal. Pupils are equal, round, and reactive to light. Right eye exhibits no discharge. Left eye exhibits no discharge. No scleral icterus. Extraocular movement intact   Neck: Trachea normal and phonation normal. Neck supple.   Cardiovascular: Normal rate, regular rhythm, normal heart sounds and normal pulses. "   Pulmonary/Chest: Effort normal and breath sounds normal. No respiratory distress.   Abdominal: Normal appearance and bowel sounds are normal. She exhibits no distension and no mass. Soft. There is no abdominal tenderness.   Musculoskeletal: Normal range of motion.         General: No deformity. Normal range of motion.   Neurological: She is alert and oriented to person, place, and time. She exhibits normal muscle tone. Coordination normal.   Skin: Skin is warm, dry, intact, not diaphoretic and not pale.   Psychiatric: Her speech is normal and behavior is normal. Judgment and thought content normal.   Nursing note and vitals reviewed.      Assessment:       1. URI, acute    2. Seasonal allergic rhinitis due to pollen          Plan:         URI, acute    Seasonal allergic rhinitis due to pollen    Other orders  -     fluticasone propionate (FLONASE) 50 mcg/actuation nasal spray; 1 spray (50 mcg total) by Each Nostril route once daily.  Dispense: 18 g; Refill: 3  -     dexAMETHasone injection 10 mg  -     benzonatate (TESSALON PERLES) 100 MG capsule; Take 2 capsules (200 mg total) by mouth 3 (three) times daily as needed for Cough.  Dispense: 30 capsule; Refill: 1  -     loratadine (CLARITIN) 10 mg tablet; Take 1 tablet (10 mg total) by mouth once daily.  Dispense: 30 tablet; Refill: 2

## 2022-12-12 ENCOUNTER — PATIENT MESSAGE (OUTPATIENT)
Dept: WOUND CARE | Facility: CLINIC | Age: 42
End: 2022-12-12
Payer: COMMERCIAL

## 2022-12-20 DIAGNOSIS — E87.6 HYPOKALEMIA: Primary | ICD-10-CM

## 2022-12-21 ENCOUNTER — PATIENT MESSAGE (OUTPATIENT)
Dept: GASTROENTEROLOGY | Facility: CLINIC | Age: 42
End: 2022-12-21
Payer: COMMERCIAL

## 2022-12-21 RX ORDER — POTASSIUM CHLORIDE 1500 MG/1
20 TABLET, EXTENDED RELEASE ORAL DAILY
Qty: 4 TABLET | Refills: 0 | Status: SHIPPED | OUTPATIENT
Start: 2022-12-21 | End: 2023-02-17

## 2022-12-22 NOTE — TELEPHONE ENCOUNTER
- Spoke to patient output has decreased from 8 bags a day to 6 with slightly thicker consistency with Imodium 4 tabs QD   - told patient to not increase anymore at this time and monitor consistency and number of bags emptied   - started Potassium as ordered  - will recheck next week

## 2022-12-28 ENCOUNTER — OFFICE VISIT (OUTPATIENT)
Dept: WOUND CARE | Facility: CLINIC | Age: 42
End: 2022-12-28
Payer: COMMERCIAL

## 2022-12-28 DIAGNOSIS — L88 PYODERMA GANGRENOSUM: ICD-10-CM

## 2022-12-28 DIAGNOSIS — Z43.2 ATTENTION TO ILEOSTOMY: Primary | ICD-10-CM

## 2022-12-28 PROCEDURE — 1160F PR REVIEW ALL MEDS BY PRESCRIBER/CLIN PHARMACIST DOCUMENTED: ICD-10-PCS | Mod: CPTII,S$GLB,, | Performed by: CLINICAL NURSE SPECIALIST

## 2022-12-28 PROCEDURE — 1159F PR MEDICATION LIST DOCUMENTED IN MEDICAL RECORD: ICD-10-PCS | Mod: CPTII,S$GLB,, | Performed by: CLINICAL NURSE SPECIALIST

## 2022-12-28 PROCEDURE — 99999 PR PBB SHADOW E&M-EST. PATIENT-LVL III: ICD-10-PCS | Mod: PBBFAC,,, | Performed by: CLINICAL NURSE SPECIALIST

## 2022-12-28 PROCEDURE — 99213 OFFICE O/P EST LOW 20 MIN: CPT | Mod: S$GLB,,, | Performed by: CLINICAL NURSE SPECIALIST

## 2022-12-28 PROCEDURE — 99999 PR PBB SHADOW E&M-EST. PATIENT-LVL III: CPT | Mod: PBBFAC,,, | Performed by: CLINICAL NURSE SPECIALIST

## 2022-12-28 PROCEDURE — 1159F MED LIST DOCD IN RCRD: CPT | Mod: CPTII,S$GLB,, | Performed by: CLINICAL NURSE SPECIALIST

## 2022-12-28 PROCEDURE — 1160F RVW MEDS BY RX/DR IN RCRD: CPT | Mod: CPTII,S$GLB,, | Performed by: CLINICAL NURSE SPECIALIST

## 2022-12-28 PROCEDURE — 99213 PR OFFICE/OUTPT VISIT, EST, LEVL III, 20-29 MIN: ICD-10-PCS | Mod: S$GLB,,, | Performed by: CLINICAL NURSE SPECIALIST

## 2022-12-28 NOTE — PROGRESS NOTES
"  Subjective:       Patient ID: Nyasia Middleton is a 42 y.o. female.    Chief Complaint: Ileostomy, Wound Check, and Skin Ulcer    Pt is post ileostomy 8/17/22 and here today for another stoma check, the sore she had has improved since using Hydrofera Blue , here for eval.     Review of Systems   Constitutional: Negative.    Respiratory: Negative.     Cardiovascular: Negative.    Gastrointestinal:  Negative for abdominal pain.   Genitourinary: Negative.    Integumentary:  Negative for rash.       Objective:      Physical Exam  Constitutional:       Appearance: Normal appearance.   Pulmonary:      Effort: Pulmonary effort is normal.   Abdominal:      General: Abdomen is flat. Bowel sounds are normal.      Palpations: Abdomen is soft.   Skin:     General: Skin is warm and dry.       12/6/22 purulence noted and cleansed   12/6  Wound post cleaning,    12/28/22 switch to Polymem  Most likely PYODERMA LESION/ULCER  Will begin HYDROFERA BLUE READY to ulceration with each pouch change , cut small piece to cover and applied,  reviewed how this will react to wound and lose its coloring.   Gave her a large 5x5 piece to take   She was able to tolerate my pressing on the area to express drg.   I sent her with a HFB ring as well in case this should fill in and she has reduced drainage. That ring can handle   Discussed with Dr Doyle           Loop ileostomy with functioning limb at 6 oclock, healed areas noted, she fits in 1" cut   Tried new image today and pt liked very much   Assessment:       Problem List Items Addressed This Visit    None  Visit Diagnoses       Attention to ileostomy    -  Primary    Pyoderma gangrenosum                  Plan:     Much improved      Try new [place] convex wafer ( bit deeper)   POLYMEM started to ulceration /Pyoderma supply give to pt ,  Call for any issues  FU in one month  I have reviewed the plan of care with the patient and she express understanding. I spent over 50% of this 20 " minute visit in face to face counseling.

## 2023-01-05 ENCOUNTER — PATIENT MESSAGE (OUTPATIENT)
Dept: GASTROENTEROLOGY | Facility: CLINIC | Age: 43
End: 2023-01-05
Payer: COMMERCIAL

## 2023-01-17 ENCOUNTER — CLINICAL SUPPORT (OUTPATIENT)
Dept: GASTROENTEROLOGY | Facility: CLINIC | Age: 43
End: 2023-01-17
Payer: COMMERCIAL

## 2023-01-17 ENCOUNTER — OFFICE VISIT (OUTPATIENT)
Dept: SURGERY | Facility: CLINIC | Age: 43
End: 2023-01-17
Payer: COMMERCIAL

## 2023-01-17 VITALS
HEART RATE: 79 BPM | BODY MASS INDEX: 21.7 KG/M2 | DIASTOLIC BLOOD PRESSURE: 78 MMHG | HEIGHT: 68 IN | WEIGHT: 143.19 LBS | SYSTOLIC BLOOD PRESSURE: 138 MMHG

## 2023-01-17 DIAGNOSIS — K50.013 CROHN'S DISEASE OF SMALL INTESTINE WITH FISTULA: Primary | ICD-10-CM

## 2023-01-17 PROCEDURE — 99213 OFFICE O/P EST LOW 20 MIN: CPT | Mod: S$GLB,,, | Performed by: COLON & RECTAL SURGERY

## 2023-01-17 PROCEDURE — 3008F PR BODY MASS INDEX (BMI) DOCUMENTED: ICD-10-PCS | Mod: CPTII,S$GLB,, | Performed by: COLON & RECTAL SURGERY

## 2023-01-17 PROCEDURE — 1160F PR REVIEW ALL MEDS BY PRESCRIBER/CLIN PHARMACIST DOCUMENTED: ICD-10-PCS | Mod: CPTII,S$GLB,, | Performed by: COLON & RECTAL SURGERY

## 2023-01-17 PROCEDURE — 1159F PR MEDICATION LIST DOCUMENTED IN MEDICAL RECORD: ICD-10-PCS | Mod: CPTII,S$GLB,, | Performed by: COLON & RECTAL SURGERY

## 2023-01-17 PROCEDURE — 99999 PR PBB SHADOW E&M-EST. PATIENT-LVL III: CPT | Mod: PBBFAC,,, | Performed by: COLON & RECTAL SURGERY

## 2023-01-17 PROCEDURE — 99213 PR OFFICE/OUTPT VISIT, EST, LEVL III, 20-29 MIN: ICD-10-PCS | Mod: S$GLB,,, | Performed by: COLON & RECTAL SURGERY

## 2023-01-17 PROCEDURE — 3075F PR MOST RECENT SYSTOLIC BLOOD PRESS GE 130-139MM HG: ICD-10-PCS | Mod: CPTII,S$GLB,, | Performed by: COLON & RECTAL SURGERY

## 2023-01-17 PROCEDURE — 1159F MED LIST DOCD IN RCRD: CPT | Mod: CPTII,S$GLB,, | Performed by: COLON & RECTAL SURGERY

## 2023-01-17 PROCEDURE — 3078F DIAST BP <80 MM HG: CPT | Mod: CPTII,S$GLB,, | Performed by: COLON & RECTAL SURGERY

## 2023-01-17 PROCEDURE — 1160F RVW MEDS BY RX/DR IN RCRD: CPT | Mod: CPTII,S$GLB,, | Performed by: COLON & RECTAL SURGERY

## 2023-01-17 PROCEDURE — 99999 PR PBB SHADOW E&M-EST. PATIENT-LVL III: ICD-10-PCS | Mod: PBBFAC,,, | Performed by: COLON & RECTAL SURGERY

## 2023-01-17 PROCEDURE — 3075F SYST BP GE 130 - 139MM HG: CPT | Mod: CPTII,S$GLB,, | Performed by: COLON & RECTAL SURGERY

## 2023-01-17 PROCEDURE — 3078F PR MOST RECENT DIASTOLIC BLOOD PRESSURE < 80 MM HG: ICD-10-PCS | Mod: CPTII,S$GLB,, | Performed by: COLON & RECTAL SURGERY

## 2023-01-17 PROCEDURE — 3008F BODY MASS INDEX DOCD: CPT | Mod: CPTII,S$GLB,, | Performed by: COLON & RECTAL SURGERY

## 2023-01-17 RX ORDER — LOPERAMIDE HYDROCHLORIDE 2 MG/1
2 CAPSULE ORAL 4 TIMES DAILY PRN
COMMUNITY
End: 2023-08-21

## 2023-01-17 NOTE — PROGRESS NOTES
CRS Office Visit Followup    Referring Md:   No referring provider defined for this encounter.    SUBJECTIVE:     Chief Complaint:  Perianal fistula    History of Present Illness:  Course is as follows:  Patient is a 42 y.o. female presents with perianal fistula.  [id number]: History of 3 stage restorative proctocolectomy for presumed ulcerative colitis (Foreign [facility] at [facility]).  2011:  Diagnosis changed to Crohn's disease due to perianal abscesses and fistulas.    She transferred care to Dr. Peralta, who performed the following surgeries.    4/3/2013:  Fistulotomy x2  7/12/2013: LIFT  12/13/2013: repeat LIFT  7/28/2014: LIFT for anovaginal fistula  10/20/2014: repeat LIFT with mesh for anovaginal fistula (MatriStem mesh)  6/26/2017: repeat LIFT  Following Dr. Peralta's [facility], she has been seen by Dr. Condon who has performed multiple stem cell applications.    5/12/2022: Regarding her Crohn's disease, she is followed by Dr. Gonzales and is treated with Stelara.  She is about to change medical therapy to Entyvio.     Functionally, she is active.  She previously vaped but has recently stopped 3 weeks ago.  She has 6-7 bowel movements during the day.  1-2 bowel movements overnight.  Had intermittent improvement of her pouch function as well as decreased drainage when she was on a course of antibiotics recently.  Maintains continence.  Has frequent vaginal drainage.  Strongly wishes to avoid ileostomy.   08/02/2022:  Presents for evaluation for increased vaginal and perineal draining with increased pain.  No improvement with antibiotics or with change in her medical therapy to Entyvio.  Recently underwent pouchoscopy by Dr. Condon  With significant inflammation of the pouch and the anal canal with multiple ulcers.  Interested in discussing possibility of fecal diversion.    Last Pouchoscopy:             8/17/22: lap loop ileostomy creation and pouchoscopy   - Flexible pouchoscopy demonstrated significant stenosis of  the pouch anal anastomosis with multiple surrounding fissures.  Distal rectal mucosa was inflamed and stenotic.  Pouch with narrowing of the proximal limb consistent with Crohn's disease.  Rolan terminal ileum appeared healthy.  8/24/22: diagnostic laparoscopy for fevers/elevated CRP --> normal laparoscopy   - readmitted for 10 days for ileus followed by high output ostomy   - discharged on fluids    Current status:  9/9/22:   Presents for follow-up.  Taking  for Lomotil 3 times per day as well as 6 Imodium 3 times per day.  Intermittent high output from the ileostomy.  Continues on fluids 5 times per day.  Intermittent fatigue.  Yet to start medical therapy.  Pending approval.  9/27/22:  Recently decreased fluid 3 times per week from daily.  She is now decreasing the amount of Imodium she takes.  Intermittent thick output.  More accepting of her ostomy.    12/6/22:  Doing very well.  She is started on medical therapy.  Weight is improving.  PICC line removed in October.  Emptying the ostomy 5-6 times per day  1/17/23:   Continues to do very well.  On Skyrizi.   Intermittent mucus drainage  the anus.      Review of Systems:  Review of Systems   Constitutional:  Negative for chills, diaphoresis, fever, malaise/fatigue and weight loss.   HENT:  Negative for congestion.    Respiratory:  Negative for shortness of breath.    Cardiovascular:  Negative for chest pain and leg swelling.   Gastrointestinal:  Positive for diarrhea. Negative for abdominal pain, blood in stool, constipation, nausea and vomiting.   Genitourinary:  Negative for dysuria.   Musculoskeletal:  Negative for back pain and myalgias.   Skin:  Positive for rash.   Neurological:  Negative for dizziness and weakness.   Endo/Heme/Allergies:  Does not bruise/bleed easily.   Psychiatric/Behavioral:  Negative for depression.      OBJECTIVE:     Vital Signs (Most Recent)  /78 (BP Location: Left arm, Patient Position: Sitting, BP Method: Medium (Automatic))    "Pulse 79   Ht 5' 8" (1.727 m)   Wt 65 kg (143 lb 3.2 oz)   LMP 07/14/2017   BMI 21.77 kg/m²     Physical Exam:  General: White female in no distress   Neuro: alert and oriented x 4.  Moves all extremities.     HEENT: no icterus.  Trachea midline  Respiratory: respirations are even and unlabored  Cardiac: regular rate  Abdomen:  Soft, nontender, no masses.    Ostomy appears healthy with pyoderma lesion lateral to the ostomy.  Extremities: Warm dry and intact  Skin: no rashes  Anorectal:   external exam with significant improvement in the posterior midline scar.  Multiple indentations and superficial fistula of the rectum.  Rectal tone intact.  Digital exam performed and able to be tolerated.  Thin rectovaginal septum.    Labs:  TSH normal.  Otherwise, no recent labs    Imaging:  No recent imaging      ASSESSMENT/PLAN:     Nyasia was seen today for post-op evaluation.    Diagnoses and all orders for this visit:    Crohn's disease of small intestine with fistula                42 y.o. female with restorative total proctocolectomy with ileal pouch anastomosis in 2011 who then developed Crohn's disease with multiple perianal fistulas as well as an anal vaginal fistula.  She has undergone multiple attempts at fistula repair.     She underwent fecal diversion with a loop ileostomy on 8/17/22.  Re-explored on POD7 and was normal.  Discharged with high output with fluids.         She continues to do very well.  Intermittent constipation with Imodium.  Recommended continuing medical therapy.  If she wished to have her ostomy reversed, would plan for detailed anorectal exam under anesthesia with flexible endoscopy      CORETTA Doyle MD, FACS, FASCRS  Staff Surgeon  Colon & Rectal Surgery              "

## 2023-01-17 NOTE — PROGRESS NOTES
[facility]  Inflammatory Bowel Disease  Pharmacy Note    TODAY'S VISIT DATE:  1/17/2023    Reason for visit: Injection training     IBD treatment to be initiated/optimized: Deb       IBD MD: Gus      Pertinent History:  IBD phenotype: Crohn's disease of the pouch with fistula     Signs and symptoms:  Ostomy output: empty bag 4-5 liquid-soft , mushy stool.   Blood/ Mucus: reports occasional blood/mucus from anus on TP   Abdominal pain: no abdominal pain  Nausea/ vomiting: nausea occasional   Better quality of life, better appetite  Gained 30 since September    Symptoms of infection (current or past 1 week)? (fever >100.4 F, URI, flu-like symptoms, cough, painful urination, warm/red/painful skin or skin ulcers/wounds, tooth pain): Yes   No fever or chills. A couple of weeks ago had a productive  cough white stuff comes up, runny nose. No body aches no chills. Patient has history of allergies and attributes this to her allergies.   Recent Antibiotics use in the last 30 days No  Dispensing pharmacy/infusion center:  [facility]   Current therapy: Skyrizi (started 10/27/22, competed induction on 12/20/22, first SC today 1/17, ND 3/14)  Occasional imodium use (once in December)      Therapeutic Drug Monitoring Labs:  None      Prior IBD Therapies:  IFX - got 2 doses, no response so d/c  Humira - developed Ab, never optimized   Stelara - optimized to Q4W but still had inflammation   Entyvio - no response after induction dose      Vaccinations:  No results found for: HEPBSAB  Lab Results   Component Value Date    HEPBSURFABQU POSITIVE 09/07/2022    HEPBSURFABQU 712 09/07/2022     Lab Results   Component Value Date    HEPAIGG Non-reactive 09/07/2022     Lab Results   Component Value Date    VARICELLAZOS 3.25 (H) 09/07/2022    VARICELLAINT Positive (A) 09/07/2022     Immunization History   Administered Date(s) Administered    COVID-19, MRNA, LN-S, PF (MODERNA FULL 0.5 ML DOSE) 01/20/2021, 02/19/2021     Hepatitis B, Pediatric/Adolescent 06/25/2003, 07/23/2003, 01/09/2004    Influenza 09/15/2014, 09/25/2019, 12/07/2020    Influenza - Quadrivalent 09/25/2019, 12/07/2020, 11/08/2021    Influenza A (H1N1) 2009 Monovalent - IM - PF 11/06/2009    MMR 04/27/1983, 02/07/1997    Td (ADULT) 02/07/1997, 02/07/1997     Influenza: discussed and recommended annual flu shot. Advised to get at local pharmacy.  PCV 20: discussed and recommended  Tetanus (TdaP): booster recommended every 10 years. Patient is overdue. Advised to get at local pharmacy   HPV: N/A      Meningococcal: N/A  Hepatitis B:  immune   Hepatitis A:  not immune. Havrix recommended.   MMR (live vaccine):  immune      Chickenpox status/Varicella (live vaccine): immune  Shingrix: discussed and recommended shingrix 2 doses 2-6 months apart.   Covid:  discussed and recommended bivalent booster.     Patient deferred vaccines today.      All Medical History/Surgical History/Family History/Social History/Allergies have been reviewed and updated in EMR    Review of patient's allergies indicates:  No Known Allergies      Current Medications:   Outpatient Medications Marked as Taking for the 1/17/23 encounter (Clinical Support) with IBD PHARMACIST   Medication Sig Dispense Refill    buPROPion (WELLBUTRIN SR) 100 MG TBSR 12 hr tablet Take 1 tablet (100 mg total) by mouth 2 (two) times daily. 60 tablet 11    FLUoxetine 40 MG capsule Take 1 capsule (40 mg total) by mouth once daily. (Patient taking differently: Take 40 mg by mouth nightly.) 30 capsule 11    pantoprazole (PROTONIX) 40 MG tablet TAKE 1 TABLET BY MOUTH TWICE DAILY 180 tablet 2    risankizumab-rzaa (SKYRIZI) 360 mg/2.4 mL (150 mg/mL) Injt Inject 360 mg into the skin every 8 weeks. Inject 360mg into the skin on week 12 (4 weeks after your last IV infusion) then every 8 weeks thereafter. 2.4 mL 4       Reviewed all current medications including OTC, herbals and supplements.     Labs:   Lab Results   Component  Value Date    HEPBSAG Non-reactive 09/07/2022    HEPBCAB Non-reactive 09/07/2022     Lab Results   Component Value Date    TBGOLDPLUS Negative 09/07/2022     Lab Results   Component Value Date    DYKYNNFV18CK 30 09/07/2022    TXNIMXKK44 852 09/07/2022     Lab Results   Component Value Date    WBC 6.69 12/20/2022    HGB 12.2 12/20/2022    HCT 36.5 (L) 12/20/2022    MCV 90 12/20/2022     12/20/2022     Lab Results   Component Value Date    CREATININE 0.8 12/20/2022    ALBUMIN 3.7 12/20/2022    BILITOT 1.4 (H) 12/20/2022    ALKPHOS 78 12/20/2022    AST 12 12/20/2022    ALT 10 12/20/2022         Assessment/Plan:  Nyasia Middleton is a 42 y.o. female that  has a past medical history of Crohn's disease, GERD (gastroesophageal reflux disease), History of Clostridium difficile infection, History of ulcerative colitis, and Perineal sinus. Patient presents to clinic for injection training and administration of first SC dose of Skyrizi. Reports feeling well and has no complaints today. Admits to noticing an improvement since starting Skyrizi.    # Recommendations:  - Educated patient on proper injection technique including: hand hygiene prior to injection, insertion of prefilled cartridge inside the OBI, proper placement on the skin, meaning of different lighting indications and when to remove OBI  - Counseled patient on proper disposal of the on body injector in a sharps container or an empty bottle made of hard plastic (example: detergent bottle, milk jug etc)  - Patient was able to demonstrate the appropriate injection steps utilizing the demo pen  - Patient successfully completed the first injection of Skyrizi OBI on the right thigh. Expressed being comfortable with self injecting in the future.   - Reviewed vaccinations and recommended flu shot, PCV20, Shingrix and Havrix. Pt deferred vaccines today.   - Labs up to date: CBC, CMP 12/2022, repeat 6/2023; TB and Hep B neg 9/2022, repeat 9/2023.  - Patient verbalized  understanding instructions.      Marisol Doty, PharmD  IBD Clinical Pharmacist

## 2023-02-14 ENCOUNTER — PATIENT MESSAGE (OUTPATIENT)
Dept: WOUND CARE | Facility: CLINIC | Age: 43
End: 2023-02-14
Payer: COMMERCIAL

## 2023-02-14 ENCOUNTER — PATIENT MESSAGE (OUTPATIENT)
Dept: SURGERY | Facility: CLINIC | Age: 43
End: 2023-02-14
Payer: COMMERCIAL

## 2023-02-16 ENCOUNTER — TELEPHONE (OUTPATIENT)
Dept: SURGERY | Facility: CLINIC | Age: 43
End: 2023-02-16
Payer: COMMERCIAL

## 2023-02-17 ENCOUNTER — OFFICE VISIT (OUTPATIENT)
Dept: SURGERY | Facility: CLINIC | Age: 43
End: 2023-02-17
Payer: COMMERCIAL

## 2023-02-17 ENCOUNTER — PATIENT MESSAGE (OUTPATIENT)
Dept: GASTROENTEROLOGY | Facility: CLINIC | Age: 43
End: 2023-02-17

## 2023-02-17 ENCOUNTER — OFFICE VISIT (OUTPATIENT)
Dept: GASTROENTEROLOGY | Facility: CLINIC | Age: 43
End: 2023-02-17
Payer: COMMERCIAL

## 2023-02-17 ENCOUNTER — CLINICAL SUPPORT (OUTPATIENT)
Dept: INFECTIOUS DISEASES | Facility: CLINIC | Age: 43
End: 2023-02-17
Payer: COMMERCIAL

## 2023-02-17 ENCOUNTER — PATIENT MESSAGE (OUTPATIENT)
Dept: GASTROENTEROLOGY | Facility: CLINIC | Age: 43
End: 2023-02-17
Payer: COMMERCIAL

## 2023-02-17 VITALS
OXYGEN SATURATION: 99 % | SYSTOLIC BLOOD PRESSURE: 122 MMHG | WEIGHT: 150.81 LBS | DIASTOLIC BLOOD PRESSURE: 75 MMHG | TEMPERATURE: 98 F | BODY MASS INDEX: 22.93 KG/M2 | HEART RATE: 86 BPM

## 2023-02-17 DIAGNOSIS — N82.3 RECTOVAGINAL FISTULA: ICD-10-CM

## 2023-02-17 DIAGNOSIS — K63.2 FISTULA OF INTESTINE, EXCLUDING RECTUM AND ANUS: ICD-10-CM

## 2023-02-17 DIAGNOSIS — K50.018 CROHN'S DISEASE OF SMALL INTESTINE WITH OTHER COMPLICATION: Primary | ICD-10-CM

## 2023-02-17 DIAGNOSIS — K50.919 CROHN'S DISEASE WITH COMPLICATION, UNSPECIFIED GASTROINTESTINAL TRACT LOCATION: ICD-10-CM

## 2023-02-17 DIAGNOSIS — Z93.2 ILEOSTOMY IN PLACE: Primary | ICD-10-CM

## 2023-02-17 PROCEDURE — 3078F PR MOST RECENT DIASTOLIC BLOOD PRESSURE < 80 MM HG: ICD-10-PCS | Mod: CPTII,S$GLB,, | Performed by: INTERNAL MEDICINE

## 2023-02-17 PROCEDURE — 90471 PNEUMOCOCCAL CONJUGATE VACCINE 20-VALENT: ICD-10-PCS | Mod: S$GLB,,, | Performed by: INTERNAL MEDICINE

## 2023-02-17 PROCEDURE — 1160F PR REVIEW ALL MEDS BY PRESCRIBER/CLIN PHARMACIST DOCUMENTED: ICD-10-PCS | Mod: CPTII,S$GLB,, | Performed by: INTERNAL MEDICINE

## 2023-02-17 PROCEDURE — 99213 PR OFFICE/OUTPT VISIT, EST, LEVL III, 20-29 MIN: ICD-10-PCS | Mod: S$GLB,,, | Performed by: INTERNAL MEDICINE

## 2023-02-17 PROCEDURE — 99999 PR PBB SHADOW E&M-EST. PATIENT-LVL I: CPT | Mod: PBBFAC,,,

## 2023-02-17 PROCEDURE — 1160F PR REVIEW ALL MEDS BY PRESCRIBER/CLIN PHARMACIST DOCUMENTED: ICD-10-PCS | Mod: CPTII,S$GLB,, | Performed by: NURSE PRACTITIONER

## 2023-02-17 PROCEDURE — 99212 OFFICE O/P EST SF 10 MIN: CPT | Mod: S$GLB,,, | Performed by: NURSE PRACTITIONER

## 2023-02-17 PROCEDURE — 90677 PCV20 VACCINE IM: CPT | Mod: S$GLB,,, | Performed by: INTERNAL MEDICINE

## 2023-02-17 PROCEDURE — 1159F PR MEDICATION LIST DOCUMENTED IN MEDICAL RECORD: ICD-10-PCS | Mod: CPTII,S$GLB,, | Performed by: INTERNAL MEDICINE

## 2023-02-17 PROCEDURE — 3074F SYST BP LT 130 MM HG: CPT | Mod: CPTII,S$GLB,, | Performed by: INTERNAL MEDICINE

## 2023-02-17 PROCEDURE — 3074F PR MOST RECENT SYSTOLIC BLOOD PRESSURE < 130 MM HG: ICD-10-PCS | Mod: CPTII,S$GLB,, | Performed by: INTERNAL MEDICINE

## 2023-02-17 PROCEDURE — 1160F RVW MEDS BY RX/DR IN RCRD: CPT | Mod: CPTII,S$GLB,, | Performed by: INTERNAL MEDICINE

## 2023-02-17 PROCEDURE — 3008F BODY MASS INDEX DOCD: CPT | Mod: CPTII,S$GLB,, | Performed by: INTERNAL MEDICINE

## 2023-02-17 PROCEDURE — 1159F PR MEDICATION LIST DOCUMENTED IN MEDICAL RECORD: ICD-10-PCS | Mod: CPTII,S$GLB,, | Performed by: NURSE PRACTITIONER

## 2023-02-17 PROCEDURE — 3008F PR BODY MASS INDEX (BMI) DOCUMENTED: ICD-10-PCS | Mod: CPTII,S$GLB,, | Performed by: INTERNAL MEDICINE

## 2023-02-17 PROCEDURE — 99999 PR PBB SHADOW E&M-EST. PATIENT-LVL I: ICD-10-PCS | Mod: PBBFAC,,,

## 2023-02-17 PROCEDURE — 99212 PR OFFICE/OUTPT VISIT, EST, LEVL II, 10-19 MIN: ICD-10-PCS | Mod: S$GLB,,, | Performed by: NURSE PRACTITIONER

## 2023-02-17 PROCEDURE — 1159F MED LIST DOCD IN RCRD: CPT | Mod: CPTII,S$GLB,, | Performed by: NURSE PRACTITIONER

## 2023-02-17 PROCEDURE — 99999 PR PBB SHADOW E&M-EST. PATIENT-LVL II: CPT | Mod: PBBFAC,,, | Performed by: NURSE PRACTITIONER

## 2023-02-17 PROCEDURE — 90750 HZV VACC RECOMBINANT IM: CPT | Mod: S$GLB,,, | Performed by: INTERNAL MEDICINE

## 2023-02-17 PROCEDURE — 1160F RVW MEDS BY RX/DR IN RCRD: CPT | Mod: CPTII,S$GLB,, | Performed by: NURSE PRACTITIONER

## 2023-02-17 PROCEDURE — 90472 IMMUNIZATION ADMIN EACH ADD: CPT | Mod: S$GLB,,, | Performed by: INTERNAL MEDICINE

## 2023-02-17 PROCEDURE — 90750 ZOSTER RECOMBINANT VACCINE: ICD-10-PCS | Mod: S$GLB,,, | Performed by: INTERNAL MEDICINE

## 2023-02-17 PROCEDURE — 90677 PNEUMOCOCCAL CONJUGATE VACCINE 20-VALENT: ICD-10-PCS | Mod: S$GLB,,, | Performed by: INTERNAL MEDICINE

## 2023-02-17 PROCEDURE — 90472 ZOSTER RECOMBINANT VACCINE: ICD-10-PCS | Mod: S$GLB,,, | Performed by: INTERNAL MEDICINE

## 2023-02-17 PROCEDURE — 3078F DIAST BP <80 MM HG: CPT | Mod: CPTII,S$GLB,, | Performed by: INTERNAL MEDICINE

## 2023-02-17 PROCEDURE — 90471 IMMUNIZATION ADMIN: CPT | Mod: S$GLB,,, | Performed by: INTERNAL MEDICINE

## 2023-02-17 PROCEDURE — 99213 OFFICE O/P EST LOW 20 MIN: CPT | Mod: S$GLB,,, | Performed by: INTERNAL MEDICINE

## 2023-02-17 PROCEDURE — 99999 PR PBB SHADOW E&M-EST. PATIENT-LVL II: ICD-10-PCS | Mod: PBBFAC,,, | Performed by: NURSE PRACTITIONER

## 2023-02-17 PROCEDURE — 1159F MED LIST DOCD IN RCRD: CPT | Mod: CPTII,S$GLB,, | Performed by: INTERNAL MEDICINE

## 2023-02-17 NOTE — PROGRESS NOTES
Subjective:       Patient ID: Nyasia Middleton is a 42 y.o. female.    Chief Complaint: No chief complaint on file.    Pt is post ileostomy 8/17/22 and here today for another stoma check, the sore she had has recurred.  Currently using poly mem to site. Having some difficulty with pouching.     Review of Systems   Constitutional: Negative.    Respiratory: Negative.     Cardiovascular: Negative.    Gastrointestinal:  Negative for abdominal pain.   Genitourinary: Negative.    Integumentary:  Negative for rash.       Objective:      Physical Exam  Constitutional:       Appearance: Normal appearance.   Pulmonary:      Effort: Pulmonary effort is normal.   Abdominal:      General: Abdomen is flat. Bowel sounds are normal.      Palpations: Abdomen is soft.   Skin:     General: Skin is warm and dry.       12/6/22 purulence noted and cleansed   12/6  Wound post cleaning,    12/28/22 switch to Polymem    Most likely PYODERMA LESION/ULCER  Will begin flonase nasal spray with polymem to ulceration with each pouch change , cut small piece to cover and applied,  reviewed how this will react to wound and lose its coloring.   Gave her 2 large 5x5 piece to take   She was able to tolerate my pressing on the area to express drg.     Likes the anny 1 pc soft convex. Feels is not as rigid as the 2 pc soft convex on ulceration.   Assessment:       Problem List Items Addressed This Visit    None  Visit Diagnoses       Ileostomy in place    -  Primary    Crohn's disease with complication, unspecified gastrointestinal tract location                  Plan:    Flonase nasal spray, cover with poly mem   Call for any issues  FU in one month  I have reviewed the plan of care with the patient and she express understanding. I spent over 50% of this 20 minute visit in face to face counseling.

## 2023-02-17 NOTE — PROGRESS NOTES
I have reviewed discharge instructions with the patient. The patient verbalized understanding. Patient left ED via Discharge Method: ambulatory to Home with self). Opportunity for questions and clarification provided. Patient given 1 scripts. To continue your aftercare when you leave the hospital, you may receive an automated call from our care team to check in on how you are doing. This is a free service and part of our promise to provide the best care and service to meet your aftercare needs.  If you have questions, or wish to unsubscribe from this service please call [phone]. Thank you for Choosing our [facility] Emergency Department. IBD PATIENT INTAKE:    COVID symptoms in the last 7 days (runny nose, sore throat, congestion, cough, fever): No  PCP: Bisi Tolbert  If not PCP-  number given to establish [phone]: N/A    ALLERGIES REVIEWED:  Yes    CHIEF COMPLAINT:    Chief Complaint   Patient presents with    Crohn's Disease       VITAL SIGNS:  /75 (BP Location: Left arm, Patient Position: Sitting)   Pulse 86   Temp 98.4 °F (36.9 °C)   Wt 68.4 kg (150 lb 12.7 oz)   LMP 07/14/2017   SpO2 99%   BMI 22.93 kg/m²      Change in medical, surgical, family or social history: No    IBD THERAPY (name, dose/frequency):  Skyrizi 360mg q8w  Last dose:  1/17    Next dose:  3/17  Infusion[facility]Pharmacy: [facility]    NSAIDs (aspirin, ibuprofen-advil or motrin, naproxen-aleve, diclofenac-voltaren, BC powder, excedrin, goodies): No    Alternative/Complementary Medications (i.e. probiotics, turmeric, fish oil, aloe vera):      no  Name/dose:  n/a    Vitamins:   Vit D:  no     Vit B-12:  no   Folic Acid: no       Calcium: no     Iron:  no      MVI: no    Antibiotics (past 30 Days):  no  If yes   Indication:  Name of antibiotic:  Completion date:     REVIEWED MEDICATION LIST RECONCILED INCLUDING ABOVE MEDS:  yes

## 2023-02-17 NOTE — PROGRESS NOTES
Patient received zoster #1 in the left deltoid, and Prevnar 20 vaccines in the right deltoid. Pt tolerated well. Pt asked to wait in the clinic 15 minutes after injection in the event of an allergic reaction. Pt verbalized understanding. Pt left in NAD.

## 2023-02-17 NOTE — PROGRESS NOTES
[facility]          Inflammatory Bowel Disease Follow Up Note         TODAY'S VISIT DATE:  2/17/2023    Reason for Consult:    Chief Complaint   Patient presents with    Crohn's Disease       PCP: Bisi Tolbert      Referring MD:   No ref. provider found    History of Present Illness:  Nyasia Middleton who is a 42 y.o. female is being seen today at the [facility] on 02/17/2023 for inflammatory bowel disease- Crohn's disease.  She is here for a follow-up visit.  She started Skyrizi October 27 2022.  She is been doing very well.  Her ostomy output is normal.  She has a small ulcerated wound adjacent to her ileostomy that comes and goes.  She has been working with wound care on this.  She reports that her perianal disease has improved significantly.  She saw Dr. Doyle recently and his exam showed significant improvement as well.  She has gained about 30 lb since our last visit.  She is not sure if it is the medication that is helping or just the ileostomy that is making a difference.      IBD History:  In 2010 she had an episode of C diff colitis in March and was treated with oral vancomycin.  This was while she was pregnant.  She continued to have issues with diarrhea and eventually was tested negative for C diff and was diagnosed with ulcerative colitis around May of 2010 after she delivered.  The 1st notes I have to review are from July of 2010 when she presented with ongoing rectal pain, blood in the stools, diarrhea.  At that time it was noted that she was taking prednisone and Asacol 2400 mg daily.  She had a colonoscopy on July 16, 2010 that showed severe inflammation throughout the entire colon.  It does not appear that the terminal ileum was intubated during this procedure.  Biopsies were taken but I do not have those results.  She reports that she received infliximab in the late summer of 2010.  She can not remember how many doses  she received but she thinks that was only 1 or 2 doses.  She does not recall having a significant improvement and in October of 2010 she underwent laparoscopic total abdominal colectomy and end ileostomy.  On January 4, 2011 she underwent completion proctectomy, J pouch formation, and diverting loop ileostomy.  In February 2011 she underwent loop ileostomy closure.  A follow-up pouchoscopy was done in December of 2011 and demonstrated some mild stenosis of the ileoanal anastomosis but otherwise the pouch was normal appearing.  By June of 2012 she had developed a perianal abscess and underwent exam under anesthesia with incision and drainage in September of that year.  In February of 2013 a repeat pouchoscopy showed moderate inflammation of the pouch.  Biopsies demonstrated chronic inflammatory changes consistent with acute pouchitis.  Given the perianal abscess there was concern for underlying Crohn's disease.  The pouchitis was treated with Cipro.  In April of 2013 she underwent a repeat exam under anesthesia with fistulotomy.  In 2013 she continued to have ongoing issues and was started on Lialda.  She had multiple subsequent procedures for perianal fistula management.  She continued with treatment with antibiotics with minimal improvement.  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  It appears that she did fairly well between 2014 and 2016.  She became pregnant again in 2013 and then again in 2016 and reports that during her pregnancy she actually did quite well.  In 2016 she underwent a pouchoscopy which showed an anal stricture.  There was normal-appearing mucosa but the J pouch was felt to be small in size.  In November of 2016 she underwent an upper endoscopy for epigastric pain and was notable for grade 1 esophagitis but otherwise normal stomach and duodenum.  Biopsies of the duodenum showed mild focal duodenal lymphocytosis but  with normal villous architecture.  There was also chronic gastritis noted in the stomach.  Esophageal biopsies were normal.  In April 2017 she was noted to have 2 small openings in the perineum with purulence drainage.  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  In June of 2017 she underwent a repeat exam under anesthesia with ligation of an intersphincteric fistula tract.  She was eventually started on sulfasalazine for an inflammatory arthritis.  In October of 2017 and MRI of the pelvis showed inflammation near the ileoanal anastomosis but no other significant abnormalities were identified.  In April 2018 she underwent laparoscopic lysis of adhesions for abdominal pain.  In 2018 she had recurrence of perianal fistulas and was treated with antibiotics.  A pouchoscopy revealed 2 fistulas from the pouch to the vagina.  In June of 2019 a fistulogram was done at [facility] that showed no definite vaginal fistula.  In December of 2019 she had a repeat anal fistula repair.  At some point she was treated with Humira.  She does not recall ever having her dosing optimized but she was on this for several months and maybe even up to a year are more.  She does not recall having a significant improvement in any of her fistulas or gastrointestinal symptoms and eventually the medication was stopped because of the development of antibodies to Humira.  In March of 2020 it is noted that she was taking Stelara.  She does not recall when she started taking this but she took it for a little over a year.  She felt like of all the medication she was ever on this did provide some benefit.  Her fistula issues never resolved but she did have a decrease in stool frequency and had more energy and an overall better sense of well-being.  She eventually stop this because of multiple issues with her insurance  and frequent delays in issues with receiving her medication from the mail order pharmacy she was required to use.  She does remember that her dose was increased to every 4 weeks at some point and that provided significant benefit.  Notes from May of 2022 mention that she was planning to switch from Stelara to Entyvio. It appears that her perianal fistulas were treated with stem cells as well. In July 2022 she had a pouchoscopy that showed ileoanal anastomosis stenosis, perianal fistulas, and inflammation in the pouch. Kenalog was injected in to the anastomosis.  She was started on Entyvio and received the 1st 3 loading doses but never felt well taking this and actually felt like she was getting worse while taking it.  It was stopped in preparation for her surgery.  On 8/17/22 she underwent diverting loop ileostomy due to continued perianal fistulizing disease. A subsequent laparoscopy on 8/24 due to concern for ischemic bowel did not show any ischemia but did show some twisting of the ileostomy.  She also reports being on antibiotics intermittently that which made her feel poorly overall but did seem to decrease the frequency of bowel movements sometimes.  She has never been on probiotics.  She has never been on immunomodulator or methotrexate.  She was on budesonide at 1 point and reports that this did not provide any benefit.      IBD Details:  Dx Date:  2010  Disease type/distribution:  Initially diagnosed with ulcerative colitis, now with Crohn's like disease of the pouch complicated by perianal and anal vaginal fistulas.  Also with ileoanal stenosis and pouch inflammation in the distal pouch  Current Treatment:  Skyrizi 360 mg every 8 weeks Start Date:  October 27, 2022 Response:  Unknown  Optimized:  Not applicable Adverse reactions:  None  Prior surgeries:   2011-total proctocolectomy with 3 stage J pouch placement     Multiple incisions and drainage, advancement flaps, other treatments for perianal fistulas  and anovaginal fistulas     2022-diverting loop ileostomy due to ongoing perianal Crohn's disease  CRP Elevation: Y  calprotectin: Unknown  Disease Complications:  Severe fistulizing disease of the J pouch  Extraintestinal manifestations:  Joint pains  Prior treatments:   Steroids:  Incomplete response to prednisone and budesonide  5ASA:  Incomplete response to Asacol and Lialda  IMM:  None  TNF Inh:  Infliximab-received 2 doses prior to proctocolectomy, no response, not optimized, no adverse reactions    Humira-took this for a while.  Does not optimized, no improvement clinically, developed antibodies   Anti-Integrin:  Received 3 loading doses of Entyvio.  No improvement in symptoms, no maintenance dose, not optimized   IL 12/23:   Stelara-took this for over a year with dose optimization to every 4 week dosing.  Improvement in GI symptoms of diarrhea but no resolution of perianal fistulizing disease-no adverse reactions, stopped due to difficulties with   Insurance   Skyrizi-current medication  HATTIE Inh:  None    Previous Clinical Trials:  None    Last Colonoscopy:  7/22-pouchoscopy with ileoanal anastomosis stenosis, perianal fistulas, inflammation in the pouch-Kenalog injected into the anastomosis    Other Endoscopies:  Previous EGD report reviewed from 2016    Imaging:   MRE:  None   CT:  CT scan from August 2022 reviewed, other CT scans also reviewed   Other:  Other pertinent studies reviewed    Pertinent Labs:  Lab Results   Component Value Date    SEDRATE 78 (H) 07/09/2018    CRP 6.5 09/07/2022     Lab Results   Component Value Date    TTGIGA 6 09/07/2022     09/07/2022     Lab Results   Component Value Date    TSH 0.884 09/07/2022    FREET4 1.01 03/14/2017     Lab Results   Component Value Date    OZVCOGDI28ZE 30 09/07/2022    NBNPLXXP30 852 09/07/2022     Lab Results   Component Value Date    HEPBSAG Non-reactive 09/07/2022    HEPBCAB Non-reactive 09/07/2022    HEPCAB Non-reactive 09/07/2022     Lab  Results   Component Value Date    PSL27GYXM Non-reactive 09/07/2022     Lab Results   Component Value Date    NIL 0.85001 09/07/2022    MITOGENNIL 5.336 09/07/2022     Lab Results   Component Value Date    TPTMINTERP SEE BELOW 09/07/2022     Lab Results   Component Value Date    CDIFFICILEAN Negative 08/27/2022    CDIFFTOX Negative 08/27/2022     No results found for: CALPROTECTIN    Therapeutic Drug Monitoring Labs:  No results found for: PROMETH  No results found for: ANSADAINIT, INFLIXIMAB, INFLIXINTERP    Vaccinations:  No results found for: HEPBSAB  Lab Results   Component Value Date    HEPAIGG Non-reactive 09/07/2022     Lab Results   Component Value Date    VARICELLAZOS 3.25 (H) 09/07/2022    VARICELLAINT Positive (A) 09/07/2022     Immunization History   Administered Date(s) Administered    COVID-19, MRNA, LN-S, PF (MODERNA FULL 0.5 ML DOSE) 01/20/2021, 02/19/2021    Hepatitis B, Pediatric/Adolescent 06/25/2003, 07/23/2003, 01/09/2004    Influenza 09/15/2014, 09/25/2019, 12/07/2020    Influenza - Quadrivalent 09/25/2019, 12/07/2020, 11/08/2021    Influenza A (H1N1) 2009 Monovalent - IM - PF 11/06/2009    MMR 04/27/1983, 02/07/1997    Pneumococcal Conjugate - 20 Valent 02/17/2023    Td (ADULT) 02/07/1997, 02/07/1997    Zoster Recombinant 02/17/2023         Review of Systems  Review of Systems   Constitutional:  Negative for chills, fever and weight loss.   HENT:  Negative for sore throat.    Eyes:  Negative for pain, discharge and redness.   Respiratory:  Negative for cough, shortness of breath and wheezing.    Cardiovascular:  Negative for chest pain, orthopnea and leg swelling.   Gastrointestinal:  Negative for abdominal pain, blood in stool, constipation, diarrhea, heartburn, melena, nausea and vomiting.   Genitourinary:  Negative for dysuria, frequency and urgency.   Musculoskeletal:  Negative for back pain, joint pain and myalgias.   Skin:  Negative for itching and rash.   Neurological:  Negative for  focal weakness and seizures.   Endo/Heme/Allergies:  Does not bruise/bleed easily.   Psychiatric/Behavioral:  Negative for depression. The patient is not nervous/anxious.      Medical History:   Past Medical History:   Diagnosis Date    Crohn's disease     GERD (gastroesophageal reflux disease)     History of Clostridium difficile infection     History of ulcerative colitis     Perineal sinus 2017       Surgical History:  Past Surgical History:   Procedure Laterality Date     SECTION, CLASSIC      DIAGNOSTIC LAPAROSCOPY N/A 2022    Procedure: LAPAROSCOPY, DIAGNOSTIC;  Surgeon: CORETTA Doyle MD;  [facility];  Service: Colon and Rectal;  Laterality: N/A;    FLEXIBLE SIGMOIDOSCOPY N/A 2018    Procedure: SIGMOIDOSCOPY, FLEXIBLE;  Surgeon: Jairo Peralta MD;  Location: [facility] (4TH FLR);  Service: Endoscopy;  Laterality: N/A;  no enemas per [facility]    FLEXIBLE SIGMOIDOSCOPY N/A 2022    Procedure: SIGMOIDOSCOPY, FLEXIBLE;  Surgeon: CORETTA Doyle MD;  Location: [facility] OR [address] [place] [address];  Service: Colon and Rectal;  Laterality: N/A;    HYSTERECTOMY      partial    ILEOSTOMY CLOSURE      LAPAROSCOPIC ILEOSTOMY N/A 2022    Procedure: CREATION, ILEOSTOMY, LAPAROSCOPIC;  Surgeon: CORETTA Doyle MD;  [facility];  Service: Colon and Rectal;  Laterality: N/A;    POUCHOSCOPY N/A 2018    Procedure: ENDOSCOPY, SMALL INTESTINAL POUCH, DIAGNOSTIC, possible seton placement;  Surgeon: Jairo Peralta MD;  Location: [facility] (4TH FLR);  Service: Endoscopy;  Laterality: N/A;  Schedule early 2018; No prep    rectovaginal fistula repair      RESTORATIVE PROCTOCOLECTOMY      TONSILLECTOMY      adenoids    TUBAL LIGATION      2016       Family History:   Family History   Problem Relation Age of Onset    No Known Problems Mother     No Known Problems Father     Crohn's disease Sister     Hypertension Maternal Grandmother     Colon cancer Maternal  Grandfather     Cancer Paternal Grandfather 53        colon ca       Social History:   Social History     Tobacco Use    Smoking status: Former     Types: Vaping with nicotine     Quit date: 2009     Years since quittin.2    Smokeless tobacco: Never   Substance Use Topics    Alcohol use: No     Alcohol/week: 0.0 standard drinks    Drug use: No       Allergies: Reviewed    Home Medications:   Medication List with Changes/Refills   Current Medications    BUPROPION (WELLBUTRIN SR) 100 MG TBSR 12 HR TABLET    Take 1 tablet (100 mg total) by mouth 2 (two) times daily.    FLUOXETINE 40 MG CAPSULE    Take 1 capsule (40 mg total) by mouth once daily.    LOPERAMIDE (IMODIUM) 2 MG CAPSULE    Take 2 mg by mouth 4 (four) times daily as needed for Diarrhea. As needed    ONDANSETRON (ZOFRAN-ODT) 8 MG TBDL    Take 1 tablet (8 mg total) by mouth every 12 (twelve) hours as needed (vomiting).    PANTOPRAZOLE (PROTONIX) 40 MG TABLET    TAKE 1 TABLET BY MOUTH TWICE DAILY    RISANKIZUMAB-RZAA (SKYRIZI) 360 MG/2.4 ML (150 MG/ML) INJT    Inject 360 mg into the skin every 8 weeks. Inject 360mg into the skin on week 12 (4 weeks after your last IV infusion) then every 8 weeks thereafter.       Physical Exam:  Vital Signs:  /75 (BP Location: Left arm, Patient Position: Sitting)   Pulse 86   Temp 98.4 °F (36.9 °C)   Wt 68.4 kg (150 lb 12.7 oz)   LMP 2017   SpO2 99%   BMI 22.93 kg/m²   Body mass index is 22.93 kg/m².    Physical Exam  Vitals and nursing note reviewed.   Constitutional:       General: She is not in acute distress.     Appearance: Normal appearance. She is well-developed. She is not ill-appearing or toxic-appearing.   Eyes:      General: No scleral icterus.     Conjunctiva/sclera: Conjunctivae normal.      Pupils: Pupils are equal, round, and reactive to light.   Neck:      Thyroid: No thyromegaly.   Cardiovascular:      Rate and Rhythm: Normal rate and regular rhythm.      Heart sounds: Normal heart  sounds. No murmur heard.  Pulmonary:      Effort: Pulmonary effort is normal.      Breath sounds: Normal breath sounds. No wheezing or rales.   Abdominal:      General: Bowel sounds are normal. There is no distension.      Palpations: Abdomen is soft. There is no mass.      Tenderness: There is no abdominal tenderness.      Comments: Ileostomy in the right abdomen with yellowish brown stool   Musculoskeletal:         General: No tenderness. Normal range of motion.   Lymphadenopathy:      Cervical: No cervical adenopathy.   Skin:     Findings: No erythema or rash.   Neurological:      General: No focal deficit present.      Mental Status: She is alert and oriented to person, place, and time.   Psychiatric:         Mood and Affect: Mood normal.         Behavior: Behavior normal.         Thought Content: Thought content normal.         Judgment: Judgment normal.       Labs: reviewed and pertinent noted above    Assessment/Plan:  Nyasia Middleton is a 42 y.o. female with Crohn's like disease of the J pouch complicated by perianal and anal vaginal fistula formation. The following issues were addresssed:    1. Crohn's disease of small intestine with other complication    2. Fistula of intestine, excluding rectum and anus    3. Rectovaginal fistula      1. Crohn's disease of the pouch:  She is doing quite well.  Today I recommend that we continue Skyrizi.  Plan is for her to have a repeat exam under anesthesia with Dr. Doyle once she is been on medical therapy for an adequate amount of time.  I would give that at least 6 months.  After that there may be consideration of taking down her ileostomy and seeing how things go.  I discussed with her today that until we get to that point it is going to be difficult to know how effective the Skyrizi actually is.  We will follow up in 6 months and see how she is doing.  I will follow along and see how her progress goes with regards to her Colorectal surgery follow-up.         #  IBD specific health maintenance:  Colon cancer surveillance:  Not applicable    Annual:  - Eye exam:  Not applicable  - Skin exam (if on IMM/TNF):  Discuss in the future  - reminded pt to use sunblock/hats/sunprotective clothing  - PAP (if immunosuppressed):  Hysterectomy    DEXA:  Not applicable    Vitamin D:  Check today    Vaccines:    Influenza:  Recommend annual vaccination   Pneumovax:  Completed today   HAV:  Check serology   HBV:  Check serology   Tdap:  Discuss in the future   MMR:  Check serologies   VZV:  Check serology   HZV:  Received 1st dose today   HPV:  Not applicable   Meningococcus:  Not applicable   COVID: Completed 2 doses    Follow up: Follow up in about 6 months (around 8/17/2023).    Thank you again for sending Nyasia Middleton to see Dr. Joselo Weber today at the [facility]. Please don't hesitate to contact Dr. Weber if there are any questions regarding this evaluation, or if you have any other patients with inflammatory bowel disease for whom you would like a consultation. You can reach Dr. Weber at [phone] or by email at [email or web address]    Patrick Weber MD  Department of Gastroenterology  Inflammatory Bowel Disease

## 2023-03-02 NOTE — PROGRESS NOTES
Subjective:       Patient ID: Nyasia Middleton is a 42 y.o. female.    Chief Complaint: Follow-up    Patient is a 42 y.o.female who presents today for annual  Cholesterol: due now  Vaccines: Influenza (yearly)  Gyn exam: partial hysterectomy  Mammogram due in july  Exercise: some  Diet: paleo  LMP: regular     Review of Systems   Constitutional:  Negative for appetite change, chills, diaphoresis and fever.   HENT:  Negative for congestion, ear discharge, ear pain, postnasal drip, tinnitus, trouble swallowing and voice change.    Eyes:  Negative for discharge, redness and itching.   Respiratory:  Negative for cough, chest tightness, shortness of breath and wheezing.    Cardiovascular:  Negative for chest pain, palpitations and leg swelling.   Gastrointestinal:  Negative for abdominal pain, constipation, diarrhea, nausea and vomiting.   Endocrine: Negative for cold intolerance and heat intolerance.   Genitourinary:  Negative for difficulty urinating, flank pain, hematuria and urgency.   Musculoskeletal:  Negative for arthralgias, gait problem, myalgias and neck stiffness.   Skin:  Negative for color change and rash.   Neurological:  Negative for dizziness, seizures, syncope and headaches.   Hematological:  Negative for adenopathy.   Psychiatric/Behavioral:  Negative for agitation, behavioral problems, confusion and sleep disturbance.      Objective:      Physical Exam  Vitals and nursing note reviewed.   Constitutional:       General: She is not in acute distress.     Appearance: She is well-developed. She is not diaphoretic.   HENT:      Head: Normocephalic and atraumatic.      Right Ear: External ear normal.      Left Ear: External ear normal.      Nose: Nose normal.      Mouth/Throat:      Pharynx: No oropharyngeal exudate.   Eyes:      General: No scleral icterus.        Right eye: No discharge.         Left eye: No discharge.      Conjunctiva/sclera: Conjunctivae normal.      Pupils: Pupils are equal, round, and  reactive to light.   Neck:      Thyroid: No thyromegaly.      Vascular: No JVD.      Trachea: No tracheal deviation.   Cardiovascular:      Rate and Rhythm: Normal rate.      Heart sounds: Normal heart sounds. No murmur heard.    No friction rub. No gallop.   Pulmonary:      Effort: Pulmonary effort is normal. No respiratory distress.      Breath sounds: Normal breath sounds. No stridor. No wheezing or rales.   Chest:      Chest wall: No tenderness.   Abdominal:      General: Bowel sounds are normal. There is no distension.      Palpations: Abdomen is soft.      Tenderness: There is no abdominal tenderness. There is no rebound.   Musculoskeletal:         General: No tenderness.      Cervical back: Neck supple.   Lymphadenopathy:      Cervical: No cervical adenopathy.   Skin:     General: Skin is warm and dry.      Findings: No erythema or rash.   Neurological:      Mental Status: She is alert and oriented to person, place, and time.   Psychiatric:         Behavior: Behavior normal.       Assessment and Plan:       1. Annual physical exam    - CBC Auto Differential; Future  - Comprehensive Metabolic Panel; Future  - Hemoglobin A1C; Future  - Lipid Panel; Future  - TSH; Future  - Urinalysis; Future  - Vitamin D; Future  - Urine culture; Future    2. Visit for screening mammogram    - Mammo Digital Screening Bilat w/ Abe; Future    3. Crohn's disease of small intestine with other complication  Seeing gastro          No follow-ups on file.

## 2023-03-06 ENCOUNTER — PATIENT MESSAGE (OUTPATIENT)
Dept: GASTROENTEROLOGY | Facility: CLINIC | Age: 43
End: 2023-03-06
Payer: COMMERCIAL

## 2023-03-09 ENCOUNTER — LAB VISIT (OUTPATIENT)
Dept: LAB | Facility: HOSPITAL | Age: 43
End: 2023-03-09
Attending: INTERNAL MEDICINE
Payer: COMMERCIAL

## 2023-03-09 ENCOUNTER — OFFICE VISIT (OUTPATIENT)
Dept: INTERNAL MEDICINE | Facility: CLINIC | Age: 43
End: 2023-03-09
Payer: COMMERCIAL

## 2023-03-09 VITALS
HEIGHT: 68 IN | HEART RATE: 78 BPM | TEMPERATURE: 98 F | BODY MASS INDEX: 23.39 KG/M2 | DIASTOLIC BLOOD PRESSURE: 60 MMHG | OXYGEN SATURATION: 98 % | RESPIRATION RATE: 20 BRPM | WEIGHT: 154.31 LBS | SYSTOLIC BLOOD PRESSURE: 112 MMHG

## 2023-03-09 DIAGNOSIS — K50.018 CROHN'S DISEASE OF SMALL INTESTINE WITH OTHER COMPLICATION: ICD-10-CM

## 2023-03-09 DIAGNOSIS — Z12.31 VISIT FOR SCREENING MAMMOGRAM: ICD-10-CM

## 2023-03-09 DIAGNOSIS — Z00.00 ANNUAL PHYSICAL EXAM: Primary | ICD-10-CM

## 2023-03-09 DIAGNOSIS — Z00.00 ANNUAL PHYSICAL EXAM: ICD-10-CM

## 2023-03-09 LAB
ALBUMIN SERPL BCP-MCNC: 3.8 G/DL (ref 3.5–5.2)
ALP SERPL-CCNC: 82 U/L (ref 55–135)
ALT SERPL W/O P-5'-P-CCNC: 21 U/L (ref 10–44)
ANION GAP SERPL CALC-SCNC: 10 MMOL/L (ref 8–16)
AST SERPL-CCNC: 23 U/L (ref 10–40)
BASOPHILS # BLD AUTO: 0.11 K/UL (ref 0–0.2)
BASOPHILS NFR BLD: 1.7 % (ref 0–1.9)
BILIRUB SERPL-MCNC: 1.1 MG/DL (ref 0.1–1)
BUN SERPL-MCNC: 11 MG/DL (ref 6–20)
CALCIUM SERPL-MCNC: 9.1 MG/DL (ref 8.7–10.5)
CHLORIDE SERPL-SCNC: 108 MMOL/L (ref 95–110)
CHOLEST SERPL-MCNC: 137 MG/DL (ref 120–199)
CHOLEST/HDLC SERPL: 1.8 {RATIO} (ref 2–5)
CO2 SERPL-SCNC: 23 MMOL/L (ref 23–29)
CREAT SERPL-MCNC: 0.8 MG/DL (ref 0.5–1.4)
DIFFERENTIAL METHOD: ABNORMAL
EOSINOPHIL # BLD AUTO: 0.4 K/UL (ref 0–0.5)
EOSINOPHIL NFR BLD: 6.7 % (ref 0–8)
ERYTHROCYTE [DISTWIDTH] IN BLOOD BY AUTOMATED COUNT: 14.3 % (ref 11.5–14.5)
EST. GFR  (NO RACE VARIABLE): >60 ML/MIN/1.73 M^2
ESTIMATED AVG GLUCOSE: 82 MG/DL (ref 68–131)
GLUCOSE SERPL-MCNC: 86 MG/DL (ref 70–110)
HBA1C MFR BLD: 4.5 % (ref 4–5.6)
HCT VFR BLD AUTO: 39.1 % (ref 37–48.5)
HDLC SERPL-MCNC: 75 MG/DL (ref 40–75)
HDLC SERPL: 54.7 % (ref 20–50)
HGB BLD-MCNC: 12.3 G/DL (ref 12–16)
IMM GRANULOCYTES # BLD AUTO: 0.03 K/UL (ref 0–0.04)
IMM GRANULOCYTES NFR BLD AUTO: 0.5 % (ref 0–0.5)
LDLC SERPL CALC-MCNC: 46.4 MG/DL (ref 63–159)
LYMPHOCYTES # BLD AUTO: 0.9 K/UL (ref 1–4.8)
LYMPHOCYTES NFR BLD: 13.2 % (ref 18–48)
MCH RBC QN AUTO: 31 PG (ref 27–31)
MCHC RBC AUTO-ENTMCNC: 31.5 G/DL (ref 32–36)
MCV RBC AUTO: 99 FL (ref 82–98)
MONOCYTES # BLD AUTO: 0.6 K/UL (ref 0.3–1)
MONOCYTES NFR BLD: 10 % (ref 4–15)
NEUTROPHILS # BLD AUTO: 4.4 K/UL (ref 1.8–7.7)
NEUTROPHILS NFR BLD: 67.9 % (ref 38–73)
NONHDLC SERPL-MCNC: 62 MG/DL
NRBC BLD-RTO: 0 /100 WBC
PLATELET # BLD AUTO: 256 K/UL (ref 150–450)
PMV BLD AUTO: 9.5 FL (ref 9.2–12.9)
POTASSIUM SERPL-SCNC: 4.3 MMOL/L (ref 3.5–5.1)
PROT SERPL-MCNC: 7.2 G/DL (ref 6–8.4)
RBC # BLD AUTO: 3.97 M/UL (ref 4–5.4)
SODIUM SERPL-SCNC: 141 MMOL/L (ref 136–145)
TRIGL SERPL-MCNC: 78 MG/DL (ref 30–150)
TSH SERPL DL<=0.005 MIU/L-ACNC: 1.81 UIU/ML (ref 0.4–4)
WBC # BLD AUTO: 6.42 K/UL (ref 3.9–12.7)

## 2023-03-09 PROCEDURE — 99396 PREV VISIT EST AGE 40-64: CPT | Mod: S$GLB,,, | Performed by: INTERNAL MEDICINE

## 2023-03-09 PROCEDURE — 80053 COMPREHEN METABOLIC PANEL: CPT | Performed by: INTERNAL MEDICINE

## 2023-03-09 PROCEDURE — 3008F PR BODY MASS INDEX (BMI) DOCUMENTED: ICD-10-PCS | Mod: CPTII,S$GLB,, | Performed by: INTERNAL MEDICINE

## 2023-03-09 PROCEDURE — 80061 LIPID PANEL: CPT | Performed by: INTERNAL MEDICINE

## 2023-03-09 PROCEDURE — 3074F PR MOST RECENT SYSTOLIC BLOOD PRESSURE < 130 MM HG: ICD-10-PCS | Mod: CPTII,S$GLB,, | Performed by: INTERNAL MEDICINE

## 2023-03-09 PROCEDURE — 99999 PR PBB SHADOW E&M-EST. PATIENT-LVL III: CPT | Mod: PBBFAC,,, | Performed by: INTERNAL MEDICINE

## 2023-03-09 PROCEDURE — 83036 HEMOGLOBIN GLYCOSYLATED A1C: CPT | Performed by: INTERNAL MEDICINE

## 2023-03-09 PROCEDURE — 36415 COLL VENOUS BLD VENIPUNCTURE: CPT | Mod: PO | Performed by: INTERNAL MEDICINE

## 2023-03-09 PROCEDURE — 3078F DIAST BP <80 MM HG: CPT | Mod: CPTII,S$GLB,, | Performed by: INTERNAL MEDICINE

## 2023-03-09 PROCEDURE — 82306 VITAMIN D 25 HYDROXY: CPT | Performed by: INTERNAL MEDICINE

## 2023-03-09 PROCEDURE — 85025 COMPLETE CBC W/AUTO DIFF WBC: CPT | Performed by: INTERNAL MEDICINE

## 2023-03-09 PROCEDURE — 84443 ASSAY THYROID STIM HORMONE: CPT | Performed by: INTERNAL MEDICINE

## 2023-03-09 PROCEDURE — 99396 PR PREVENTIVE VISIT,EST,40-64: ICD-10-PCS | Mod: S$GLB,,, | Performed by: INTERNAL MEDICINE

## 2023-03-09 PROCEDURE — 3078F PR MOST RECENT DIASTOLIC BLOOD PRESSURE < 80 MM HG: ICD-10-PCS | Mod: CPTII,S$GLB,, | Performed by: INTERNAL MEDICINE

## 2023-03-09 PROCEDURE — 99999 PR PBB SHADOW E&M-EST. PATIENT-LVL III: ICD-10-PCS | Mod: PBBFAC,,, | Performed by: INTERNAL MEDICINE

## 2023-03-09 PROCEDURE — 3008F BODY MASS INDEX DOCD: CPT | Mod: CPTII,S$GLB,, | Performed by: INTERNAL MEDICINE

## 2023-03-09 PROCEDURE — 3074F SYST BP LT 130 MM HG: CPT | Mod: CPTII,S$GLB,, | Performed by: INTERNAL MEDICINE

## 2023-03-09 RX ORDER — BUPROPION HYDROCHLORIDE 100 MG/1
100 TABLET, EXTENDED RELEASE ORAL 2 TIMES DAILY
Qty: 180 TABLET | Refills: 3 | Status: ON HOLD | OUTPATIENT
Start: 2023-03-09 | End: 2023-06-16 | Stop reason: HOSPADM

## 2023-03-09 RX ORDER — FLUOXETINE HYDROCHLORIDE 40 MG/1
40 CAPSULE ORAL DAILY
Qty: 90 CAPSULE | Refills: 3 | Status: ON HOLD | OUTPATIENT
Start: 2023-03-09 | End: 2023-06-16 | Stop reason: HOSPADM

## 2023-03-09 RX ORDER — PANTOPRAZOLE SODIUM 40 MG/1
40 TABLET, DELAYED RELEASE ORAL 2 TIMES DAILY
Qty: 180 TABLET | Refills: 2 | Status: SHIPPED | OUTPATIENT
Start: 2023-03-09 | End: 2024-03-11 | Stop reason: SDUPTHER

## 2023-03-10 LAB — 25(OH)D3+25(OH)D2 SERPL-MCNC: 28 NG/ML (ref 30–96)

## 2023-03-16 ENCOUNTER — TELEPHONE (OUTPATIENT)
Dept: GASTROENTEROLOGY | Facility: CLINIC | Age: 43
End: 2023-03-16
Payer: COMMERCIAL

## 2023-03-16 NOTE — TELEPHONE ENCOUNTER
PA for renewal of Skyrizi 360mg SC every 8 weeks submitted via Pending sale to [facility]    Key: [id number]

## 2023-03-17 ENCOUNTER — PATIENT MESSAGE (OUTPATIENT)
Dept: SURGERY | Facility: CLINIC | Age: 43
End: 2023-03-17
Payer: COMMERCIAL

## 2023-03-17 ENCOUNTER — PATIENT MESSAGE (OUTPATIENT)
Dept: GASTROENTEROLOGY | Facility: CLINIC | Age: 43
End: 2023-03-17
Payer: COMMERCIAL

## 2023-04-21 ENCOUNTER — CLINICAL SUPPORT (OUTPATIENT)
Dept: INFECTIOUS DISEASES | Facility: CLINIC | Age: 43
End: 2023-04-21
Payer: COMMERCIAL

## 2023-04-21 PROCEDURE — 90750 HZV VACC RECOMBINANT IM: CPT | Mod: S$GLB,,, | Performed by: INTERNAL MEDICINE

## 2023-04-21 PROCEDURE — 99999 PR PBB SHADOW E&M-EST. PATIENT-LVL I: CPT | Mod: PBBFAC,,,

## 2023-04-21 PROCEDURE — 90471 ZOSTER RECOMBINANT VACCINE: ICD-10-PCS | Mod: S$GLB,,, | Performed by: INTERNAL MEDICINE

## 2023-04-21 PROCEDURE — 90471 IMMUNIZATION ADMIN: CPT | Mod: S$GLB,,, | Performed by: INTERNAL MEDICINE

## 2023-04-21 PROCEDURE — 99999 PR PBB SHADOW E&M-EST. PATIENT-LVL I: ICD-10-PCS | Mod: PBBFAC,,,

## 2023-04-21 PROCEDURE — 90750 ZOSTER RECOMBINANT VACCINE: ICD-10-PCS | Mod: S$GLB,,, | Performed by: INTERNAL MEDICINE

## 2023-04-21 NOTE — PROGRESS NOTES
Patient received Shingrix vaccine IM in left arm, patient tolerated well and left clinic NAD after observation.

## 2023-05-02 ENCOUNTER — OFFICE VISIT (OUTPATIENT)
Dept: SURGERY | Facility: CLINIC | Age: 43
End: 2023-05-02
Payer: COMMERCIAL

## 2023-05-02 VITALS
HEIGHT: 68 IN | BODY MASS INDEX: 24.72 KG/M2 | DIASTOLIC BLOOD PRESSURE: 67 MMHG | SYSTOLIC BLOOD PRESSURE: 119 MMHG | WEIGHT: 163.13 LBS | HEART RATE: 85 BPM

## 2023-05-02 DIAGNOSIS — K50.013 CROHN'S DISEASE OF SMALL INTESTINE WITH FISTULA: Primary | ICD-10-CM

## 2023-05-02 PROCEDURE — 99999 PR PBB SHADOW E&M-EST. PATIENT-LVL IV: CPT | Mod: PBBFAC,,, | Performed by: COLON & RECTAL SURGERY

## 2023-05-02 PROCEDURE — 1159F MED LIST DOCD IN RCRD: CPT | Mod: CPTII,S$GLB,, | Performed by: COLON & RECTAL SURGERY

## 2023-05-02 PROCEDURE — 3074F SYST BP LT 130 MM HG: CPT | Mod: CPTII,S$GLB,, | Performed by: COLON & RECTAL SURGERY

## 2023-05-02 PROCEDURE — 1160F RVW MEDS BY RX/DR IN RCRD: CPT | Mod: CPTII,S$GLB,, | Performed by: COLON & RECTAL SURGERY

## 2023-05-02 PROCEDURE — 1159F PR MEDICATION LIST DOCUMENTED IN MEDICAL RECORD: ICD-10-PCS | Mod: CPTII,S$GLB,, | Performed by: COLON & RECTAL SURGERY

## 2023-05-02 PROCEDURE — 3008F PR BODY MASS INDEX (BMI) DOCUMENTED: ICD-10-PCS | Mod: CPTII,S$GLB,, | Performed by: COLON & RECTAL SURGERY

## 2023-05-02 PROCEDURE — 3044F PR MOST RECENT HEMOGLOBIN A1C LEVEL <7.0%: ICD-10-PCS | Mod: CPTII,S$GLB,, | Performed by: COLON & RECTAL SURGERY

## 2023-05-02 PROCEDURE — 1160F PR REVIEW ALL MEDS BY PRESCRIBER/CLIN PHARMACIST DOCUMENTED: ICD-10-PCS | Mod: CPTII,S$GLB,, | Performed by: COLON & RECTAL SURGERY

## 2023-05-02 PROCEDURE — 99213 OFFICE O/P EST LOW 20 MIN: CPT | Mod: S$GLB,,, | Performed by: COLON & RECTAL SURGERY

## 2023-05-02 PROCEDURE — 3008F BODY MASS INDEX DOCD: CPT | Mod: CPTII,S$GLB,, | Performed by: COLON & RECTAL SURGERY

## 2023-05-02 PROCEDURE — 99213 PR OFFICE/OUTPT VISIT, EST, LEVL III, 20-29 MIN: ICD-10-PCS | Mod: S$GLB,,, | Performed by: COLON & RECTAL SURGERY

## 2023-05-02 PROCEDURE — 3078F DIAST BP <80 MM HG: CPT | Mod: CPTII,S$GLB,, | Performed by: COLON & RECTAL SURGERY

## 2023-05-02 PROCEDURE — 3078F PR MOST RECENT DIASTOLIC BLOOD PRESSURE < 80 MM HG: ICD-10-PCS | Mod: CPTII,S$GLB,, | Performed by: COLON & RECTAL SURGERY

## 2023-05-02 PROCEDURE — 3074F PR MOST RECENT SYSTOLIC BLOOD PRESSURE < 130 MM HG: ICD-10-PCS | Mod: CPTII,S$GLB,, | Performed by: COLON & RECTAL SURGERY

## 2023-05-02 PROCEDURE — 3044F HG A1C LEVEL LT 7.0%: CPT | Mod: CPTII,S$GLB,, | Performed by: COLON & RECTAL SURGERY

## 2023-05-02 PROCEDURE — 99999 PR PBB SHADOW E&M-EST. PATIENT-LVL IV: ICD-10-PCS | Mod: PBBFAC,,, | Performed by: COLON & RECTAL SURGERY

## 2023-05-02 NOTE — PROGRESS NOTES
CRS Office Visit Followup    Referring Md:   No referring provider defined for this encounter.    SUBJECTIVE:     Chief Complaint:  Perianal fistula    History of Present Illness:  Course is as follows:  Patient is a 42 y.o. female presents with perianal fistula.  [id number]: History of 3 stage restorative proctocolectomy for presumed ulcerative colitis ([facility] at [facility]).  2011:  Diagnosis changed to Crohn's disease due to perianal abscesses and fistulas.    She transferred care to Dr. Peralta, who performed the following surgeries.    4/3/2013:  Fistulotomy x2  7/12/2013: LIFT  12/13/2013: repeat LIFT  7/28/2014: LIFT for anovaginal fistula  10/20/2014: repeat LIFT with mesh for anovaginal fistula (MatriStem mesh)  6/26/2017: repeat LIFT  Following Dr. Peralta's USP, she has been seen by Dr. Condon who has performed multiple stem cell applications.    5/12/2022: Regarding her Crohn's disease, she is followed by Dr. Gonzales and is treated with Stelara.  She is about to change medical therapy to Entyvio.     Functionally, she is active.  She previously vaped but has recently stopped 3 weeks ago.  She has 6-7 bowel movements during the day.  1-2 bowel movements overnight.  Had intermittent improvement of her pouch function as well as decreased drainage when she was on a course of antibiotics recently.  Maintains continence.  Has frequent vaginal drainage.  Strongly wishes to avoid ileostomy.   08/02/2022:  Presents for evaluation for increased vaginal and perineal draining with increased pain.  No improvement with antibiotics or with change in her medical therapy to Entyvio.  Recently underwent pouchoscopy by Dr. Condon  With significant inflammation of the pouch and the anal canal with multiple ulcers.  Interested in discussing possibility of fecal diversion.    Last Pouchoscopy:             8/17/22: lap loop ileostomy creation and pouchoscopy   - Flexible pouchoscopy demonstrated significant stenosis of  the pouch anal anastomosis with multiple surrounding fissures.  Distal rectal mucosa was inflamed and stenotic.  Pouch with narrowing of the proximal limb consistent with Crohn's disease.  Rolan terminal ileum appeared healthy.  8/24/22: diagnostic laparoscopy for fevers/elevated CRP --> normal laparoscopy   - readmitted for 10 days for ileus followed by high output ostomy   - discharged on fluids    Current status:  9/9/22:   Presents for follow-up.  Taking  for Lomotil 3 times per day as well as 6 Imodium 3 times per day.  Intermittent high output from the ileostomy.  Continues on fluids 5 times per day.  Intermittent fatigue.  Yet to start medical therapy.  Pending approval.  9/27/22:  Recently decreased fluid 3 times per week from daily.  She is now decreasing the amount of Imodium she takes.  Intermittent thick output.  More accepting of her ostomy.    12/6/22:  Doing very well.  She is started on medical therapy.  Weight is improving.  PICC line removed in October.  Emptying the ostomy 5-6 times per day  1/17/23:   Continues to do very well.  On Skyrizi.   Intermittent mucus drainage  the anus.    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  She is complaint with her Skyrizi every 8 weeks.  She is haviing intermittent drainage of purulence and blood from the anus but has markedly improved.  Wishes to proceed with EUA to see if the ostomy can be reversed.      Review of Systems:  Review of Systems   Constitutional:  Negative for chills, diaphoresis, fever, malaise/fatigue and weight loss.   HENT:  Negative for congestion.    Respiratory:  Negative for shortness of breath.    Cardiovascular:  Negative for chest pain and leg swelling.   Gastrointestinal:  Positive for diarrhea. Negative for abdominal pain, blood in stool, constipation, nausea and vomiting.   Genitourinary:  Negative for dysuria.   Musculoskeletal:  Negative for back pain and myalgias.   Skin:  Positive for rash.  "  Neurological:  Negative for dizziness and weakness.   Endo/Heme/Allergies:  Does not bruise/bleed easily.   Psychiatric/Behavioral:  Negative for depression.      OBJECTIVE:     Vital Signs (Most Recent)  /67 (BP Location: Left arm, Patient Position: Sitting, BP Method: Large (Automatic))   Pulse 85   Ht 5' 8" (1.727 m)   Wt 74 kg (163 lb 2.3 oz)   LMP 07/14/2017   BMI 24.81 kg/m²     Physical Exam:  General: White female in no distress   Neuro: alert and oriented x 4.  Moves all extremities.     HEENT: no icterus.  Trachea midline  Respiratory: respirations are even and unlabored  Cardiac: regular rate  Abdomen:  Soft, nontender, no masses.    Ostomy appears healthy.  Appliance not removed  Extremities: Warm dry and intact  Skin: no rashes  Anorectal:   External exam with marked improvement of perianal fistulas.  Anal stenosis.  No infection seen  Thin rectovaginal septum.    Labs:  TSH normal.  H&H 12 and 39.  Albumin 3.9.  Normal renal function    Imaging:  No recent imaging      ASSESSMENT/PLAN:     Diagnoses and all orders for this visit:    Crohn's disease of small intestine with fistula  -     Case Request Operating Room: Exam under anesthesia, lithotomy, SIGMOIDOSCOPY, FLEXIBLE        42 y.o. female with restorative total proctocolectomy with ileal pouch anastomosis in 2011 who then developed Crohn's disease with multiple perianal fistulas as well as an anal vaginal fistula.  She has undergone multiple attempts at fistula repair.     She underwent fecal diversion with a loop ileostomy on 8/17/22.  Re-explored on POD7 and was normal.  Discharged with high output with fluids.       She continues to do very well.  Interval improvement in her perianal exam.  Will proceed with exam under anesthesia with flexible pouchoscopy and biopsies.  She understands the risks of bleeding and pain.      CORETTA Doyle MD, FACS, FASCRS  Staff Surgeon  Colon & Rectal Surgery    CC: Joselo Weber, " MD

## 2023-05-04 ENCOUNTER — OFFICE VISIT (OUTPATIENT)
Dept: OBSTETRICS AND GYNECOLOGY | Facility: CLINIC | Age: 43
End: 2023-05-04
Payer: COMMERCIAL

## 2023-05-04 ENCOUNTER — PATIENT MESSAGE (OUTPATIENT)
Dept: SURGERY | Facility: HOSPITAL | Age: 43
End: 2023-05-04
Payer: COMMERCIAL

## 2023-05-04 VITALS
RESPIRATION RATE: 18 BRPM | HEIGHT: 68 IN | HEART RATE: 82 BPM | BODY MASS INDEX: 25.01 KG/M2 | SYSTOLIC BLOOD PRESSURE: 130 MMHG | WEIGHT: 165 LBS | DIASTOLIC BLOOD PRESSURE: 78 MMHG

## 2023-05-04 DIAGNOSIS — Z12.4 CERVICAL CANCER SCREENING: ICD-10-CM

## 2023-05-04 DIAGNOSIS — Z01.419 ENCNTR FOR GYN EXAM (GENERAL) (ROUTINE) W/O ABN FINDINGS: Primary | ICD-10-CM

## 2023-05-04 PROCEDURE — 3078F DIAST BP <80 MM HG: CPT | Mod: CPTII,S$GLB,, | Performed by: STUDENT IN AN ORGANIZED HEALTH CARE EDUCATION/TRAINING PROGRAM

## 2023-05-04 PROCEDURE — 3075F PR MOST RECENT SYSTOLIC BLOOD PRESS GE 130-139MM HG: ICD-10-PCS | Mod: CPTII,S$GLB,, | Performed by: STUDENT IN AN ORGANIZED HEALTH CARE EDUCATION/TRAINING PROGRAM

## 2023-05-04 PROCEDURE — 3008F PR BODY MASS INDEX (BMI) DOCUMENTED: ICD-10-PCS | Mod: CPTII,S$GLB,, | Performed by: STUDENT IN AN ORGANIZED HEALTH CARE EDUCATION/TRAINING PROGRAM

## 2023-05-04 PROCEDURE — 99396 PREV VISIT EST AGE 40-64: CPT | Mod: S$GLB,,, | Performed by: STUDENT IN AN ORGANIZED HEALTH CARE EDUCATION/TRAINING PROGRAM

## 2023-05-04 PROCEDURE — 1160F RVW MEDS BY RX/DR IN RCRD: CPT | Mod: CPTII,S$GLB,, | Performed by: STUDENT IN AN ORGANIZED HEALTH CARE EDUCATION/TRAINING PROGRAM

## 2023-05-04 PROCEDURE — 99999 PR PBB SHADOW E&M-EST. PATIENT-LVL III: ICD-10-PCS | Mod: PBBFAC,,, | Performed by: STUDENT IN AN ORGANIZED HEALTH CARE EDUCATION/TRAINING PROGRAM

## 2023-05-04 PROCEDURE — 99396 PR PREVENTIVE VISIT,EST,40-64: ICD-10-PCS | Mod: S$GLB,,, | Performed by: STUDENT IN AN ORGANIZED HEALTH CARE EDUCATION/TRAINING PROGRAM

## 2023-05-04 PROCEDURE — 87624 HPV HI-RISK TYP POOLED RSLT: CPT | Performed by: STUDENT IN AN ORGANIZED HEALTH CARE EDUCATION/TRAINING PROGRAM

## 2023-05-04 PROCEDURE — 3044F PR MOST RECENT HEMOGLOBIN A1C LEVEL <7.0%: ICD-10-PCS | Mod: CPTII,S$GLB,, | Performed by: STUDENT IN AN ORGANIZED HEALTH CARE EDUCATION/TRAINING PROGRAM

## 2023-05-04 PROCEDURE — 99999 PR PBB SHADOW E&M-EST. PATIENT-LVL III: CPT | Mod: PBBFAC,,, | Performed by: STUDENT IN AN ORGANIZED HEALTH CARE EDUCATION/TRAINING PROGRAM

## 2023-05-04 PROCEDURE — 1159F MED LIST DOCD IN RCRD: CPT | Mod: CPTII,S$GLB,, | Performed by: STUDENT IN AN ORGANIZED HEALTH CARE EDUCATION/TRAINING PROGRAM

## 2023-05-04 PROCEDURE — 1160F PR REVIEW ALL MEDS BY PRESCRIBER/CLIN PHARMACIST DOCUMENTED: ICD-10-PCS | Mod: CPTII,S$GLB,, | Performed by: STUDENT IN AN ORGANIZED HEALTH CARE EDUCATION/TRAINING PROGRAM

## 2023-05-04 PROCEDURE — 88175 CYTOPATH C/V AUTO FLUID REDO: CPT | Performed by: STUDENT IN AN ORGANIZED HEALTH CARE EDUCATION/TRAINING PROGRAM

## 2023-05-04 PROCEDURE — 3044F HG A1C LEVEL LT 7.0%: CPT | Mod: CPTII,S$GLB,, | Performed by: STUDENT IN AN ORGANIZED HEALTH CARE EDUCATION/TRAINING PROGRAM

## 2023-05-04 PROCEDURE — 3078F PR MOST RECENT DIASTOLIC BLOOD PRESSURE < 80 MM HG: ICD-10-PCS | Mod: CPTII,S$GLB,, | Performed by: STUDENT IN AN ORGANIZED HEALTH CARE EDUCATION/TRAINING PROGRAM

## 2023-05-04 PROCEDURE — 1159F PR MEDICATION LIST DOCUMENTED IN MEDICAL RECORD: ICD-10-PCS | Mod: CPTII,S$GLB,, | Performed by: STUDENT IN AN ORGANIZED HEALTH CARE EDUCATION/TRAINING PROGRAM

## 2023-05-04 PROCEDURE — 3075F SYST BP GE 130 - 139MM HG: CPT | Mod: CPTII,S$GLB,, | Performed by: STUDENT IN AN ORGANIZED HEALTH CARE EDUCATION/TRAINING PROGRAM

## 2023-05-04 PROCEDURE — 3008F BODY MASS INDEX DOCD: CPT | Mod: CPTII,S$GLB,, | Performed by: STUDENT IN AN ORGANIZED HEALTH CARE EDUCATION/TRAINING PROGRAM

## 2023-05-04 RX ORDER — POTASSIUM CHLORIDE 20 MEQ/1
20 TABLET, EXTENDED RELEASE ORAL
Status: ON HOLD | COMMUNITY
Start: 2022-12-21 | End: 2023-06-16 | Stop reason: HOSPADM

## 2023-05-04 RX ORDER — DICYCLOMINE HYDROCHLORIDE 20 MG/1
20 TABLET ORAL 4 TIMES DAILY
Status: ON HOLD | COMMUNITY
Start: 2022-12-27 | End: 2023-06-16 | Stop reason: HOSPADM

## 2023-05-04 NOTE — PROGRESS NOTES
Subjective:    Patient ID: Nyasia Middleton is a 42 y.o. y.o. female.     Chief Complaint: Annual Well Woman Exam     History of Present Illness:  Nyasia presents today for Annual Well Woman exam. She is status post supracervical hysterectomy. She denies pelvic pain.  She admits to breast tenderness intermittently, denies masses, nipple discharge. She admits to difficulty with incomplete emptying of the bladder.  She is sexually active.     Menstrual History:   Patient's last menstrual period was 2017..     OB History          3    Para   3    Term   3            AB        Living             SAB        IAB        Ectopic        Multiple        Live Births                     The following portions of the patient's history were reviewed and updated as appropriate: allergies, current medications, past family history, past medical history, past social history, past surgical history, and problem list.    ROS:   CONSTITUTIONAL: diaphoresis, fatigue  ENT: nasal discharge, sinus pressure  EYES: negative for blurry vision, decreased vision, loss of vision, eye pain, diplopia, photophobia, discharge  SKIN: Negative for rash, itching, hives  RESPIRATORY: negative for cough, hemoptysis, shortness of breath, pleuritic chest pain, wheezing  CARDIOVASCULAR: edema  BREAST: Performs self breast exam and tenderness  GASTROINTESTINAL: negative for abdominal pain, flank pain, nausea, vomiting, diarrhea, constipation, black stool, blood in stool  GENITOURINARY: frequency/urgency, genital discharge, leakage of urine  HEMATOLOGIC/LYMPHATIC: negative for swollen lymph nodes, bleeding, bruising  MUSCULOSKELETAL: back pain, joint stiffness  NEUROLOGICAL: negative for dizzy/vertigo, headache, focal weakness, numbness/tingling, speech problems, loss of consciousness, confusion, memory loss  BEHAVORIAL/PSYCH: negative for anxiety, depression, psychosis  ENDOCRINE: polydipsia/polyuria  ALLERGIC/IMMUNOLOGIC:  immunocomprised    Answers submitted by the patient for this visit:  Gynecologic Exam Questionnaire  (Submitted on 2023)  Chief Complaint: Gynecologic exam  genital itching: No  genital lesions: No  genital odor: Yes  genital rash: No  missed menses: No  pelvic pain: No  vaginal bleeding: No  vaginal discharge: Yes  Chronicity: recurrent  Onset: more than 1 month ago  Frequency: daily  Progression since onset: waxing and waning  Pain severity: no pain  Pregnant now?: No  abdominal pain: No  anorexia: No  back pain: Yes  chills: No  constipation: No  diarrhea: No  discolored urine: No  dysuria: No  fever: No  flank pain: Yes  frequency: Yes  headaches: No  hematuria: No  nausea: No  painful intercourse: No  rash: No  urgency: No  vomiting: No  Vaginal bleeding: no bleeding  Passing clots?: No  Passing tissue?: Yes  Aggravated by: activity  treatments tried: anti-fungal cream, heating pad, NSAIDs  Improvement on treatment: moderate  Sexual activity: sexually active  Partner with STD symptoms: no  Birth control: hysterectomy  STD: No  abdominal surgery: Yes   section: Yes  Ectopic pregnancy: No  Endometriosis: No  herpes simplex: No  gynecological surgery: Yes  menorrhagia: No  metrorrhagia: No  miscarriage: No  ovarian cysts: Yes  perineal abscess: Yes  PID: No  terminated pregnancy: No  vaginosis: No      Objective:    Vital Signs:  Vitals:    23 1041   BP: 130/78   Pulse: 82   Resp: 18       Physical Exam:  General:  alert, cooperative, no distress   Skin:  Skin color, texture, turgor normal. No rashes or lesions   HEENT:  conjunctivae/corneas clear. PERRL.   Neck: supple, trachea midline, no adenopathy or thyromegally   Respiratory:  Normal effort   Breasts:  no discharge, erythema, tenderness, or palpable masses; no axillary lymphadenopathy   Abdomen:  soft, nontender, no palpable masses; ileostomy bag present   Pelvis: External genitalia: normal general appearance  Urinary system: urethral  meatus normal, bladder nontender  Vaginal: normal mucosa without prolapse or lesions  Cervix: normal appearance  Uterus: absent, removed surgically  Adnexa: normal size, nontender bilaterally   Extremities: Normal ROM; no edema, no cyanosis   Neurologial: Normal strength and tone. No focal numbness or weakness.   Psychiatric: normal mood, speech, dress, and thought processes     LABS:  No result found     A1C:  Recent Labs   Lab 03/09/23  1057   Hemoglobin A1C 4.5     CBC:  Recent Labs   Lab 12/20/22  1001 03/09/23  1057   WBC 6.69 6.42   RBC 4.05 3.97 L   Hemoglobin 12.2 12.3   Hematocrit 36.5 L 39.1   Platelets 247 256   MCV 90 99 H   MCH 30.1 31.0   MCHC 33.4 31.5 L     CMP:  Recent Labs   Lab 12/20/22  1001 03/09/23  1057   Glucose 104 86   Calcium 8.7 9.1   Albumin 3.7 3.8   Total Protein 7.0 7.2   Sodium 136 141   Potassium 3.0 L 4.3   CO2 23 23   Chloride 106 108   BUN 12 11   Creatinine 0.8 0.8   Alkaline Phosphatase 78 82   ALT 10 21   AST 12 23   Total Bilirubin 1.4 H 1.1 H     LIPIDS:  Recent Labs   Lab 01/11/22  1158 09/07/22  1438 03/09/23  1057   TSH 1.112 0.884 1.809   HDL 53  --  75   Cholesterol 130  --  137   Triglycerides 65  --  78   LDL Cholesterol 64.0  --  46.4 L   HDL/Cholesterol Ratio 40.8  --  54.7 H   Non-HDL Cholesterol 77  --  62   Total Cholesterol/HDL Ratio 2.5  --  1.8 L     TSH:  Recent Labs   Lab 09/07/22  1438 03/09/23  1057   TSH 0.884 1.809       Assessment:       Healthy female exam.     1. Encntr for gyn exam (general) (routine) w/o abn findings    2. Cervical cancer screening          Plan:      Problem List Items Addressed This Visit    None  Visit Diagnoses       Encntr for gyn exam (general) (routine) w/o abn findings    -  Primary    Cervical cancer screening        Relevant Orders    Liquid-Based Pap Smear, Screening    HPV High Risk Genotypes, PCR            COUNSELING:  Nyasia was counseled on STD prevention, use and side-effects of various contraceptive measures,  ALMA. Pap guidelines and recommendations for yearly pelvic exams in addition to recommendations for monthly self breast exams; to see her PCP for other health maintenance.

## 2023-05-05 DIAGNOSIS — K50.013 CROHN'S DISEASE OF SMALL INTESTINE WITH FISTULA: Primary | ICD-10-CM

## 2023-05-08 ENCOUNTER — PATIENT MESSAGE (OUTPATIENT)
Dept: INTERNAL MEDICINE | Facility: CLINIC | Age: 43
End: 2023-05-08
Payer: COMMERCIAL

## 2023-05-08 ENCOUNTER — TELEPHONE (OUTPATIENT)
Dept: ENDOSCOPY | Facility: HOSPITAL | Age: 43
End: 2023-05-08
Payer: COMMERCIAL

## 2023-05-08 ENCOUNTER — PATIENT MESSAGE (OUTPATIENT)
Dept: ENDOSCOPY | Facility: HOSPITAL | Age: 43
End: 2023-05-08
Payer: COMMERCIAL

## 2023-05-08 ENCOUNTER — PATIENT MESSAGE (OUTPATIENT)
Dept: OBSTETRICS AND GYNECOLOGY | Facility: CLINIC | Age: 43
End: 2023-05-08
Payer: COMMERCIAL

## 2023-05-08 ENCOUNTER — PATIENT MESSAGE (OUTPATIENT)
Dept: SURGERY | Facility: HOSPITAL | Age: 43
End: 2023-05-08
Payer: COMMERCIAL

## 2023-05-08 DIAGNOSIS — K50.113 CROHN'S DISEASE OF COLON WITH FISTULA: Primary | ICD-10-CM

## 2023-05-08 RX ORDER — ALPRAZOLAM 0.5 MG/1
0.5 TABLET ORAL 3 TIMES DAILY PRN
Qty: 90 TABLET | Refills: 0 | Status: SHIPPED | OUTPATIENT
Start: 2023-05-08 | End: 2024-01-03

## 2023-05-08 NOTE — TELEPHONE ENCOUNTER
"----- Message from CORETTA Doyle MD sent at 2023 11:10 AM CDT -----  Procedure: pouchoscopy    Diagnosis:  Crohn's disease with fistula    Procedure Timin-4 weeks    #If within 4 weeks selected, please rick as high priority#    #If greater than 12 weeks, please select "5-12 weeks" and delay sending until 2 months prior to requested date#     Provider: Myself    Location: [address]    Additional Scheduling Information: No scheduling concerns    Prep Specifications:Standard prep    Have you attached a patient to this message: yes     "

## 2023-05-08 NOTE — TELEPHONE ENCOUNTER
"Pt sent message below.  Please advise.      "Good morning! I should have asked at my visit the other day, but I was wondering if we could still run some blood work just to check all my hormones, vitamin levels & thyroid just to make sure all is ok?"   "

## 2023-05-09 ENCOUNTER — PATIENT MESSAGE (OUTPATIENT)
Dept: SURGERY | Facility: CLINIC | Age: 43
End: 2023-05-09
Payer: COMMERCIAL

## 2023-05-09 DIAGNOSIS — Z01.419 ENCNTR FOR GYN EXAM (GENERAL) (ROUTINE) W/O ABN FINDINGS: Primary | ICD-10-CM

## 2023-05-11 LAB
HPV HR 12 DNA SPEC QL NAA+PROBE: NEGATIVE
HPV16 AG SPEC QL: NEGATIVE
HPV18 DNA SPEC QL NAA+PROBE: NEGATIVE

## 2023-05-12 LAB
FINAL PATHOLOGIC DIAGNOSIS: NORMAL
Lab: NORMAL

## 2023-05-23 ENCOUNTER — PATIENT MESSAGE (OUTPATIENT)
Dept: INTERNAL MEDICINE | Facility: CLINIC | Age: 43
End: 2023-05-23
Payer: COMMERCIAL

## 2023-05-24 NOTE — TELEPHONE ENCOUNTER
Pt just had labs done in march; her vitamin D deficiency could be causing some fatigue; would recommend vitamin D replacement at 2000 IU per day. The dizziness would have to be evaluated in clinic. Labs don't usually show much for that symptom

## 2023-06-15 ENCOUNTER — ANESTHESIA EVENT (OUTPATIENT)
Dept: ENDOSCOPY | Facility: HOSPITAL | Age: 43
End: 2023-06-15
Payer: COMMERCIAL

## 2023-06-16 ENCOUNTER — ANESTHESIA (OUTPATIENT)
Dept: ENDOSCOPY | Facility: HOSPITAL | Age: 43
End: 2023-06-16
Payer: COMMERCIAL

## 2023-06-16 ENCOUNTER — HOSPITAL ENCOUNTER (OUTPATIENT)
Facility: HOSPITAL | Age: 43
Discharge: HOME | End: 2023-06-16
Attending: COLON & RECTAL SURGERY | Admitting: COLON & RECTAL SURGERY
Payer: COMMERCIAL

## 2023-06-16 VITALS
HEART RATE: 74 BPM | SYSTOLIC BLOOD PRESSURE: 94 MMHG | HEIGHT: 68 IN | WEIGHT: 176 LBS | DIASTOLIC BLOOD PRESSURE: 48 MMHG | BODY MASS INDEX: 26.67 KG/M2 | TEMPERATURE: 97 F | OXYGEN SATURATION: 99 % | RESPIRATION RATE: 18 BRPM

## 2023-06-16 DIAGNOSIS — K91.850 ILEAL POUCHITIS: ICD-10-CM

## 2023-06-16 DIAGNOSIS — K50.018 CROHN'S DISEASE OF SMALL INTESTINE WITH OTHER COMPLICATION: Primary | ICD-10-CM

## 2023-06-16 PROCEDURE — E9220 PRA ENDO ANESTHESIA: ICD-10-PCS | Mod: ,,, | Performed by: NURSE ANESTHETIST, CERTIFIED REGISTERED

## 2023-06-16 PROCEDURE — 25000003 PHARM REV CODE 250: Performed by: NURSE ANESTHETIST, CERTIFIED REGISTERED

## 2023-06-16 PROCEDURE — 88342 IMHCHEM/IMCYTCHM 1ST ANTB: CPT | Mod: 26,,, | Performed by: PATHOLOGY

## 2023-06-16 PROCEDURE — 88305 TISSUE EXAM BY PATHOLOGIST: CPT | Mod: 26,,, | Performed by: PATHOLOGY

## 2023-06-16 PROCEDURE — 25000003 PHARM REV CODE 250: Performed by: COLON & RECTAL SURGERY

## 2023-06-16 PROCEDURE — 88342 IMHCHEM/IMCYTCHM 1ST ANTB: CPT | Performed by: PATHOLOGY

## 2023-06-16 PROCEDURE — 37000009 HC ANESTHESIA EA ADD 15 MINS: Performed by: COLON & RECTAL SURGERY

## 2023-06-16 PROCEDURE — 88342 CHG IMMUNOCYTOCHEMISTRY: ICD-10-PCS | Mod: 26,,, | Performed by: PATHOLOGY

## 2023-06-16 PROCEDURE — 44386 PR ENDOSCOPY, BOWEL POUCH, BIOPSY: ICD-10-PCS | Mod: ,,, | Performed by: COLON & RECTAL SURGERY

## 2023-06-16 PROCEDURE — 27201012 HC FORCEPS, HOT/COLD, DISP: Performed by: COLON & RECTAL SURGERY

## 2023-06-16 PROCEDURE — E9220 PRA ENDO ANESTHESIA: HCPCS | Mod: ,,, | Performed by: NURSE ANESTHETIST, CERTIFIED REGISTERED

## 2023-06-16 PROCEDURE — 44386 ENDOSCOPY BOWEL POUCH/BIOP: CPT | Mod: ,,, | Performed by: COLON & RECTAL SURGERY

## 2023-06-16 PROCEDURE — 37000008 HC ANESTHESIA 1ST 15 MINUTES: Performed by: COLON & RECTAL SURGERY

## 2023-06-16 PROCEDURE — 44386 ENDOSCOPY BOWEL POUCH/BIOP: CPT | Performed by: COLON & RECTAL SURGERY

## 2023-06-16 PROCEDURE — 88305 TISSUE EXAM BY PATHOLOGIST: ICD-10-PCS | Mod: 26,,, | Performed by: PATHOLOGY

## 2023-06-16 PROCEDURE — 88305 TISSUE EXAM BY PATHOLOGIST: CPT | Performed by: PATHOLOGY

## 2023-06-16 RX ORDER — PROPOFOL 10 MG/ML
INJECTION, EMULSION INTRAVENOUS
Status: DISCONTINUED | OUTPATIENT
Start: 2023-06-16 | End: 2023-06-16

## 2023-06-16 RX ORDER — HYDROCORTISONE ACETATE 25 MG/1
25 SUPPOSITORY RECTAL 2 TIMES DAILY
Qty: 30 SUPPOSITORY | Refills: 5 | Status: SHIPPED | OUTPATIENT
Start: 2023-06-16 | End: 2024-02-14

## 2023-06-16 RX ORDER — SODIUM CHLORIDE 9 MG/ML
INJECTION, SOLUTION INTRAVENOUS CONTINUOUS
Status: DISCONTINUED | OUTPATIENT
Start: 2023-06-16 | End: 2023-06-16 | Stop reason: HOSPADM

## 2023-06-16 RX ORDER — PROPOFOL 10 MG/ML
INJECTION, EMULSION INTRAVENOUS CONTINUOUS PRN
Status: DISCONTINUED | OUTPATIENT
Start: 2023-06-16 | End: 2023-06-16

## 2023-06-16 RX ORDER — LIDOCAINE HYDROCHLORIDE 20 MG/ML
INJECTION INTRAVENOUS
Status: DISCONTINUED | OUTPATIENT
Start: 2023-06-16 | End: 2023-06-16

## 2023-06-16 RX ADMIN — PROPOFOL 150 MCG/KG/MIN: 10 INJECTION, EMULSION INTRAVENOUS at 08:06

## 2023-06-16 RX ADMIN — LIDOCAINE HYDROCHLORIDE 100 MG: 20 INJECTION INTRAVENOUS at 08:06

## 2023-06-16 RX ADMIN — SODIUM CHLORIDE: 9 INJECTION, SOLUTION INTRAVENOUS at 08:06

## 2023-06-16 RX ADMIN — PROPOFOL 100 MG: 10 INJECTION, EMULSION INTRAVENOUS at 08:06

## 2023-06-16 RX ADMIN — PROPOFOL 30 MG: 10 INJECTION, EMULSION INTRAVENOUS at 08:06

## 2023-06-16 NOTE — ANESTHESIA PREPROCEDURE EVALUATION
2023  Nyasia Middleton is a 42 y.o., female.  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    MARION (generalized anxiety disorder)    Screening for colon cancer    Crohn's disease of small intestine with other complication    Partial small bowel obstruction     Social History     Socioeconomic History    Marital status:    Tobacco Use    Smoking status: Former     Types: Vaping with nicotine     Quit date: 2009     Years since quittin.5    Smokeless tobacco: Never   Substance and Sexual Activity    Alcohol use: Yes     Comment: occ    Drug use: No    Sexual activity: Yes     Partners: Male     Birth control/protection: See Surgical Hx     Comment:      Social Determinants of Health     Financial Resource Strain: Low Risk     Difficulty of Paying Living Expenses: Not very hard   Food Insecurity: No Food Insecurity    Worried About Running Out of Food in the Last Year: Never true    Ran Out of Food in the Last Year: Never true   Transportation Needs: No Transportation Needs    Lack of Transportation (Medical): No    Lack of Transportation (Non-Medical): No   Physical Activity: Inactive    Days of Exercise per Week: 0 days    Minutes of Exercise per Session: 0 min   Stress: No Stress Concern Present    Feeling of Stress : Only a little   Social Connections: Unknown    Frequency of Communication with Friends  and Family: More than three times a week    Frequency of Social Gatherings with Friends and Family: Once a week    Active Member of Clubs or Organizations: Yes    Attends Club or Organization Meetings: 1 to 4 times per year    Marital Status:    Housing Stability: Unknown    Unable to Pay for Housing in the Last Year: No    Unstable Housing in the Last Year: No     Past Surgical History:   Procedure Laterality Date     SECTION, CLASSIC      DIAGNOSTIC LAPAROSCOPY N/A 2022    Procedure: LAPAROSCOPY, DIAGNOSTIC;  Surgeon: CORETTA Doyle MD;  Location: [facility] OR [facility] FLR;  Service: Colon and Rectal;  Laterality: N/A;    FLEXIBLE SIGMOIDOSCOPY N/A 2018    Procedure: SIGMOIDOSCOPY, FLEXIBLE;  Surgeon: Jairo Peralta MD;  Location: [facility] ZACKERY (4TH FLR);  Service: Endoscopy;  Laterality: N/A;  no enemas per [facility]    FLEXIBLE SIGMOIDOSCOPY N/A 2022    Procedure: SIGMOIDOSCOPY, FLEXIBLE;  Surgeon: CORETTA Doyle MD;  [facility];  Service: Colon and Rectal;  Laterality: N/A;    HYSTERECTOMY      LSH    ILEOSTOMY CLOSURE      LAPAROSCOPIC ILEOSTOMY N/A 2022    Procedure: CREATION, ILEOSTOMY, LAPAROSCOPIC;  Surgeon: CORETTA Doyle MD;  [facility];  Service: Colon and Rectal;  Laterality: N/A;    POUCHOSCOPY N/A 2018    Procedure: ENDOSCOPY, SMALL INTESTINAL POUCH, DIAGNOSTIC, possible seton placement;  Surgeon: Jairo Peralta MD;  Location: [facility] ENDO (4TH FLR);  Service: Endoscopy;  Laterality: N/A;  Schedule early 2018; No prep    rectovaginal fistula repair      RESTORATIVE PROCTOCOLECTOMY      TONSILLECTOMY      adenoids    TUBAL LIGATION      2016     No current facility-administered medications on file prior to encounter.     Current Outpatient Medications on File Prior to Encounter   Medication Sig Dispense Refill    loperamide (IMODIUM) 2 mg capsule Take 2 mg by mouth 4 (four) times daily as needed  for Diarrhea. As needed      ondansetron (ZOFRAN-ODT) 8 MG TbDL Take 1 tablet (8 mg total) by mouth every 12 (twelve) hours as needed (vomiting). 20 tablet 2    pantoprazole (PROTONIX) 40 MG tablet Take 1 tablet (40 mg total) by mouth 2 (two) times daily. 180 tablet 2    risankizumab-rzaa (SKYRIZI) 360 mg/2.4 mL (150 mg/mL) Injt Inject 360 mg into the skin every 8 weeks. Inject 360mg into the skin on week 12 (4 weeks after your last IV infusion) then every 8 weeks thereafter. 2.4 mL 4    buPROPion (WELLBUTRIN SR) 100 MG TBSR 12 hr tablet Take 1 tablet (100 mg total) by mouth 2 (two) times daily. 180 tablet 3    dicyclomine (BENTYL) 20 mg tablet Take 20 mg by mouth 4 (four) times daily.      FLUoxetine 40 MG capsule Take 1 capsule (40 mg total) by mouth once daily. 90 capsule 3    potassium chloride SA (K-DUR,KLOR-CON) 20 MEQ tablet Take 20 mEq by mouth.       Pre-op Assessment    I have reviewed the NPO Status.      Review of Systems  Anesthesia Hx:   Denies Personal Hx of Anesthesia complications.   Social:  Former Smoker, Social Alcohol Use    Cardiovascular:   Exercise tolerance: good    Hepatic/GI:   GERD  Bowel Conditions:  Inflammatory Bowel Disease, Crohns, Ulcerative Colitis    Psych:   anxiety          Physical Exam    Airway:  Mallampati: I   Neck ROM: Normal ROM        Anesthesia Plan  Type of Anesthesia, risks & benefits discussed:    Anesthesia Type: Gen Natural Airway  Intra-op Monitoring Plan: Standard ASA Monitors  Induction:  IV  Informed Consent: Informed consent signed with the Patient and all parties understand the risks and agree with anesthesia plan.  All questions answered.   ASA Score: 2    Ready For Surgery From Anesthesia Perspective.     .

## 2023-06-16 NOTE — PROVATION PATIENT INSTRUCTIONS
Discharge Summary/Instructions after an Endoscopic Procedure  Patient Name: Nyasia Middleton  Patient MRN: [id number]  Patient YOB: 1980 Friday, June 16, 2023  Suresh Doyle MD  Dear patient,  As a result of recent federal legislation (The Federal Cures Act), you may   receive lab or pathology results from your procedure in your [facility]   account before your physician is able to contact you. Your physician or   their representative will relay the results to you with their   recommendations at their soonest availability.  Thank you,  RESTRICTIONS:  During your procedure today, you received medications for sedation.  These   medications may affect your judgment, balance and coordination.  Therefore,   for 24 hours, you have the following restrictions:   - DO NOT drive a car, operate machinery, make legal/financial decisions,   sign important papers or drink alcohol.    ACTIVITY:  Today: no heavy lifting, straining or running due to procedural   sedation/anesthesia.  The following day: return to full activity including work.  DIET:  Eat and drink normally unless instructed otherwise.     TREATMENT FOR COMMON SIDE EFFECTS:  - Mild abdominal pain, nausea, belching, bloating or excessive gas:  rest,   eat lightly and use a heating pad.  - Sore Throat: treat with throat lozenges and/or gargle with warm salt   water.  - Because air was used during the procedure, expelling large amounts of air   from your rectum or belching is normal.  - If a bowel prep was taken, you may not have a bowel movement for 1-3 days.    This is normal.  SYMPTOMS TO WATCH FOR AND REPORT TO YOUR PHYSICIAN:  1. Abdominal pain or bloating, other than gas cramps.  2. Chest pain.  3. Back pain.  4. Signs of infection such as: chills or fever occurring within 24 hours   after the procedure.  5. Rectal bleeding, which would show as bright red, maroon, or black stools.   (A tablespoon of blood from the rectum is not serious, especially if    hemorrhoids are present.)  6. Vomiting.  7. Weakness or dizziness.  GO DIRECTLY TO THE NEAREST EMERGENCY ROOM IF YOU HAVE ANY OF THE FOLLOWING:      Difficulty breathing              Chills and/or fever over 101 F   Persistent vomiting and/or vomiting blood   Severe abdominal pain   Severe chest pain   Black, tarry stools   Bleeding- more than one tablespoon   Any other symptom or condition that you feel may need urgent attention  Your doctor recommends these additional instructions:  If any biopsies were taken, your doctors clinic will contact you in 1 to 2   weeks with any results.  - Discharge patient to home (ambulatory).   - Continue present medications.   - Use hydrocortisone suppository 25 mg 2 per rectum once a day for 10 days.     - Return to my office PRN.   - Repeat post-surgical lower GI endoscopy in 1 year for surveillance.  For questions, problems or results please call your physician - Suresh Doyle MD at Work:  [phone].  [facility], EMERGENCY ROOM PHONE NUMBER: [phone]  IF A COMPLICATION OR EMERGENCY SITUATION ARISES AND YOU ARE UNABLE TO REACH   YOUR PHYSICIAN - GO DIRECTLY TO THE EMERGENCY ROOM.  Suresh Doyle MD  6/16/2023 9:10:04 AM  This report has been verified and signed electronically.  Dear patient,  As a result of recent federal legislation (The Federal Cures Act), you may   receive lab or pathology results from your procedure in your [facility]   account before your physician is able to contact you. Your physician or   their representative will relay the results to you with their   recommendations at their soonest availability.  Thank you,  PROVATION

## 2023-06-16 NOTE — TRANSFER OF CARE
Anesthesia Transfer of Care Note    Patient: Nyasia Middleton    Procedure(s) Performed: Procedure(s) (LRB):  ENDOSCOPY, POUCH, SMALL INTESTINE, DIAGNOSTIC (N/A)    Patient location: GI    Anesthesia Type: general    Transport from OR: Transported from OR on room air with adequate spontaneous ventilation    Post pain: adequate analgesia    Post assessment: no apparent anesthetic complications    Post vital signs: stable    Level of consciousness: awake    Nausea/Vomiting: no nausea/vomiting    Complications: none    Transfer of care protocol was followed      Last vitals:   Visit Vitals  /59   Pulse 80   Temp 36   Resp 19   Ht    Wt    LMP    SpO2 98%   Breastfeeding    BMI

## 2023-06-16 NOTE — H&P
ENDOSCOPY HISTORY AND PHYSICAL EXAM    Procedure : pouchoscopy      INDICATIONS: history of IPAA with Crohn's disease.      Family Hx of CRC: no         Past Medical History:   Diagnosis Date    Abnormal Pap smear of cervix     Crohn's disease     GERD (gastroesophageal reflux disease)     History of Clostridium difficile infection     History of ulcerative colitis     Perineal sinus 2017     Sedation Problems: NO  Family History   Problem Relation Age of Onset    Hypertension Maternal Grandmother     Colon cancer Maternal Grandfather     No Known Problems Father     No Known Problems Mother     Crohn's disease Sister     Breast cancer Neg Hx     Ovarian cancer Neg Hx      Fam Hx of Sedation Problems: NO  Social History     Socioeconomic History    Marital status:    Tobacco Use    Smoking status: Former     Types: Vaping with nicotine     Quit date: 2009     Years since quittin.5    Smokeless tobacco: Never   Substance and Sexual Activity    Alcohol use: Yes     Comment: occ    Drug use: No    Sexual activity: Yes     Partners: Male     Birth control/protection: See Surgical Hx     Comment:      Social Determinants of Health     Financial Resource Strain: Low Risk     Difficulty of Paying Living Expenses: Not very hard   Food Insecurity: No Food Insecurity    Worried About Running Out of Food in the Last Year: Never true    Ran Out of Food in the Last Year: Never true   Transportation Needs: No Transportation Needs    Lack of Transportation (Medical): No    Lack of Transportation (Non-Medical): No   Physical Activity: Inactive    Days of Exercise per Week: 0 days    Minutes of Exercise per Session: 0 min   Stress: No Stress Concern Present    Feeling of Stress : Only a little   Social Connections: Unknown    Frequency of Communication with Friends and Family: More than three times a week    Frequency of Social Gatherings with Friends and Family: Once a week    Active Member of Clubs  "or Organizations: Yes    Attends Club or Organization Meetings: 1 to 4 times per year    Marital Status:    Housing Stability: Unknown    Unable to Pay for Housing in the Last Year: No    Unstable Housing in the Last Year: No       Review of Systems - Negative except   Respiratory ROS: no dyspnea  Cardiovascular ROS: no exertional chest pain  Gastrointestinal ROS: YES intermittent abdominal discomfort,  YES rare rectal bleeding  Musculoskeletal ROS: no muscular pain  Neurological ROS: no recent stroke    Physical Exam:  /61 (BP Location: Left arm, Patient Position: Lying)   Pulse 80   Temp 97.9 °F (36.6 °C) (Temporal)   Resp 18   Ht 5' 8" (1.727 m)   Wt 79.8 kg (176 lb)   LMP 07/14/2017   SpO2 98%   Breastfeeding No   BMI 26.76 kg/m²   General: no distress  Head: normocephalic  Mallampati Score   Neck: supple, symmetrical, trachea midline  Lungs:  clear to auscultation bilaterally and normal respiratory effort  Heart: regular rate and rhythm and no murmur  Abdomen: soft, non-tender non-distented; bowel sounds normal; no masses,  no organomegaly  Extremities: no cyanosis or edema, or clubbing    ASA:  II    PLAN  COLONOSCOPY.    SedationPlan :MAC    The details of the procedure, the possible need for biopsy or polypectomy and the potential risks including bleeding, perforation, missed polyps were discussed in detail.      "

## 2023-06-16 NOTE — ANESTHESIA POSTPROCEDURE EVALUATION
Anesthesia Post Evaluation    Patient: Nyasia Middleton    Procedure(s) Performed: Procedure(s) (LRB):  ENDOSCOPY, POUCH, SMALL INTESTINE, DIAGNOSTIC (N/A)    Final Anesthesia Type: general      Patient location during evaluation: GI PACU  Patient participation: Yes- Able to Participate  Level of consciousness: awake and alert  Post-procedure vital signs: reviewed and stable  Pain management: adequate  Airway patency: patent    PONV status at discharge: No PONV  Anesthetic complications: no      Cardiovascular status: hemodynamically stable  Respiratory status: spontaneous ventilation  Hydration status: euvolemic  Follow-up not needed.          Vitals Value Taken Time   BP 94/48 06/16/23 0933   Temp 36.2 °C (97.2 °F) 06/16/23 0907   Pulse 74 06/16/23 0933   Resp 18 06/16/23 0933   SpO2 99 % 06/16/23 0933         Event Time   Out of Recovery 09:35:58         Pain/George Score: George Score: 10 (6/16/2023  9:19 AM)

## 2023-06-21 LAB
FINAL PATHOLOGIC DIAGNOSIS: NORMAL
GROSS: NORMAL
Lab: NORMAL
SUPPLEMENTAL DIAGNOSIS: NORMAL

## 2023-06-22 ENCOUNTER — LAB VISIT (OUTPATIENT)
Dept: LAB | Facility: HOSPITAL | Age: 43
End: 2023-06-22
Attending: STUDENT IN AN ORGANIZED HEALTH CARE EDUCATION/TRAINING PROGRAM
Payer: COMMERCIAL

## 2023-06-22 ENCOUNTER — PATIENT MESSAGE (OUTPATIENT)
Dept: GASTROENTEROLOGY | Facility: CLINIC | Age: 43
End: 2023-06-22
Payer: COMMERCIAL

## 2023-06-22 DIAGNOSIS — Z01.419 ENCNTR FOR GYN EXAM (GENERAL) (ROUTINE) W/O ABN FINDINGS: ICD-10-CM

## 2023-06-22 LAB
ALBUMIN SERPL BCP-MCNC: 4.2 G/DL (ref 3.5–5.2)
ALP SERPL-CCNC: 67 U/L (ref 38–126)
ALT SERPL W/O P-5'-P-CCNC: 18 U/L (ref 10–44)
ANION GAP SERPL CALC-SCNC: 11 MMOL/L (ref 8–16)
AST SERPL-CCNC: 22 U/L (ref 15–46)
BASOPHILS # BLD AUTO: 0.06 K/UL (ref 0–0.2)
BASOPHILS NFR BLD: 1.1 % (ref 0–1.9)
BILIRUB SERPL-MCNC: 1.4 MG/DL (ref 0.1–1)
CALCIUM SERPL-MCNC: 8.8 MG/DL (ref 8.7–10.5)
CHLORIDE SERPL-SCNC: 103 MMOL/L (ref 95–110)
CHOLEST SERPL-MCNC: 147 MG/DL (ref 120–199)
CHOLEST/HDLC SERPL: 2.1 {RATIO} (ref 2–5)
CO2 SERPL-SCNC: 27 MMOL/L (ref 23–29)
CREAT SERPL-MCNC: 0.57 MG/DL (ref 0.5–1.4)
DIFFERENTIAL METHOD: ABNORMAL
EOSINOPHIL # BLD AUTO: 0.2 K/UL (ref 0–0.5)
EOSINOPHIL NFR BLD: 4.4 % (ref 0–8)
ERYTHROCYTE [DISTWIDTH] IN BLOOD BY AUTOMATED COUNT: 12.7 % (ref 11.5–14.5)
EST. GFR  (NO RACE VARIABLE): >60 ML/MIN/1.73 M^2
GLUCOSE SERPL-MCNC: 89 MG/DL (ref 70–110)
HCT VFR BLD AUTO: 37.2 % (ref 37–48.5)
HDLC SERPL-MCNC: 69 MG/DL (ref 40–75)
HDLC SERPL: 46.9 % (ref 20–50)
HGB BLD-MCNC: 12.2 G/DL (ref 12–16)
IMM GRANULOCYTES # BLD AUTO: 0.04 K/UL (ref 0–0.04)
IMM GRANULOCYTES NFR BLD AUTO: 0.7 % (ref 0–0.5)
LDLC SERPL CALC-MCNC: 50.8 MG/DL (ref 63–159)
LYMPHOCYTES # BLD AUTO: 0.8 K/UL (ref 1–4.8)
LYMPHOCYTES NFR BLD: 15 % (ref 18–48)
MCH RBC QN AUTO: 31.4 PG (ref 27–31)
MCHC RBC AUTO-ENTMCNC: 32.8 G/DL (ref 32–36)
MCV RBC AUTO: 96 FL (ref 82–98)
MONOCYTES # BLD AUTO: 0.8 K/UL (ref 0.3–1)
MONOCYTES NFR BLD: 15.5 % (ref 4–15)
NEUTROPHILS # BLD AUTO: 3.4 K/UL (ref 1.8–7.7)
NEUTROPHILS NFR BLD: 63.3 % (ref 38–73)
NONHDLC SERPL-MCNC: 78 MG/DL
NRBC BLD-RTO: 0 /100 WBC
PLATELET # BLD AUTO: 208 K/UL (ref 150–450)
PMV BLD AUTO: 9.8 FL (ref 9.2–12.9)
POTASSIUM SERPL-SCNC: 4 MMOL/L (ref 3.5–5.1)
PROT SERPL-MCNC: 7.7 G/DL (ref 6–8.4)
RBC # BLD AUTO: 3.88 M/UL (ref 4–5.4)
SODIUM SERPL-SCNC: 141 MMOL/L (ref 136–145)
TRIGL SERPL-MCNC: 136 MG/DL (ref 30–150)
TSH SERPL DL<=0.005 MIU/L-ACNC: 0.72 UIU/ML (ref 0.4–4)
UUN UR-MCNC: 9 MG/DL (ref 7–17)
WBC # BLD AUTO: 5.41 K/UL (ref 3.9–12.7)

## 2023-06-22 PROCEDURE — 80053 COMPREHEN METABOLIC PANEL: CPT | Mod: PO | Performed by: STUDENT IN AN ORGANIZED HEALTH CARE EDUCATION/TRAINING PROGRAM

## 2023-06-22 PROCEDURE — 80061 LIPID PANEL: CPT | Performed by: STUDENT IN AN ORGANIZED HEALTH CARE EDUCATION/TRAINING PROGRAM

## 2023-06-22 PROCEDURE — 36415 COLL VENOUS BLD VENIPUNCTURE: CPT | Mod: PO | Performed by: STUDENT IN AN ORGANIZED HEALTH CARE EDUCATION/TRAINING PROGRAM

## 2023-06-22 PROCEDURE — 84443 ASSAY THYROID STIM HORMONE: CPT | Mod: PO | Performed by: STUDENT IN AN ORGANIZED HEALTH CARE EDUCATION/TRAINING PROGRAM

## 2023-06-22 PROCEDURE — 85025 COMPLETE CBC W/AUTO DIFF WBC: CPT | Mod: PO | Performed by: STUDENT IN AN ORGANIZED HEALTH CARE EDUCATION/TRAINING PROGRAM

## 2023-06-23 ENCOUNTER — PATIENT MESSAGE (OUTPATIENT)
Dept: GASTROENTEROLOGY | Facility: CLINIC | Age: 43
End: 2023-06-23
Payer: COMMERCIAL

## 2023-06-23 ENCOUNTER — PATIENT MESSAGE (OUTPATIENT)
Dept: WOUND CARE | Facility: CLINIC | Age: 43
End: 2023-06-23
Payer: COMMERCIAL

## 2023-06-23 NOTE — PROGRESS NOTES
Can we please let Nyasia know that some values on her lipid panel have increased but are still within normal range.

## 2023-06-29 ENCOUNTER — OFFICE VISIT (OUTPATIENT)
Dept: WOUND CARE | Facility: CLINIC | Age: 43
End: 2023-06-29
Payer: COMMERCIAL

## 2023-06-29 DIAGNOSIS — Z43.2 ATTENTION TO ILEOSTOMY: Primary | ICD-10-CM

## 2023-06-29 DIAGNOSIS — K13.4 GRANULOMA AND GRANULOMA-LIKE LESIONS OF ORAL MUCOSA: ICD-10-CM

## 2023-06-29 PROCEDURE — 17250 CHEM CAUT OF GRANLTJ TISSUE: CPT | Mod: S$GLB,,, | Performed by: CLINICAL NURSE SPECIALIST

## 2023-06-29 PROCEDURE — 1160F PR REVIEW ALL MEDS BY PRESCRIBER/CLIN PHARMACIST DOCUMENTED: ICD-10-PCS | Mod: CPTII,S$GLB,, | Performed by: CLINICAL NURSE SPECIALIST

## 2023-06-29 PROCEDURE — 99214 OFFICE O/P EST MOD 30 MIN: CPT | Mod: 25,S$GLB,, | Performed by: CLINICAL NURSE SPECIALIST

## 2023-06-29 PROCEDURE — 1160F RVW MEDS BY RX/DR IN RCRD: CPT | Mod: CPTII,S$GLB,, | Performed by: CLINICAL NURSE SPECIALIST

## 2023-06-29 PROCEDURE — 99999 PR PBB SHADOW E&M-EST. PATIENT-LVL III: ICD-10-PCS | Mod: PBBFAC,,, | Performed by: CLINICAL NURSE SPECIALIST

## 2023-06-29 PROCEDURE — 99214 PR OFFICE/OUTPT VISIT, EST, LEVL IV, 30-39 MIN: ICD-10-PCS | Mod: 25,S$GLB,, | Performed by: CLINICAL NURSE SPECIALIST

## 2023-06-29 PROCEDURE — 1159F MED LIST DOCD IN RCRD: CPT | Mod: CPTII,S$GLB,, | Performed by: CLINICAL NURSE SPECIALIST

## 2023-06-29 PROCEDURE — 3044F HG A1C LEVEL LT 7.0%: CPT | Mod: CPTII,S$GLB,, | Performed by: CLINICAL NURSE SPECIALIST

## 2023-06-29 PROCEDURE — 17250 PR CHEM CAUTERY GRANULATN TISSUE: ICD-10-PCS | Mod: S$GLB,,, | Performed by: CLINICAL NURSE SPECIALIST

## 2023-06-29 PROCEDURE — 99999 PR PBB SHADOW E&M-EST. PATIENT-LVL III: CPT | Mod: PBBFAC,,, | Performed by: CLINICAL NURSE SPECIALIST

## 2023-06-29 PROCEDURE — 1159F PR MEDICATION LIST DOCUMENTED IN MEDICAL RECORD: ICD-10-PCS | Mod: CPTII,S$GLB,, | Performed by: CLINICAL NURSE SPECIALIST

## 2023-06-29 PROCEDURE — 3044F PR MOST RECENT HEMOGLOBIN A1C LEVEL <7.0%: ICD-10-PCS | Mod: CPTII,S$GLB,, | Performed by: CLINICAL NURSE SPECIALIST

## 2023-06-29 RX ORDER — TRIAMCINOLONE ACETONIDE 1 MG/G
PASTE DENTAL
Qty: 5 G | Refills: 12 | Status: SHIPPED | OUTPATIENT
Start: 2023-06-29 | End: 2024-02-14

## 2023-06-29 NOTE — PROGRESS NOTES
"  Subjective:       Patient ID: Nyasia Middleton is a 42 y.o. female.    Chief Complaint: Ileostomy    Pt is here for eval and had temp ileostomy 8/17/22. She is here today for another stoma check,  she has developed multiple growths referred to as granulomas on her stoma mucosa and edge that are bleeding.     Review of Systems   Constitutional: Negative.    Respiratory: Negative.     Cardiovascular: Negative.    Gastrointestinal:  Negative for abdominal pain.   Genitourinary: Negative.    Integumentary:  Negative for rash.       Objective:      Physical Exam  Constitutional:       Appearance: Normal appearance.   Pulmonary:      Effort: Pulmonary effort is normal.   Abdominal:      General: Abdomen is flat. Bowel sounds are normal.      Palpations: Abdomen is soft.   Skin:     General: Skin is warm and dry.        6/29 Multiple growths non painful but bleed and keep getting larger, will need cautery to remove  PROCEDURE:  CHEMICAL CAUTERY: Performed for hypergranulation tissue/granuloma(s) of ILEOSTOMY . The tissue was anesthetized topically with application of Lidocaine gel 2% and 5 minute wait time. The area noted was cauterized with AGNO3. The patient stated pain was 0 on 1-10 scale with this procedure.  She will try the steroid in dental paste prn to any that regrow in effort to keep them small or eliminate         2022  Loop ileostomy with functioning limb at 6 oclock, healed areas noted, she fits in 1" cut   Tried new image today and pt liked very much   Assessment:       Problem List Items Addressed This Visit    None  Visit Diagnoses       Attention to ileostomy    -  Primary    Granuloma and granuloma-like lesions of mucosa                  Plan:       New image Marian convex wafer ( bit deeper) uses and loves   Chemical cautery to granulomas  Try dental paste with steroid  Call for any issues  I have reviewed the plan of care with the patient and she express understanding. I spent over 50% of this 30 " minute visit in face to face counseling.

## 2023-08-21 ENCOUNTER — OFFICE VISIT (OUTPATIENT)
Dept: GASTROENTEROLOGY | Facility: CLINIC | Age: 43
End: 2023-08-21
Payer: COMMERCIAL

## 2023-08-21 VITALS
BODY MASS INDEX: 27.49 KG/M2 | DIASTOLIC BLOOD PRESSURE: 73 MMHG | OXYGEN SATURATION: 100 % | SYSTOLIC BLOOD PRESSURE: 109 MMHG | WEIGHT: 180.75 LBS | TEMPERATURE: 98 F | HEART RATE: 72 BPM

## 2023-08-21 DIAGNOSIS — K50.018 CROHN'S DISEASE OF SMALL INTESTINE WITH OTHER COMPLICATION: Primary | ICD-10-CM

## 2023-08-21 DIAGNOSIS — K63.2 FISTULA OF INTESTINE, EXCLUDING RECTUM AND ANUS: ICD-10-CM

## 2023-08-21 PROCEDURE — 1160F RVW MEDS BY RX/DR IN RCRD: CPT | Mod: CPTII,S$GLB,, | Performed by: INTERNAL MEDICINE

## 2023-08-21 PROCEDURE — 3074F SYST BP LT 130 MM HG: CPT | Mod: CPTII,S$GLB,, | Performed by: INTERNAL MEDICINE

## 2023-08-21 PROCEDURE — 3008F BODY MASS INDEX DOCD: CPT | Mod: CPTII,S$GLB,, | Performed by: INTERNAL MEDICINE

## 2023-08-21 PROCEDURE — 99214 OFFICE O/P EST MOD 30 MIN: CPT | Mod: S$GLB,,, | Performed by: INTERNAL MEDICINE

## 2023-08-21 PROCEDURE — 99214 PR OFFICE/OUTPT VISIT, EST, LEVL IV, 30-39 MIN: ICD-10-PCS | Mod: S$GLB,,, | Performed by: INTERNAL MEDICINE

## 2023-08-21 PROCEDURE — 1159F PR MEDICATION LIST DOCUMENTED IN MEDICAL RECORD: ICD-10-PCS | Mod: CPTII,S$GLB,, | Performed by: INTERNAL MEDICINE

## 2023-08-21 PROCEDURE — 3008F PR BODY MASS INDEX (BMI) DOCUMENTED: ICD-10-PCS | Mod: CPTII,S$GLB,, | Performed by: INTERNAL MEDICINE

## 2023-08-21 PROCEDURE — 1159F MED LIST DOCD IN RCRD: CPT | Mod: CPTII,S$GLB,, | Performed by: INTERNAL MEDICINE

## 2023-08-21 PROCEDURE — 3044F HG A1C LEVEL LT 7.0%: CPT | Mod: CPTII,S$GLB,, | Performed by: INTERNAL MEDICINE

## 2023-08-21 PROCEDURE — 3074F PR MOST RECENT SYSTOLIC BLOOD PRESSURE < 130 MM HG: ICD-10-PCS | Mod: CPTII,S$GLB,, | Performed by: INTERNAL MEDICINE

## 2023-08-21 PROCEDURE — 1160F PR REVIEW ALL MEDS BY PRESCRIBER/CLIN PHARMACIST DOCUMENTED: ICD-10-PCS | Mod: CPTII,S$GLB,, | Performed by: INTERNAL MEDICINE

## 2023-08-21 PROCEDURE — 3078F PR MOST RECENT DIASTOLIC BLOOD PRESSURE < 80 MM HG: ICD-10-PCS | Mod: CPTII,S$GLB,, | Performed by: INTERNAL MEDICINE

## 2023-08-21 PROCEDURE — 3044F PR MOST RECENT HEMOGLOBIN A1C LEVEL <7.0%: ICD-10-PCS | Mod: CPTII,S$GLB,, | Performed by: INTERNAL MEDICINE

## 2023-08-21 PROCEDURE — 3078F DIAST BP <80 MM HG: CPT | Mod: CPTII,S$GLB,, | Performed by: INTERNAL MEDICINE

## 2023-08-21 RX ORDER — LURASIDONE HYDROCHLORIDE 40 MG/1
40 TABLET, FILM COATED ORAL
COMMUNITY
Start: 2023-08-16 | End: 2024-01-03

## 2023-08-21 NOTE — PROGRESS NOTES
[facility]          Inflammatory Bowel Disease Follow Up Note         TODAY'S VISIT DATE:  8/21/2023    Reason for Consult:    Chief Complaint   Patient presents with    Crohn's Disease       PCP: Bisi Tolbert      Referring MD:   No ref. provider found    History of Present Illness:  Nyasia Middleton who is a 42 y.o. female is being seen today at the [facility] on 08/21/2023 for inflammatory bowel disease- Crohn's disease.  She continues to take Skyrizi.  She has not had any significant issues with that.  She underwent a pouchoscopy June 16, 2023.  At that time her perianal fistula appeared significantly better.  There was an anal stricture noted as well as some hemorrhagic appearance to the rectal cuff.  There was some inflammatory changes in the pouch in the pouch was small in size.  There was some mild narrowing of the pouch inlet but the ileum above that and the pouch inlet did not have any evidence of significant ulceration or inflammation.  She continues to do pretty well.  She reports that her ostomy output changes based on what she eats.  She has been having some increasing heartburn symptoms recently.  She takes pantoprazole twice daily and uses Tums for breakthrough symptoms with good results.  She has not had any side effects associated with her Skyrizi.    IBD History:  In 2010 she had an episode of C diff colitis in March and was treated with oral vancomycin.  This was while she was pregnant.  She continued to have issues with diarrhea and eventually was tested negative for C diff and was diagnosed with ulcerative colitis around May of 2010 after she delivered.  The 1st notes I have to review are from July of 2010 when she presented with ongoing rectal pain, blood in the stools, diarrhea.  At that time it was noted that she was taking prednisone and Asacol 2400 mg daily.  She had a colonoscopy on July 16, 2010 that showed  severe inflammation throughout the entire colon.  It does not appear that the terminal ileum was intubated during this procedure.  Biopsies were taken but I do not have those results.  She reports that she received infliximab in the late summer of 2010.  She can not remember how many doses she received but she thinks that was only 1 or 2 doses.  She does not recall having a significant improvement and in October of 2010 she underwent laparoscopic total abdominal colectomy and end ileostomy.  On January 4, 2011 she underwent completion proctectomy, J pouch formation, and diverting loop ileostomy.  In February 2011 she underwent loop ileostomy closure.  A follow-up pouchoscopy was done in December of 2011 and demonstrated some mild stenosis of the ileoanal anastomosis but otherwise the pouch was normal appearing.  By June of 2012 she had developed a perianal abscess and underwent exam under anesthesia with incision and drainage in September of that year.  In February of 2013 a repeat pouchoscopy showed moderate inflammation of the pouch.  Biopsies demonstrated chronic inflammatory changes consistent with acute pouchitis.  Given the perianal abscess there was concern for underlying Crohn's disease.  The pouchitis was treated with Cipro.  In April of 2013 she underwent a repeat exam under anesthesia with fistulotomy.  In 2013 she continued to have ongoing issues and was started on Lialda.  She had multiple subsequent procedures for perianal fistula management.  She continued with treatment with antibiotics with minimal improvement.  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  It appears that she did fairly well between 2014 and 2016.  She became pregnant again in 2013 and then again in 2016 and reports that during her pregnancy she actually did quite well.  In 2016 she underwent a pouchoscopy which showed an anal stricture.  There was  normal-appearing mucosa but the J pouch was felt to be small in size.  In November of 2016 she underwent an upper endoscopy for epigastric pain and was notable for grade 1 esophagitis but otherwise normal stomach and duodenum.  Biopsies of the duodenum showed mild focal duodenal lymphocytosis but with normal villous architecture.  There was also chronic gastritis noted in the stomach.  Esophageal biopsies were normal.  In April 2017 she was noted to have 2 small openings in the perineum with purulence drainage.  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  In June of 2017 she underwent a repeat exam under anesthesia with ligation of an intersphincteric fistula tract.  She was eventually started on sulfasalazine for an inflammatory arthritis.  In October of 2017 and MRI of the pelvis showed inflammation near the ileoanal anastomosis but no other significant abnormalities were identified.  In April 2018 she underwent laparoscopic lysis of adhesions for abdominal pain.  In 2018 she had recurrence of perianal fistulas and was treated with antibiotics.  A pouchoscopy revealed 2 fistulas from the pouch to the vagina.  In June of 2019 a fistulogram was done at [facility] that showed no definite vaginal fistula.  In December of 2019 she had a repeat anal fistula repair.  At some point she was treated with Humira.  She does not recall ever having her dosing optimized but she was on this for several months and maybe even up to a year are more.  She does not recall having a significant improvement in any of her fistulas or gastrointestinal symptoms and eventually the medication was stopped because of the development of antibodies to Humira.  In March of 2020 it is noted that she was taking Stelara.  She does not recall when she started taking this but she took it for a little over a year.   She felt like of all the medication she was ever on this did provide some benefit.  Her fistula issues never resolved but she did have a decrease in stool frequency and had more energy and an overall better sense of well-being.  She eventually stop this because of multiple issues with her insurance and frequent delays in issues with receiving her medication from the mail order pharmacy she was required to use.  She does remember that her dose was increased to every 4 weeks at some point and that provided significant benefit.  Notes from May of 2022 mention that she was planning to switch from Stelara to Entyvio. It appears that her perianal fistulas were treated with stem cells as well. In July 2022 she had a pouchoscopy that showed ileoanal anastomosis stenosis, perianal fistulas, and inflammation in the pouch. Kenalog was injected in to the anastomosis.  She was started on Entyvio and received the 1st 3 loading doses but never felt well taking this and actually felt like she was getting worse while taking it.  It was stopped in preparation for her surgery.  On 8/17/22 she underwent diverting loop ileostomy due to continued perianal fistulizing disease. A subsequent laparoscopy on 8/24 due to concern for ischemic bowel did not show any ischemia but did show some twisting of the ileostomy.  She also reports being on antibiotics intermittently that which made her feel poorly overall but did seem to decrease the frequency of bowel movements sometimes.  She has never been on probiotics.  She has never been on immunomodulator or methotrexate.  She was on budesonide at 1 point and reports that this did not provide any benefit.  She started Skyrizi October 27, 2022.  After starting this she had significant improvement in her perianal fistula and began gaining weight.  She felt significantly better overall.  In June 2023 she underwent a follow-up pouchoscopy. At that time her perianal fistula appeared significantly better.   There was an anal stricture noted as well as some hemorrhagic appearance to the rectal cuff.  There was some inflammatory changes in the pouch in the pouch was small in size.  There was some mild narrowing of the pouch inlet but the ileum above that and the pouch inlet did not have any evidence of significant ulceration or inflammation.      IBD Details:  Dx Date:  2010  Disease type/distribution:  Initially diagnosed with ulcerative colitis, now with Crohn's like disease of the pouch complicated by perianal and anal vaginal fistulas.  Also with ileoanal stenosis and pouch inflammation in the distal pouch  Current Treatment:  Skyrizi 360 mg every 8 weeks Start Date:  October 27, 2022 Response:  Unknown  Optimized:  Not applicable Adverse reactions:  None  Prior surgeries:   2011-total proctocolectomy with 3 stage J pouch placement     Multiple incisions and drainage, advancement flaps, other treatments for perianal fistulas and anovaginal fistulas     2022-diverting loop ileostomy due to ongoing perianal Crohn's disease  CRP Elevation: Y  calprotectin: Unknown  Disease Complications:  Severe fistulizing disease of the J pouch  Extraintestinal manifestations:  Joint pains  Prior treatments:   Steroids:  Incomplete response to prednisone and budesonide  5ASA:  Incomplete response to Asacol and Lialda  IMM:  None  TNF Inh:  Infliximab-received 2 doses prior to proctocolectomy, no response, not optimized, no adverse reactions    Humira-took this for a while.  Does not optimized, no improvement clinically, developed antibodies   Anti-Integrin:  Received 3 loading doses of Entyvio.  No improvement in symptoms, no maintenance dose, not optimized   IL 12/23:   Stelara-took this for over a year with dose optimization to every 4 week dosing.  Improvement in GI symptoms of diarrhea but no resolution of perianal fistulizing disease-no adverse reactions, stopped due to   difficulties with insurance   Skyrizi-current  "medication  HATTIE Inh:  None    Previous Clinical Trials:  None    Last Colonoscopy:   June 2023-pouchoscopy-anal stenosis, improving perianal fistula, inflammation in the J pouch, mild narrowing of the pouch inlet  7/22-pouchoscopy with ileoanal anastomosis stenosis, perianal fistulas, inflammation in the pouch-Kenalog injected into the anastomosis    Other Endoscopies:  Previous EGD report reviewed from 2016    Imaging:   MRE:  None   CT:  CT scan from August 2022 reviewed, other CT scans also reviewed   Other:  Other pertinent studies reviewed    Pertinent Labs:  Lab Results   Component Value Date    SEDRATE 78 (H) 07/09/2018    CRP 6.5 09/07/2022     Lab Results   Component Value Date    TTGIGA 6 09/07/2022     09/07/2022     Lab Results   Component Value Date    TSH 0.723 06/22/2023    FREET4 1.01 03/14/2017     Lab Results   Component Value Date    NISBXICU40NT 28 (L) 03/09/2023    IAVGKCNH52 852 09/07/2022     Lab Results   Component Value Date    HEPBSAG Non-reactive 09/07/2022    HEPBCAB Non-reactive 09/07/2022    HEPCAB Non-reactive 09/07/2022     Lab Results   Component Value Date    QDU65KPHY Non-reactive 09/07/2022     Lab Results   Component Value Date    NIL 0.88144 09/07/2022    MITOGENNIL 5.336 09/07/2022     Lab Results   Component Value Date    TPTMINTERP SEE BELOW 09/07/2022     Lab Results   Component Value Date    CDIFFICILEAN Negative 08/27/2022    CDIFFTOX Negative 08/27/2022     No results found for: "CALPROTECTIN"    Therapeutic Drug Monitoring Labs:  No results found for: "PROMETH"  No results found for: "ANSADAINIT", "INFLIXIMAB", "INFLIXINTERP"    Vaccinations:  No results found for: "HEPBSAB"  Lab Results   Component Value Date    HEPAIGG Non-reactive 09/07/2022     Lab Results   Component Value Date    VARICELLAZOS 3.25 (H) 09/07/2022    VARICELLAINT Positive (A) 09/07/2022     Immunization History   Administered Date(s) Administered    COVID-19, MRNA, LN-S, PF (MODERNA FULL 0.5 " ML DOSE) 2021, 2021    Hepatitis B, Pediatric/Adolescent 2003, 2003, 2004    Influenza 09/15/2014, 2019, 2020    Influenza - Quadrivalent 2019, 2020, 2021    Influenza A (H1N1) 2009 Monovalent - IM - PF 2009    MMR 1983, 1997    Pneumococcal Conjugate - 20 Valent 2023    Td (ADULT) 1997, 1997    Zoster Recombinant 2023, 2023         Review of Systems  Review of Systems   Constitutional:  Negative for chills, fever and weight loss.   HENT:  Negative for sore throat.    Eyes:  Negative for pain, discharge and redness.   Respiratory:  Negative for cough, shortness of breath and wheezing.    Cardiovascular:  Negative for chest pain, orthopnea and leg swelling.   Gastrointestinal:  Negative for abdominal pain, blood in stool, constipation, diarrhea, heartburn, melena, nausea and vomiting.   Genitourinary:  Negative for dysuria, frequency and urgency.   Musculoskeletal:  Negative for back pain, joint pain and myalgias.   Skin:  Negative for itching and rash.   Neurological:  Negative for focal weakness and seizures.   Endo/Heme/Allergies:  Does not bruise/bleed easily.   Psychiatric/Behavioral:  Negative for depression. The patient is not nervous/anxious.        Medical History:   Past Medical History:   Diagnosis Date    Abnormal Pap smear of cervix     Crohn's disease     GERD (gastroesophageal reflux disease)     History of Clostridium difficile infection     History of ulcerative colitis     Perineal sinus 2017       Surgical History:  Past Surgical History:   Procedure Laterality Date     SECTION, CLASSIC      DIAGNOSTIC LAPAROSCOPY N/A 2022    Procedure: LAPAROSCOPY, DIAGNOSTIC;  Surgeon: CORETTA Doyle MD;  Location: [facility] OR [address] [place] [address];  Service: Colon and Rectal;  Laterality: N/A;    FLEXIBLE SIGMOIDOSCOPY N/A 2018    Procedure: SIGMOIDOSCOPY, FLEXIBLE;  Surgeon: Jairo Peralta,  MD;  Location: [facility] (4TH FLR);  Service: Endoscopy;  Laterality: N/A;  no enemas per [facility]    FLEXIBLE SIGMOIDOSCOPY N/A 2022    Procedure: SIGMOIDOSCOPY, FLEXIBLE;  Surgeon: CORETTA Doyle MD;  Location: [facility] OR 2ND FLR;  Service: Colon and Rectal;  Laterality: N/A;    HYSTERECTOMY      LSH    ILEOSTOMY CLOSURE      LAPAROSCOPIC ILEOSTOMY N/A 2022    Procedure: CREATION, ILEOSTOMY, LAPAROSCOPIC;  Surgeon: CORETTA Doyle MD;  Location: [facility] OR 2ND FLR;  Service: Colon and Rectal;  Laterality: N/A;    POUCHOSCOPY N/A 2018    Procedure: ENDOSCOPY, SMALL INTESTINAL POUCH, DIAGNOSTIC, possible seton placement;  Surgeon: Jairo Peralta MD;  Location: [facility] (4TH FLR);  Service: Endoscopy;  Laterality: N/A;  Schedule early 2018; No prep    POUCHOSCOPY N/A 2023    Procedure: ENDOSCOPY, POUCH, SMALL INTESTINE, DIAGNOSTIC;  Surgeon: CORETTA Doyle MD;  [facility]  Service: Endoscopy;  Laterality: N/A;  inst via portal    rectovaginal fistula repair      RESTORATIVE PROCTOCOLECTOMY      TONSILLECTOMY      adenoids    TUBAL LIGATION      2016       Family History:   Family History   Problem Relation Age of Onset    Hypertension Maternal Grandmother     Colon cancer Maternal Grandfather     No Known Problems Father     No Known Problems Mother     Crohn's disease Sister     Breast cancer Neg Hx     Ovarian cancer Neg Hx        Social History:   Social History     Tobacco Use    Smoking status: Former     Current packs/day: 0.00     Types: Vaping with nicotine     Quit date: 2009     Years since quittin.7    Smokeless tobacco: Never   Substance Use Topics    Alcohol use: Yes     Comment: occ    Drug use: No       Allergies: Reviewed    Home Medications:   Medication List with Changes/Refills   Current Medications    ALPRAZOLAM (XANAX) 0.5 MG TABLET    Take 1 tablet (0.5 mg total) by mouth 3 (three) times daily as needed for Anxiety.     HYDROCORTISONE (ANUSOL-HC) 25 MG SUPPOSITORY    Place 1 suppository (25 mg total) rectally 2 (two) times daily.    LURASIDONE (LATUDA) 40 MG TAB TABLET    Take 40 mg by mouth.    ONDANSETRON (ZOFRAN-ODT) 8 MG TBDL    Take 1 tablet (8 mg total) by mouth every 12 (twelve) hours as needed (vomiting).    PANTOPRAZOLE (PROTONIX) 40 MG TABLET    Take 1 tablet (40 mg total) by mouth 2 (two) times daily.    RISANKIZUMAB-RZAA (SKYRIZI) 360 MG/2.4 ML (150 MG/ML) INJT    Inject 360 mg into the skin every 8 weeks. Inject 360mg into the skin on week 12 (4 weeks after your last IV infusion) then every 8 weeks thereafter.    TRIAMCINOLONE ACETONIDE 0.1% (KENALOG) 0.1 % PASTE    Apply thin coat of paste to ulceration prn   Discontinued Medications    LOPERAMIDE (IMODIUM) 2 MG CAPSULE    Take 2 mg by mouth 4 (four) times daily as needed for Diarrhea. As needed       Physical Exam:  Vital Signs:  /73 (BP Location: Left arm, Patient Position: Sitting)   Pulse 72   Temp 98.2 °F (36.8 °C)   Wt 82 kg (180 lb 12.4 oz)   LMP 07/14/2017   SpO2 100%   BMI 27.49 kg/m²   Body mass index is 27.49 kg/m².    Physical Exam  Vitals and nursing note reviewed.   Constitutional:       General: She is not in acute distress.     Appearance: Normal appearance. She is well-developed. She is not ill-appearing or toxic-appearing.   Eyes:      General: No scleral icterus.     Conjunctiva/sclera: Conjunctivae normal.      Pupils: Pupils are equal, round, and reactive to light.   Neck:      Thyroid: No thyromegaly.   Cardiovascular:      Rate and Rhythm: Normal rate and regular rhythm.      Heart sounds: Normal heart sounds. No murmur heard.  Pulmonary:      Effort: Pulmonary effort is normal.      Breath sounds: Normal breath sounds. No wheezing or rales.   Abdominal:      General: Bowel sounds are normal. There is no distension.      Palpations: Abdomen is soft. There is no mass.      Tenderness: There is no abdominal tenderness.      Comments:  Ileostomy in the right abdomen with yellowish brown stool   Musculoskeletal:         General: No tenderness. Normal range of motion.   Lymphadenopathy:      Cervical: No cervical adenopathy.   Skin:     Findings: No erythema or rash.   Neurological:      General: No focal deficit present.      Mental Status: She is alert and oriented to person, place, and time.   Psychiatric:         Mood and Affect: Mood normal.         Behavior: Behavior normal.         Thought Content: Thought content normal.         Judgment: Judgment normal.         Labs: reviewed and pertinent noted above    Assessment/Plan:  Nyasia Middleton is a 42 y.o. female with Crohn's like disease of the J pouch complicated by perianal and anal vaginal fistula formation. The following issues were addresssed:    1. Crohn's disease of small intestine with other complication    2. Fistula of intestine, excluding rectum and anus      1. Crohn's disease of the pouch:  She continues to do pretty well.  Her recent pouchoscopy definitely seem to show signs of improvement but she did have some ongoing inflammation.  It is possible some of the inflammatory changes in the pouch could be related to diversion of the fecal stream.  Today I recommend that we update labs in September.  At that time I would also like to check a stool calprotectin level to make sure there is no evidence of inflammation in the ileum proximal to the ileostomy.  If there is evidence of inflammation in ileum then we may need to consider an ileoscopy before considering any further surgical management of her Crohn's disease.  Today we discussed timing of ileostomy takedown.  She has an appointment with Dr. Doyle next month to discuss this as well.  She is not in a hurry to get this done as she does report that her quality of life has improved significantly since she has the ileostomy in place.  She also is concerned about disease recurrence as well as issues related to the anal stenosis in  the J pouch.  She isn't ready to commit to pouch excision and end ileostomy as a permanent solution yet.  I will also talk to Dr. Doyle about her and get his thoughts on the inflammation noted on her recent pouchoscopy.  We may want to continue the Skyrizi for a while longer.  Alternatively we could always consider Rinvoq if it is felt that the Skyrizi isn't completely controlling her symptoms.         # IBD specific health maintenance:  Colon cancer surveillance:  Not applicable    Annual:  - Eye exam:  Not applicable  - Skin exam (if on IMM/TNF):  Discuss in the future  - reminded pt to use sunblock/hats/sunprotective clothing  - PAP (if immunosuppressed):  Hysterectomy    DEXA:  Not applicable    Vitamin D:  Check today    Vaccines:    Influenza:  Recommend annual vaccination   Pneumovax:  Completed today   HAV:  Check serology   HBV:  Check serology   Tdap:  Discuss in the future   MMR:  Check serologies   VZV:  Check serology   HZV:  Received 1st dose today   HPV:  Not applicable   Meningococcus:  Not applicable   COVID: Completed 2 doses    Follow up: Follow up in about 6 months (around 2/21/2024).    Thank you again for sending Nyasia Middleton to see Dr. Joselo Weber today at the [facility]. Please don't hesitate to contact Dr. Weber if there are any questions regarding this evaluation, or if you have any other patients with inflammatory bowel disease for whom you would like a consultation. You can reach Dr. Weber at [phone] or by email at [email or web address]    Patrick Weber MD  Department of Gastroenterology  Inflammatory Bowel Disease

## 2023-08-21 NOTE — PROGRESS NOTES
IBD PATIENT INTAKE:    COVID symptoms in the last 7 days (runny nose, sore throat, congestion, cough, fever): No  PCP: Bisi Tolbert  If not PCP-  number given to establish [phone]: N/A    ALLERGIES REVIEWED:  Yes    CHIEF COMPLAINT:    Chief Complaint   Patient presents with    Crohn's Disease       VITAL SIGNS:  /73 (BP Location: Left arm, Patient Position: Sitting)   Pulse 72   Temp 98.2 °F (36.8 °C)   Wt 82 kg (180 lb 12.4 oz)   LMP 07/14/2017   SpO2 100%   BMI 27.49 kg/m²      Change in medical, surgical, family or social history: No    IBD THERAPY (name, dose/frequency):  Skyrizi Q8W   Last dose:  7/04    Next dose:  8/29  [facility]    Vitamins (be sure this has been put in medication list):   Vit D:  no     Vit B-12:  no   Folic Acid: no       Calcium: no     Iron:  no      MVI: no    Antibiotics (past 30 Days):  No  If yes   Indication:  Name of antibiotic:  Completion date:     REVIEWED MEDICATION LIST RECONCILED INCLUDING ABOVE MEDS:  Yes                  Answers submitted by the patient for this visit:  Established Patient Questionnaire  (Submitted on 8/15/2023)  Feeling depressed?: Yes  nervous/ anxious: Yes  heartburn: Yes  back pain: Yes

## 2023-08-22 ENCOUNTER — PATIENT MESSAGE (OUTPATIENT)
Dept: WOUND CARE | Facility: CLINIC | Age: 43
End: 2023-08-22
Payer: COMMERCIAL

## 2023-08-23 ENCOUNTER — OFFICE VISIT (OUTPATIENT)
Dept: WOUND CARE | Facility: CLINIC | Age: 43
End: 2023-08-23
Payer: COMMERCIAL

## 2023-08-23 DIAGNOSIS — R21 SKIN RASH: ICD-10-CM

## 2023-08-23 DIAGNOSIS — Z43.2 ATTENTION TO ILEOSTOMY: Primary | ICD-10-CM

## 2023-08-23 PROCEDURE — 3044F HG A1C LEVEL LT 7.0%: CPT | Mod: CPTII,S$GLB,, | Performed by: CLINICAL NURSE SPECIALIST

## 2023-08-23 PROCEDURE — 99999 PR PBB SHADOW E&M-EST. PATIENT-LVL III: CPT | Mod: PBBFAC,,, | Performed by: CLINICAL NURSE SPECIALIST

## 2023-08-23 PROCEDURE — 1159F PR MEDICATION LIST DOCUMENTED IN MEDICAL RECORD: ICD-10-PCS | Mod: CPTII,S$GLB,, | Performed by: CLINICAL NURSE SPECIALIST

## 2023-08-23 PROCEDURE — 1160F RVW MEDS BY RX/DR IN RCRD: CPT | Mod: CPTII,S$GLB,, | Performed by: CLINICAL NURSE SPECIALIST

## 2023-08-23 PROCEDURE — 1159F MED LIST DOCD IN RCRD: CPT | Mod: CPTII,S$GLB,, | Performed by: CLINICAL NURSE SPECIALIST

## 2023-08-23 PROCEDURE — 1160F PR REVIEW ALL MEDS BY PRESCRIBER/CLIN PHARMACIST DOCUMENTED: ICD-10-PCS | Mod: CPTII,S$GLB,, | Performed by: CLINICAL NURSE SPECIALIST

## 2023-08-23 PROCEDURE — 99213 PR OFFICE/OUTPT VISIT, EST, LEVL III, 20-29 MIN: ICD-10-PCS | Mod: S$GLB,,, | Performed by: CLINICAL NURSE SPECIALIST

## 2023-08-23 PROCEDURE — 3044F PR MOST RECENT HEMOGLOBIN A1C LEVEL <7.0%: ICD-10-PCS | Mod: CPTII,S$GLB,, | Performed by: CLINICAL NURSE SPECIALIST

## 2023-08-23 PROCEDURE — 99213 OFFICE O/P EST LOW 20 MIN: CPT | Mod: S$GLB,,, | Performed by: CLINICAL NURSE SPECIALIST

## 2023-08-23 PROCEDURE — 99999 PR PBB SHADOW E&M-EST. PATIENT-LVL III: ICD-10-PCS | Mod: PBBFAC,,, | Performed by: CLINICAL NURSE SPECIALIST

## 2023-08-23 NOTE — PROGRESS NOTES
"  Subjective:       Patient ID: Nyasia Middleton is a 42 y.o. female.    Chief Complaint: Ileostomy and Skin Problem    Pt is here for eval and had loop ileostomy 8/17/22. She is here today for another stoma check,  she has persistent skin itching and rash and wants it checked .      Review of Systems   Constitutional: Negative.    Respiratory: Negative.     Cardiovascular: Negative.    Gastrointestinal:  Negative for abdominal pain.   Genitourinary: Negative.    Integumentary:  Negative for rash.         Objective:      Physical Exam  Constitutional:       Appearance: Normal appearance.   Pulmonary:      Effort: Pulmonary effort is normal.   Abdominal:      General: Abdomen is flat. Bowel sounds are normal.      Palpations: Abdomen is soft.   Skin:     General: Skin is warm and dry.        8/23 NO PHOTO but there is a slight rash around the stoma extending out to edge of tape . Have suggested she try calamine or caladryl or go back to nasal steroid spray .         6/29 Multiple growths non painful but bleed and keep getting larger, will need cautery to remove  PROCEDURE:  CHEMICAL CAUTERY: Performed for hypergranulation tissue/granuloma(s) of ILEOSTOMY . The tissue was anesthetized topically with application of Lidocaine gel 2% and 5 minute wait time. The area noted was cauterized with AGNO3. The patient stated pain was 0 on 1-10 scale with this procedure.  She will try the steroid in dental paste prn to any that regrow in effort to keep them small or eliminate         2022  Loop ileostomy with functioning limb at 6 oclock, healed areas noted, she fits in 1" cut   Tried new image today and pt liked very much   Assessment:       Problem List Items Addressed This Visit    None  Visit Diagnoses       Attention to ileostomy    -  Primary    Skin rash                  Plan:       New image [place] convex wafer  still preferred  Introduced the stoma genie to her   Call for any issues  I have reviewed the plan of care " with the patient and she express understanding. I spent over 50% of this 20 minute visit in face to face counseling.

## 2023-09-01 ENCOUNTER — TELEPHONE (OUTPATIENT)
Dept: GASTROENTEROLOGY | Facility: CLINIC | Age: 43
End: 2023-09-01
Payer: COMMERCIAL

## 2023-09-01 NOTE — TELEPHONE ENCOUNTER
-PA approved through [employer] via CM  -From 08/02/2023 to 08/31/2024  -CaseId:[id number]  -Key: [id number]

## 2023-09-06 ENCOUNTER — PATIENT MESSAGE (OUTPATIENT)
Dept: SURGERY | Facility: CLINIC | Age: 43
End: 2023-09-06
Payer: COMMERCIAL

## 2023-09-06 DIAGNOSIS — K50.013 CROHN'S DISEASE OF SMALL INTESTINE WITH FISTULA: ICD-10-CM

## 2023-09-06 DIAGNOSIS — Z93.2 ILEOSTOMY IN PLACE: Primary | ICD-10-CM

## 2023-09-14 ENCOUNTER — OFFICE VISIT (OUTPATIENT)
Dept: SURGERY | Facility: CLINIC | Age: 43
End: 2023-09-14
Payer: COMMERCIAL

## 2023-09-14 ENCOUNTER — PATIENT MESSAGE (OUTPATIENT)
Dept: SURGERY | Facility: CLINIC | Age: 43
End: 2023-09-14
Payer: COMMERCIAL

## 2023-09-14 ENCOUNTER — PATIENT MESSAGE (OUTPATIENT)
Dept: PSYCHIATRY | Facility: CLINIC | Age: 43
End: 2023-09-14
Payer: COMMERCIAL

## 2023-09-14 VITALS — BODY MASS INDEX: 27.2 KG/M2 | DIASTOLIC BLOOD PRESSURE: 78 MMHG | WEIGHT: 178.88 LBS | SYSTOLIC BLOOD PRESSURE: 109 MMHG

## 2023-09-14 DIAGNOSIS — K50.018 CROHN'S DISEASE OF SMALL INTESTINE WITH OTHER COMPLICATION: Primary | ICD-10-CM

## 2023-09-14 PROCEDURE — 3074F PR MOST RECENT SYSTOLIC BLOOD PRESSURE < 130 MM HG: ICD-10-PCS | Mod: CPTII,S$GLB,, | Performed by: COLON & RECTAL SURGERY

## 2023-09-14 PROCEDURE — 99999 PR PBB SHADOW E&M-EST. PATIENT-LVL III: ICD-10-PCS | Mod: PBBFAC,,, | Performed by: COLON & RECTAL SURGERY

## 2023-09-14 PROCEDURE — 3074F SYST BP LT 130 MM HG: CPT | Mod: CPTII,S$GLB,, | Performed by: COLON & RECTAL SURGERY

## 2023-09-14 PROCEDURE — 99999 PR PBB SHADOW E&M-EST. PATIENT-LVL III: CPT | Mod: PBBFAC,,, | Performed by: COLON & RECTAL SURGERY

## 2023-09-14 PROCEDURE — 99213 OFFICE O/P EST LOW 20 MIN: CPT | Mod: S$GLB,,, | Performed by: COLON & RECTAL SURGERY

## 2023-09-14 PROCEDURE — 3044F HG A1C LEVEL LT 7.0%: CPT | Mod: CPTII,S$GLB,, | Performed by: COLON & RECTAL SURGERY

## 2023-09-14 PROCEDURE — 3008F PR BODY MASS INDEX (BMI) DOCUMENTED: ICD-10-PCS | Mod: CPTII,S$GLB,, | Performed by: COLON & RECTAL SURGERY

## 2023-09-14 PROCEDURE — 3078F DIAST BP <80 MM HG: CPT | Mod: CPTII,S$GLB,, | Performed by: COLON & RECTAL SURGERY

## 2023-09-14 PROCEDURE — 1160F PR REVIEW ALL MEDS BY PRESCRIBER/CLIN PHARMACIST DOCUMENTED: ICD-10-PCS | Mod: CPTII,S$GLB,, | Performed by: COLON & RECTAL SURGERY

## 2023-09-14 PROCEDURE — 3078F PR MOST RECENT DIASTOLIC BLOOD PRESSURE < 80 MM HG: ICD-10-PCS | Mod: CPTII,S$GLB,, | Performed by: COLON & RECTAL SURGERY

## 2023-09-14 PROCEDURE — 1159F MED LIST DOCD IN RCRD: CPT | Mod: CPTII,S$GLB,, | Performed by: COLON & RECTAL SURGERY

## 2023-09-14 PROCEDURE — 3008F BODY MASS INDEX DOCD: CPT | Mod: CPTII,S$GLB,, | Performed by: COLON & RECTAL SURGERY

## 2023-09-14 PROCEDURE — 3044F PR MOST RECENT HEMOGLOBIN A1C LEVEL <7.0%: ICD-10-PCS | Mod: CPTII,S$GLB,, | Performed by: COLON & RECTAL SURGERY

## 2023-09-14 PROCEDURE — 1159F PR MEDICATION LIST DOCUMENTED IN MEDICAL RECORD: ICD-10-PCS | Mod: CPTII,S$GLB,, | Performed by: COLON & RECTAL SURGERY

## 2023-09-14 PROCEDURE — 1160F RVW MEDS BY RX/DR IN RCRD: CPT | Mod: CPTII,S$GLB,, | Performed by: COLON & RECTAL SURGERY

## 2023-09-14 PROCEDURE — 99213 PR OFFICE/OUTPT VISIT, EST, LEVL III, 20-29 MIN: ICD-10-PCS | Mod: S$GLB,,, | Performed by: COLON & RECTAL SURGERY

## 2023-09-14 RX ORDER — ATOMOXETINE 40 MG/1
40 CAPSULE ORAL DAILY
COMMUNITY
End: 2024-01-03

## 2023-09-14 NOTE — PROGRESS NOTES
CRS Office Visit Followup    Referring Md:   No referring provider defined for this encounter.    SUBJECTIVE:     Chief Complaint:  Perianal fistula    History of Present Illness:  Course is as follows:  Patient is a 42 y.o. female presents with perianal fistula.  1912-0626: History of 3 stage restorative proctocolectomy for presumed ulcerative colitis ([facility] at [facility]).  2011:  Diagnosis changed to Crohn's disease due to perianal abscesses and fistulas.    She transferred care to Dr. Peralta, who performed the following surgeries.    4/3/2013:  Fistulotomy x2  7/12/2013: LIFT  12/13/2013: repeat LIFT  7/28/2014: LIFT for anovaginal fistula  10/20/2014: repeat LIFT with mesh for anovaginal fistula (MatriStem mesh)  6/26/2017: repeat LIFT  Following Dr. Peralta's shelter, she has been seen by Dr. Condon who has performed multiple stem cell applications.    5/12/2022: Regarding her Crohn's disease, she is followed by Dr. Gonzales and is treated with Stelara.  She is about to change medical therapy to Entyvio.     Functionally, she is active.  She previously vaped but has recently stopped 3 weeks ago.  She has 6-7 bowel movements during the day.  1-2 bowel movements overnight.  Had intermittent improvement of her pouch function as well as decreased drainage when she was on a course of antibiotics recently.  Maintains continence.  Has frequent vaginal drainage.  Strongly wishes to avoid ileostomy.   08/02/2022:  Presents for evaluation for increased vaginal and perineal draining with increased pain.  No improvement with antibiotics or with change in her medical therapy to Entyvio.  Recently underwent pouchoscopy by Dr. Condon  With significant inflammation of the pouch and the anal canal with multiple ulcers.  Interested in discussing possibility of fecal diversion.    Last Pouchoscopy:             8/17/22: lap loop ileostomy creation and pouchoscopy   - Flexible pouchoscopy demonstrated significant stenosis of  the pouch anal anastomosis with multiple surrounding fissures.  Distal rectal mucosa was inflamed and stenotic.  Pouch with narrowing of the proximal limb consistent with Crohn's disease.  Rolan terminal ileum appeared healthy.  8/24/22: diagnostic laparoscopy for fevers/elevated CRP --> normal laparoscopy   - readmitted for 10 days for ileus followed by high output ostomy   - discharged on fluids    Current status:  9/9/22:   Presents for follow-up.  Taking  for Lomotil 3 times per day as well as 6 Imodium 3 times per day.  Intermittent high output from the ileostomy.  Continues on fluids 5 times per day.  Intermittent fatigue.  Yet to start medical therapy.  Pending approval.  9/27/22:  Recently decreased fluid 3 times per week from daily.  She is now decreasing the amount of Imodium she takes.  Intermittent thick output.  More accepting of her ostomy.    12/6/22:  Doing very well.  She is started on medical therapy.  Weight is improving.  PICC line removed in October.  Emptying the ostomy 5-6 times per day  1/17/23:   Continues to do very well.  On Skyrizi.   Intermittent mucus drainage  the anus.    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  She is complaint with her Skyrizi every 8 weeks.  She is haviing intermittent drainage of purulence and blood from the anus but has markedly improved.  Wishes to proceed with EUA to see if the ostomy can be reversed.    6/16/23: pouchoscopy  Impression:            - Stenosed post-surgical anastomosis found on digital exam.                          - Perianal fistula found on perianal exam.                          - Rectal cuff with a hemorrhagic appearance and  inflammation seen. Biopsied.                          - The examined portion of the ileum was normal. Pouch was small but healthy appearing. Narrowed pouch inlet, but able to be traversed easily.   9/14/23: doing well with her ostomy.   Intermittent mucus drainage from the perineum.   Overall doing very well.  Wears a pad.  Has to change the pad once to twice per day.    Review of Systems:  Review of Systems   Constitutional:  Negative for chills, diaphoresis, fever, malaise/fatigue and weight loss.   HENT:  Negative for congestion.    Respiratory:  Negative for shortness of breath.    Cardiovascular:  Negative for chest pain and leg swelling.   Gastrointestinal:  Positive for diarrhea. Negative for abdominal pain, blood in stool, constipation, nausea and vomiting.   Genitourinary:  Negative for dysuria.   Musculoskeletal:  Negative for back pain and myalgias.   Skin:  Positive for rash.   Neurological:  Negative for dizziness and weakness.   Endo/Heme/Allergies:  Does not bruise/bleed easily.   Psychiatric/Behavioral:  Negative for depression.        OBJECTIVE:     Vital Signs (Most Recent)  /78 (BP Location: Left arm, Patient Position: Sitting, BP Method: Small (Automatic))   Wt 81.1 kg (178 lb 14.5 oz)   LMP 07/14/2017   BMI 27.20 kg/m²     Physical Exam:  General: White female in no distress   Neuro: alert and oriented x 4.  Moves all extremities.     HEENT: no icterus.  Trachea midline  Respiratory: respirations are even and unlabored  Cardiac: regular rate  Abdomen:  Soft, nontender, no masses.    Ostomy appears healthy.  Appliance not removed  Extremities: Warm dry and intact  Skin: no rashes  Anorectal:   Deferred from today From prior exam:  External exam with marked improvement of perianal fistulas.  Anal stenosis.  No infection seen  Thin rectovaginal septum.    Labs:  TSH normal.  H&H 12 and 39.  Albumin 3.9.  Normal renal function    Imaging:  No recent imaging      ASSESSMENT/PLAN:     Diagnoses and all orders for this visit:    Crohn's disease of small intestine with other complication        42 y.o. female with restorative total proctocolectomy with ileal pouch anastomosis in 2011 who then developed Crohn's disease with multiple perianal fistulas as well as an anal  vaginal fistula.  She has undergone multiple attempts at fistula repair.     She underwent fecal diversion with a loop ileostomy on 8/17/22.  Re-explored on POD7 and was normal.      Last pouch exam on 06/16/2023 with severe stenosis of the anus with ongoing fistula.  Significant improvement from prior exams.      She continues to do very well.  She is contemplating changing medical therapy with Dr. Weber.  Repeat pouchoscopy in June 2024.  She will follow up with me in the meantime with any further needs or issues or she wishes to consider having her ostomy reversed.  We did discuss ostomy reversal today.  Discussed that it is unknown how her perineum will react to ostomy reversal.          CORETTA Doyle MD, FACS, FASCRS  Staff Surgeon  Colon & Rectal Surgery    CC: Joselo Weber MD

## 2023-09-29 DIAGNOSIS — K50.013 CROHN'S DISEASE OF SMALL INTESTINE WITH FISTULA: ICD-10-CM

## 2023-09-29 RX ORDER — RISANKIZUMAB-RZAA 360 MG/2.4
360 WEARABLE INJECTOR SUBCUTANEOUS
Qty: 2.4 ML | Refills: 2 | Status: SHIPPED | OUTPATIENT
Start: 2023-09-29 | End: 2024-03-01

## 2023-09-29 NOTE — TELEPHONE ENCOUNTER
IBD medications Skyrizi 360 mg every 8 weeks    Refill request for Skyrizi 360 mg    Allergies reviewed Yes    Drug Monitoring labs/frequency for all IBD meds:  CBC, CMP - q 6 months  TB, HEP B - Yearly    Lab Results   Component Value Date    HEPBSAG Non-reactive 09/14/2023    HEPBCAB Non-reactive 09/14/2023     Lab Results   Component Value Date    TBGOLDPLUS Negative 09/22/2023     Lab Results   Component Value Date    BXFJZQKZ98PA 28 (L) 03/09/2023    SMAXWXPG29 852 09/07/2022     Lab Results   Component Value Date    WBC 7.55 09/14/2023    HGB 13.1 09/14/2023    HCT 39.0 09/14/2023    MCV 91 09/14/2023     09/14/2023     Lab Results   Component Value Date    CREATININE 0.68 09/14/2023    CREATININE 0.68 09/14/2023    ALBUMIN 4.6 09/14/2023    ALBUMIN 4.6 09/14/2023    BILITOT 1.8 (H) 09/14/2023    BILITOT 1.8 (H) 09/14/2023    ALKPHOS CANCELED 09/14/2023    ALKPHOS 77 09/14/2023    AST CANCELED 09/14/2023    AST 23 09/14/2023    ALT 15 09/14/2023    ALT 15 09/14/2023       Lab due date (mo/yr):  03/2024    Labs scheduled: Yes    Next appt: 03/11/2024    RX refill sent to provider for amount until next labs: Yes

## 2023-12-20 ENCOUNTER — TELEPHONE (OUTPATIENT)
Dept: FAMILY MEDICINE | Facility: CLINIC | Age: 43
End: 2023-12-20
Payer: COMMERCIAL

## 2023-12-20 NOTE — TELEPHONE ENCOUNTER
----- Message from Milagro Ko sent at 12/20/2023  3:19 PM CST -----  Regarding: sooner  Contact: [phone]  Type:  Sooner Apoointment Request    Caller is requesting a sooner appointment.  Caller declined first available appointment listed below.  Caller will not accept being placed on the waitlist and is requesting a message be sent to doctor.  Name of Caller: pt   When is the first available appointment? 04/30  Symptoms: NP to establish care   Would the patient rather a call back or [facility]?  call  Best Call Back Number: [phone]  Additional Information:

## 2023-12-20 NOTE — TELEPHONE ENCOUNTER
Spoke to pt. Pt stated that she is having problems managing her anxiety. Was able to rescheduled pt to 01/03/2023. Pt accepted the sooner appt.

## 2023-12-23 ENCOUNTER — PATIENT MESSAGE (OUTPATIENT)
Dept: SURGERY | Facility: CLINIC | Age: 43
End: 2023-12-23
Payer: COMMERCIAL

## 2024-01-03 ENCOUNTER — OFFICE VISIT (OUTPATIENT)
Dept: FAMILY MEDICINE | Facility: CLINIC | Age: 44
End: 2024-01-03
Payer: COMMERCIAL

## 2024-01-03 ENCOUNTER — PATIENT MESSAGE (OUTPATIENT)
Dept: FAMILY MEDICINE | Facility: CLINIC | Age: 44
End: 2024-01-03

## 2024-01-03 VITALS
HEART RATE: 90 BPM | OXYGEN SATURATION: 98 % | HEIGHT: 68 IN | DIASTOLIC BLOOD PRESSURE: 84 MMHG | WEIGHT: 156.19 LBS | TEMPERATURE: 98 F | SYSTOLIC BLOOD PRESSURE: 132 MMHG | BODY MASS INDEX: 23.67 KG/M2

## 2024-01-03 DIAGNOSIS — F41.1 GAD (GENERALIZED ANXIETY DISORDER): Primary | ICD-10-CM

## 2024-01-03 DIAGNOSIS — K50.018 CROHN'S DISEASE OF SMALL INTESTINE WITH OTHER COMPLICATION: ICD-10-CM

## 2024-01-03 DIAGNOSIS — Z93.2 PRESENCE OF ILEOSTOMY: ICD-10-CM

## 2024-01-03 PROCEDURE — 1160F RVW MEDS BY RX/DR IN RCRD: CPT | Mod: CPTII,S$GLB,, | Performed by: STUDENT IN AN ORGANIZED HEALTH CARE EDUCATION/TRAINING PROGRAM

## 2024-01-03 PROCEDURE — 3008F BODY MASS INDEX DOCD: CPT | Mod: CPTII,S$GLB,, | Performed by: STUDENT IN AN ORGANIZED HEALTH CARE EDUCATION/TRAINING PROGRAM

## 2024-01-03 PROCEDURE — 99396 PREV VISIT EST AGE 40-64: CPT | Mod: S$GLB,,, | Performed by: STUDENT IN AN ORGANIZED HEALTH CARE EDUCATION/TRAINING PROGRAM

## 2024-01-03 PROCEDURE — 3075F SYST BP GE 130 - 139MM HG: CPT | Mod: CPTII,S$GLB,, | Performed by: STUDENT IN AN ORGANIZED HEALTH CARE EDUCATION/TRAINING PROGRAM

## 2024-01-03 PROCEDURE — 1159F MED LIST DOCD IN RCRD: CPT | Mod: CPTII,S$GLB,, | Performed by: STUDENT IN AN ORGANIZED HEALTH CARE EDUCATION/TRAINING PROGRAM

## 2024-01-03 PROCEDURE — 3079F DIAST BP 80-89 MM HG: CPT | Mod: CPTII,S$GLB,, | Performed by: STUDENT IN AN ORGANIZED HEALTH CARE EDUCATION/TRAINING PROGRAM

## 2024-01-03 RX ORDER — SERTRALINE HYDROCHLORIDE 50 MG/1
50 TABLET, FILM COATED ORAL DAILY
Qty: 30 TABLET | Refills: 11 | Status: SHIPPED | OUTPATIENT
Start: 2024-01-03 | End: 2024-02-01 | Stop reason: ALTCHOICE

## 2024-01-03 RX ORDER — ALPRAZOLAM 0.5 MG/1
0.5 TABLET ORAL 2 TIMES DAILY PRN
Qty: 60 TABLET | Refills: 0 | Status: SHIPPED | OUTPATIENT
Start: 2024-01-03

## 2024-01-03 RX ORDER — BUSPIRONE HYDROCHLORIDE 7.5 MG/1
7.5 TABLET ORAL 2 TIMES DAILY PRN
Qty: 60 TABLET | Refills: 11 | Status: SHIPPED | OUTPATIENT
Start: 2024-01-03 | End: 2024-02-14

## 2024-01-04 PROBLEM — Z93.2 PRESENCE OF ILEOSTOMY: Status: ACTIVE | Noted: 2024-01-04

## 2024-01-04 NOTE — PROGRESS NOTES
Patient ID: Nyasia Middleton is a 43 y.o. female. This patient is new to me.    Chief Complaint: Establish Care and Anxiety    Anxiety  Patient reports no chest pain, nausea or shortness of breath.         Patient presents to establish care. She has a history of crohns disease and is followed by dr Weber. Dr Doyle is her colorectal surgeon. She had ileostomy done in . She suffers with anxiety and currently feels like it is not well controlled. She recently saw a mental health NP and was started on wellbutrin and stattera. This caused her to feel scatter-brained. She stopped these medicines on her own and started taking some prozac that she had from past years. This is helping her anxiety more than the wellbutrin but she still does not feel well controlled.     Past Medical History:   Diagnosis Date    Abnormal Pap smear of cervix     Anxiety     Crohn disease     GERD (gastroesophageal reflux disease)     History of Clostridium difficile infection     Perineal sinus 2017       Past Surgical History:   Procedure Laterality Date     SECTION, CLASSIC      DIAGNOSTIC LAPAROSCOPY N/A 2022    Procedure: LAPAROSCOPY, DIAGNOSTIC;  Surgeon: CORETTA Doyle MD;  [facility];  Service: Colon and Rectal;  Laterality: N/A;    FLEXIBLE SIGMOIDOSCOPY N/A 2018    Procedure: SIGMOIDOSCOPY, FLEXIBLE;  Surgeon: Jairo Peralta MD;  Location: [facility] (4TH FLR);  Service: Endoscopy;  Laterality: N/A;  no enemas per [facility]    FLEXIBLE SIGMOIDOSCOPY N/A 2022    Procedure: SIGMOIDOSCOPY, FLEXIBLE;  Surgeon: CORETTA Doyle MD;  Location: [facility] OR 2ND FLR;  Service: Colon and Rectal;  Laterality: N/A;    HYSTERECTOMY      LSH    ILEOSTOMY CLOSURE      LAPAROSCOPIC ILEOSTOMY N/A 2022    Procedure: CREATION, ILEOSTOMY, LAPAROSCOPIC;  Surgeon: CORETTA Doyle MD;  Location: [facility] OR [facility] FLR;  Service: Colon and Rectal;  Laterality: N/A;    POUCHOSCOPY N/A  11/06/2018    Procedure: ENDOSCOPY, SMALL INTESTINAL POUCH, DIAGNOSTIC, possible seton placement;  Surgeon: Jairo Peralta MD;  Location: [facility] ENDO (4TH FLR);  Service: Endoscopy;  Laterality: N/A;  Schedule early Nov 2018; No prep    POUCHOSCOPY N/A 6/16/2023    Procedure: ENDOSCOPY, POUCH, SMALL INTESTINE, DIAGNOSTIC;  Surgeon: CORETTA Doyle MD;  Location: [facility] ENDO (4TH FLR);  Service: Endoscopy;  Laterality: N/A;  inst via portal    rectovaginal fistula repair      RESTORATIVE PROCTOCOLECTOMY      TONSILLECTOMY      adenoids    TUBAL LIGATION      june 2016       Family History   Problem Relation Age of Onset    Hypertension Maternal Grandmother     Colon cancer Maternal Grandfather     No Known Problems Father     No Known Problems Mother     Crohn's disease Sister     Breast cancer Neg Hx     Ovarian cancer Neg Hx        Social History     Tobacco Use    Smoking status: Former    Smokeless tobacco: Never   Substance Use Topics    Alcohol use: Yes     Comment: occ    Drug use: No       Review of patient's allergies indicates:  No Known Allergies     Review of Systems  Review of Systems   Constitutional:  Negative for fever.   HENT:  Negative for ear pain and sinus pain.    Eyes:  Negative for discharge.   Respiratory:  Negative for cough and shortness of breath.    Cardiovascular:  Negative for chest pain and leg swelling.   Gastrointestinal:  Negative for diarrhea, nausea and vomiting.   Genitourinary:  Negative for urgency.   Musculoskeletal:  Negative for myalgias.   Skin:  Negative for rash.   Neurological:  Negative for weakness and headaches.   Psychiatric/Behavioral:  Negative for depression.    All other systems reviewed and are negative.      Currently Medications  Current Outpatient Medications on File Prior to Visit   Medication Sig Dispense Refill    hydrocortisone (ANUSOL-HC) 25 mg suppository Place 1 suppository (25 mg total) rectally 2 (two) times daily. 30 suppository 5    ondansetron  "(ZOFRAN-ODT) 8 MG TbDL Take 1 tablet (8 mg total) by mouth every 12 (twelve) hours as needed (vomiting). 20 tablet 2    pantoprazole (PROTONIX) 40 MG tablet Take 1 tablet (40 mg total) by mouth 2 (two) times daily. 180 tablet 2    risankizumab-rzaa (SKYRIZI) 360 mg/2.4 mL (150 mg/mL) Injt Inject 360 mg into the skin every 8 weeks. 2.4 mL 2    triamcinolone acetonide 0.1% (KENALOG) 0.1 % paste Apply thin coat of paste to ulceration prn 5 g 12     No current facility-administered medications on file prior to visit.       Physical  Exam  Vitals:    01/03/24 1507   BP: 132/84   BP Location: Right arm   Patient Position: Sitting   Pulse: 90   Temp: 98.1 °F (36.7 °C)   SpO2: 98%   Weight: 70.8 kg (156 lb 3.1 oz)   Height: 5' 8" (1.727 m)      Body mass index is 23.75 kg/m².    Physical Exam  Vitals and nursing note reviewed.   Constitutional:       General: She is not in acute distress.     Appearance: She is not ill-appearing.   HENT:      Head: Normocephalic and atraumatic.      Right Ear: External ear normal.      Left Ear: External ear normal.      Nose: Nose normal.      Mouth/Throat:      Mouth: Mucous membranes are moist.   Eyes:      Extraocular Movements: Extraocular movements intact.      Conjunctiva/sclera: Conjunctivae normal.   Cardiovascular:      Rate and Rhythm: Normal rate and regular rhythm.      Pulses: Normal pulses.      Heart sounds: No murmur heard.  Pulmonary:      Effort: Pulmonary effort is normal. No respiratory distress.      Breath sounds: No wheezing.   Musculoskeletal:         General: No swelling.      Cervical back: Normal range of motion.   Skin:     Coloration: Skin is not jaundiced.      Findings: No rash.   Neurological:      General: No focal deficit present.      Mental Status: She is alert and oriented to person, place, and time.   Psychiatric:         Mood and Affect: Mood normal.         Thought Content: Thought content normal.         Labs:    Complete Blood Count  Lab Results " "  Component Value Date    RBC 4.28 09/14/2023    HGB 13.1 09/14/2023    HCT 39.0 09/14/2023    MCV 91 09/14/2023    MCH 30.6 09/14/2023    MCHC 33.6 09/14/2023    RDW 12.6 09/14/2023     09/14/2023    MPV 10.0 09/14/2023    GRAN 5.2 09/14/2023    GRAN 69.4 09/14/2023    LYMPH 1.2 09/14/2023    LYMPH 15.5 (L) 09/14/2023    MONO 0.6 09/14/2023    MONO 7.8 09/14/2023    EOS 0.4 09/14/2023    BASO 0.09 09/14/2023    EOSINOPHIL 5.7 09/14/2023    BASOPHIL 1.2 09/14/2023    DIFFMETHOD Automated 09/14/2023       Comprehensive Metabolic Panel  Lab Results   Component Value Date    GLU 95 09/14/2023    GLU 95 09/14/2023    BUN CANCELED 09/14/2023    BUN 13 09/14/2023    CREATININE 0.68 09/14/2023    CREATININE 0.68 09/14/2023     09/14/2023     09/14/2023    K 3.5 09/14/2023    K 3.5 09/14/2023     09/14/2023     09/14/2023    PROT 8.4 09/14/2023    PROT 8.4 09/14/2023    ALBUMIN 4.6 09/14/2023    ALBUMIN 4.6 09/14/2023    BILITOT 1.8 (H) 09/14/2023    BILITOT 1.8 (H) 09/14/2023    AST CANCELED 09/14/2023    AST 23 09/14/2023    ALKPHOS CANCELED 09/14/2023    ALKPHOS 77 09/14/2023    CO2 24 09/14/2023    CO2 24 09/14/2023    ALT 15 09/14/2023    ALT 15 09/14/2023    ANIONGAP 12 09/14/2023    ANIONGAP 12 09/14/2023       TSH  No results found for: "TSH"    Imaging:  Mammo Digital Screening Bilat w/ Abe  Narrative: Result:  Mammo Digital Screening Bilat w/ Abe    History:  Patient is 42 y.o. and is seen for a screening mammogram.    Films Compared:  Compared to: 07/14/2022 Mammo Digital Screening Bilat w/ Abe     Findings:   This procedure was performed using tomosynthesis.   Computer-aided detection was utilized in the interpretation of this   examination.    The breasts have scattered areas of fibroglandular density. There is no   evidence of suspicious masses, microcalcifications or architectural   distortion.  Impression:    No mammographic evidence of malignancy.    BI-RADS Category 1: " Negative    Recommendation:  Routine screening mammogram in 1 year is recommended.    Your estimated lifetime risk of breast cancer (to age 85) based on   Tyrer-Cuzick risk assessment model is 12.45 %.  According to the American   Cancer Society, patients with a lifetime breast cancer risk of 20% or   higher might benefit from supplemental screening tests. ??       Assessment/Plan:    1. MARION (generalized anxiety disorder)  -     sertraline (ZOLOFT) 50 MG tablet; Take 1 tablet (50 mg total) by mouth once daily.  Dispense: 30 tablet; Refill: 11  -     busPIRone (BUSPAR) 7.5 MG tablet; Take 1 tablet (7.5 mg total) by mouth 2 (two) times daily as needed (anxiety).  Dispense: 60 tablet; Refill: 11  -     ALPRAZolam (XANAX) 0.5 MG tablet; Take 1 tablet (0.5 mg total) by mouth 2 (two) times daily as needed for Anxiety.  Dispense: 60 tablet; Refill: 0    2. Crohn's disease of small intestine with other complication    3. Presence of ileostomy          Discussed how to stay healthy including: diet, exercise, refraining from smoking and discussed screening exams / tests needed for age, sex and family Hx.    RTC 4 weeks    Kofi Winkler MD

## 2024-02-01 ENCOUNTER — OFFICE VISIT (OUTPATIENT)
Dept: FAMILY MEDICINE | Facility: CLINIC | Age: 44
End: 2024-02-01
Payer: COMMERCIAL

## 2024-02-01 ENCOUNTER — PATIENT MESSAGE (OUTPATIENT)
Dept: FAMILY MEDICINE | Facility: CLINIC | Age: 44
End: 2024-02-01

## 2024-02-01 DIAGNOSIS — F51.01 PRIMARY INSOMNIA: ICD-10-CM

## 2024-02-01 DIAGNOSIS — F41.1 GAD (GENERALIZED ANXIETY DISORDER): Primary | ICD-10-CM

## 2024-02-01 PROCEDURE — 1159F MED LIST DOCD IN RCRD: CPT | Mod: CPTII,95,, | Performed by: STUDENT IN AN ORGANIZED HEALTH CARE EDUCATION/TRAINING PROGRAM

## 2024-02-01 PROCEDURE — 1160F RVW MEDS BY RX/DR IN RCRD: CPT | Mod: CPTII,95,, | Performed by: STUDENT IN AN ORGANIZED HEALTH CARE EDUCATION/TRAINING PROGRAM

## 2024-02-01 PROCEDURE — 99214 OFFICE O/P EST MOD 30 MIN: CPT | Mod: 95,,, | Performed by: STUDENT IN AN ORGANIZED HEALTH CARE EDUCATION/TRAINING PROGRAM

## 2024-02-01 RX ORDER — TRAZODONE HYDROCHLORIDE 50 MG/1
50 TABLET ORAL NIGHTLY
Qty: 30 TABLET | Refills: 11 | Status: SHIPPED | OUTPATIENT
Start: 2024-02-01 | End: 2024-03-12

## 2024-02-01 RX ORDER — FLUOXETINE HYDROCHLORIDE 40 MG/1
40 CAPSULE ORAL DAILY
Qty: 90 CAPSULE | Refills: 1 | Status: SHIPPED | OUTPATIENT
Start: 2024-02-01

## 2024-02-01 NOTE — PROGRESS NOTES
Primary Care Virtual Visit    The patient location is: [place], [place]  The chief complaint leading to consultation is:  f/u anxiety  Visit type: Virtual visit with synchronous audio and video  Total time spent with patient:  17 minutes  Each patient to whom he or she provides medical services by telemedicine is:  (1) informed of the relationship between the physician and patient and the respective role of any other health care provider with respect to management of the patient; and (2) notified that he or she may decline to receive medical services by telemedicine and may withdraw from such care at any time.     Patient ID: Nyasia Middleton is a 43 y.o. female.       Anxiety  Presents for initial visit. Symptoms include excessive worry, insomnia, irritability, malaise, nervous/anxious behavior, panic and restlessness. Patient reports no chest pain, compulsions, confusion, decreased concentration, depressed mood, nausea, palpitations or shortness of breath. The severity of symptoms is interfering with daily activities. Nighttime awakenings: several.     Her past medical history is significant for anxiety/panic attacks. Past treatments include SSRIs. The treatment provided no relief. Compliance with prior treatments has been good.   Patient reports no improvement with mood and anxiety since starting zoloft. She would like to change back to prozac. She is taking xanax most nights to help her sleep.        Review of Systems  Review of Systems   Constitutional:  Positive for irritability. Negative for fever.   HENT:  Negative for ear pain, hearing loss and sinus pain.    Eyes:  Negative for discharge.   Respiratory:  Negative for cough, shortness of breath and wheezing.    Cardiovascular:  Negative for chest pain, palpitations and leg swelling.   Gastrointestinal:  Negative for blood in stool, constipation, diarrhea, nausea and vomiting.   Genitourinary:  Negative for dysuria, hematuria and urgency.   Musculoskeletal:   Negative for myalgias and neck pain.   Skin:  Negative for rash.   Neurological:  Negative for weakness and headaches.   Endo/Heme/Allergies:  Negative for polydipsia.   Psychiatric/Behavioral:  Negative for confusion, decreased concentration and depression. The patient is nervous/anxious and has insomnia.    All other systems reviewed and are negative.      Currently Medications  Current Outpatient Medications on File Prior to Visit   Medication Sig Dispense Refill    ALPRAZolam (XANAX) 0.5 MG tablet Take 1 tablet (0.5 mg total) by mouth 2 (two) times daily as needed for Anxiety. 60 tablet 0    busPIRone (BUSPAR) 7.5 MG tablet Take 1 tablet (7.5 mg total) by mouth 2 (two) times daily as needed (anxiety). 60 tablet 11    hydrocortisone (ANUSOL-HC) 25 mg suppository Place 1 suppository (25 mg total) rectally 2 (two) times daily. 30 suppository 5    ondansetron (ZOFRAN-ODT) 8 MG TbDL Take 1 tablet (8 mg total) by mouth every 12 (twelve) hours as needed (vomiting). 20 tablet 2    pantoprazole (PROTONIX) 40 MG tablet Take 1 tablet (40 mg total) by mouth 2 (two) times daily. 180 tablet 2    risankizumab-rzaa (SKYRIZI) 360 mg/2.4 mL (150 mg/mL) Injt Inject 360 mg into the skin every 8 weeks. 2.4 mL 2    triamcinolone acetonide 0.1% (KENALOG) 0.1 % paste Apply thin coat of paste to ulceration prn 5 g 12    [DISCONTINUED] sertraline (ZOLOFT) 50 MG tablet Take 1 tablet (50 mg total) by mouth once daily. 30 tablet 11     No current facility-administered medications on file prior to visit.       Physical  Exam  There were no vitals filed for this visit.   Physical Exam  Constitutional:       Appearance: Normal appearance.   HENT:      Head: Normocephalic and atraumatic.   Eyes:      Extraocular Movements: Extraocular movements intact.   Skin:     Coloration: Skin is not pale.   Neurological:      Mental Status: She is alert and oriented to person, place, and time.   Psychiatric:         Mood and Affect: Mood normal.  "        Labs:    Complete Blood Count  Lab Results   Component Value Date    RBC 4.28 09/14/2023    HGB 13.1 09/14/2023    HCT 39.0 09/14/2023    MCV 91 09/14/2023    MCH 30.6 09/14/2023    MCHC 33.6 09/14/2023    RDW 12.6 09/14/2023     09/14/2023    MPV 10.0 09/14/2023    GRAN 5.2 09/14/2023    GRAN 69.4 09/14/2023    LYMPH 1.2 09/14/2023    LYMPH 15.5 (L) 09/14/2023    MONO 0.6 09/14/2023    MONO 7.8 09/14/2023    EOS 0.4 09/14/2023    BASO 0.09 09/14/2023    EOSINOPHIL 5.7 09/14/2023    BASOPHIL 1.2 09/14/2023    DIFFMETHOD Automated 09/14/2023       Comprehensive Metabolic Panel  Lab Results   Component Value Date    GLU 95 09/14/2023    GLU 95 09/14/2023    BUN CANCELED 09/14/2023    BUN 13 09/14/2023    CREATININE 0.68 09/14/2023    CREATININE 0.68 09/14/2023     09/14/2023     09/14/2023    K 3.5 09/14/2023    K 3.5 09/14/2023     09/14/2023     09/14/2023    PROT 8.4 09/14/2023    PROT 8.4 09/14/2023    ALBUMIN 4.6 09/14/2023    ALBUMIN 4.6 09/14/2023    BILITOT 1.8 (H) 09/14/2023    BILITOT 1.8 (H) 09/14/2023    AST CANCELED 09/14/2023    AST 23 09/14/2023    ALKPHOS CANCELED 09/14/2023    ALKPHOS 77 09/14/2023    CO2 24 09/14/2023    CO2 24 09/14/2023    ALT 15 09/14/2023    ALT 15 09/14/2023    ANIONGAP 12 09/14/2023    ANIONGAP 12 09/14/2023       TSH  No results found for: "TSH"    Imaging:  Mammo Digital Screening Bilat w/ Abe  Narrative: Result:  Mammo Digital Screening Bilat w/ Abe    History:  Patient is 42 y.o. and is seen for a screening mammogram.    Films Compared:  Compared to: 07/14/2022 Mammo Digital Screening Bilat w/ Abe     Findings:   This procedure was performed using tomosynthesis.   Computer-aided detection was utilized in the interpretation of this   examination.    The breasts have scattered areas of fibroglandular density. There is no   evidence of suspicious masses, microcalcifications or architectural   distortion.  Impression:    No mammographic " evidence of malignancy.    BI-RADS Category 1: Negative    Recommendation:  Routine screening mammogram in 1 year is recommended.    Your estimated lifetime risk of breast cancer (to age 85) based on   Tyrer-Cuzick risk assessment model is 12.45 %.  According to the American   Cancer Society, patients with a lifetime breast cancer risk of 20% or   higher might benefit from supplemental screening tests. ??       Assessment/Plan:    1. MARION (generalized anxiety disorder)  -     FLUoxetine 40 MG capsule; Take 1 capsule (40 mg total) by mouth once daily.  Dispense: 90 capsule; Refill: 1    2. Primary insomnia  -     traZODone (DESYREL) 50 MG tablet; Take 1 tablet (50 mg total) by mouth every evening. For sleep.  Dispense: 30 tablet; Refill: 11          Discussed how to stay healthy including: diet, exercise, refraining from smoking and discussed screening exams / tests needed for age, sex and family Hx.    RTC 2 weeks    Kofi Winkler MD      Answers submitted by the patient for this visit:  Review of Systems Questionnaire (Submitted on 1/31/2024)  activity change: No  unexpected weight change: No  rhinorrhea: No  trouble swallowing: No  visual disturbance: No  chest tightness: No  polyuria: No  difficulty urinating: No  menstrual problem: No  joint swelling: No  arthralgias: No  confusion: No  dysphoric mood: Yes

## 2024-02-14 ENCOUNTER — OFFICE VISIT (OUTPATIENT)
Dept: FAMILY MEDICINE | Facility: CLINIC | Age: 44
End: 2024-02-14
Payer: COMMERCIAL

## 2024-02-14 ENCOUNTER — PATIENT MESSAGE (OUTPATIENT)
Dept: FAMILY MEDICINE | Facility: CLINIC | Age: 44
End: 2024-02-14

## 2024-02-14 DIAGNOSIS — F41.1 GAD (GENERALIZED ANXIETY DISORDER): ICD-10-CM

## 2024-02-14 PROCEDURE — 99214 OFFICE O/P EST MOD 30 MIN: CPT | Mod: 95,,, | Performed by: STUDENT IN AN ORGANIZED HEALTH CARE EDUCATION/TRAINING PROGRAM

## 2024-02-14 PROCEDURE — 1159F MED LIST DOCD IN RCRD: CPT | Mod: CPTII,95,, | Performed by: STUDENT IN AN ORGANIZED HEALTH CARE EDUCATION/TRAINING PROGRAM

## 2024-02-14 PROCEDURE — 1160F RVW MEDS BY RX/DR IN RCRD: CPT | Mod: CPTII,95,, | Performed by: STUDENT IN AN ORGANIZED HEALTH CARE EDUCATION/TRAINING PROGRAM

## 2024-02-14 RX ORDER — BUSPIRONE HYDROCHLORIDE 10 MG/1
10 TABLET ORAL 2 TIMES DAILY PRN
Qty: 180 TABLET | Refills: 1 | Status: SHIPPED | OUTPATIENT
Start: 2024-02-14

## 2024-02-14 NOTE — PROGRESS NOTES
Primary Care Virtual Visit    The patient location is: [place], [place]  The chief complaint leading to consultation is:  follow up anxiety/depression  Visit type: Virtual visit with synchronous audio and video  Total time spent with patient:  11 minutes  Each patient to whom he or she provides medical services by telemedicine is:  (1) informed of the relationship between the physician and patient and the respective role of any other health care provider with respect to management of the patient; and (2) notified that he or she may decline to receive medical services by telemedicine and may withdraw from such care at any time.     Patient ID: Nyasia Middleton is a 43 y.o. female.       Anxiety  Presents for follow-up visit. Symptoms include excessive worry, nervous/anxious behavior and panic. Patient reports no suicidal ideas. Primary symptoms comment: paranoid, + fatigue. The severity of symptoms is moderate. The quality of sleep is fair. Nighttime awakenings: occasional.     Compliance with medications is %.   Patient reports some improvement with buspar, however, she feels like she needs an increased dose of the medicine.        Review of Systems  Review of Systems   Constitutional:  Negative for fever.   HENT:  Negative for ear pain and sinus pain.    Eyes:  Negative for discharge.   Respiratory:  Negative for cough.    Cardiovascular:  Negative for leg swelling.   Gastrointestinal:  Negative for diarrhea and vomiting.   Genitourinary:  Negative for urgency.   Musculoskeletal:  Negative for myalgias.   Skin:  Negative for rash.   Neurological:  Negative for weakness and headaches.   Psychiatric/Behavioral:  Negative for depression and suicidal ideas. The patient is nervous/anxious.    All other systems reviewed and are negative.      Currently Medications  Current Outpatient Medications on File Prior to Visit   Medication Sig Dispense Refill    ALPRAZolam (XANAX) 0.5 MG tablet Take 1 tablet (0.5 mg total) by  mouth 2 (two) times daily as needed for Anxiety. 60 tablet 0    FLUoxetine 40 MG capsule Take 1 capsule (40 mg total) by mouth once daily. 90 capsule 1    ondansetron (ZOFRAN-ODT) 8 MG TbDL Take 1 tablet (8 mg total) by mouth every 12 (twelve) hours as needed (vomiting). 20 tablet 2    pantoprazole (PROTONIX) 40 MG tablet Take 1 tablet (40 mg total) by mouth 2 (two) times daily. 180 tablet 2    risankizumab-rzaa (SKYRIZI) 360 mg/2.4 mL (150 mg/mL) Injt Inject 360 mg into the skin every 8 weeks. 2.4 mL 2    traZODone (DESYREL) 50 MG tablet Take 1 tablet (50 mg total) by mouth every evening. For sleep. 30 tablet 11    [DISCONTINUED] busPIRone (BUSPAR) 7.5 MG tablet Take 1 tablet (7.5 mg total) by mouth 2 (two) times daily as needed (anxiety). 60 tablet 11    [DISCONTINUED] hydrocortisone (ANUSOL-HC) 25 mg suppository Place 1 suppository (25 mg total) rectally 2 (two) times daily. 30 suppository 5    [DISCONTINUED] triamcinolone acetonide 0.1% (KENALOG) 0.1 % paste Apply thin coat of paste to ulceration prn 5 g 12     No current facility-administered medications on file prior to visit.       Physical  Exam  There were no vitals filed for this visit.   Physical Exam  Constitutional:       Appearance: Normal appearance.   HENT:      Head: Normocephalic and atraumatic.   Eyes:      Extraocular Movements: Extraocular movements intact.   Skin:     Coloration: Skin is not pale.   Neurological:      Mental Status: She is alert and oriented to person, place, and time.   Psychiatric:         Mood and Affect: Mood normal.         Labs:    Complete Blood Count  Lab Results   Component Value Date    RBC 4.28 09/14/2023    HGB 13.1 09/14/2023    HCT 39.0 09/14/2023    MCV 91 09/14/2023    MCH 30.6 09/14/2023    MCHC 33.6 09/14/2023    RDW 12.6 09/14/2023     09/14/2023    MPV 10.0 09/14/2023    GRAN 5.2 09/14/2023    GRAN 69.4 09/14/2023    LYMPH 1.2 09/14/2023    LYMPH 15.5 (L) 09/14/2023    MONO 0.6 09/14/2023    MONO 7.8  "09/14/2023    EOS 0.4 09/14/2023    BASO 0.09 09/14/2023    EOSINOPHIL 5.7 09/14/2023    BASOPHIL 1.2 09/14/2023    DIFFMETHOD Automated 09/14/2023       Comprehensive Metabolic Panel  Lab Results   Component Value Date    GLU 95 09/14/2023    GLU 95 09/14/2023    BUN CANCELED 09/14/2023    BUN 13 09/14/2023    CREATININE 0.68 09/14/2023    CREATININE 0.68 09/14/2023     09/14/2023     09/14/2023    K 3.5 09/14/2023    K 3.5 09/14/2023     09/14/2023     09/14/2023    PROT 8.4 09/14/2023    PROT 8.4 09/14/2023    ALBUMIN 4.6 09/14/2023    ALBUMIN 4.6 09/14/2023    BILITOT 1.8 (H) 09/14/2023    BILITOT 1.8 (H) 09/14/2023    AST CANCELED 09/14/2023    AST 23 09/14/2023    ALKPHOS CANCELED 09/14/2023    ALKPHOS 77 09/14/2023    CO2 24 09/14/2023    CO2 24 09/14/2023    ALT 15 09/14/2023    ALT 15 09/14/2023    ANIONGAP 12 09/14/2023    ANIONGAP 12 09/14/2023       TSH  No results found for: "TSH"    Imaging:  Mammo Digital Screening Bilat w/ Abe  Narrative: Result:  Mammo Digital Screening Bilat w/ Abe    History:  Patient is 42 y.o. and is seen for a screening mammogram.    Films Compared:  Compared to: 07/14/2022 Mammo Digital Screening Bilat w/ Abe     Findings:   This procedure was performed using tomosynthesis.   Computer-aided detection was utilized in the interpretation of this   examination.    The breasts have scattered areas of fibroglandular density. There is no   evidence of suspicious masses, microcalcifications or architectural   distortion.  Impression:    No mammographic evidence of malignancy.    BI-RADS Category 1: Negative    Recommendation:  Routine screening mammogram in 1 year is recommended.    Your estimated lifetime risk of breast cancer (to age 85) based on   Tyrer-Cuzick risk assessment model is 12.45 %.  According to the American   Cancer Society, patients with a lifetime breast cancer risk of 20% or   higher might benefit from supplemental screening tests. ?? "       Assessment/Plan:    1. MARION (generalized anxiety disorder)  -     busPIRone (BUSPAR) 10 MG tablet; Take 1 tablet (10 mg total) by mouth 2 (two) times daily as needed (anxiety).  Dispense: 180 tablet; Refill: 1          Discussed how to stay healthy including: diet, exercise, refraining from smoking and discussed screening exams / tests needed for age, sex and family Hx.    Kofi Winkler MD      Answers submitted by the patient for this visit:  Review of Systems Questionnaire (Submitted on 2/13/2024)  activity change: No  unexpected weight change: No  rhinorrhea: No  trouble swallowing: No  visual disturbance: No  chest tightness: No  polyuria: No  difficulty urinating: No  menstrual problem: No  joint swelling: No  arthralgias: No  confusion: No  dysphoric mood: No

## 2024-03-01 DIAGNOSIS — K50.013 CROHN'S DISEASE OF SMALL INTESTINE WITH FISTULA: ICD-10-CM

## 2024-03-01 RX ORDER — RISANKIZUMAB-RZAA 360 MG/2.4
WEARABLE INJECTOR SUBCUTANEOUS
Qty: 2.4 ML | Refills: 0 | Status: SHIPPED | OUTPATIENT
Start: 2024-03-01 | End: 2024-04-01 | Stop reason: SDUPTHER

## 2024-03-01 NOTE — TELEPHONE ENCOUNTER
"IBD medications Skyrizi 360 mg Q 8 weeks    Refill request for Skyrizi 360 mg Q 8 weeks    Allergies reviewed Yes    Drug Monitoring labs/frequency for all IBD meds:    CBC / CMP Q 6 months  TB Quantiferon yearly  HBsAg / HBcAb yearly    Lab Results   Component Value Date    HEPBSAG Non-reactive 09/14/2023    HEPBCAB Non-reactive 09/14/2023     Lab Results   Component Value Date    TBGOLDPLUS Negative 09/22/2023     No results found for: "QUANTNILVALU", "QUANTIFERON", "QUANTTBGDPL"   Lab Results   Component Value Date    QAZRDJFO78RT 28 (L) 03/09/2023    OIGYSOSR31 852 09/07/2022     Lab Results   Component Value Date    WBC 7.55 09/14/2023    HGB 13.1 09/14/2023    HCT 39.0 09/14/2023    MCV 91 09/14/2023     09/14/2023     Lab Results   Component Value Date    CREATININE 0.68 09/14/2023    CREATININE 0.68 09/14/2023    ALBUMIN 4.6 09/14/2023    ALBUMIN 4.6 09/14/2023    BILITOT 1.8 (H) 09/14/2023    BILITOT 1.8 (H) 09/14/2023    ALKPHOS CANCELED 09/14/2023    ALKPHOS 77 09/14/2023    AST CANCELED 09/14/2023    AST 23 09/14/2023    ALT 15 09/14/2023    ALT 15 09/14/2023       Lab due date (mo/yr):  3/2024    Labs scheduled: yes    Next appt: 3/11/2024    RX refill sent to provider for amount until next labs: Yes     "

## 2024-03-05 ENCOUNTER — PATIENT MESSAGE (OUTPATIENT)
Dept: GASTROENTEROLOGY | Facility: CLINIC | Age: 44
End: 2024-03-05
Payer: COMMERCIAL

## 2024-03-05 ENCOUNTER — PATIENT MESSAGE (OUTPATIENT)
Dept: FAMILY MEDICINE | Facility: CLINIC | Age: 44
End: 2024-03-05
Payer: COMMERCIAL

## 2024-03-11 ENCOUNTER — OFFICE VISIT (OUTPATIENT)
Dept: GASTROENTEROLOGY | Facility: CLINIC | Age: 44
End: 2024-03-11
Payer: COMMERCIAL

## 2024-03-11 VITALS
HEART RATE: 76 BPM | DIASTOLIC BLOOD PRESSURE: 71 MMHG | WEIGHT: 155.88 LBS | OXYGEN SATURATION: 99 % | HEIGHT: 68 IN | SYSTOLIC BLOOD PRESSURE: 113 MMHG | TEMPERATURE: 98 F | BODY MASS INDEX: 23.63 KG/M2

## 2024-03-11 DIAGNOSIS — K21.9 GASTROESOPHAGEAL REFLUX DISEASE, UNSPECIFIED WHETHER ESOPHAGITIS PRESENT: ICD-10-CM

## 2024-03-11 DIAGNOSIS — K50.018 CROHN'S DISEASE OF SMALL INTESTINE WITH OTHER COMPLICATION: Primary | ICD-10-CM

## 2024-03-11 DIAGNOSIS — D84.821 IMMUNOSUPPRESSION DUE TO DRUG THERAPY: ICD-10-CM

## 2024-03-11 DIAGNOSIS — Z79.899 IMMUNOSUPPRESSION DUE TO DRUG THERAPY: ICD-10-CM

## 2024-03-11 DIAGNOSIS — N82.3 RECTOVAGINAL FISTULA: ICD-10-CM

## 2024-03-11 DIAGNOSIS — Z93.2 ILEOSTOMY IN PLACE: ICD-10-CM

## 2024-03-11 PROCEDURE — 3078F DIAST BP <80 MM HG: CPT | Mod: CPTII,S$GLB,, | Performed by: INTERNAL MEDICINE

## 2024-03-11 PROCEDURE — 1160F RVW MEDS BY RX/DR IN RCRD: CPT | Mod: CPTII,S$GLB,, | Performed by: INTERNAL MEDICINE

## 2024-03-11 PROCEDURE — 99214 OFFICE O/P EST MOD 30 MIN: CPT | Mod: 25,S$GLB,, | Performed by: INTERNAL MEDICINE

## 2024-03-11 PROCEDURE — 90471 IMMUNIZATION ADMIN: CPT | Mod: S$GLB,,, | Performed by: INTERNAL MEDICINE

## 2024-03-11 PROCEDURE — 3074F SYST BP LT 130 MM HG: CPT | Mod: CPTII,S$GLB,, | Performed by: INTERNAL MEDICINE

## 2024-03-11 PROCEDURE — 1159F MED LIST DOCD IN RCRD: CPT | Mod: CPTII,S$GLB,, | Performed by: INTERNAL MEDICINE

## 2024-03-11 PROCEDURE — 3008F BODY MASS INDEX DOCD: CPT | Mod: CPTII,S$GLB,, | Performed by: INTERNAL MEDICINE

## 2024-03-11 PROCEDURE — 90632 HEPA VACCINE ADULT IM: CPT | Mod: S$GLB,,, | Performed by: INTERNAL MEDICINE

## 2024-03-11 RX ORDER — SUCRALFATE 1 G/1
1 TABLET ORAL 4 TIMES DAILY
Qty: 360 TABLET | Refills: 1 | Status: ON HOLD | OUTPATIENT
Start: 2024-03-11 | End: 2024-06-07

## 2024-03-11 RX ORDER — SUCRALFATE 1 G/1
1 TABLET ORAL 4 TIMES DAILY
COMMUNITY
End: 2024-03-11 | Stop reason: SDUPTHER

## 2024-03-11 RX ORDER — PANTOPRAZOLE SODIUM 40 MG/1
40 TABLET, DELAYED RELEASE ORAL 2 TIMES DAILY
Qty: 180 TABLET | Refills: 2 | Status: SHIPPED | OUTPATIENT
Start: 2024-03-11

## 2024-03-11 NOTE — PATIENT INSTRUCTIONS
- continue skyrizi every 8 weeks  - pouchoscopy and ileoscopy 6/2024 - will discuss with Dr. Doyle   - get tetanus shot at local pharmacy   - hepatitis A vaccine today - second dose in 6-12 months - with next visit   - follow up 6 months

## 2024-03-11 NOTE — PROGRESS NOTES
[facility]          Inflammatory Bowel Disease Follow Up Note         TODAY'S VISIT DATE:  3/12/2024    Reason for Consult:    Chief Complaint   Patient presents with    Crohn's Disease       PCP: Kofi Winkler      Referring MD:   No ref. provider found    History of Present Illness:  Nyasia Middleton who is a 43 y.o. female is being seen today at the [facility] on 03/12/2024 for inflammatory bowel disease- Crohn's disease.  Since the last office visit in August 2023, pt continued skyrizi every 8 weeks. Repeat labs in 9/2023 showed borderline normal stool ethan at 56. Today she reports doing well. Currently changing her ileostomy bag 6-10 times a day. Reports loose to soft brown stool without blood or mucus. Patient continues to notice mucus discharge from the rectum. Some days she has more mucus than other days. Denies any missed or delayed doses of skyrizi, her next dose due on 4/9. She had one episode of some itchiness on the injection site but resolved on its own. She reports ongoing heartburn symptoms, that tend to respond well to Protonix and Carafate. She is still unsure regarding decision for ileostomy takedown vs. permanent ileostomy.     IBD History:  In 2010 she had an episode of C diff colitis in March and was treated with oral vancomycin.  This was while she was pregnant.  She continued to have issues with diarrhea and eventually was tested negative for C diff and was diagnosed with ulcerative colitis around May of 2010 after she delivered.  The 1st notes I have to review are from July of 2010 when she presented with ongoing rectal pain, blood in the stools, diarrhea.  At that time it was noted that she was taking prednisone and Asacol 2400 mg daily.  She had a colonoscopy on July 16, 2010 that showed severe inflammation throughout the entire colon.  It does not appear that the terminal ileum was intubated during this procedure.   Biopsies were taken but I do not have those results.  She reports that she received infliximab in the late summer of 2010.  She can not remember how many doses she received but she thinks that was only 1 or 2 doses.  She does not recall having a significant improvement and in October of 2010 she underwent laparoscopic total abdominal colectomy and end ileostomy.  On January 4, 2011 she underwent completion proctectomy, J pouch formation, and diverting loop ileostomy.  In February 2011 she underwent loop ileostomy closure.  A follow-up pouchoscopy was done in December of 2011 and demonstrated some mild stenosis of the ileoanal anastomosis but otherwise the pouch was normal appearing.  By June of 2012 she had developed a perianal abscess and underwent exam under anesthesia with incision and drainage in September of that year.  In February of 2013 a repeat pouchoscopy showed moderate inflammation of the pouch.  Biopsies demonstrated chronic inflammatory changes consistent with acute pouchitis.  Given the perianal abscess there was concern for underlying Crohn's disease.  The pouchitis was treated with Cipro.  In April of 2013 she underwent a repeat exam under anesthesia with fistulotomy.  In 2013 she continued to have ongoing issues and was started on Lialda.  She had multiple subsequent procedures for perianal fistula management.  She continued with treatment with antibiotics with minimal improvement.  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  It appears that she did fairly well between 2014 and 2016.  She became pregnant again in 2013 and then again in 2016 and reports that during her pregnancy she actually did quite well.  In 2016 she underwent a pouchoscopy which showed an anal stricture.  There was normal-appearing mucosa but the J pouch was felt to be small in size.  In November of 2016 she underwent an upper endoscopy for epigastric  pain and was notable for grade 1 esophagitis but otherwise normal stomach and duodenum.  Biopsies of the duodenum showed mild focal duodenal lymphocytosis but with normal villous architecture.  There was also chronic gastritis noted in the stomach.  Esophageal biopsies were normal.  In April 2017 she was noted to have 2 small openings in the perineum with purulence drainage.  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  In June of 2017 she underwent a repeat exam under anesthesia with ligation of an intersphincteric fistula tract.  She was eventually started on sulfasalazine for an inflammatory arthritis.  In October of 2017 and MRI of the pelvis showed inflammation near the ileoanal anastomosis but no other significant abnormalities were identified.  In April 2018 she underwent laparoscopic lysis of adhesions for abdominal pain.  In 2018 she had recurrence of perianal fistulas and was treated with antibiotics.  A pouchoscopy revealed 2 fistulas from the pouch to the vagina.  In June of 2019 a fistulogram was done at [facility] that showed no definite vaginal fistula.  In December of 2019 she had a repeat anal fistula repair.  At some point she was treated with Humira.  She does not recall ever having her dosing optimized but she was on this for several months and maybe even up to a year are more.  She does not recall having a significant improvement in any of her fistulas or gastrointestinal symptoms and eventually the medication was stopped because of the development of antibodies to Humira.  In March of 2020 it is noted that she was taking Stelara.  She does not recall when she started taking this but she took it for a little over a year.  She felt like of all the medication she was ever on this did provide some benefit.  Her fistula issues never resolved but she did have a  decrease in stool frequency and had more energy and an overall better sense of well-being.  She eventually stop this because of multiple issues with her insurance and frequent delays in issues with receiving her medication from the mail order pharmacy she was required to use.  She does remember that her dose was increased to every 4 weeks at some point and that provided significant benefit.  Notes from May of 2022 mention that she was planning to switch from Stelara to Entyvio. It appears that her perianal fistulas were treated with stem cells as well. In July 2022 she had a pouchoscopy that showed ileoanal anastomosis stenosis, perianal fistulas, and inflammation in the pouch. Kenalog was injected in to the anastomosis.  She was started on Entyvio and received the 1st 3 loading doses but never felt well taking this and actually felt like she was getting worse while taking it.  It was stopped in preparation for her surgery.  On 8/17/22 she underwent diverting loop ileostomy due to continued perianal fistulizing disease. A subsequent laparoscopy on 8/24 due to concern for ischemic bowel did not show any ischemia but did show some twisting of the ileostomy.  She also reports being on antibiotics intermittently that which made her feel poorly overall but did seem to decrease the frequency of bowel movements sometimes.  She has never been on probiotics.  She has never been on immunomodulator or methotrexate.  She was on budesonide at 1 point and reports that this did not provide any benefit.  She started Skyrizi October 27, 2022.  After starting this she had significant improvement in her perianal fistula and began gaining weight.  She felt significantly better overall.  In June 2023 she underwent a follow-up pouchoscopy. At that time her perianal fistula appeared significantly better.  There was an anal stricture noted as well as some hemorrhagic appearance to the rectal cuff.  There was some inflammatory changes in  the pouch in the pouch was small in size.  There was some mild narrowing of the pouch inlet but the ileum above that and the pouch inlet did not have any evidence of significant ulceration or inflammation.      IBD Details:  Dx Date:  2010  Disease type/distribution:  Initially diagnosed with ulcerative colitis, now with Crohn's like disease of the pouch complicated by perianal and anal vaginal fistulas.  Also with ileoanal stenosis and pouch inflammation in the distal pouch  Current Treatment:  Skyrizi 360 mg every 8 weeks Start Date:  October 27, 2022 Response:  Unknown  Optimized:  Not applicable Adverse reactions:  None  Prior surgeries:   2011-total proctocolectomy with 3 stage J pouch placement     Multiple incisions and drainage, advancement flaps, other treatments for perianal fistulas and anovaginal fistulas     2022-diverting loop ileostomy due to ongoing perianal Crohn's disease  CRP Elevation: Y  calprotectin: Unknown  Disease Complications:  Severe fistulizing disease of the J pouch  Extraintestinal manifestations:  Joint pains  Prior treatments:   Steroids:  Incomplete response to prednisone and budesonide  5ASA:  Incomplete response to Asacol and Lialda  IMM:  None  TNF Inh:  Infliximab-received 2 doses prior to proctocolectomy, no response, not optimized, no adverse reactions    Humira-took this for a while.  Does not optimized, no improvement clinically, developed antibodies   Anti-Integrin:  Received 3 loading doses of Entyvio.  No improvement in symptoms, no maintenance dose, not optimized   IL 12/23:   Stelara-took this for over a year with dose optimization to every 4 week dosing.  Improvement in GI symptoms of diarrhea but no resolution of perianal fistulizing disease-no adverse reactions, stopped due to   difficulties with insurance   Skyrizi-current medication  HATTIE Inh:  None    Previous Clinical Trials:  None    Last Colonoscopy:   June 2023-pouchoscopy-anal stenosis, improving perianal  "fistula, inflammation in the J pouch, mild narrowing of the pouch inlet  7/22-pouchoscopy with ileoanal anastomosis stenosis, perianal fistulas, inflammation in the pouch-Kenalog injected into the anastomosis    Other Endoscopies:  Previous EGD report reviewed from 2016    Imaging:   MRE:  None   CT:  CT scan from August 2022 reviewed, other CT scans also reviewed   Other:  Other pertinent studies reviewed    Pertinent Labs:  Lab Results   Component Value Date    SEDRATE 78 (H) 07/09/2018    CRP 1.8 09/14/2023     Lab Results   Component Value Date    TTGIGA 6 09/07/2022     09/07/2022     Lab Results   Component Value Date    TSH 0.723 06/22/2023    FREET4 1.01 03/14/2017     Lab Results   Component Value Date    AKWYCPTK94EA 28 (L) 03/09/2023    TENYXCEB47 852 09/07/2022     Lab Results   Component Value Date    HEPBSAG Non-reactive 09/14/2023    HEPBCAB Non-reactive 09/14/2023    HEPCAB Non-reactive 09/07/2022     Lab Results   Component Value Date    CKT36KKCG Non-reactive 09/07/2022     Lab Results   Component Value Date    NIL 0.10917 09/22/2023    MITOGENNIL 9.995 09/22/2023     Lab Results   Component Value Date    TPTMINTERP SEE BELOW 09/07/2022     Lab Results   Component Value Date    CDIFFICILEAN Negative 08/27/2022    CDIFFTOX Negative 08/27/2022     Lab Results   Component Value Date    CALPROTECTIN 56.9 (H) 09/14/2023       Therapeutic Drug Monitoring Labs:  No results found for: "PROMETH"  No results found for: "ANSADAINIT", "INFLIXIMAB", "INFLIXINTERP"    Vaccinations:  No results found for: "HEPBSAB"  Lab Results   Component Value Date    HEPAIGG Non-reactive 09/07/2022     Lab Results   Component Value Date    VARICELLAZOS 3.25 (H) 09/07/2022    VARICELLAINT Positive (A) 09/07/2022     Immunization History   Administered Date(s) Administered    COVID-19, MRNA, LN-S, PF (MODERNA FULL 0.5 ML DOSE) 01/20/2021, 02/19/2021    Hepatitis A, Adult 03/11/2024    Hepatitis B, Pediatric/Adolescent " 2003, 2003, 2004    Influenza 09/15/2014, 2019, 2020    Influenza - Quadrivalent 2019, 2020, 2021    Influenza - Quadrivalent - PF *Preferred* (6 months and older) 2023    Influenza A (H1N1) 2009 Monovalent - IM - PF 2009    MMR 1983, 1997    Pneumococcal Conjugate - 20 Valent 2023    Td (ADULT) 1997, 1997    Zoster Recombinant 2023, 2023         Review of Systems  Review of Systems   Constitutional:  Negative for chills, fever and weight loss.   HENT:  Negative for sore throat.    Eyes:  Negative for pain, discharge and redness.   Respiratory:  Negative for cough, shortness of breath and wheezing.    Cardiovascular:  Negative for chest pain, orthopnea and leg swelling.   Gastrointestinal:  Negative for abdominal pain, blood in stool, constipation, diarrhea, heartburn, melena, nausea and vomiting.   Genitourinary:  Negative for dysuria, frequency and urgency.   Musculoskeletal:  Negative for back pain, joint pain and myalgias.   Skin:  Negative for itching and rash.   Neurological:  Negative for focal weakness and seizures.   Endo/Heme/Allergies:  Does not bruise/bleed easily.   Psychiatric/Behavioral:  Negative for depression. The patient is not nervous/anxious.        Medical History:   Past Medical History:   Diagnosis Date    Abnormal Pap smear of cervix     Anxiety     Crohn disease     GERD (gastroesophageal reflux disease)     History of Clostridium difficile infection     Perineal sinus 2017       Surgical History:  Past Surgical History:   Procedure Laterality Date     SECTION, CLASSIC      DIAGNOSTIC LAPAROSCOPY N/A 2022    Procedure: LAPAROSCOPY, DIAGNOSTIC;  Surgeon: CORETTA Doyle MD;  Location: [facility] OR [address] [place] [address];  Service: Colon and Rectal;  Laterality: N/A;    FLEXIBLE SIGMOIDOSCOPY N/A 2018    Procedure: SIGMOIDOSCOPY, FLEXIBLE;  Surgeon: Jairo Peralta MD;  Location:  [facility] ENDO (4TH FLR);  Service: Endoscopy;  Laterality: N/A;  [place] [facility]    FLEXIBLE SIGMOIDOSCOPY N/A 08/17/2022    Procedure: SIGMOIDOSCOPY, FLEXIBLE;  Surgeon: CORETTA Doyle MD;  Location: [facility] OR 2ND FLR;  Service: Colon and Rectal;  Laterality: N/A;    HYSTERECTOMY      LSH    ILEOSTOMY CLOSURE      LAPAROSCOPIC ILEOSTOMY N/A 08/17/2022    Procedure: CREATION, ILEOSTOMY, LAPAROSCOPIC;  Surgeon: CORETTA Doyle MD;  Location: [facility] OR 2ND FLR;  Service: Colon and Rectal;  Laterality: N/A;    POUCHOSCOPY N/A 11/06/2018    Procedure: ENDOSCOPY, SMALL INTESTINAL POUCH, DIAGNOSTIC, possible seton placement;  Surgeon: Jairo Peralta MD;  Location: [facility] ENDO (4TH FLR);  Service: Endoscopy;  Laterality: N/A;  Schedule early Nov 2018; No prep    POUCHOSCOPY N/A 6/16/2023    Procedure: ENDOSCOPY, POUCH, SMALL INTESTINE, DIAGNOSTIC;  Surgeon: CORETTA Doyle MD;  Location: [facility] ENDO (4TH FLR);  Service: Endoscopy;  Laterality: N/A;  inst via portal    rectovaginal fistula repair      RESTORATIVE PROCTOCOLECTOMY      TONSILLECTOMY      adenoids    TUBAL LIGATION      june 2016       Family History:   Family History   Problem Relation Age of Onset    No Known Problems Mother     No Known Problems Father     Crohn's disease Sister     Hypertension Maternal Grandmother     Colon cancer Maternal Grandfather     Breast cancer Neg Hx     Ovarian cancer Neg Hx     Esophageal cancer Neg Hx        Social History:   Social History     Tobacco Use    Smoking status: Former    Smokeless tobacco: Never   Substance Use Topics    Alcohol use: Yes     Comment: occ    Drug use: No       Allergies: Reviewed    Home Medications:   Medication List with Changes/Refills   New Medications    TEMAZEPAM (RESTORIL) 15 MG CAP    Take 1 capsule (15 mg total) by mouth nightly as needed (insmonia.). Do not take with xanax.   Current Medications    BUSPIRONE (BUSPAR) 10 MG TABLET    Take 1 tablet (10 mg total) by mouth 2  "(two) times daily as needed (anxiety).    FLUOXETINE 40 MG CAPSULE    Take 1 capsule (40 mg total) by mouth once daily.    SKYRIZI 360 MG/2.4 ML (150 MG/ML) INJT    INJECT 360 MG UNDER THE SKIN EVERY 8 WEEKS   Changed and/or Refilled Medications    Modified Medication Previous Medication    ALPRAZOLAM (XANAX) 0.5 MG TABLET ALPRAZolam (XANAX) 0.5 MG tablet       Take 1 tablet (0.5 mg total) by mouth 2 (two) times daily as needed for Anxiety. Do not take with restoril    Take 1 tablet (0.5 mg total) by mouth 2 (two) times daily as needed for Anxiety.    ONDANSETRON (ZOFRAN-ODT) 8 MG TBDL ondansetron (ZOFRAN-ODT) 8 MG TbDL       Take 1 tablet (8 mg total) by mouth every 12 (twelve) hours as needed (vomiting).    Take 1 tablet (8 mg total) by mouth every 12 (twelve) hours as needed (vomiting).    PANTOPRAZOLE (PROTONIX) 40 MG TABLET pantoprazole (PROTONIX) 40 MG tablet       Take 1 tablet (40 mg total) by mouth 2 (two) times daily.    Take 1 tablet (40 mg total) by mouth 2 (two) times daily.    SUCRALFATE (CARAFATE) 1 GRAM TABLET sucralfate (CARAFATE) 1 gram tablet       Take 1 tablet (1 g total) by mouth 4 (four) times daily.    Take 1 g by mouth 4 (four) times daily.   Discontinued Medications    TRAZODONE (DESYREL) 50 MG TABLET    Take 1 tablet (50 mg total) by mouth every evening. For sleep.       Physical Exam:  Vital Signs:  /71   Pulse 76   Temp 98.1 °F (36.7 °C) (Skin)   Ht 5' 8" (1.727 m)   Wt 70.7 kg (155 lb 13.8 oz)   LMP 07/14/2017   SpO2 99%   BMI 23.70 kg/m²   Body mass index is 23.7 kg/m².    Physical Exam  Vitals and nursing note reviewed.   Constitutional:       General: She is not in acute distress.     Appearance: Normal appearance. She is well-developed. She is not ill-appearing or toxic-appearing.   Eyes:      General: No scleral icterus.     Conjunctiva/sclera: Conjunctivae normal.      Pupils: Pupils are equal, round, and reactive to light.   Neck:      Thyroid: No thyromegaly. "   Cardiovascular:      Rate and Rhythm: Normal rate and regular rhythm.      Heart sounds: Normal heart sounds. No murmur heard.  Pulmonary:      Effort: Pulmonary effort is normal.      Breath sounds: Normal breath sounds. No wheezing or rales.   Abdominal:      General: Bowel sounds are normal. There is no distension.      Palpations: Abdomen is soft. There is no mass.      Tenderness: There is no abdominal tenderness.      Comments: Ileostomy in the right abdomen with yellowish brown stool   Musculoskeletal:         General: No tenderness. Normal range of motion.   Lymphadenopathy:      Cervical: No cervical adenopathy.   Skin:     Findings: No erythema or rash.   Neurological:      General: No focal deficit present.      Mental Status: She is alert and oriented to person, place, and time.   Psychiatric:         Mood and Affect: Mood normal.         Behavior: Behavior normal.         Thought Content: Thought content normal.         Judgment: Judgment normal.         Labs: reviewed and pertinent noted above    Assessment/Plan:  Nyasia Middleton is a 43 y.o. female with Crohn's like disease of the J pouch complicated by perianal and anal vaginal fistula formation. The following issues were addresssed:    1. Crohn's disease of small intestine with other complication    2. Gastroesophageal reflux disease, unspecified whether esophagitis present    3. Immunosuppression due to drug therapy    4. Ileostomy in place    5. Rectovaginal fistula        1. Crohn's disease of the pouch:  She continues to do pretty well on skyrizi 360mg SC every 8 weeks.  Her recent pouchoscopy from 6/2023 showed signs of improvement but she did have some ongoing inflammation. Recent stool calprotectin after her last OV was normal. From a symptoms perspective she is stable. She isn't ready to commit to pouch excision and end ileostomy as a permanent solution yet. The plan is to repeat a pouchoscopy in June 2024. Additionally, will discuss with  Dr. Doyle to do an ileoscopy and EGD on the same day as pouchoscopy. Findings from these will help decide the next steps regarding ileostomy take down vs. permanent ileostomy, and if there will be a need to change treatment. Patient up to date with labs at this time.     2. Acid reflux: She continues to report acid reflux which improves with protonix and carafate. Will continue the same treatment and plan to reassess with an EGD when pt gets scoped in 6/2024    3. Immunosuppression due to drug therapy: Serology labs indicate pt no longer immune to hepatitis A. Discussed and recommended havrix vaccine today. Will plan for second dose of the series with the next OV in 6-12 months. Reminded pt to establish care with dermatology for annual skin exams.    # IBD specific health maintenance:  Colon cancer surveillance:  Not applicable    Annual:  - Eye exam:  Not applicable  - Skin exam (if on IMM/TNF):  Discussed and recommended she establishes care with a dermatologist  - reminded pt to use sunblock/hats/sunprotective clothing  - PAP (if immunosuppressed):  Hysterectomy - last PAP 5/2023 - yearly     DEXA:  Not applicable    Vitamin D:  Check with next labs. Currently not on vit d supplementation     Vaccines:    Influenza:  Recommend annual vaccination. UTD   Pneumovax: UTD   HAV:  Not immune. Discussed and recommended 2 doses series 6-12 mo apart.     HBV:  Immune   Tdap:  Discussed and recommended booster now at local pharmacy. Repeat every 10 years.    MMR: Immune   VZV:  Immune   HZV:  UTD   HPV:  Not applicable   Meningococcus:  Not applicable   COVID: Eligible for booster.     Follow up: Follow up in about 6 months (around 9/11/2024).      I personally examined the patient and discussed above plan in collaboration with Marisol Doty, PharmD.        Thank you again for sending Nyasia Middleton to see Dr. Joselo Weber today at the [facility]. Please don't hesitate to contact   Gus if there are any questions regarding this evaluation, or if you have any other patients with inflammatory bowel disease for whom you would like a consultation. You can reach Dr. Weber at [phone] or by email at [email or web address]    Patrick Weber MD  Department of Gastroenterology  Inflammatory Bowel Disease

## 2024-03-12 ENCOUNTER — OFFICE VISIT (OUTPATIENT)
Dept: FAMILY MEDICINE | Facility: CLINIC | Age: 44
End: 2024-03-12
Payer: COMMERCIAL

## 2024-03-12 DIAGNOSIS — F41.1 GAD (GENERALIZED ANXIETY DISORDER): ICD-10-CM

## 2024-03-12 DIAGNOSIS — F51.01 PRIMARY INSOMNIA: Primary | ICD-10-CM

## 2024-03-12 PROCEDURE — 99214 OFFICE O/P EST MOD 30 MIN: CPT | Mod: 95,,, | Performed by: STUDENT IN AN ORGANIZED HEALTH CARE EDUCATION/TRAINING PROGRAM

## 2024-03-12 PROCEDURE — 1160F RVW MEDS BY RX/DR IN RCRD: CPT | Mod: CPTII,95,, | Performed by: STUDENT IN AN ORGANIZED HEALTH CARE EDUCATION/TRAINING PROGRAM

## 2024-03-12 PROCEDURE — 1159F MED LIST DOCD IN RCRD: CPT | Mod: CPTII,95,, | Performed by: STUDENT IN AN ORGANIZED HEALTH CARE EDUCATION/TRAINING PROGRAM

## 2024-03-12 RX ORDER — ALPRAZOLAM 0.5 MG/1
0.5 TABLET ORAL 2 TIMES DAILY PRN
Qty: 60 TABLET | Refills: 0 | Status: SHIPPED | OUTPATIENT
Start: 2024-03-12 | End: 2024-04-26 | Stop reason: SDUPTHER

## 2024-03-12 RX ORDER — ONDANSETRON 8 MG/1
8 TABLET, ORALLY DISINTEGRATING ORAL EVERY 12 HOURS PRN
Qty: 20 TABLET | Refills: 2 | Status: SHIPPED | OUTPATIENT
Start: 2024-03-12

## 2024-03-12 RX ORDER — TEMAZEPAM 15 MG/1
15 CAPSULE ORAL NIGHTLY PRN
Qty: 30 CAPSULE | Refills: 2 | Status: SHIPPED | OUTPATIENT
Start: 2024-03-12

## 2024-03-12 NOTE — PROGRESS NOTES
Primary Care Virtual Visit    The patient location is: [place], [place]  The chief complaint leading to consultation is:  follow up anxiety medicine  Visit type: Virtual visit with synchronous audio and video  Total time spent with patient:  15 minutes  Each patient to whom he or she provides medical services by telemedicine is:  (1) informed of the relationship between the physician and patient and the respective role of any other health care provider with respect to management of the patient; and (2) notified that he or she may decline to receive medical services by telemedicine and may withdraw from such care at any time.     Patient ID: Nyasia Middleton is a 43 y.o. female.       HPI   Patient presents for follow up after starting prozac. She reports anxiety is much better controlled. She is taking buspar a few times per week as needed. She is also taking xanax approximately 2 times per week. She has been taking trazodone to help her sleep. She is still having trouble falling asleep and staying asleep. She has increased her dosage to 100mg without resolution. She would like to change medications.       She would like a refill of zofran    Review of Systems  Review of Systems   Constitutional:  Negative for fever.   HENT:  Negative for ear pain, hearing loss and sinus pain.    Eyes:  Negative for discharge.   Respiratory:  Negative for cough, shortness of breath and wheezing.    Cardiovascular:  Negative for chest pain, palpitations and leg swelling.   Gastrointestinal:  Negative for blood in stool, constipation, diarrhea, nausea and vomiting.   Genitourinary:  Negative for dysuria, hematuria and urgency.   Musculoskeletal:  Negative for myalgias and neck pain.   Skin:  Negative for rash.   Neurological:  Negative for weakness and headaches.   Endo/Heme/Allergies:  Negative for polydipsia.   Psychiatric/Behavioral:  Negative for depression.    All other systems reviewed and are negative.      Currently  Medications  Current Outpatient Medications on File Prior to Visit   Medication Sig Dispense Refill    busPIRone (BUSPAR) 10 MG tablet Take 1 tablet (10 mg total) by mouth 2 (two) times daily as needed (anxiety). 180 tablet 1    FLUoxetine 40 MG capsule Take 1 capsule (40 mg total) by mouth once daily. 90 capsule 1    pantoprazole (PROTONIX) 40 MG tablet Take 1 tablet (40 mg total) by mouth 2 (two) times daily. 180 tablet 2    SKYRIZI 360 mg/2.4 mL (150 mg/mL) Injt INJECT 360 MG UNDER THE SKIN EVERY 8 WEEKS 2.4 mL 0    sucralfate (CARAFATE) 1 gram tablet Take 1 tablet (1 g total) by mouth 4 (four) times daily. (Patient taking differently: Take 1 g by mouth 4 (four) times daily. Takes once at night) 360 tablet 1    [DISCONTINUED] ALPRAZolam (XANAX) 0.5 MG tablet Take 1 tablet (0.5 mg total) by mouth 2 (two) times daily as needed for Anxiety. 60 tablet 0    [DISCONTINUED] ondansetron (ZOFRAN-ODT) 8 MG TbDL Take 1 tablet (8 mg total) by mouth every 12 (twelve) hours as needed (vomiting). 20 tablet 2    [DISCONTINUED] traZODone (DESYREL) 50 MG tablet Take 1 tablet (50 mg total) by mouth every evening. For sleep. (Patient not taking: Reported on 3/11/2024) 30 tablet 11     No current facility-administered medications on file prior to visit.       Physical  Exam  There were no vitals filed for this visit.   Physical Exam  Constitutional:       Appearance: Normal appearance.   HENT:      Head: Normocephalic and atraumatic.   Eyes:      Extraocular Movements: Extraocular movements intact.   Skin:     Coloration: Skin is not pale.   Neurological:      Mental Status: She is alert and oriented to person, place, and time.   Psychiatric:         Mood and Affect: Mood normal.         Labs:    Complete Blood Count  Lab Results   Component Value Date    RBC 4.30 03/08/2024    HGB 13.5 03/08/2024    HCT 39.8 03/08/2024    MCV 93 03/08/2024    MCH 31.4 (H) 03/08/2024    MCHC 33.9 03/08/2024    RDW 13.1 03/08/2024      "03/08/2024    MPV 9.5 03/08/2024    GRAN 5.4 03/08/2024    GRAN 64.6 03/08/2024    LYMPH 1.6 03/08/2024    LYMPH 18.7 03/08/2024    MONO 0.7 03/08/2024    MONO 8.1 03/08/2024    EOS 0.5 03/08/2024    BASO 0.15 03/08/2024    EOSINOPHIL 6.3 03/08/2024    BASOPHIL 1.8 03/08/2024    DIFFMETHOD Automated 03/08/2024       Comprehensive Metabolic Panel  Lab Results   Component Value Date    GLU 94 03/08/2024    BUN 14 03/08/2024    CREATININE 0.79 03/08/2024     03/08/2024    K 3.7 03/08/2024     03/08/2024    PROT 7.6 03/08/2024    ALBUMIN 4.4 03/08/2024    BILITOT 1.6 (H) 03/08/2024    AST 34 03/08/2024    ALKPHOS 71 03/08/2024    CO2 27 03/08/2024    ALT 29 03/08/2024    ANIONGAP 11 03/08/2024       TSH  No results found for: "TSH"    Imaging:  Mammo Digital Screening Bilat w/ Abe  Narrative: Result:  Mammo Digital Screening Bilat w/ Abe    History:  Patient is 42 y.o. and is seen for a screening mammogram.    Films Compared:  Compared to: 07/14/2022 Mammo Digital Screening Bilat w/ Abe     Findings:   This procedure was performed using tomosynthesis.   Computer-aided detection was utilized in the interpretation of this   examination.    The breasts have scattered areas of fibroglandular density. There is no   evidence of suspicious masses, microcalcifications or architectural   distortion.  Impression:    No mammographic evidence of malignancy.    BI-RADS Category 1: Negative    Recommendation:  Routine screening mammogram in 1 year is recommended.    Your estimated lifetime risk of breast cancer (to age 85) based on   Tyrer-Cuzick risk assessment model is 12.45 %.  According to the American   Cancer Society, patients with a lifetime breast cancer risk of 20% or   higher might benefit from supplemental screening tests. ??       Assessment/Plan:    1. Primary insomnia  -     temazepam (RESTORIL) 15 mg Cap; Take 1 capsule (15 mg total) by mouth nightly as needed (insmonia.). Do not take with xanax.  " Dispense: 30 capsule; Refill: 2    2. MARION (generalized anxiety disorder)  -     ALPRAZolam (XANAX) 0.5 MG tablet; Take 1 tablet (0.5 mg total) by mouth 2 (two) times daily as needed for Anxiety. Do not take with restoril  Dispense: 60 tablet; Refill: 0    Other orders  -     ondansetron (ZOFRAN-ODT) 8 MG TbDL; Take 1 tablet (8 mg total) by mouth every 12 (twelve) hours as needed (vomiting).  Dispense: 20 tablet; Refill: 2          Discussed not to take xanax and restoril at the same time    Kofi Winkler MD      Answers submitted by the patient for this visit:  Review of Systems Questionnaire (Submitted on 3/12/2024)  activity change: No  unexpected weight change: No  rhinorrhea: No  trouble swallowing: No  visual disturbance: No  chest tightness: No  polyuria: No  difficulty urinating: No  menstrual problem: No  joint swelling: No  arthralgias: No  confusion: No  dysphoric mood: No

## 2024-03-13 ENCOUNTER — TELEPHONE (OUTPATIENT)
Dept: ENDOSCOPY | Facility: HOSPITAL | Age: 44
End: 2024-03-13
Payer: COMMERCIAL

## 2024-03-13 DIAGNOSIS — K21.9 GASTROESOPHAGEAL REFLUX DISEASE, UNSPECIFIED WHETHER ESOPHAGITIS PRESENT: Primary | ICD-10-CM

## 2024-03-13 NOTE — TELEPHONE ENCOUNTER
"----- Message from Zofia Mccabe sent at 3/13/2024  7:16 AM CDT -----    ----- Message -----  From: Patrick Weber MD  Sent: 3/12/2024   1:43 PM CDT  To: [facility] Patients    Procedure:  EGD, ileoscopy, pouchoscopy    Diagnosis: GERD and Crohn's disease    Procedure Timin-12 weeks (around 2024)    #If within 4 weeks selected, please rick as high priority#    #If greater than 12 weeks, please select "4-12 weeks" and delay sending until 2 months prior to requested date#     Provider: Myself-needs to be scheduled on a day when Dr. Doyle is available in endoscopy    Location: [address]    Additional Scheduling Information: No scheduling concerns    Prep Specifications:  Half MiraLax prep for the ileoscopy and an enema for pouchoscopy    Have you attached a patient to this message: Yes       "

## 2024-03-13 NOTE — TELEPHONE ENCOUNTER
Spoke to pt to schedule procedure(s) Pouchoscopy Ileoscopy and EGD       Physician to perform procedure(s) Dr. MIKHAIL Weber  Date of Procedure (s) 6/7/24  Arrival Time 9:00 AM  Time of Procedure(s) 10:00 AM   Location of Procedure(s) [place] 4th Floor  Type of Rx Prep sent to patient: Miralax  Instructions provided to patient via [facility]    Patient was informed on the following information and verbalized understanding. Screening questionnaire reviewed with patient and complete. If procedure requires anesthesia, a responsible adult needs to be present to accompany the patient home, patient cannot drive after receiving anesthesia. Appointment details are tentative, especially check-in time. Patient will receive a prep-op call 7 days prior to confirm check-in time for procedure. If applicable the patient should contact their pharmacy to verify Rx for procedure prep is ready for pick-up. Patient was advised to call the scheduling department at [phone] if pharmacy states no Rx is available. Patient was advised to call the endoscopy scheduling department if any questions or concerns arise.      [facility] Scheduling Department

## 2024-03-30 ENCOUNTER — PATIENT MESSAGE (OUTPATIENT)
Dept: GASTROENTEROLOGY | Facility: CLINIC | Age: 44
End: 2024-03-30
Payer: COMMERCIAL

## 2024-04-01 DIAGNOSIS — K50.013 CROHN'S DISEASE OF SMALL INTESTINE WITH FISTULA: ICD-10-CM

## 2024-04-01 RX ORDER — RISANKIZUMAB-RZAA 360 MG/2.4
360 WEARABLE INJECTOR SUBCUTANEOUS
Qty: 2.4 ML | Refills: 4 | Status: SHIPPED | OUTPATIENT
Start: 2024-04-01 | End: 2024-04-04 | Stop reason: SDUPTHER

## 2024-04-01 NOTE — TELEPHONE ENCOUNTER
"IBD medications Skyrizi 360 mg Q 8 weeks    Refill request for Skyrizi 360 mg Q 8 weeks    Allergies reviewed Yes    Drug Monitoring labs/frequency for all IBD meds:    CBC / CMP Q 6 months  TB Quantiferon yearly  HBsAg / HBcAb yearly    Lab Results   Component Value Date    HEPBSAG Non-reactive 09/14/2023    HEPBCAB Non-reactive 09/14/2023     Lab Results   Component Value Date    TBGOLDPLUS Negative 09/22/2023     No results found for: "QUANTNILVALU", "QUANTIFERON", "QUANTTBGDPL"   Lab Results   Component Value Date    QIRQKEEM15BK 28 (L) 03/09/2023    NWYVICUJ38 852 09/07/2022     Lab Results   Component Value Date    WBC 8.39 03/08/2024    HGB 13.5 03/08/2024    HCT 39.8 03/08/2024    MCV 93 03/08/2024     03/08/2024     Lab Results   Component Value Date    CREATININE 0.79 03/08/2024    ALBUMIN 4.4 03/08/2024    BILITOT 1.6 (H) 03/08/2024    ALKPHOS 71 03/08/2024    AST 34 03/08/2024    ALT 29 03/08/2024       Lab due date (mo/yr):  9/2024    Labs scheduled: no     Next appt: 9/30/2024    RX refill sent to provider for amount until next labs: Yes     "

## 2024-04-04 DIAGNOSIS — K50.013 CROHN'S DISEASE OF SMALL INTESTINE WITH FISTULA: ICD-10-CM

## 2024-04-04 RX ORDER — RISANKIZUMAB-RZAA 360 MG/2.4
360 WEARABLE INJECTOR SUBCUTANEOUS
Qty: 2.4 ML | Refills: 4 | Status: SHIPPED | OUTPATIENT
Start: 2024-04-04

## 2024-04-26 DIAGNOSIS — F41.1 GAD (GENERALIZED ANXIETY DISORDER): ICD-10-CM

## 2024-04-26 NOTE — TELEPHONE ENCOUNTER
No care due was identified.  [facility] Embedded Care Due Messages. Reference number: [id number].   4/26/2024 10:14:28 AM CDT

## 2024-05-01 RX ORDER — ALPRAZOLAM 0.5 MG/1
0.5 TABLET ORAL 2 TIMES DAILY PRN
Qty: 60 TABLET | Refills: 0 | Status: SHIPPED | OUTPATIENT
Start: 2024-05-01

## 2024-05-31 ENCOUNTER — TELEPHONE (OUTPATIENT)
Dept: ENDOSCOPY | Facility: HOSPITAL | Age: 44
End: 2024-05-31
Payer: COMMERCIAL

## 2024-05-31 ENCOUNTER — PATIENT MESSAGE (OUTPATIENT)
Dept: ENDOSCOPY | Facility: HOSPITAL | Age: 44
End: 2024-05-31
Payer: COMMERCIAL

## 2024-05-31 NOTE — TELEPHONE ENCOUNTER
Confirmed egd/ileoscopy appt for 6/7/24 with pt. . Reviewed instructions pt denies taking blood thinning or glp1 medications.Confirmed ride home after procedure.Pt verbalized understanding. New set of instructions sent via portal-tb

## 2024-06-07 ENCOUNTER — HOSPITAL ENCOUNTER (OUTPATIENT)
Facility: HOSPITAL | Age: 44
Discharge: HOME | End: 2024-06-07
Attending: INTERNAL MEDICINE | Admitting: INTERNAL MEDICINE
Payer: COMMERCIAL

## 2024-06-07 ENCOUNTER — TELEPHONE (OUTPATIENT)
Dept: SURGERY | Facility: CLINIC | Age: 44
End: 2024-06-07
Payer: COMMERCIAL

## 2024-06-07 ENCOUNTER — PATIENT MESSAGE (OUTPATIENT)
Dept: SURGERY | Facility: CLINIC | Age: 44
End: 2024-06-07
Payer: COMMERCIAL

## 2024-06-07 ENCOUNTER — ANESTHESIA EVENT (OUTPATIENT)
Dept: ENDOSCOPY | Facility: HOSPITAL | Age: 44
End: 2024-06-07
Payer: COMMERCIAL

## 2024-06-07 ENCOUNTER — ANESTHESIA (OUTPATIENT)
Dept: ENDOSCOPY | Facility: HOSPITAL | Age: 44
End: 2024-06-07
Payer: COMMERCIAL

## 2024-06-07 VITALS
HEART RATE: 62 BPM | DIASTOLIC BLOOD PRESSURE: 53 MMHG | BODY MASS INDEX: 23.95 KG/M2 | WEIGHT: 158 LBS | OXYGEN SATURATION: 100 % | TEMPERATURE: 97 F | RESPIRATION RATE: 18 BRPM | HEIGHT: 68 IN | SYSTOLIC BLOOD PRESSURE: 105 MMHG

## 2024-06-07 DIAGNOSIS — K50.90 CROHN'S DISEASE: ICD-10-CM

## 2024-06-07 DIAGNOSIS — K21.9 GASTROESOPHAGEAL REFLUX DISEASE, UNSPECIFIED WHETHER ESOPHAGITIS PRESENT: ICD-10-CM

## 2024-06-07 PROCEDURE — 44380 SMALL BOWEL ENDOSCOPY BR/WA: CPT | Mod: ,,, | Performed by: INTERNAL MEDICINE

## 2024-06-07 PROCEDURE — 63600175 PHARM REV CODE 636 W HCPCS: Performed by: NURSE ANESTHETIST, CERTIFIED REGISTERED

## 2024-06-07 PROCEDURE — 25000003 PHARM REV CODE 250: Performed by: NURSE ANESTHETIST, CERTIFIED REGISTERED

## 2024-06-07 PROCEDURE — 88342 IMHCHEM/IMCYTCHM 1ST ANTB: CPT | Mod: 26,,, | Performed by: PATHOLOGY

## 2024-06-07 PROCEDURE — 44380 SMALL BOWEL ENDOSCOPY BR/WA: CPT | Mod: 59 | Performed by: INTERNAL MEDICINE

## 2024-06-07 PROCEDURE — 37000009 HC ANESTHESIA EA ADD 15 MINS: Performed by: INTERNAL MEDICINE

## 2024-06-07 PROCEDURE — 44385 ENDOSCOPY OF BOWEL POUCH: CPT | Performed by: COLON & RECTAL SURGERY

## 2024-06-07 PROCEDURE — 37000008 HC ANESTHESIA 1ST 15 MINUTES: Performed by: INTERNAL MEDICINE

## 2024-06-07 PROCEDURE — 43239 EGD BIOPSY SINGLE/MULTIPLE: CPT | Mod: ,,, | Performed by: INTERNAL MEDICINE

## 2024-06-07 PROCEDURE — 43239 EGD BIOPSY SINGLE/MULTIPLE: CPT | Performed by: INTERNAL MEDICINE

## 2024-06-07 PROCEDURE — 44385 ENDOSCOPY OF BOWEL POUCH: CPT | Mod: ,,, | Performed by: COLON & RECTAL SURGERY

## 2024-06-07 PROCEDURE — E9220 PRA ENDO ANESTHESIA: HCPCS | Mod: ,,, | Performed by: NURSE ANESTHETIST, CERTIFIED REGISTERED

## 2024-06-07 PROCEDURE — 88342 IMHCHEM/IMCYTCHM 1ST ANTB: CPT | Performed by: PATHOLOGY

## 2024-06-07 PROCEDURE — 88305 TISSUE EXAM BY PATHOLOGIST: CPT | Performed by: PATHOLOGY

## 2024-06-07 PROCEDURE — 88305 TISSUE EXAM BY PATHOLOGIST: CPT | Mod: 26,,, | Performed by: PATHOLOGY

## 2024-06-07 PROCEDURE — 27201012 HC FORCEPS, HOT/COLD, DISP: Performed by: INTERNAL MEDICINE

## 2024-06-07 RX ORDER — LIDOCAINE HYDROCHLORIDE 20 MG/ML
INJECTION INTRAVENOUS
Status: DISCONTINUED | OUTPATIENT
Start: 2024-06-07 | End: 2024-06-07

## 2024-06-07 RX ORDER — SUCRALFATE 1 G/1
1 TABLET ORAL
Qty: 120 TABLET | Refills: 5 | Status: SHIPPED | OUTPATIENT
Start: 2024-06-07

## 2024-06-07 RX ORDER — PROPOFOL 10 MG/ML
VIAL (ML) INTRAVENOUS CONTINUOUS PRN
Status: DISCONTINUED | OUTPATIENT
Start: 2024-06-07 | End: 2024-06-07

## 2024-06-07 RX ADMIN — PROPOFOL 200 MCG/KG/MIN: 10 INJECTION, EMULSION INTRAVENOUS at 09:06

## 2024-06-07 RX ADMIN — PROPOFOL 30 MG: 10 INJECTION, EMULSION INTRAVENOUS at 09:06

## 2024-06-07 RX ADMIN — SODIUM CHLORIDE: 0.9 INJECTION, SOLUTION INTRAVENOUS at 09:06

## 2024-06-07 RX ADMIN — LIDOCAINE HYDROCHLORIDE 100 MG: 20 INJECTION INTRAVENOUS at 09:06

## 2024-06-07 NOTE — ANESTHESIA POSTPROCEDURE EVALUATION
Anesthesia Post Evaluation    Patient: Nyasia Middleton    Procedure(s) Performed: Procedure(s) (LRB):  ILEOSCOPY (N/A)  ENDOSCOPY, POUCH, SMALL INTESTINE, DIAGNOSTIC (N/A)  EGD (ESOPHAGOGASTRODUODENOSCOPY) (N/A)    Final Anesthesia Type: general      Patient location during evaluation: PACU  Patient participation: Yes- Able to Participate  Level of consciousness: awake and alert  Post-procedure vital signs: reviewed and stable  Pain management: adequate  Airway patency: patent    PONV status at discharge: No PONV  Anesthetic complications: no      Cardiovascular status: hemodynamically stable  Respiratory status: spontaneous ventilation  Follow-up not needed.              Vitals Value Taken Time   /53 06/07/24 1038   Temp 36.3 °C (97.3 °F) 06/07/24 1009   Pulse 62 06/07/24 1038   Resp 18 06/07/24 1038   SpO2 100 % 06/07/24 1038         Event Time   Out of Recovery 10:39:59         Pain/George Score: George Score: 10 (6/7/2024 10:38 AM)

## 2024-06-07 NOTE — H&P
Short Stay Endoscopy History and Physical    PCP - Kofi Winkler MD    Procedure - EGD and Ileoscopy/pouchoscopy  ASA - 2  Mallampati - per anesthesia  History of Anesthesia problems - no  Family history Anesthesia problems -  no     HPI:  This is a 43 y.o. female here for evaluation of :     EGD  Reflux - yes  Dysphagia - no  Abdominal pain - no  Diarrhea - no  Anemia - no  GI bleeding - no  Other - no    Ileoscopy/pouchoscopy  Screening - no  History of polyps - no  Diarrhea - no  Anemia - no  Blood in stools - no  Abdominal pain - no  Other - Crohn's disease    ROS:  CONSTITUTIONAL: Denies weight change,  fatigue, fevers, chills, night sweats.  CARDIOVASCULAR: Denies chest pain, shortness of breath, orthopnea and edema.  RESPIRATORY: Denies cough, hemoptysis, dyspnea, and wheezing.  GI: See HPI.    Medical History:   Past Medical History:   Diagnosis Date    Abnormal Pap smear of cervix     Anxiety     Crohn disease     GERD (gastroesophageal reflux disease)     History of Clostridium difficile infection     Perineal sinus 2017       Surgical History:   Past Surgical History:   Procedure Laterality Date     SECTION, CLASSIC      DIAGNOSTIC LAPAROSCOPY N/A 2022    Procedure: LAPAROSCOPY, DIAGNOSTIC;  Surgeon: CORETTA Doyle MD;  Location: [address];  Service: Colon and Rectal;  Laterality: N/A;    FLEXIBLE SIGMOIDOSCOPY N/A 2018    Procedure: SIGMOIDOSCOPY, FLEXIBLE;  Surgeon: Jairo Peralta MD;  Location: [address]);  Service: Endoscopy;  Laterality: N/A;  no enemas per [facility]    FLEXIBLE SIGMOIDOSCOPY N/A 2022    Procedure: SIGMOIDOSCOPY, FLEXIBLE;  Surgeon: CORETTA Doyle MD;  Location: [address];  Service: Colon and Rectal;  Laterality: N/A;    HYSTERECTOMY      LSH    ILEOSTOMY CLOSURE      LAPAROSCOPIC ILEOSTOMY N/A 2022    Procedure: CREATION, ILEOSTOMY, LAPAROSCOPIC;  Surgeon: CORETTA Doyle MD;  Location: [address]  FLR;  Service: Colon and Rectal;  Laterality: N/A;    POUCHOSCOPY N/A 11/06/2018    Procedure: ENDOSCOPY, SMALL INTESTINAL POUCH, DIAGNOSTIC, possible seton placement;  Surgeon: Jairo Peralta MD;  Location: [facility] ENDO (4TH FLR);  Service: Endoscopy;  Laterality: N/A;  Schedule early Nov 2018; No prep    POUCHOSCOPY N/A 6/16/2023    Procedure: ENDOSCOPY, POUCH, SMALL INTESTINE, DIAGNOSTIC;  Surgeon: CORETTA Doyle MD;  Location: [facility] ENDO (4TH FLR);  Service: Endoscopy;  Laterality: N/A;  inst via portal    rectovaginal fistula repair      RESTORATIVE PROCTOCOLECTOMY      TONSILLECTOMY      adenoids    TUBAL LIGATION      june 2016       Family History:   Family History   Problem Relation Name Age of Onset    No Known Problems Mother      No Known Problems Father      Crohn's disease Sister      Hypertension Maternal Grandmother      Colon cancer Maternal Grandfather      Breast cancer Neg Hx      Ovarian cancer Neg Hx      Esophageal cancer Neg Hx         Social History:   Social History     Tobacco Use    Smoking status: Former    Smokeless tobacco: Never   Substance Use Topics    Alcohol use: Yes     Comment: occ    Drug use: No       Allergies: Reviewed    Medications:   No current facility-administered medications on file prior to encounter.     Current Outpatient Medications on File Prior to Encounter   Medication Sig Dispense Refill    FLUoxetine 40 MG capsule Take 1 capsule (40 mg total) by mouth once daily. 90 capsule 1    ondansetron (ZOFRAN-ODT) 8 MG TbDL Take 1 tablet (8 mg total) by mouth every 12 (twelve) hours as needed (vomiting). 20 tablet 2    pantoprazole (PROTONIX) 40 MG tablet Take 1 tablet (40 mg total) by mouth 2 (two) times daily. 180 tablet 2    sucralfate (CARAFATE) 1 gram tablet Take 1 tablet (1 g total) by mouth 4 (four) times daily. (Patient taking differently: Take 1 g by mouth 4 (four) times daily. Takes once at night) 360 tablet 1    temazepam (RESTORIL) 15 mg Cap Take 1 capsule  (15 mg total) by mouth nightly as needed (insmonia.). Do not take with xanax. 30 capsule 2    busPIRone (BUSPAR) 10 MG tablet Take 1 tablet (10 mg total) by mouth 2 (two) times daily as needed (anxiety). 180 tablet 1       Physical Exam:  Vital Signs:   Vitals:    06/07/24 0907   BP: 124/66   Pulse: 74   Resp: 18   Temp: 97.7 °F (36.5 °C)     General Appearance: Well appearing in no acute distress  ENT: OP clear  Chest: CTA B  CV: RRR, no m/r/g  Abd: s/nt/nd/nabs  Ext: no edema    Labs:Reviewed    Plan:   I have explained the risks and benefits of upper endoscopy and Ileoscopy/pouchoscopy to the patient including but not limited to bleeding, perforation, infection, and death. The patient wishes to proceed.

## 2024-06-07 NOTE — ANESTHESIA PREPROCEDURE EVALUATION
06/07/2024  Nyasia Middleton is a 43 y.o., female.      Pre-op Assessment    I have reviewed the Patient Summary Reports.     I have reviewed the Nursing Notes. I have reviewed the NPO Status.      Review of Systems  Anesthesia Hx:             Denies Family Hx of Anesthesia complications.    Denies Personal Hx of Anesthesia complications.                    Hepatic/GI:     GERD      Bowel Conditions:         Psych:  Psychiatric History                Physical Exam  General: Well nourished, Cooperative, Alert and Oriented    Airway:  Mallampati: II   Mouth Opening: Normal  TM Distance: Normal  Tongue: Normal  Neck ROM: Normal ROM    Dental:  Intact    Anesthesia Plan  Type of Anesthesia, risks & benefits discussed:    Anesthesia Type: Gen Natural Airway  Intra-op Monitoring Plan: Standard ASA Monitors  Post Op Pain Control Plan: multimodal analgesia  Induction:  IV  Informed Consent: Informed consent signed with the Patient and all parties understand the risks and agree with anesthesia plan.  All questions answered.   ASA Score: 2  Day of Surgery Review of History & Physical: H&P Update referred to the surgeon/provider.I have interviewed and examined the patient. I have reviewed the patient's H&P dated:     Ready For Surgery From Anesthesia Perspective.   .

## 2024-06-07 NOTE — TELEPHONE ENCOUNTER
Left voicemail with callback number regarding upcoming appointment with Dr. Doyle. Instructed to call back to discuss.

## 2024-06-07 NOTE — [employer] PATIENT INSTRUCTIONS
Discharge Summary/Instructions after an Endoscopic Procedure  Patient Name: Nyasia Middleton  Patient MRN: [id number]  Patient YOB: 1980 Friday, June 7, 2024  Suresh Doyle MD  Dear patient,  As a result of recent federal legislation (The Federal Cures Act), you may   receive lab or pathology results from your procedure in your [facility]   account before your physician is able to contact you. Your physician or   their representative will relay the results to you with their   recommendations at their soonest availability.  Thank you,  RESTRICTIONS:  During your procedure today, you received medications for sedation.  These   medications may affect your judgment, balance and coordination.  Therefore,   for 24 hours, you have the following restrictions:   - DO NOT drive a car, operate machinery, make legal/financial decisions,   sign important papers or drink alcohol.    ACTIVITY:  Today: no heavy lifting, straining or running due to procedural   sedation/anesthesia.  The following day: return to full activity including work.  DIET:  Eat and drink normally unless instructed otherwise.     TREATMENT FOR COMMON SIDE EFFECTS:  - Mild abdominal pain, nausea, belching, bloating or excessive gas:  rest,   eat lightly and use a heating pad.  - Sore Throat: treat with throat lozenges and/or gargle with warm salt   water.  - Because air was used during the procedure, expelling large amounts of air   from your rectum or belching is normal.  - If a bowel prep was taken, you may not have a bowel movement for 1-3 days.    This is normal.  SYMPTOMS TO WATCH FOR AND REPORT TO YOUR PHYSICIAN:  1. Abdominal pain or bloating, other than gas cramps.  2. Chest pain.  3. Back pain.  4. Signs of infection such as: chills or fever occurring within 24 hours   after the procedure.  5. Rectal bleeding, which would show as bright red, maroon, or black stools.   (A tablespoon of blood from the rectum is not serious, especially if    hemorrhoids are present.)  6. Vomiting.  7. Weakness or dizziness.  GO DIRECTLY TO THE NEAREST EMERGENCY ROOM IF YOU HAVE ANY OF THE FOLLOWING:      Difficulty breathing              Chills and/or fever over 101 F   Persistent vomiting and/or vomiting blood   Severe abdominal pain   Severe chest pain   Black, tarry stools   Bleeding- more than one tablespoon   Any other symptom or condition that you feel may need urgent attention  Your doctor recommends these additional instructions:  If any biopsies were taken, your doctors clinic will contact you in 1 to 2   weeks with any results.  - Discharge patient to home (ambulatory).   - Repeat post-surgical lower GI endoscopy in 1 year for surveillance.  For questions, problems or results please call your physician - Suresh Doyle MD at Work:  [phone].  [facility] EMERGENCY ROOM PHONE NUMBER: [phone]  IF A COMPLICATION OR EMERGENCY SITUATION ARISES AND YOU ARE UNABLE TO REACH   YOUR PHYSICIAN - GO DIRECTLY TO THE EMERGENCY ROOM.  Suresh Doyle MD  6/7/2024 10:01:49 AM  This report has been verified and signed electronically.  Dear patient,  As a result of recent federal legislation (The Federal Cures Act), you may   receive lab or pathology results from your procedure in your [facility]   account before your physician is able to contact you. Your physician or   their representative will relay the results to you with their   recommendations at their soonest availability.  Thank you,  PROVATION

## 2024-06-07 NOTE — PROVATION PATIENT INSTRUCTIONS
Discharge Summary/Instructions after an Endoscopic Procedure  Patient Name: Nyasia Middleton  Patient MRN: [id number]  Patient YOB: 1980 Friday, June 7, 2024  Patrick Weber MD  Dear patient,  As a result of recent federal legislation (The Federal Cures Act), you may   receive lab or pathology results from your procedure in your [facility]   account before your physician is able to contact you. Your physician or   their representative will relay the results to you with their   recommendations at their soonest availability.  Thank you,  RESTRICTIONS:  During your procedure today, you received medications for sedation.  These   medications may affect your judgment, balance and coordination.  Therefore,   for 24 hours, you have the following restrictions:   - DO NOT drive a car, operate machinery, make legal/financial decisions,   sign important papers or drink alcohol.    ACTIVITY:  Today: no heavy lifting, straining or running due to procedural   sedation/anesthesia.  The following day: return to full activity including work.  DIET:  Eat and drink normally unless instructed otherwise.     TREATMENT FOR COMMON SIDE EFFECTS:  - Mild abdominal pain, nausea, belching, bloating or excessive gas:  rest,   eat lightly and use a heating pad.  - Sore Throat: treat with throat lozenges and/or gargle with warm salt   water.  - Because air was used during the procedure, expelling large amounts of air   from your rectum or belching is normal.  - If a bowel prep was taken, you may not have a bowel movement for 1-3 days.    This is normal.  SYMPTOMS TO WATCH FOR AND REPORT TO YOUR PHYSICIAN:  1. Abdominal pain or bloating, other than gas cramps.  2. Chest pain.  3. Back pain.  4. Signs of infection such as: chills or fever occurring within 24 hours   after the procedure.  5. Rectal bleeding, which would show as bright red, maroon, or black stools.   (A tablespoon of blood from the rectum is not serious, especially if    hemorrhoids are present.)  6. Vomiting.  7. Weakness or dizziness.  GO DIRECTLY TO THE NEAREST EMERGENCY ROOM IF YOU HAVE ANY OF THE FOLLOWING:      Difficulty breathing              Chills and/or fever over 101 F   Persistent vomiting and/or vomiting blood   Severe abdominal pain   Severe chest pain   Black, tarry stools   Bleeding- more than one tablespoon   Any other symptom or condition that you feel may need urgent attention  Your doctor recommends these additional instructions:  If any biopsies were taken, your doctors clinic will contact you in 1 to 2   weeks with any results.  - Discharge patient to home.   - Ileoscopy and pouchoscopy today.  - Continue present medications.   - Await pathology results.   - Return to my office as previously scheduled.  For questions, problems or results please call your physician - Patrick Weber MD at Work:  [phone].  [facility], EMERGENCY ROOM PHONE NUMBER: [phone]  IF A COMPLICATION OR EMERGENCY SITUATION ARISES AND YOU ARE UNABLE TO REACH   YOUR PHYSICIAN - GO DIRECTLY TO THE EMERGENCY ROOM.  Patrick Weber MD  6/7/2024 10:07:38 AM  This report has been verified and signed electronically.  Dear patient,  As a result of recent federal legislation (The Federal Cures Act), you may   receive lab or pathology results from your procedure in your [facility]   account before your physician is able to contact you. Your physician or   their representative will relay the results to you with their   recommendations at their soonest availability.  Thank you,  PROVATION

## 2024-06-07 NOTE — TELEPHONE ENCOUNTER
----- Message from CORETTA Doyle MD sent at 6/7/2024 10:37 AM CDT -----  Can I get an appt with her for EUA and fistula repair?    THANK YOU!    Wade

## 2024-06-07 NOTE — TRANSFER OF CARE
"Anesthesia Transfer of Care Note    Patient: Nyasia Middleton    Procedure(s) Performed: Procedure(s) (LRB):  ILEOSCOPY (N/A)  ENDOSCOPY, POUCH, SMALL INTESTINE, DIAGNOSTIC (N/A)  EGD (ESOPHAGOGASTRODUODENOSCOPY) (N/A)    Patient location: PACU    Anesthesia Type: general    Transport from OR: Transported from OR on 2-3 L/min O2 by NC with adequate spontaneous ventilation    Post pain: adequate analgesia    Post assessment: no apparent anesthetic complications    Post vital signs: stable    Level of consciousness: sedated    Nausea/Vomiting: no nausea/vomiting    Complications: none    Transfer of care protocol was followed      Last vitals: Visit Vitals  /66 (BP Location: Left arm, Patient Position: Sitting)   Pulse 74   Temp 36.5 °C (97.7 °F) (Temporal)   Resp 18   Ht 5' 8" (1.727 m)   Wt 71.7 kg (158 lb)   LMP 07/14/2017   SpO2 97%   Breastfeeding No   BMI 24.02 kg/m²     "

## 2024-06-07 NOTE — PROVATION PATIENT INSTRUCTIONS
Discharge Summary/Instructions after an Endoscopic Procedure  Patient Name: Nyasia Middleton  Patient MRN: [id number]  Patient YOB: 1980 Friday, June 7, 2024  Patrick Weber MD  Dear patient,  As a result of recent federal legislation (The Federal Cures Act), you may   receive lab or pathology results from your procedure in your [facility]   account before your physician is able to contact you. Your physician or   their representative will relay the results to you with their   recommendations at their soonest availability.  Thank you,  RESTRICTIONS:  During your procedure today, you received medications for sedation.  These   medications may affect your judgment, balance and coordination.  Therefore,   for 24 hours, you have the following restrictions:   - DO NOT drive a car, operate machinery, make legal/financial decisions,   sign important papers or drink alcohol.    ACTIVITY:  Today: no heavy lifting, straining or running due to procedural   sedation/anesthesia.  The following day: return to full activity including work.  DIET:  Eat and drink normally unless instructed otherwise.     TREATMENT FOR COMMON SIDE EFFECTS:  - Mild abdominal pain, nausea, belching, bloating or excessive gas:  rest,   eat lightly and use a heating pad.  - Sore Throat: treat with throat lozenges and/or gargle with warm salt   water.  - Because air was used during the procedure, expelling large amounts of air   from your rectum or belching is normal.  - If a bowel prep was taken, you may not have a bowel movement for 1-3 days.    This is normal.  SYMPTOMS TO WATCH FOR AND REPORT TO YOUR PHYSICIAN:  1. Abdominal pain or bloating, other than gas cramps.  2. Chest pain.  3. Back pain.  4. Signs of infection such as: chills or fever occurring within 24 hours   after the procedure.  5. Rectal bleeding, which would show as bright red, maroon, or black stools.   (A tablespoon of blood from the rectum is not serious, especially if    hemorrhoids are present.)  6. Vomiting.  7. Weakness or dizziness.  GO DIRECTLY TO THE NEAREST EMERGENCY ROOM IF YOU HAVE ANY OF THE FOLLOWING:      Difficulty breathing              Chills and/or fever over 101 F   Persistent vomiting and/or vomiting blood   Severe abdominal pain   Severe chest pain   Black, tarry stools   Bleeding- more than one tablespoon   Any other symptom or condition that you feel may need urgent attention  Your doctor recommends these additional instructions:  If any biopsies were taken, your doctors clinic will contact you in 1 to 2   weeks with any results.  - Discharge patient to home.   - Resume previous diet.   - Return to my office as previously scheduled.   - Continue present medications.  For questions, problems or results please call your physician - Patrick Weber MD at Work:  [phone].  [facility], EMERGENCY ROOM PHONE NUMBER: [phone]  IF A COMPLICATION OR EMERGENCY SITUATION ARISES AND YOU ARE UNABLE TO REACH   YOUR PHYSICIAN - GO DIRECTLY TO THE EMERGENCY ROOM.  Patrick Weber MD  6/7/2024 10:06:03 AM  This report has been verified and signed electronically.  Dear patient,  As a result of recent federal legislation (The Federal Cures Act), you may   receive lab or pathology results from your procedure in your [facility]   account before your physician is able to contact you. Your physician or   their representative will relay the results to you with their   recommendations at their soonest availability.  Thank you,  PROVATION

## 2024-06-12 LAB
FINAL PATHOLOGIC DIAGNOSIS: NORMAL
GROSS: NORMAL
Lab: NORMAL
MICROSCOPIC EXAM: NORMAL

## 2024-07-02 DIAGNOSIS — F41.1 GAD (GENERALIZED ANXIETY DISORDER): ICD-10-CM

## 2024-07-02 DIAGNOSIS — F51.01 PRIMARY INSOMNIA: ICD-10-CM

## 2024-07-03 RX ORDER — ALPRAZOLAM 0.5 MG/1
0.5 TABLET ORAL 2 TIMES DAILY PRN
Qty: 60 TABLET | Refills: 0 | Status: SHIPPED | OUTPATIENT
Start: 2024-07-03

## 2024-07-03 RX ORDER — TEMAZEPAM 15 MG/1
15 CAPSULE ORAL NIGHTLY PRN
Qty: 30 CAPSULE | Refills: 2 | Status: SHIPPED | OUTPATIENT
Start: 2024-07-03

## 2024-07-03 NOTE — TELEPHONE ENCOUNTER
No care due was identified.  [facility] Embedded Care Due Messages. Reference number: [id number].   7/02/2024 9:05:43 PM CDT

## 2024-07-16 ENCOUNTER — OFFICE VISIT (OUTPATIENT)
Dept: SURGERY | Facility: CLINIC | Age: 44
End: 2024-07-16
Payer: COMMERCIAL

## 2024-07-16 VITALS
SYSTOLIC BLOOD PRESSURE: 112 MMHG | DIASTOLIC BLOOD PRESSURE: 76 MMHG | WEIGHT: 165.81 LBS | HEART RATE: 75 BPM | HEIGHT: 68 IN | BODY MASS INDEX: 25.13 KG/M2

## 2024-07-16 DIAGNOSIS — K50.013 CROHN'S DISEASE OF SMALL INTESTINE WITH FISTULA: Primary | ICD-10-CM

## 2024-07-16 PROCEDURE — 1159F MED LIST DOCD IN RCRD: CPT | Mod: CPTII,S$GLB,, | Performed by: COLON & RECTAL SURGERY

## 2024-07-16 PROCEDURE — 99213 OFFICE O/P EST LOW 20 MIN: CPT | Mod: S$GLB,,, | Performed by: COLON & RECTAL SURGERY

## 2024-07-16 PROCEDURE — 3074F SYST BP LT 130 MM HG: CPT | Mod: CPTII,S$GLB,, | Performed by: COLON & RECTAL SURGERY

## 2024-07-16 PROCEDURE — 3078F DIAST BP <80 MM HG: CPT | Mod: CPTII,S$GLB,, | Performed by: COLON & RECTAL SURGERY

## 2024-07-16 PROCEDURE — 1160F RVW MEDS BY RX/DR IN RCRD: CPT | Mod: CPTII,S$GLB,, | Performed by: COLON & RECTAL SURGERY

## 2024-07-16 PROCEDURE — 3008F BODY MASS INDEX DOCD: CPT | Mod: CPTII,S$GLB,, | Performed by: COLON & RECTAL SURGERY

## 2024-07-16 PROCEDURE — 99999 PR PBB SHADOW E&M-EST. PATIENT-LVL IV: CPT | Mod: PBBFAC,,, | Performed by: COLON & RECTAL SURGERY

## 2024-07-16 NOTE — PROGRESS NOTES
CRS Office Visit Followup    Referring Md:   No referring provider defined for this encounter.    SUBJECTIVE:     Chief Complaint:  Perianal fistula    History of Present Illness:  Course is as follows:  Patient is a 43 y.o. female presents with perianal fistula.  [id number]: History of 3 stage restorative proctocolectomy for presumed ulcerative colitis ([facility] at [facility]).  2011:  Diagnosis changed to Crohn's disease due to perianal abscesses and fistulas.    She transferred care to Dr. Peralta, who performed the following surgeries.    4/3/2013:  Fistulotomy x2  7/12/2013: LIFT  12/13/2013: repeat LIFT  7/28/2014: LIFT for anovaginal fistula  10/20/2014: repeat LIFT with mesh for anovaginal fistula (MatriStem mesh)  6/26/2017: repeat LIFT  Following Dr. Peralta's FCI, she has been seen by Dr. Condon who has performed multiple stem cell applications.    5/12/2022: Regarding her Crohn's disease, she is followed by Dr. Gonzales and is treated with Stelara.  She is about to change medical therapy to Entyvio.     Functionally, she is active.  She previously vaped but has recently stopped 3 weeks ago.  She has 6-7 bowel movements during the day.  1-2 bowel movements overnight.  Had intermittent improvement of her pouch function as well as decreased drainage when she was on a course of antibiotics recently.  Maintains continence.  Has frequent vaginal drainage.  Strongly wishes to avoid ileostomy.   08/02/2022:  Presents for evaluation for increased vaginal and perineal draining with increased pain.  No improvement with antibiotics or with change in her medical therapy to Entyvio.  Recently underwent pouchoscopy by Dr. Condon  With significant inflammation of the pouch and the anal canal with multiple ulcers.  Interested in discussing possibility of fecal diversion.    Last Pouchoscopy:             8/17/22: lap loop ileostomy creation and pouchoscopy   - Flexible pouchoscopy demonstrated significant stenosis of  the pouch anal anastomosis with multiple surrounding fissures.  Distal rectal mucosa was inflamed and stenotic.  Pouch with narrowing of the proximal limb consistent with Crohn's disease.  Rolan terminal ileum appeared healthy.  8/24/22: diagnostic laparoscopy for fevers/elevated CRP --> normal laparoscopy   - readmitted for 10 days for ileus followed by high output ostomy   - discharged on fluids    Current status:  9/9/22:   Presents for follow-up.  Taking  for Lomotil 3 times per day as well as 6 Imodium 3 times per day.  Intermittent high output from the ileostomy.  Continues on fluids 5 times per day.  Intermittent fatigue.  Yet to start medical therapy.  Pending approval.  9/27/22:  Recently decreased fluid 3 times per week from daily.  She is now decreasing the amount of Imodium she takes.  Intermittent thick output.  More accepting of her ostomy.    12/6/22:  Doing very well.  She is started on medical therapy.  Weight is improving.  PICC line removed in October.  Emptying the ostomy 5-6 times per day  1/17/23:   Continues to do very well.  On Skyrizi.   Intermittent mucus drainage  the anus.    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  She is complaint with her Skyrizi every 8 weeks.  She is haviing intermittent drainage of purulence and blood from the anus but has markedly improved.  Wishes to proceed with EUA to see if the ostomy can be reversed.    6/16/23: pouchoscopy  Impression:            - Stenosed post-surgical anastomosis found on digital exam.                          - Perianal fistula found on perianal exam.                          - Rectal cuff with a hemorrhagic appearance and  inflammation seen. Biopsied.                          - The examined portion of the ileum was normal. Pouch was small but healthy appearing. Narrowed pouch inlet, but able to be traversed easily.   9/14/23: doing well with her ostomy.   Intermittent mucus drainage from the perineum.  " Overall doing very well.  Wears a pad.  Has to change the pad once to twice per day.  6/7/24:  Underwent pouchoscopy  Impression:            - Anal fissure and perianal fistula found on                          perianal exam.                          - Anal stricture found on digital rectal exam.                          - The examined portion of the ileum was normal.                          - Stricture at the pouch inlet.                          - Ileal pouch with healthy appearing mucosa seen.                          - No specimens collected.   7/16/24:  Continues on Deb.  Presents for evaluation for possible fistula repair.  She is having ongoing perianal leakage and drainage.  Doing well with her ostomy.    Review of Systems:  Review of Systems   Constitutional:  Negative for chills, diaphoresis, fever, malaise/fatigue and weight loss.   HENT:  Negative for congestion.    Respiratory:  Negative for shortness of breath.    Cardiovascular:  Negative for chest pain and leg swelling.   Gastrointestinal:  Positive for diarrhea. Negative for abdominal pain, blood in stool, constipation, nausea and vomiting.   Genitourinary:  Negative for dysuria.   Musculoskeletal:  Negative for back pain and myalgias.   Skin:  Positive for rash.   Neurological:  Negative for dizziness and weakness.   Endo/Heme/Allergies:  Does not bruise/bleed easily.   Psychiatric/Behavioral:  Negative for depression.        OBJECTIVE:     Vital Signs (Most Recent)  /76 (BP Location: Left arm, Patient Position: Sitting, BP Method: Medium (Automatic))   Pulse 75   Ht 5' 8" (1.727 m)   Wt 75.2 kg (165 lb 12.6 oz)   LMP 07/14/2017   BMI 25.21 kg/m²     Physical Exam:  General: White female in no distress   Neuro: alert and oriented x 4.  Moves all extremities.     HEENT: no icterus.  Trachea midline  Respiratory: respirations are even and unlabored  Cardiac: regular rate  Abdomen:  Soft, nontender, no masses.    Ostomy appears " healthy.  Appliance not removed  Extremities: Warm dry and intact  Skin: no rashes  Anorectal:   External exam with multiple perianal fistulas extending to the perineal body.  Thin rectovaginal septum.  Anal stenosis with scarring.  Digital exam deferred secondary to discomfort    Labs:  TSH normal.  H&H 12 and 39.  Albumin 3.9.  Normal renal function    Imaging:  No recent imaging      ASSESSMENT/PLAN:     Diagnoses and all orders for this visit:    Crohn's disease of small intestine with fistula  -     Case Request Operating Room: FISTULOTOMY          43 y.o. female with restorative total proctocolectomy with ileal pouch anastomosis in 2011 who then developed Crohn's disease with multiple perianal fistulas as well as an anal vaginal fistula.  She has undergone multiple attempts at fistula repair.     She underwent fecal diversion with a loop ileostomy on 8/17/22.  Re-explored on POD7 and was normal.      Last pouch exam on 6/7/24 with ongoing stenosis at the anus as well as persistent fistula.  Remains significantly improved from prior exams.    She wishes to investigate whether or not the fistulas would be amenable to repair.  Endoscopically, her pouch has done very well and appears healthy.  Based on her degree of stenosis and fistulas that involve the perineal body, I do not believe that her fistulas would likely be amenable to repair.  Options for repair, pouch excision with removal of the anus would then be recommended.  Rare, prior to considering pouch excision, it does seem reasonable to attempt exam under anesthesia and potential discussed this may involve drains or advancement flaps or potentially a LIFT  Consents were signed today.  She understands there is a chance that repair could not be performed        CORETTA Doyle MD, FACS, FASCRS  Staff Surgeon  Colon & Rectal Surgery    CC: Joselo Weber MD

## 2024-07-31 DIAGNOSIS — Z12.31 OTHER SCREENING MAMMOGRAM: ICD-10-CM

## 2024-08-05 ENCOUNTER — TELEPHONE (OUTPATIENT)
Dept: GASTROENTEROLOGY | Facility: CLINIC | Age: 44
End: 2024-08-05
Payer: COMMERCIAL

## 2024-08-15 DIAGNOSIS — F41.1 GAD (GENERALIZED ANXIETY DISORDER): ICD-10-CM

## 2024-08-16 RX ORDER — ALPRAZOLAM 0.5 MG/1
0.5 TABLET ORAL 2 TIMES DAILY PRN
Qty: 60 TABLET | Refills: 0 | Status: SHIPPED | OUTPATIENT
Start: 2024-08-16

## 2024-08-16 NOTE — TELEPHONE ENCOUNTER
No care due was identified.  [facility] Embedded Care Due Messages. Reference number: [id number].   8/15/2024 7:47:20 PM CDT

## 2024-08-21 ENCOUNTER — TELEPHONE (OUTPATIENT)
Dept: SURGERY | Facility: CLINIC | Age: 44
End: 2024-08-21
Payer: COMMERCIAL

## 2024-08-22 NOTE — PRE-PROCEDURE INSTRUCTIONS
PreOp Instructions given:   - Verbal medication information (what to hold and what to take)   - NPO guidelines given/CRS Dept  - Arrival place directions given; time to be given the day before procedure by the   Surgeon's Office [address]  - Bathing with antibacterial soap   - Don't wear any jewelry or bring any valuables AM of surgery   - No makeup or moisturizer to face   - No perfume/cologne, powder, lotions or aftershave   Pt. verbalized understanding.   Pt denies any h/o Anesthesia/Sedation complications or side effects.

## 2024-08-23 ENCOUNTER — ANESTHESIA EVENT (OUTPATIENT)
Dept: SURGERY | Facility: HOSPITAL | Age: 44
End: 2024-08-23
Payer: COMMERCIAL

## 2024-08-25 NOTE — ANESTHESIA PREPROCEDURE EVALUATION
[facility]   Anesthesia Pre-Operative Evaluation        Patient Name: Nyasia Middleton  YOB: 1980  MRN: [id number]    SUBJECTIVE:     Pre-operative Evaluation for Procedure(s) (LRB):  FISTULOTOMY (N/A)     2024    Nyasia Middleton is a 43 y.o. female with a PMHx significant for Crohn's disease s/p complex surgical history, GERD, MARION.    Patient now presents for the above procedure(s)     Previous Airway:     Intubation:     Induction:  Intravenous    Intubated:  Postinduction    Mask Ventilation:  Easy mask    Attempts:  1    Attempted By:  Resident anesthesiologist    Method of Intubation:  Direct    Blade:  Viky 3    Laryngeal View Grade: Grade I - full view of cords      Difficult Airway Encountered?: No      Complications:  None    Airway Device:  Oral endotracheal tube    Airway Device Size:  7.0    Style/Cuff Inflation:  Cuffed (inflated to minimal occlusive pressure)    Inflation Amount (mL):  5    Tube secured:  21    Secured at:  The lips    Placement Verified By:  Capnometry and Revisualization with laryngoscopy    Complicating Factors:  None    Findings Post-Intubation:  BS equal bilateral and atraumatic/condition of teeth unchanged    Current Outpatient Medications   Medication Instructions    ALPRAZolam (XANAX) 0.5 mg, Oral, 2 times daily PRN, Do not take with restoril    busPIRone (BUSPAR) 10 mg, Oral, 2 times daily PRN    FLUoxetine 40 mg, Oral, Daily    ondansetron (ZOFRAN-ODT) 8 mg, Oral, Every 12 hours PRN    pantoprazole (PROTONIX) 40 mg, Oral, 2 times daily    SKYRIZI 360 mg, Subcutaneous, Every 8 weeks    sucralfate (CARAFATE) 1 g, Oral, Before meals & nightly, Takes once at night    temazepam (RESTORIL) 15 mg, Oral, Nightly PRN, Do not take with xanax.       Review of patient's allergies indicates:  No Known Allergies    Past Surgical History:   Procedure Laterality Date     SECTION, CLASSIC      DIAGNOSTIC LAPAROSCOPY N/A 2022    Procedure:  LAPAROSCOPY, DIAGNOSTIC;  Surgeon: CORETTA Doyle MD;  Location: [facility] OR 2ND FLR;  Service: Colon and Rectal;  Laterality: N/A;    ESOPHAGOGASTRODUODENOSCOPY N/A 6/7/2024    Procedure: EGD (ESOPHAGOGASTRODUODENOSCOPY);  Surgeon: Patrick Weber MD;  Location: [facility] ENDO (4TH FLR);  Service: Endoscopy;  Laterality: N/A;    FLEXIBLE SIGMOIDOSCOPY N/A 08/01/2018    Procedure: SIGMOIDOSCOPY, FLEXIBLE;  Surgeon: Jairo Peralta MD;  Location: [facility] ENDO (4TH FLR);  Service: Endoscopy;  Laterality: N/A;  no enemas per Jennifer NP    FLEXIBLE SIGMOIDOSCOPY N/A 08/17/2022    Procedure: SIGMOIDOSCOPY, FLEXIBLE;  Surgeon: CORETTA Doyle MD;  Location: [facility] OR 2ND FLR;  Service: Colon and Rectal;  Laterality: N/A;    HYSTERECTOMY      LSH    ILEOSCOPY N/A 6/7/2024    Procedure: ILEOSCOPY;  Surgeon: Patrick Weber MD;  Location: [facility] (4TH FLR);  Service: Endoscopy;  Laterality: N/A;  Ref by:,instr sent via portal,half miralax prep,hold on snapboard for -[facility]  5/31-pre call complete-tb    ILEOSTOMY CLOSURE      LAPAROSCOPIC ILEOSTOMY N/A 08/17/2022    Procedure: CREATION, ILEOSTOMY, LAPAROSCOPIC;  Surgeon: CORETTA Doyle MD;  Location: [facility] OR 2ND FLR;  Service: Colon and Rectal;  Laterality: N/A;    POUCHOSCOPY N/A 11/06/2018    Procedure: ENDOSCOPY, SMALL INTESTINAL POUCH, DIAGNOSTIC, possible seton placement;  Surgeon: Jairo Peralta MD;  Location: [facility] ENDO (4TH FLR);  Service: Endoscopy;  Laterality: N/A;  Schedule early Nov 2018; No prep    POUCHOSCOPY N/A 6/16/2023    Procedure: ENDOSCOPY, POUCH, SMALL INTESTINE, DIAGNOSTIC;  Surgeon: CORETTA Doyle MD;  Location: [facility] ENDO (4TH FLR);  Service: Endoscopy;  Laterality: N/A;  inst via portal    POUCHOSCOPY N/A 6/7/2024    Procedure: ENDOSCOPY, POUCH, SMALL INTESTINE, DIAGNOSTIC;  Surgeon: Patrick Weber MD;  Location: [facility] ([address] [place] [address]);  Service: Endoscopy;  Laterality: N/A;    rectovaginal fistula repair       RESTORATIVE PROCTOCOLECTOMY      TONSILLECTOMY      adenoids    TUBAL LIGATION      june 2016       Social History     Substance and Sexual Activity   Drug Use No     Alcohol Use: Not At Risk (1/3/2024)    AUDIT-C     Frequency of Alcohol Consumption: Monthly or less     Average Number of Drinks: 1 or 2     Frequency of Binge Drinking: Never     Tobacco Use: Medium Risk (7/16/2024)    Patient History     Smoking Tobacco Use: Former     Smokeless Tobacco Use: Never     Passive Exposure: Not on file       OBJECTIVE:     Vital Signs Range (Last 24H):         Significant Labs  Lab Results   Component Value Date    WBC 8.39 03/08/2024    HGB 13.5 03/08/2024    HCT 39.8 03/08/2024     03/08/2024    CHOL 147 06/22/2023    TRIG 136 06/22/2023    HDL 69 06/22/2023    ALT 29 03/08/2024    AST 34 03/08/2024     03/08/2024    K 3.7 03/08/2024     03/08/2024    CREATININE 0.79 03/08/2024    BUN 14 03/08/2024    CO2 27 03/08/2024    TSH 0.723 06/22/2023    HGBA1C 4.5 03/09/2023       Cardiac Studies:    EKG:   Results for orders placed or performed during the hospital encounter of 08/22/22   EKG 12-lead    Collection Time: 08/27/22 10:32 AM    Narrative    Test Reason : R07.9,    Vent. Rate : 122 BPM     Atrial Rate : 122 BPM     P-R Int : 128 ms          QRS Dur : 074 ms      QT Int : 294 ms       P-R-T Axes : 047 046 061 degrees     QTc Int : 418 ms    Sinus tachycardia  Otherwise normal ECG  When compared with ECG of 24-AUG-2022 08:58,  Nonspecific T wave abnormality no longer evident in Inferior leads  Confirmed by James Durant MD ([id number]) on 8/28/2022 9:22:11 AM    Referred By: PILI HENNESSY           Confirmed By:Pedro Durant MD       Transthoracic Echo  No results found for this or any previous visit.      Transesophageal Echo  No results found for this or any previous visit.      ASSESSMENT/PLAN AND ANESTHESIA ROS/EXAM:     Nyasia Middleton is a 43 y.o. female is presenting for Procedure(s)  (LRB):  FISTULOTOMY (N/A)      Pre-op Assessment    I have reviewed the Patient Summary Reports.     I have reviewed the Nursing Notes.       Review of Systems  Hepatic/GI:     GERD             Psych:  Psychiatric History                     Anesthesia Plan  Type of Anesthesia, risks & benefits discussed:    Anesthesia Type: Gen ETT, Gen Supraglottic Airway, Gen Natural Airway  Intra-op Monitoring Plan: Standard ASA Monitors  Post Op Pain Control Plan: multimodal analgesia and IV/PO Opioids PRN  Airway Plan: Direct and Video  ASA Score: 2  Day of Surgery Review of History & Physical: H&P Update referred to the surgeon/provider.    Ready For Surgery From Anesthesia Perspective.     .

## 2024-08-26 ENCOUNTER — ANESTHESIA (OUTPATIENT)
Dept: SURGERY | Facility: HOSPITAL | Age: 44
End: 2024-08-26
Payer: COMMERCIAL

## 2024-08-26 ENCOUNTER — HOSPITAL ENCOUNTER (OUTPATIENT)
Facility: HOSPITAL | Age: 44
Discharge: HOME | End: 2024-08-26
Attending: COLON & RECTAL SURGERY | Admitting: COLON & RECTAL SURGERY
Payer: COMMERCIAL

## 2024-08-26 VITALS
DIASTOLIC BLOOD PRESSURE: 59 MMHG | OXYGEN SATURATION: 96 % | HEIGHT: 68 IN | HEART RATE: 71 BPM | WEIGHT: 165 LBS | BODY MASS INDEX: 25.01 KG/M2 | SYSTOLIC BLOOD PRESSURE: 109 MMHG | RESPIRATION RATE: 16 BRPM | TEMPERATURE: 99 F

## 2024-08-26 DIAGNOSIS — K60.3 ANAL FISTULA: ICD-10-CM

## 2024-08-26 PROCEDURE — 63600175 PHARM REV CODE 636 W HCPCS: Performed by: STUDENT IN AN ORGANIZED HEALTH CARE EDUCATION/TRAINING PROGRAM

## 2024-08-26 PROCEDURE — 25000003 PHARM REV CODE 250

## 2024-08-26 PROCEDURE — 71000015 HC POSTOP RECOV 1ST HR: Performed by: COLON & RECTAL SURGERY

## 2024-08-26 PROCEDURE — 71000016 HC POSTOP RECOV ADDL HR: Performed by: COLON & RECTAL SURGERY

## 2024-08-26 PROCEDURE — 63600175 PHARM REV CODE 636 W HCPCS

## 2024-08-26 PROCEDURE — 36000706: Performed by: COLON & RECTAL SURGERY

## 2024-08-26 PROCEDURE — 25000003 PHARM REV CODE 250: Performed by: NURSE PRACTITIONER

## 2024-08-26 PROCEDURE — 36000707: Performed by: COLON & RECTAL SURGERY

## 2024-08-26 PROCEDURE — 25000003 PHARM REV CODE 250: Performed by: COLON & RECTAL SURGERY

## 2024-08-26 PROCEDURE — 63600175 PHARM REV CODE 636 W HCPCS: Mod: JZ,JG | Performed by: COLON & RECTAL SURGERY

## 2024-08-26 PROCEDURE — 71000044 HC DOSC ROUTINE RECOVERY FIRST HOUR: Performed by: COLON & RECTAL SURGERY

## 2024-08-26 PROCEDURE — 46270 REMOVE ANAL FIST SUBQ: CPT | Mod: ,,, | Performed by: COLON & RECTAL SURGERY

## 2024-08-26 PROCEDURE — 37000008 HC ANESTHESIA 1ST 15 MINUTES: Performed by: COLON & RECTAL SURGERY

## 2024-08-26 PROCEDURE — 37000009 HC ANESTHESIA EA ADD 15 MINS: Performed by: COLON & RECTAL SURGERY

## 2024-08-26 RX ORDER — PROCHLORPERAZINE EDISYLATE 5 MG/ML
5 INJECTION INTRAMUSCULAR; INTRAVENOUS EVERY 30 MIN PRN
Status: DISCONTINUED | OUTPATIENT
Start: 2024-08-26 | End: 2024-08-26 | Stop reason: HOSPADM

## 2024-08-26 RX ORDER — OXYCODONE HYDROCHLORIDE 5 MG/1
5 TABLET ORAL EVERY 4 HOURS PRN
Qty: 20 TABLET | Refills: 0 | Status: SHIPPED | OUTPATIENT
Start: 2024-08-26

## 2024-08-26 RX ORDER — LIDOCAINE HYDROCHLORIDE 20 MG/ML
INJECTION, SOLUTION EPIDURAL; INFILTRATION; INTRACAUDAL; PERINEURAL
Status: DISCONTINUED | OUTPATIENT
Start: 2024-08-26 | End: 2024-08-26

## 2024-08-26 RX ORDER — MUPIROCIN 20 MG/G
OINTMENT TOPICAL
Status: DISCONTINUED | OUTPATIENT
Start: 2024-08-26 | End: 2024-08-26 | Stop reason: HOSPADM

## 2024-08-26 RX ORDER — METRONIDAZOLE 500 MG/100ML
INJECTION, SOLUTION INTRAVENOUS
Status: DISCONTINUED | OUTPATIENT
Start: 2024-08-26 | End: 2024-08-26

## 2024-08-26 RX ORDER — LIDOCAINE HYDROCHLORIDE 10 MG/ML
1 INJECTION, SOLUTION EPIDURAL; INFILTRATION; INTRACAUDAL; PERINEURAL ONCE
Status: COMPLETED | OUTPATIENT
Start: 2024-08-26 | End: 2024-08-26

## 2024-08-26 RX ORDER — BUPIVACAINE HYDROCHLORIDE 2.5 MG/ML
INJECTION, SOLUTION EPIDURAL; INFILTRATION; INTRACAUDAL
Status: DISCONTINUED | OUTPATIENT
Start: 2024-08-26 | End: 2024-08-26 | Stop reason: HOSPADM

## 2024-08-26 RX ORDER — KETOROLAC TROMETHAMINE 30 MG/ML
30 INJECTION, SOLUTION INTRAMUSCULAR; INTRAVENOUS ONCE
Status: COMPLETED | OUTPATIENT
Start: 2024-08-26 | End: 2024-08-26

## 2024-08-26 RX ORDER — FENTANYL CITRATE 50 UG/ML
INJECTION, SOLUTION INTRAMUSCULAR; INTRAVENOUS
Status: DISCONTINUED | OUTPATIENT
Start: 2024-08-26 | End: 2024-08-26

## 2024-08-26 RX ORDER — SODIUM CHLORIDE 9 MG/ML
INJECTION, SOLUTION INTRAVENOUS CONTINUOUS
Status: DISCONTINUED | OUTPATIENT
Start: 2024-08-26 | End: 2024-08-26 | Stop reason: HOSPADM

## 2024-08-26 RX ORDER — ROCURONIUM BROMIDE 10 MG/ML
INJECTION, SOLUTION INTRAVENOUS
Status: DISCONTINUED | OUTPATIENT
Start: 2024-08-26 | End: 2024-08-26

## 2024-08-26 RX ORDER — GLUCAGON 1 MG
1 KIT INJECTION
Status: DISCONTINUED | OUTPATIENT
Start: 2024-08-26 | End: 2024-08-26 | Stop reason: HOSPADM

## 2024-08-26 RX ORDER — ONDANSETRON HYDROCHLORIDE 2 MG/ML
INJECTION, SOLUTION INTRAVENOUS
Status: DISCONTINUED | OUTPATIENT
Start: 2024-08-26 | End: 2024-08-26

## 2024-08-26 RX ORDER — PROPOFOL 10 MG/ML
VIAL (ML) INTRAVENOUS
Status: DISCONTINUED | OUTPATIENT
Start: 2024-08-26 | End: 2024-08-26

## 2024-08-26 RX ORDER — HYDROMORPHONE HYDROCHLORIDE 1 MG/ML
0.2 INJECTION, SOLUTION INTRAMUSCULAR; INTRAVENOUS; SUBCUTANEOUS EVERY 5 MIN PRN
Status: COMPLETED | OUTPATIENT
Start: 2024-08-26 | End: 2024-08-26

## 2024-08-26 RX ORDER — HYDROMORPHONE HYDROCHLORIDE 1 MG/ML
0.2 INJECTION, SOLUTION INTRAMUSCULAR; INTRAVENOUS; SUBCUTANEOUS EVERY 5 MIN PRN
Status: DISCONTINUED | OUTPATIENT
Start: 2024-08-26 | End: 2024-08-26 | Stop reason: HOSPADM

## 2024-08-26 RX ORDER — MIDAZOLAM HYDROCHLORIDE 1 MG/ML
INJECTION INTRAMUSCULAR; INTRAVENOUS
Status: DISCONTINUED | OUTPATIENT
Start: 2024-08-26 | End: 2024-08-26

## 2024-08-26 RX ORDER — DEXAMETHASONE SODIUM PHOSPHATE 4 MG/ML
INJECTION, SOLUTION INTRA-ARTICULAR; INTRALESIONAL; INTRAMUSCULAR; INTRAVENOUS; SOFT TISSUE
Status: DISCONTINUED | OUTPATIENT
Start: 2024-08-26 | End: 2024-08-26

## 2024-08-26 RX ADMIN — FENTANYL CITRATE 50 MCG: 50 INJECTION, SOLUTION INTRAMUSCULAR; INTRAVENOUS at 07:08

## 2024-08-26 RX ADMIN — MUPIROCIN: 20 OINTMENT TOPICAL at 06:08

## 2024-08-26 RX ADMIN — LIDOCAINE HYDROCHLORIDE 100 MG: 20 INJECTION, SOLUTION EPIDURAL; INFILTRATION; INTRACAUDAL; PERINEURAL at 07:08

## 2024-08-26 RX ADMIN — HYDROMORPHONE HYDROCHLORIDE 0.2 MG: 1 INJECTION, SOLUTION INTRAMUSCULAR; INTRAVENOUS; SUBCUTANEOUS at 08:08

## 2024-08-26 RX ADMIN — PROPOFOL 200 MG: 10 INJECTION, EMULSION INTRAVENOUS at 07:08

## 2024-08-26 RX ADMIN — ONDANSETRON 4 MG: 2 INJECTION INTRAMUSCULAR; INTRAVENOUS at 07:08

## 2024-08-26 RX ADMIN — SODIUM CHLORIDE: 9 INJECTION, SOLUTION INTRAVENOUS at 06:08

## 2024-08-26 RX ADMIN — DEXAMETHASONE SODIUM PHOSPHATE 4 MG: 4 INJECTION, SOLUTION INTRAMUSCULAR; INTRAVENOUS at 07:08

## 2024-08-26 RX ADMIN — METRONIDAZOLE 500 MG: 500 INJECTION, SOLUTION INTRAVENOUS at 07:08

## 2024-08-26 RX ADMIN — SODIUM CHLORIDE: 0.9 INJECTION, SOLUTION INTRAVENOUS at 06:08

## 2024-08-26 RX ADMIN — ROCURONIUM BROMIDE 80 MG: 10 INJECTION, SOLUTION INTRAVENOUS at 07:08

## 2024-08-26 RX ADMIN — HYDROMORPHONE HYDROCHLORIDE 0.2 MG: 1 INJECTION, SOLUTION INTRAMUSCULAR; INTRAVENOUS; SUBCUTANEOUS at 09:08

## 2024-08-26 RX ADMIN — LIDOCAINE HYDROCHLORIDE 1 MG: 10 INJECTION, SOLUTION EPIDURAL; INFILTRATION; INTRACAUDAL; PERINEURAL at 06:08

## 2024-08-26 RX ADMIN — CEFTRIAXONE 2 G: 1 INJECTION, POWDER, FOR SOLUTION INTRAMUSCULAR; INTRAVENOUS at 07:08

## 2024-08-26 RX ADMIN — MIDAZOLAM HYDROCHLORIDE 2 MG: 2 INJECTION, SOLUTION INTRAMUSCULAR; INTRAVENOUS at 07:08

## 2024-08-26 RX ADMIN — KETOROLAC TROMETHAMINE 30 MG: 30 INJECTION, SOLUTION INTRAMUSCULAR; INTRAVENOUS at 08:08

## 2024-08-26 RX ADMIN — SUGAMMADEX 600 MG: 100 INJECTION, SOLUTION INTRAVENOUS at 07:08

## 2024-08-26 NOTE — OP NOTE
MAGGIE CHILDS  [id number]  [id number]    OPERATIVE NOTE:    DATE OF OPERATION: 08/26/2024    PREOPERATIVE DIAGNOSIS:  Crohn's disease with perianal fistula    POSTOPERATIVE DIAGNOSIS:  Crohn's disease with perianal fistula    PROCEDURE PERFORMED:  Exam under anesthesia with fistulotomy    ATTENDING SURGEON: CORETTA Doyle MD    RESIDENT/ FELLOW SURGEON:  Darius Cobb MD    ANESTHESIA:  General    ESTIMATED BLOOD LOSS:  50 mL    FINDINGS:  1. Extreme stenosis of the anal canal.  Superficial fistula involving only skin anteriorly.  Superficial fistula was divided.  Anal canal was gently dilated to allow the smallest of the Hill-Goss anoscope.  Moderate amount of granulation tissue seen anteriorly.  No remaining fistulas identified.  Prior fistulas appeared to have closed despite multiple probings.  Small skin tag was removed.  Hemostasis assured.    SPECIMENS:  None    COMPLICATIONS:  None apparent    DISPOSITION: PACU    CONDITION: good    INDICATION FOR PROCEDURE:  Maggie Childs is a 43 y.o. female with perianal Crohn's disease who had previously undergone ileostomy creation.  She presents for exam under anesthesia as she has had a significant improvement in her perianal symptoms.    DESCRIPTION OF PROCEDURE:   After informed consent was reviewed, the patient was taken to the operating room and placed under general anesthesia.  she was placed in the prone samson-knife position.  The buttocks were taped apart and the perianal skin was prepped and draped in usual sterile fashion.  A time-out was then performed.  External exam demonstrated anal stenosis as well as a deformed perineal body.  Digital exam with severe stenosis allowing only the pinky finger.  There was a superficial fistula that was divided with electrocautery.  This allowed slightly further dilation of the anal canal.  Serial dilation of the anal canal was performed to allow accommodation of the index finger.  Afterwards, the smallest of  the Hill-Goss anoscope could be used to better visualize the anal canal.  Large amount of scar tissue seen laterally and posteriorly.  Granulation tissue seen anteriorly but no remaining fistula tract identified.  Multiple deformities on the perineal body were probed and did not track anywhere.  All of the external defects were blind ending.  Small skin tag internally was excised.  Hemostasis was then carefully achieved.  Two pieces of Gelfoam were placed in the anal canal as a topical hemostatic agent.  30 mL of 0.25% Marcaine was injected as a long acting local anesthetic.  The patient tolerated the procedure well.  There were no apparent complications.  Fluff dressings were applied.  she was then extubated and taken to PACU in stable condition.     Overall, she has had significant improvement in her perianal fistulas.  No further fistula remain active on today's exam.  However, she has significant scarring and stenosis of the anal canal making her future function very challenging and extraordinarily high risk of ongoing incontinence.    CORETTA Doyle MD, FACS, FASCRS  Staff Surgeon  Colon & Rectal Surgery

## 2024-08-26 NOTE — BRIEF OP NOTE
[facility] - Surgery ([facility])  Brief Operative Note    SUMMARY     Surgery Date: 8/26/2024     Surgeons and Role:     * CORETTA Doyle MD - Primary     * Darius Cobb MD - Resident - Assisting        Pre-op Diagnosis:  Crohn's disease of small intestine with fistula [K50.013]    Post-op Diagnosis:  Post-Op Diagnosis Codes:     * Crohn's disease of small intestine with fistula [K50.013]    Procedure(s) (LRB):  FISTULOTOMY (N/A)    Anesthesia: General    Implants:  * No implants in log *    Operative Findings: fistulotomy, anal dilation    Estimated Blood Loss: * No values recorded between 8/26/2024  7:22 AM and 8/26/2024  7:54 AM *    Estimated Blood Loss has not been documented. EBL = 5cc.         Specimens:   Specimen (24h ago, onward)      None            [id number]

## 2024-08-26 NOTE — TRANSFER OF CARE
"Anesthesia Transfer of Care Note    Patient: Nyasia Middleton    Procedure(s) Performed: Procedure(s) (LRB):  FISTULOTOMY (N/A)    Patient location: PACU    Anesthesia Type: general    Transport from OR: Transported from OR on 6-10 L/min O2 by face mask with adequate spontaneous ventilation    Post pain: adequate analgesia    Post assessment: no apparent anesthetic complications and tolerated procedure well    Post vital signs: stable    Level of consciousness: awake and alert    Nausea/Vomiting: no nausea/vomiting    Complications: none    Transfer of care protocol was followed      Last vitals: Visit Vitals  BP (!) 114/56   Pulse 79   Temp 37 °C (98.6 °F) (Temporal)   Resp 20   Ht 5' 8" (1.727 m)   Wt 74.8 kg (165 lb)   LMP 07/14/2017   SpO2 99%   Breastfeeding No   BMI 25.09 kg/m²     "

## 2024-08-26 NOTE — H&P
SUBJECTIVE:      Chief Complaint:  Perianal fistula     History of Present Illness:  Course is as follows:  Patient is a 43 y.o. female presents with perianal fistula.  [id number]: History of 3 stage restorative proctocolectomy for presumed ulcerative colitis (Foreign [facility] at [facility]).  2011:  Diagnosis changed to Crohn's disease due to perianal abscesses and fistulas.    She transferred care to Dr. Peralta, who performed the following surgeries.    4/3/2013:  Fistulotomy x2  7/12/2013: LIFT  12/13/2013: repeat LIFT  7/28/2014: LIFT for anovaginal fistula  10/20/2014: repeat LIFT with mesh for anovaginal fistula (MatriStem mesh)  6/26/2017: repeat LIFT  Following Dr. Peralta's FPC, she has been seen by Dr. Condon who has performed multiple stem cell applications.     5/12/2022: Regarding her Crohn's disease, she is followed by Dr. Gonzales and is treated with Stelara.  She is about to change medical therapy to Entyvio.     Functionally, she is active.  She previously vaped but has recently stopped 3 weeks ago.  She has 6-7 bowel movements during the day.  1-2 bowel movements overnight.  Had intermittent improvement of her pouch function as well as decreased drainage when she was on a course of antibiotics recently.  Maintains continence.  Has frequent vaginal drainage.  Strongly wishes to avoid ileostomy.   08/02/2022:  Presents for evaluation for increased vaginal and perineal draining with increased pain.  No improvement with antibiotics or with change in her medical therapy to Entyvio.  Recently underwent pouchoscopy by Dr. Condon  With significant inflammation of the pouch and the anal canal with multiple ulcers.  Interested in discussing possibility of fecal diversion.     Last Pouchoscopy:                8/17/22: lap loop ileostomy creation and pouchoscopy              - Flexible pouchoscopy demonstrated significant stenosis of the pouch anal anastomosis with multiple surrounding fissures.  Distal rectal  mucosa was inflamed and stenotic.  Pouch with narrowing of the proximal limb consistent with Crohn's disease.  Rolan terminal ileum appeared healthy.  8/24/22: diagnostic laparoscopy for fevers/elevated CRP --> normal laparoscopy              - readmitted for 10 days for ileus followed by high output ostomy              - discharged on fluids     Current status:  9/9/22:   Presents for follow-up.  Taking  for Lomotil 3 times per day as well as 6 Imodium 3 times per day.  Intermittent high output from the ileostomy.  Continues on fluids 5 times per day.  Intermittent fatigue.  Yet to start medical therapy.  Pending approval.  9/27/22:  Recently decreased fluid 3 times per week from daily.  She is now decreasing the amount of Imodium she takes.  Intermittent thick output.  More accepting of her ostomy.    12/6/22:  Doing very well.  She is started on medical therapy.  Weight is improving.  PICC line removed in October.  Emptying the ostomy 5-6 times per day  1/17/23:   Continues to do very well.  On Skyrizi.   Intermittent mucus drainage  the anus.    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  She is complaint with her Skyrizi every 8 weeks.  She is haviing intermittent drainage of purulence and blood from the anus but has markedly improved.  Wishes to proceed with EUA to see if the ostomy can be reversed.    6/16/23: pouchoscopy  Impression:            - Stenosed post-surgical anastomosis found on digital exam.                          - Perianal fistula found on perianal exam.                          - Rectal cuff with a hemorrhagic appearance and  inflammation seen. Biopsied.                          - The examined portion of the ileum was normal. Pouch was small but healthy appearing. Narrowed pouch inlet, but able to be traversed easily.   9/14/23: doing well with her ostomy.   Intermittent mucus drainage from the perineum.  Overall doing very well.  Wears a pad.  Has to change  the pad once to twice per day.  6/7/24:  Underwent pouchoscopy  Impression:            - Anal fissure and perianal fistula found on                          perianal exam.                          - Anal stricture found on digital rectal exam.                          - The examined portion of the ileum was normal.                          - Stricture at the pouch inlet.                          - Ileal pouch with healthy appearing mucosa seen.                          - No specimens collected.   7/16/24:  Continues on Skziaydizi.  Presents for evaluation for possible fistula repair.  She is having ongoing perianal leakage and drainage.  Doing well with her ostomy.       8/26/24: Presents today for EUA, possible fistula intervention in her usual state of health.       Review of Systems:  Review of Systems   Constitutional:  Negative for chills, diaphoresis, fever, malaise/fatigue and weight loss.   HENT:  Negative for congestion.    Respiratory:  Negative for shortness of breath.    Cardiovascular:  Negative for chest pain and leg swelling.   Gastrointestinal:  Positive for diarrhea. Negative for abdominal pain, blood in stool, constipation, nausea and vomiting.   Genitourinary:  Negative for dysuria.   Musculoskeletal:  Negative for back pain and myalgias.   Skin:  Positive for rash.   Neurological:  Negative for dizziness and weakness.   Endo/Heme/Allergies:  Does not bruise/bleed easily.   Psychiatric/Behavioral:  Negative for depression.          OBJECTIVE:      Vital Signs (Most Recent)  Vitals:    08/26/24 0603   BP: 127/72   Pulse: 70   Resp: 20   Temp: 98.5 °F (36.9 °C)          Physical Exam:  General: White female in no distress   Neuro: alert and oriented x 4.  Moves all extremities.     HEENT: no icterus.  Trachea midline  Respiratory: respirations are even and unlabored  Cardiac: regular rate  Abdomen:  Soft, nontender, no masses.    Ostomy appears healthy.  Appliance not removed  Extremities: Warm dry and  intact  Skin: no rashes  Anorectal:   External exam with multiple perianal fistulas extending to the perineal body.  Thin rectovaginal septum.  Anal stenosis with scarring.  Digital exam deferred secondary to discomfort     Labs:  TSH normal.  H&H 12 and 39.  Albumin 3.9.  Normal renal function     Imaging:  No recent imaging        ASSESSMENT/PLAN:      Diagnoses and all orders for this visit:     Crohn's disease of small intestine with fistula  -     Case Request Operating Room: FISTULOTOMY              43 y.o. female with restorative total proctocolectomy with ileal pouch anastomosis in 2011 who then developed Crohn's disease with multiple perianal fistulas as well as an anal vaginal fistula.  She has undergone multiple attempts at fistula repair.      She underwent fecal diversion with a loop ileostomy on 8/17/22.  Re-explored on POD7 and was normal.       Last pouch exam on 6/7/24 with ongoing stenosis at the anus as well as persistent fistula.  Remains significantly improved from prior exams.     She wishes to investigate whether or not the fistulas would be amenable to repair.  Endoscopically, her pouch has done very well and appears healthy.  Based on her degree of stenosis and fistulas that involve the perineal body, I do not believe that her fistulas would likely be amenable to repair.  Options for repair, pouch excision with removal of the anus would then be recommended.  Rare, prior to considering pouch excision, it does seem reasonable to attempt exam under anesthesia and potential discussed this may involve drains or advancement flaps or potentially a LIFT  Consents were signed today.  She understands there is a chance that repair could not be performed

## 2024-08-26 NOTE — DISCHARGE INSTRUCTIONS
Anal Surgery Post Op Instructions:    You had a fistulotomy and dilation performed today.     Wound care:  Expect some drainage over the next few weeks as the wound heals.  Please wear a pad to help with drainage. There is a piece of gel foam in your anus that will pass naturally.     You have no activity restrictions.   No dietary restrictions.    Medications:  A narcotic pain medication has been prescribed.  Please start to wean the pain medication over the following few days.  You can take tylenol instead of the pain medication.      Follow up:  Return to clinic in 4 weeks for follow up and wound check.    CORETTA Doyle MD  Staff Surgeon  Colon & Rectal Surgery

## 2024-08-26 NOTE — ANESTHESIA PROCEDURE NOTES
Intubation    Date/Time: 8/26/2024 7:11 AM    Performed by: Bonita Barreto MD  Authorized by: Omar Hernandez MD    Intubation:     Induction:  Intravenous    Intubated:  Postinduction    Mask Ventilation:  Easy with oral airway    Attempts:  1    Attempted By:  Resident anesthesiologist    Method of Intubation:  Video laryngoscopy    Blade:  Richard 3    Laryngeal View Grade: Grade I - full view of cords      Difficult Airway Encountered?: No      Complications:  None    Airway Device:  Oral endotracheal tube    Airway Device Size:  7.0    Style/Cuff Inflation:  Cuffed (inflated to minimal occlusive pressure)    Tube secured:  22    Secured at:  The lips    Placement Verified By:  Capnometry    Complicating Factors:  None    Findings Post-Intubation:  Atraumatic/condition of teeth unchanged and BS equal bilateral

## 2024-08-27 NOTE — ANESTHESIA POSTPROCEDURE EVALUATION
Anesthesia Post Evaluation    Patient: Nyasia Middleton    Procedure(s) Performed: Procedure(s) (LRB):  FISTULOTOMY (N/A)    Final Anesthesia Type: general      Patient location during evaluation: PACU  Patient participation: Yes- Able to Participate  Level of consciousness: awake and alert  Post-procedure vital signs: reviewed and stable  Pain management: adequate  Airway patency: patent  SAUL mitigation strategies: Extubation while patient is awake  PONV status at discharge: No PONV  Anesthetic complications: no      Cardiovascular status: stable  Respiratory status: spontaneous ventilation and face mask  Hydration status: euvolemic  Follow-up not needed.              Vitals Value Taken Time   /59 08/26/24 0941   Temp 37 °C (98.6 °F) 08/26/24 0758   Pulse 71 08/26/24 0941   Resp 16 08/26/24 0941   SpO2 96 % 08/26/24 0941         No case tracking events are documented in the log.      Pain/George Score: Pain Rating Prior to Med Admin: 6 (8/26/2024  9:10 AM)  George Score: 10 (8/26/2024  9:41 AM)

## 2024-09-04 ENCOUNTER — PATIENT MESSAGE (OUTPATIENT)
Dept: SURGERY | Facility: CLINIC | Age: 44
End: 2024-09-04
Payer: COMMERCIAL

## 2024-09-10 ENCOUNTER — PATIENT MESSAGE (OUTPATIENT)
Dept: GASTROENTEROLOGY | Facility: CLINIC | Age: 44
End: 2024-09-10
Payer: COMMERCIAL

## 2024-09-10 DIAGNOSIS — F41.1 GAD (GENERALIZED ANXIETY DISORDER): ICD-10-CM

## 2024-09-10 DIAGNOSIS — K21.9 GASTROESOPHAGEAL REFLUX DISEASE, UNSPECIFIED WHETHER ESOPHAGITIS PRESENT: ICD-10-CM

## 2024-09-10 RX ORDER — FLUOXETINE HYDROCHLORIDE 40 MG/1
40 CAPSULE ORAL NIGHTLY
Qty: 90 CAPSULE | Refills: 3 | Status: SHIPPED | OUTPATIENT
Start: 2024-09-10

## 2024-09-10 RX ORDER — SUCRALFATE 1 G/1
1 TABLET ORAL
Qty: 360 TABLET | Refills: 0 | Status: SHIPPED | OUTPATIENT
Start: 2024-09-10

## 2024-09-10 NOTE — TELEPHONE ENCOUNTER
Rx pended for MD approval in 9/10/24 refill encounter.   The patient is a 39y Female complaining of shortness of breath.

## 2024-09-10 NOTE — TELEPHONE ENCOUNTER
No care due was identified.  [facility] Embedded Care Due Messages. Reference number: [id number].   9/10/2024 9:15:51 AM CDT

## 2024-09-10 NOTE — TELEPHONE ENCOUNTER
"Allergies reviewed  Pt had upper GI scope 6/7/24-  Plan per MD "Please continue protonix and carafate"  Next Dr Weber OV 9/30/24    Pt requesting Carafate Rx pharmacy change/ 90 day supply of Carafate sent to [facility] pharmacy.     Rx pended for MD approval.    "

## 2024-09-16 NOTE — PROGRESS NOTES
CRS Office Visit Followup    Referring Md:   No referring provider defined for this encounter.    SUBJECTIVE:     Chief Complaint:  Perianal fistula    History of Present Illness:  Course is as follows:  Patient is a 43 y.o. female presents with perianal fistula.  [id number]: History of 3 stage restorative proctocolectomy for presumed ulcerative colitis (Foreign [facility] at [facility]).  2011:  Diagnosis changed to Crohn's disease due to perianal abscesses and fistulas.    She transferred care to Dr. Peralta, who performed the following surgeries.    4/3/2013:  Fistulotomy x2  7/12/2013: LIFT  12/13/2013: repeat LIFT  7/28/2014: LIFT for anovaginal fistula  10/20/2014: repeat LIFT with mesh for anovaginal fistula (MatriStem mesh)  6/26/2017: repeat LIFT  Following Dr. Peralta's FPC, she has been seen by Dr. Condon who has performed multiple stem cell applications.    5/12/2022: Regarding her Crohn's disease, she is followed by Dr. Gonzales and is treated with Stelara.  She is about to change medical therapy to Entyvio.     Functionally, she is active.  She previously vaped but has recently stopped 3 weeks ago.  She has 6-7 bowel movements during the day.  1-2 bowel movements overnight.  Had intermittent improvement of her pouch function as well as decreased drainage when she was on a course of antibiotics recently.  Maintains continence.  Has frequent vaginal drainage.  Strongly wishes to avoid ileostomy.   08/02/2022:  Presents for evaluation for increased vaginal and perineal draining with increased pain.  No improvement with antibiotics or with change in her medical therapy to Entyvio.  Recently underwent pouchoscopy by Dr. Condon  With significant inflammation of the pouch and the anal canal with multiple ulcers.  Interested in discussing possibility of fecal diversion.    Last Pouchoscopy:             8/17/22: lap loop ileostomy creation and pouchoscopy   - Flexible pouchoscopy demonstrated significant stenosis of  the pouch anal anastomosis with multiple surrounding fissures.  Distal rectal mucosa was inflamed and stenotic.  Pouch with narrowing of the proximal limb consistent with Crohn's disease.  Rolan terminal ileum appeared healthy.  8/24/22: diagnostic laparoscopy for fevers/elevated CRP --> normal laparoscopy   - readmitted for 10 days for ileus followed by high output ostomy   - discharged on fluids    Current status:  9/9/22:   Presents for follow-up.  Taking  for Lomotil 3 times per day as well as 6 Imodium 3 times per day.  Intermittent high output from the ileostomy.  Continues on fluids 5 times per day.  Intermittent fatigue.  Yet to start medical therapy.  Pending approval.  9/27/22:  Recently decreased fluid 3 times per week from daily.  She is now decreasing the amount of Imodium she takes.  Intermittent thick output.  More accepting of her ostomy.    12/6/22:  Doing very well.  She is started on medical therapy.  Weight is improving.  PICC line removed in October.  Emptying the ostomy 5-6 times per day  1/17/23:   Continues to do very well.  On Skyrizi.   Intermittent mucus drainage  the anus.    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  She is complaint with her Skyrizi every 8 weeks.  She is haviing intermittent drainage of purulence and blood from the anus but has markedly improved.  Wishes to proceed with EUA to see if the ostomy can be reversed.    6/16/23: pouchoscopy  Impression:            - Stenosed post-surgical anastomosis found on digital exam.                          - Perianal fistula found on perianal exam.                          - Rectal cuff with a hemorrhagic appearance and  inflammation seen. Biopsied.                          - The examined portion of the ileum was normal. Pouch was small but healthy appearing. Narrowed pouch inlet, but able to be traversed easily.   9/14/23: doing well with her ostomy.   Intermittent mucus drainage from the perineum.   Overall doing very well.  Wears a pad.  Has to change the pad once to twice per day.  6/7/24:  Underwent pouchoscopy  Impression:            - Anal fissure and perianal fistula found on                          perianal exam.                          - Anal stricture found on digital rectal exam.                          - The examined portion of the ileum was normal.                          - Stricture at the pouch inlet.                          - Ileal pouch with healthy appearing mucosa seen.                          - No specimens collected.   7/16/24:  Continues on Skyrizi.  Presents for evaluation for possible fistula repair.  She is having ongoing perianal leakage and drainage.  Doing well with her ostomy.  8/6/24:  Exam under anesthesia with fistulotomy.  FINDINGS:  1. Extreme stenosis of the anal canal.  Superficial fistula involving only skin anteriorly.  Superficial fistula was divided.  Anal canal was gently dilated to allow the smallest of the Hill-Goss anoscope.  Moderate amount of granulation tissue seen anteriorly.  No remaining fistulas identified.  Prior fistulas appeared to have closed despite multiple probings.  Small skin tag was removed.  Hemostasis assured.  9/17/24:  Presents for follow-up.  Overall doing well.  Pleased with her ileostomy.  Pain well controlled.  Has intermittent mucus drainage this poorly controlled from the anal canal.  On Skyrizi.    Review of Systems:  Review of Systems   Constitutional:  Negative for chills, diaphoresis, fever, malaise/fatigue and weight loss.   HENT:  Negative for congestion.    Respiratory:  Negative for shortness of breath.    Cardiovascular:  Negative for chest pain and leg swelling.   Gastrointestinal:  Positive for diarrhea. Negative for abdominal pain, blood in stool, constipation, nausea and vomiting.   Genitourinary:  Negative for dysuria.   Musculoskeletal:  Negative for back pain and myalgias.   Skin:  Positive for rash.   Neurological:  " Negative for dizziness and weakness.   Endo/Heme/Allergies:  Does not bruise/bleed easily.   Psychiatric/Behavioral:  Negative for depression.        OBJECTIVE:     Vital Signs (Most Recent)  /72 (BP Location: Right arm, Patient Position: Sitting, BP Method: Large (Automatic))   Pulse 78   Resp 18   Ht 5' 8" (1.727 m)   Wt 78 kg (172 lb 1.1 oz)   LMP 07/14/2017   BMI 26.16 kg/m²     Physical Exam:  General: White female in no distress   Neuro: alert and oriented x 4.  Moves all extremities.     HEENT: no icterus.  Trachea midline  Respiratory: respirations are even and unlabored  Cardiac: regular rate  Abdomen:  Soft, nontender, no masses.    Ostomy appears healthy.  Appliance not removed  Extremities: Warm dry and intact  Skin: no rashes  Anorectal:   Deferred today.  From her recent exam, multiple blind ending perianal fistula.  No active fistula.     Labs:  TSH normal.  H&H 12 and 39.  Albumin 3.9.  Normal renal function    Imaging:  No recent imaging      ASSESSMENT/PLAN:     Diagnoses and all orders for this visit:    Crohn's disease of small intestine with fistula            43 y.o. female with restorative total proctocolectomy with ileal pouch anastomosis in 2011 who then developed Crohn's disease with multiple perianal fistulas as well as an anal vaginal fistula.  She has undergone multiple attempts at fistula repair.     She underwent fecal diversion with a loop ileostomy on 8/17/22.  Re-explored on POD7 and was normal.      Last pouch exam on 6/7/24 with ongoing stenosis at the anus as well as persistent fistula.  Remains significantly improved from prior exams.    She underwent repeat exam under anesthesia on 08/26/2024.  Superficial fistula was divided.  All other fistulas were blind ending.  She had significant improvement from prior exam.  However, she has significant stenosis with fibrosis of the anal sphincter muscle putting her at very high risk of incontinence.    This was discussed " with her today.  Recommended against any ileostomy takedown at this time.  She is agreeable and will follow up with me with future issues.            CORETTA Doyle MD, FACS, FASCRS  Staff Surgeon  Colon & Rectal Surgery    CC: Joselo Weber MD

## 2024-09-17 ENCOUNTER — OFFICE VISIT (OUTPATIENT)
Dept: SURGERY | Facility: CLINIC | Age: 44
End: 2024-09-17
Payer: COMMERCIAL

## 2024-09-17 VITALS
WEIGHT: 172.06 LBS | DIASTOLIC BLOOD PRESSURE: 72 MMHG | SYSTOLIC BLOOD PRESSURE: 116 MMHG | HEIGHT: 68 IN | HEART RATE: 78 BPM | BODY MASS INDEX: 26.08 KG/M2 | RESPIRATION RATE: 18 BRPM

## 2024-09-17 DIAGNOSIS — K50.013 CROHN'S DISEASE OF SMALL INTESTINE WITH FISTULA: Primary | ICD-10-CM

## 2024-09-17 PROCEDURE — 99999 PR PBB SHADOW E&M-EST. PATIENT-LVL III: CPT | Mod: PBBFAC,,, | Performed by: COLON & RECTAL SURGERY

## 2024-09-17 PROCEDURE — 99024 POSTOP FOLLOW-UP VISIT: CPT | Mod: S$GLB,,, | Performed by: COLON & RECTAL SURGERY

## 2024-09-17 PROCEDURE — 3078F DIAST BP <80 MM HG: CPT | Mod: CPTII,S$GLB,, | Performed by: COLON & RECTAL SURGERY

## 2024-09-17 PROCEDURE — 1159F MED LIST DOCD IN RCRD: CPT | Mod: CPTII,S$GLB,, | Performed by: COLON & RECTAL SURGERY

## 2024-09-17 PROCEDURE — 3074F SYST BP LT 130 MM HG: CPT | Mod: CPTII,S$GLB,, | Performed by: COLON & RECTAL SURGERY

## 2024-09-26 DIAGNOSIS — F41.1 GAD (GENERALIZED ANXIETY DISORDER): ICD-10-CM

## 2024-09-27 RX ORDER — ALPRAZOLAM 0.5 MG/1
0.5 TABLET ORAL 2 TIMES DAILY PRN
Qty: 60 TABLET | Refills: 0 | Status: SHIPPED | OUTPATIENT
Start: 2024-09-27

## 2024-09-27 NOTE — TELEPHONE ENCOUNTER
No care due was identified.  [facility] Embedded Care Due Messages. Reference number: [id number].   9/26/2024 8:35:55 PM CDT

## 2024-09-30 ENCOUNTER — OFFICE VISIT (OUTPATIENT)
Dept: GASTROENTEROLOGY | Facility: CLINIC | Age: 44
End: 2024-09-30
Payer: COMMERCIAL

## 2024-09-30 ENCOUNTER — LAB VISIT (OUTPATIENT)
Dept: LAB | Facility: HOSPITAL | Age: 44
End: 2024-09-30
Attending: INTERNAL MEDICINE
Payer: COMMERCIAL

## 2024-09-30 VITALS
TEMPERATURE: 98 F | OXYGEN SATURATION: 98 % | SYSTOLIC BLOOD PRESSURE: 111 MMHG | HEART RATE: 72 BPM | WEIGHT: 176.38 LBS | BODY MASS INDEX: 26.73 KG/M2 | HEIGHT: 68 IN | DIASTOLIC BLOOD PRESSURE: 77 MMHG

## 2024-09-30 DIAGNOSIS — K50.013 CROHN'S DISEASE OF SMALL INTESTINE WITH FISTULA: Primary | ICD-10-CM

## 2024-09-30 DIAGNOSIS — K21.9 GASTROESOPHAGEAL REFLUX DISEASE, UNSPECIFIED WHETHER ESOPHAGITIS PRESENT: ICD-10-CM

## 2024-09-30 DIAGNOSIS — D84.821 IMMUNOSUPPRESSION DUE TO DRUG THERAPY: ICD-10-CM

## 2024-09-30 DIAGNOSIS — Z93.2 ILEOSTOMY IN PLACE: ICD-10-CM

## 2024-09-30 DIAGNOSIS — K50.013 CROHN'S DISEASE OF SMALL INTESTINE WITH FISTULA: ICD-10-CM

## 2024-09-30 DIAGNOSIS — Z79.899 IMMUNOSUPPRESSION DUE TO DRUG THERAPY: ICD-10-CM

## 2024-09-30 LAB
ALBUMIN SERPL BCP-MCNC: 3.9 G/DL (ref 3.5–5.2)
ALP SERPL-CCNC: 89 U/L (ref 55–135)
ALT SERPL W/O P-5'-P-CCNC: 18 U/L (ref 10–44)
ANION GAP SERPL CALC-SCNC: 8 MMOL/L (ref 8–16)
AST SERPL-CCNC: 19 U/L (ref 10–40)
BASOPHILS # BLD AUTO: 0.09 K/UL (ref 0–0.2)
BASOPHILS NFR BLD: 1.3 % (ref 0–1.9)
BILIRUB SERPL-MCNC: 1.5 MG/DL (ref 0.1–1)
BUN SERPL-MCNC: 9 MG/DL (ref 6–20)
CALCIUM SERPL-MCNC: 8.8 MG/DL (ref 8.7–10.5)
CHLORIDE SERPL-SCNC: 106 MMOL/L (ref 95–110)
CO2 SERPL-SCNC: 26 MMOL/L (ref 23–29)
CREAT SERPL-MCNC: 0.7 MG/DL (ref 0.5–1.4)
CRP SERPL-MCNC: 1.3 MG/L (ref 0–8.2)
DIFFERENTIAL METHOD BLD: ABNORMAL
EOSINOPHIL # BLD AUTO: 0.6 K/UL (ref 0–0.5)
EOSINOPHIL NFR BLD: 8.4 % (ref 0–8)
ERYTHROCYTE [DISTWIDTH] IN BLOOD BY AUTOMATED COUNT: 13.1 % (ref 11.5–14.5)
EST. GFR  (NO RACE VARIABLE): >60 ML/MIN/1.73 M^2
GLUCOSE SERPL-MCNC: 78 MG/DL (ref 70–110)
HBV CORE AB SERPL QL IA: NORMAL
HBV SURFACE AG SERPL QL IA: NORMAL
HCT VFR BLD AUTO: 39 % (ref 37–48.5)
HGB BLD-MCNC: 12.9 G/DL (ref 12–16)
IMM GRANULOCYTES # BLD AUTO: 0.04 K/UL (ref 0–0.04)
IMM GRANULOCYTES NFR BLD AUTO: 0.6 % (ref 0–0.5)
LYMPHOCYTES # BLD AUTO: 1.2 K/UL (ref 1–4.8)
LYMPHOCYTES NFR BLD: 17.4 % (ref 18–48)
MCH RBC QN AUTO: 31.7 PG (ref 27–31)
MCHC RBC AUTO-ENTMCNC: 33.1 G/DL (ref 32–36)
MCV RBC AUTO: 96 FL (ref 82–98)
MONOCYTES # BLD AUTO: 0.5 K/UL (ref 0.3–1)
MONOCYTES NFR BLD: 7.5 % (ref 4–15)
NEUTROPHILS # BLD AUTO: 4.3 K/UL (ref 1.8–7.7)
NEUTROPHILS NFR BLD: 64.8 % (ref 38–73)
NRBC BLD-RTO: 0 /100 WBC
PLATELET # BLD AUTO: 233 K/UL (ref 150–450)
PMV BLD AUTO: 9.6 FL (ref 9.2–12.9)
POTASSIUM SERPL-SCNC: 3.5 MMOL/L (ref 3.5–5.1)
PROT SERPL-MCNC: 7.5 G/DL (ref 6–8.4)
RBC # BLD AUTO: 4.07 M/UL (ref 4–5.4)
SODIUM SERPL-SCNC: 140 MMOL/L (ref 136–145)
WBC # BLD AUTO: 6.67 K/UL (ref 3.9–12.7)

## 2024-09-30 PROCEDURE — 3008F BODY MASS INDEX DOCD: CPT | Mod: CPTII,S$GLB,, | Performed by: INTERNAL MEDICINE

## 2024-09-30 PROCEDURE — 3078F DIAST BP <80 MM HG: CPT | Mod: CPTII,S$GLB,, | Performed by: INTERNAL MEDICINE

## 2024-09-30 PROCEDURE — 85025 COMPLETE CBC W/AUTO DIFF WBC: CPT | Performed by: INTERNAL MEDICINE

## 2024-09-30 PROCEDURE — 1159F MED LIST DOCD IN RCRD: CPT | Mod: CPTII,S$GLB,, | Performed by: INTERNAL MEDICINE

## 2024-09-30 PROCEDURE — 90471 IMMUNIZATION ADMIN: CPT | Mod: S$GLB,,, | Performed by: INTERNAL MEDICINE

## 2024-09-30 PROCEDURE — 90656 IIV3 VACC NO PRSV 0.5 ML IM: CPT | Mod: S$GLB,,, | Performed by: INTERNAL MEDICINE

## 2024-09-30 PROCEDURE — 86706 HEP B SURFACE ANTIBODY: CPT | Performed by: INTERNAL MEDICINE

## 2024-09-30 PROCEDURE — 1160F RVW MEDS BY RX/DR IN RCRD: CPT | Mod: CPTII,S$GLB,, | Performed by: INTERNAL MEDICINE

## 2024-09-30 PROCEDURE — 80053 COMPREHEN METABOLIC PANEL: CPT | Performed by: INTERNAL MEDICINE

## 2024-09-30 PROCEDURE — 86704 HEP B CORE ANTIBODY TOTAL: CPT | Performed by: INTERNAL MEDICINE

## 2024-09-30 PROCEDURE — 36415 COLL VENOUS BLD VENIPUNCTURE: CPT | Performed by: INTERNAL MEDICINE

## 2024-09-30 PROCEDURE — 87340 HEPATITIS B SURFACE AG IA: CPT | Performed by: INTERNAL MEDICINE

## 2024-09-30 PROCEDURE — 3074F SYST BP LT 130 MM HG: CPT | Mod: CPTII,S$GLB,, | Performed by: INTERNAL MEDICINE

## 2024-09-30 PROCEDURE — 99214 OFFICE O/P EST MOD 30 MIN: CPT | Mod: 25,S$GLB,, | Performed by: INTERNAL MEDICINE

## 2024-09-30 PROCEDURE — 86140 C-REACTIVE PROTEIN: CPT | Performed by: INTERNAL MEDICINE

## 2024-09-30 PROCEDURE — 86480 TB TEST CELL IMMUN MEASURE: CPT | Performed by: INTERNAL MEDICINE

## 2024-09-30 RX ORDER — SUCRALFATE 1 G/1
1 TABLET ORAL
Qty: 360 TABLET | Refills: 3 | Status: SHIPPED | OUTPATIENT
Start: 2024-09-30

## 2024-09-30 NOTE — PROGRESS NOTES
[facility]          Inflammatory Bowel Disease Follow Up Note         TODAY'S VISIT DATE:  9/30/2024    Reason for Consult:    Chief Complaint   Patient presents with    Crohn's Disease       PCP: Kofi Winkler      Referring MD:   No ref. provider found    History of Present Illness:  Nyasia Middleton who is a 43 y.o. female is being seen today at the [facility] on 09/30/2024 for inflammatory bowel disease- Crohn's disease.  In June of 2024 she underwent an upper endoscopy that was normal.  A pouchoscopy at that time showed minimal inflammation but ongoing structural issues including a perianal fistula and severe anal canal stenosis as well as mild stricturing of the pouch inlet.  Ileoscopy at that time showed no evidence of active inflammation.  She also underwent an exam under anesthesia with fistulotomy on August 26, 2024.  At that time she was again noted to have severe anal canal stenosis and a small superficial fistula.  She continues to take Skyrizi every 8 weeks without any issues.  She reports stable output from her ileostomy.  She is taking pantoprazole 80 mg at night as well as Carafate 4 times a day and occasional Gaviscon.  She has not required any Gaviscon for breakthrough symptoms in the last few weeks.  Overall her symptoms have been fairly stable.    IBD History:  In 2010 she had an episode of C diff colitis in March and was treated with oral vancomycin.  This was while she was pregnant.  She continued to have issues with diarrhea and eventually was tested negative for C diff and was diagnosed with ulcerative colitis around May of 2010 after she delivered.  The 1st notes I have to review are from July of 2010 when she presented with ongoing rectal pain, blood in the stools, diarrhea.  At that time it was noted that she was taking prednisone and Asacol 2400 mg daily.  She had a colonoscopy on July 16, 2010 that showed severe  inflammation throughout the entire colon.  It does not appear that the terminal ileum was intubated during this procedure.  Biopsies were taken but I do not have those results.  She reports that she received infliximab in the late summer of 2010.  She can not remember how many doses she received but she thinks that was only 1 or 2 doses.  She does not recall having a significant improvement and in October of 2010 she underwent laparoscopic total abdominal colectomy and end ileostomy.  On January 4, 2011 she underwent completion proctectomy, J pouch formation, and diverting loop ileostomy.  In February 2011 she underwent loop ileostomy closure.  A follow-up pouchoscopy was done in December of 2011 and demonstrated some mild stenosis of the ileoanal anastomosis but otherwise the pouch was normal appearing.  By June of 2012 she had developed a perianal abscess and underwent exam under anesthesia with incision and drainage in September of that year.  In February of 2013 a repeat pouchoscopy showed moderate inflammation of the pouch.  Biopsies demonstrated chronic inflammatory changes consistent with acute pouchitis.  Given the perianal abscess there was concern for underlying Crohn's disease.  The pouchitis was treated with Cipro.  In April of 2013 she underwent a repeat exam under anesthesia with fistulotomy.  In 2013 she continued to have ongoing issues and was started on Lialda.  She had multiple subsequent procedures for perianal fistula management.  She continued with treatment with antibiotics with minimal improvement.  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  It appears that she did fairly well between 2014 and 2016.  She became pregnant again in 2013 and then again in 2016 and reports that during her pregnancy she actually did quite well.  In 2016 she underwent a pouchoscopy which showed an anal stricture.  There was normal-appearing  mucosa but the J pouch was felt to be small in size.  In November of 2016 she underwent an upper endoscopy for epigastric pain and was notable for grade 1 esophagitis but otherwise normal stomach and duodenum.  Biopsies of the duodenum showed mild focal duodenal lymphocytosis but with normal villous architecture.  There was also chronic gastritis noted in the stomach.  Esophageal biopsies were normal.  In April 2017 she was noted to have 2 small openings in the perineum with purulence drainage.  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  In June of 2017 she underwent a repeat exam under anesthesia with ligation of an intersphincteric fistula tract.  She was eventually started on sulfasalazine for an inflammatory arthritis.  In October of 2017 and MRI of the pelvis showed inflammation near the ileoanal anastomosis but no other significant abnormalities were identified.  In April 2018 she underwent laparoscopic lysis of adhesions for abdominal pain.  In 2018 she had recurrence of perianal fistulas and was treated with antibiotics.  A pouchoscopy revealed 2 fistulas from the pouch to the vagina.  In June of 2019 a fistulogram was done at [facility] that showed no definite vaginal fistula.  In December of 2019 she had a repeat anal fistula repair.  At some point she was treated with Humira.  She does not recall ever having her dosing optimized but she was on this for several months and maybe even up to a year are more.  She does not recall having a significant improvement in any of her fistulas or gastrointestinal symptoms and eventually the medication was stopped because of the development of antibodies to Humira.  In March of 2020 it is noted that she was taking Stelara.  She does not recall when she started taking this but she took it for a little over a year.  She felt like of  all the medication she was ever on this did provide some benefit.  Her fistula issues never resolved but she did have a decrease in stool frequency and had more energy and an overall better sense of well-being.  She eventually stop this because of multiple issues with her insurance and frequent delays in issues with receiving her medication from the mail order pharmacy she was required to use.  She does remember that her dose was increased to every 4 weeks at some point and that provided significant benefit.  Notes from May of 2022 mention that she was planning to switch from Stelara to Entyvio. It appears that her perianal fistulas were treated with stem cells as well. In July 2022 she had a pouchoscopy that showed ileoanal anastomosis stenosis, perianal fistulas, and inflammation in the pouch. Kenalog was injected in to the anastomosis.  She was started on Entyvio and received the 1st 3 loading doses but never felt well taking this and actually felt like she was getting worse while taking it.  It was stopped in preparation for her surgery.  On 8/17/22 she underwent diverting loop ileostomy due to continued perianal fistulizing disease. A subsequent laparoscopy on 8/24 due to concern for ischemic bowel did not show any ischemia but did show some twisting of the ileostomy.  She also reports being on antibiotics intermittently that which made her feel poorly overall but did seem to decrease the frequency of bowel movements sometimes.  She has never been on probiotics.  She has never been on immunomodulator or methotrexate.  She was on budesonide at 1 point and reports that this did not provide any benefit.  She started Skyrizi October 27, 2022.  After starting this she had significant improvement in her perianal fistula and began gaining weight.  She felt significantly better overall.  In June 2023 she underwent a follow-up pouchoscopy. At that time her perianal fistula appeared significantly better.  There was an  anal stricture noted as well as some hemorrhagic appearance to the rectal cuff.  There was some inflammatory changes in the pouch in the pouch was small in size.  There was some mild narrowing of the pouch inlet but the ileum above that and the pouch inlet did not have any evidence of significant ulceration or inflammation. In June of 2024 she underwent an upper endoscopy that was normal.  A pouchoscopy at that time showed minimal inflammation but ongoing structural issues including a perianal fistula and severe anal canal stenosis as well as mild stricturing of the pouch inlet.  Ileoscopy at that time showed no evidence of active inflammation.  She also underwent an exam under anesthesia with fistulotomy on August 26, 2024.  At that time she was again noted to have severe anal canal stenosis and a small superficial fistula.       IBD Details:  Dx Date:  2010  Disease type/distribution:  Initially diagnosed with ulcerative colitis, now with Crohn's like disease of the pouch complicated by perianal and anal vaginal fistulas.  Also with ileoanal stenosis and pouch inflammation in the distal pouch  Current Treatment:  Skyrizi 360 mg every 8 weeks Start Date:  October 27, 2022 Response:  Endoscopic improvement  Optimized:  Not applicable  Adverse reactions:  None  Prior surgeries:   2011-total proctocolectomy with 3 stage J pouch placement     Multiple incisions and drainage, advancement flaps, other treatments for perianal fistulas and anovaginal fistulas     2022-diverting loop ileostomy due to ongoing perianal Crohn's disease  CRP Elevation: Y  calprotectin: Unknown  Disease Complications:  Severe fistulizing disease of the J pouch  Extraintestinal manifestations:  Joint pains  Prior treatments:   Steroids:  Incomplete response to prednisone and budesonide  5ASA:  Incomplete response to Asacol and Lialda  IMM:  None  TNF Inh:  Infliximab-received 2 doses prior to proctocolectomy, no response, not optimized, no  adverse reactions    Humira-took this for a while.  Does not optimized, no improvement clinically, developed antibodies   Anti-Integrin:  Received 3 loading doses of Entyvio.  No improvement in symptoms, no maintenance dose, not optimized   IL 12/23:   Stelara-took this for over a year with dose optimization to every 4 week dosing.  Improvement in GI symptoms of diarrhea but no resolution of perianal fistulizing disease-no adverse reactions, stopped due to   difficulties with insurance   Skyrizi-current medication  HATTIE Inh:  None    Previous Clinical Trials:  None    Last Colonoscopy:   June 2024-pouchoscopy-severe anal stenosis, perianal fistula, minimal inflammation in the pouch and pre pouch ileum, mild narrowing of the pouch inlet; ileoscopy normal  June 2023-pouchoscopy-anal stenosis, improving perianal fistula, inflammation in the J pouch, mild narrowing of the pouch inlet  7/22-pouchoscopy with ileoanal anastomosis stenosis, perianal fistulas, inflammation in the pouch-Kenalog injected into the anastomosis    Other Endoscopies:   June 2024-EGD-normal  Previous EGD report reviewed from 2016    Imaging:   MRE:  None   CT:  CT scan from August 2022 reviewed, other CT scans also reviewed   Other:  Other pertinent studies reviewed    Pertinent Labs:  Lab Results   Component Value Date    SEDRATE 78 (H) 07/09/2018    CRP 1.8 09/14/2023     Lab Results   Component Value Date    TTGIGA 6 09/07/2022     09/07/2022     Lab Results   Component Value Date    TSH 0.723 06/22/2023    FREET4 1.01 03/14/2017     Lab Results   Component Value Date    XILQBAZR96MM 28 (L) 03/09/2023    MRCORVCQ11 852 09/07/2022     Lab Results   Component Value Date    HEPBSAG Non-reactive 09/14/2023    HEPBCAB Non-reactive 09/14/2023    HEPCAB Non-reactive 09/07/2022     Lab Results   Component Value Date    KXF20QAWN Non-reactive 09/07/2022     Lab Results   Component Value Date    NIL 0.21788 09/22/2023    MITOGENNIL 9.995 09/22/2023  "    Lab Results   Component Value Date    TPTMINTERP SEE BELOW 09/07/2022     Lab Results   Component Value Date    CDIFFICILEAN Negative 08/27/2022    CDIFFTOX Negative 08/27/2022     Lab Results   Component Value Date    CALPROTECTIN 56.9 (H) 09/14/2023       Therapeutic Drug Monitoring Labs:  No results found for: "PROMETH"  No results found for: "ANSADAINIT", "INFLIXIMAB", "INFLIXINTERP"    Vaccinations:  No results found for: "HEPBSAB"  Lab Results   Component Value Date    HEPAIGG Non-reactive 09/07/2022     Lab Results   Component Value Date    VARICELLAZOS 3.25 (H) 09/07/2022    VARICELLAINT Positive (A) 09/07/2022     Immunization History   Administered Date(s) Administered    COVID-19, MRNA, LN-S, PF (MODERNA FULL 0.5 ML DOSE) 01/20/2021, 02/19/2021    Hepatitis A, Adult 03/11/2024    Hepatitis B, Pediatric/Adolescent 06/25/2003, 07/23/2003, 01/09/2004    Influenza 09/15/2014, 09/25/2019, 12/07/2020    Influenza - Quadrivalent 09/25/2019, 12/07/2020, 11/08/2021    Influenza - Quadrivalent - PF *Preferred* (6 months and older) 11/22/2023    Influenza - Trivalent - Fluarix, Flulaval, Fluzone, Afluria - PF 09/30/2024    Influenza A (H1N1) 2009 Monovalent - IM - PF 11/06/2009    MMR 04/27/1983, 02/07/1997    Pneumococcal Conjugate - 20 Valent 02/17/2023    Td (ADULT) 02/07/1997, 02/07/1997    Zoster Recombinant 02/17/2023, 04/21/2023         Review of Systems  Review of Systems   Constitutional:  Negative for chills, fever and weight loss.   HENT:  Negative for sore throat.    Eyes:  Negative for pain, discharge and redness.   Respiratory:  Negative for cough, shortness of breath and wheezing.    Cardiovascular:  Negative for chest pain, orthopnea and leg swelling.   Gastrointestinal:  Negative for abdominal pain, blood in stool, constipation, diarrhea, heartburn, melena, nausea and vomiting.   Genitourinary:  Negative for dysuria, frequency and urgency.   Musculoskeletal:  Negative for back pain, joint pain " and myalgias.   Skin:  Negative for itching and rash.   Neurological:  Negative for focal weakness and seizures.   Endo/Heme/Allergies:  Does not bruise/bleed easily.   Psychiatric/Behavioral:  Negative for depression. The patient is not nervous/anxious.        Medical History:   Past Medical History:   Diagnosis Date    Abnormal Pap smear of cervix     Anxiety     Crohn disease     GERD (gastroesophageal reflux disease)     History of Clostridium difficile infection     Perineal sinus 2017       Surgical History:  Past Surgical History:   Procedure Laterality Date     SECTION, CLASSIC      DIAGNOSTIC LAPAROSCOPY N/A 2022    Procedure: LAPAROSCOPY, DIAGNOSTIC;  Surgeon: CORETTA Doyle MD;  [facility];  Service: Colon and Rectal;  Laterality: N/A;    ESOPHAGOGASTRODUODENOSCOPY N/A 2024    Procedure: EGD (ESOPHAGOGASTRODUODENOSCOPY);  Surgeon: Patrick Weber MD;  Location: [facility] ENDO (4TH FLR);  Service: Endoscopy;  Laterality: N/A;    FISTULOTOMY N/A 2024    Procedure: FISTULOTOMY;  Surgeon: CORETTA Doyle MD;  Location: [facility] OR 2ND FLR;  Service: Colon and Rectal;  Laterality: N/A;    FLEXIBLE SIGMOIDOSCOPY N/A 2018    Procedure: SIGMOIDOSCOPY, FLEXIBLE;  Surgeon: Jairo Peralta MD;  Location: [facility] (4TH FLR);  Service: Endoscopy;  Laterality: N/A;  no enemas per [facility]    FLEXIBLE SIGMOIDOSCOPY N/A 2022    Procedure: SIGMOIDOSCOPY, FLEXIBLE;  Surgeon: CORETTA Doyle MD;  [facility];  Service: Colon and Rectal;  Laterality: N/A;    HYSTERECTOMY      LSH    ILEOSCOPY N/A 2024    Procedure: ILEOSCOPY;  Surgeon: Patrick Weber MD;  Location: [facility] ENDO (4TH FLR);  Service: Endoscopy;  Laterality: N/A;  Ref by:,instr sent via portal,half miralax prep,hold on snapboard for -[facility]  -pre call complete-tb    ILEOSTOMY CLOSURE      LAPAROSCOPIC ILEOSTOMY N/A 2022    Procedure:  CREATION, ILEOSTOMY, LAPAROSCOPIC;  Surgeon: CORETTA Doyle MD;  Location: [facility] OR 2ND FLR;  Service: Colon and Rectal;  Laterality: N/A;    POUCHOSCOPY N/A 11/06/2018    Procedure: ENDOSCOPY, SMALL INTESTINAL POUCH, DIAGNOSTIC, possible seton placement;  Surgeon: Jairo Peralta MD;  Location: [facility] ENDO (4TH FLR);  Service: Endoscopy;  Laterality: N/A;  Schedule early Nov 2018; No prep    POUCHOSCOPY N/A 6/16/2023    Procedure: ENDOSCOPY, POUCH, SMALL INTESTINE, DIAGNOSTIC;  Surgeon: CORETTA Doyle MD;  Location: [facility] ENDO (4TH FLR);  Service: Endoscopy;  Laterality: N/A;  inst via portal    POUCHOSCOPY N/A 6/7/2024    Procedure: ENDOSCOPY, POUCH, SMALL INTESTINE, DIAGNOSTIC;  Surgeon: Patrick Weber MD;  Location: [facility] ENDO (4TH FLR);  Service: Endoscopy;  Laterality: N/A;    rectovaginal fistula repair      RESTORATIVE PROCTOCOLECTOMY      TONSILLECTOMY      adenoids    TUBAL LIGATION      june 2016       Family History:   Family History   Problem Relation Name Age of Onset    No Known Problems Mother      No Known Problems Father      Crohn's disease Sister      Crohn's disease Maternal Aunt      Hypertension Maternal Grandmother      Colon cancer Maternal Grandfather      Breast cancer Neg Hx      Ovarian cancer Neg Hx      Esophageal cancer Neg Hx         Social History:   Social History     Tobacco Use    Smoking status: Former    Smokeless tobacco: Never   Substance Use Topics    Alcohol use: Yes     Comment: occ    Drug use: No       Allergies: Reviewed    Home Medications:   Medication List with Changes/Refills   Current Medications    ALPRAZOLAM (XANAX) 0.5 MG TABLET    Take 1 tablet (0.5 mg total) by mouth 2 (two) times daily as needed for Anxiety. Do not take with restoril    BUSPIRONE (BUSPAR) 10 MG TABLET    Take 1 tablet (10 mg total) by mouth 2 (two) times daily as needed (anxiety).    DM/P-EPHED/ACETAMINOPH/DOXYLAM (VICKS NYQUIL LIQUICAPS ORAL)    Take by mouth.    FLUOXETINE 40 MG  "CAPSULE    Take 1 capsule (40 mg total) by mouth every evening.    ONDANSETRON (ZOFRAN-ODT) 8 MG TBDL    Take 1 tablet (8 mg total) by mouth every 12 (twelve) hours as needed (vomiting).    OXYCODONE (ROXICODONE) 5 MG IMMEDIATE RELEASE TABLET    Take 1 tablet (5 mg total) by mouth every 4 (four) hours as needed for Pain.    PANTOPRAZOLE (PROTONIX) 40 MG TABLET    Take 1 tablet (40 mg total) by mouth 2 (two) times daily.    RISANKIZUMAB-RZAA (SKYRIZI) 360 MG/2.4 ML (150 MG/ML) INJT    Inject 360 mg into the skin every 8 weeks.    TEMAZEPAM (RESTORIL) 15 MG CAP    Take 1 capsule (15 mg total) by mouth nightly as needed (insmonia.). Do not take with xanax.   Changed and/or Refilled Medications    Modified Medication Previous Medication    SUCRALFATE (CARAFATE) 1 GRAM TABLET sucralfate (CARAFATE) 1 gram tablet       Take 1 tablet (1 g total) by mouth 4 (four) times daily before meals and nightly.    Take 1 tablet (1 g total) by mouth 4 (four) times daily before meals and nightly.       Physical Exam:  Vital Signs:  /77 (BP Location: Left arm, Patient Position: Sitting)   Pulse 72   Temp 97.9 °F (36.6 °C) (Skin)   Ht 5' 8" (1.727 m)   Wt 80 kg (176 lb 5.9 oz)   LMP 07/14/2017   SpO2 98%   BMI 26.82 kg/m²   Body mass index is 26.82 kg/m².    Physical Exam  Nursing note reviewed.   Constitutional:       General: She is not in acute distress.     Appearance: Normal appearance. She is well-developed. She is not ill-appearing.   Skin:     Findings: No rash.   Neurological:      Mental Status: She is alert.   Psychiatric:         Mood and Affect: Mood normal.         Behavior: Behavior normal.         Thought Content: Thought content normal.         Judgment: Judgment normal.         Labs: reviewed and pertinent noted above    Assessment/Plan:  Nyasia Middleton is a 43 y.o. female with Crohn's like disease of the J pouch complicated by perianal and anal vaginal fistula formation. The following issues were " addresssed:    1. Crohn's disease of small intestine with fistula    2. Immunosuppression due to drug therapy    3. Ileostomy in place    4. Gastroesophageal reflux disease, unspecified whether esophagitis present        1. Crohn's disease of the pouch:  Overall there has been significant improvement in the inflammation in her pouch since starting Skyrizi.  I recommend that she continue current medical therapy.  She saw Dr. Doyle recently who recommended against ileostomy takedown.  I think that is the right move.  I am concerned that if she did ever have ileostomy takedown she is going to have significant issues given the severe anal stricturing.  She does report significant improvement in her quality of life since having the diverting ileostomy placed.  She does not love having the ileostomy but has noted a significant improvement.    2. Acid reflux:  EGD was normal.  Recommend changing dosing of Protonix to 30 minutes to an hour before breakfast and before supper.  Can continue Carafate.  Can continue to use Gaviscon as needed.    3. Immunosuppression due to drug therapy:  Flu shot today    # IBD specific health maintenance:  Colon cancer surveillance:  Not applicable    Annual:  - Eye exam:  Not applicable  - Skin exam (if on IMM/TNF):  Discussed and recommended she establishes care with a dermatologist  - reminded pt to use sunblock/hats/sunprotective clothing  - PAP (if immunosuppressed):  Hysterectomy - last PAP 5/2023 - yearly     DEXA:  Not applicable    Vitamin D:  Check with next labs. Currently not on vit d supplementation     Vaccines:    Influenza:  Receiving today   Pneumovax: UTD   HAV:  Received 1st dose   HBV:  Immune   Tdap:  Discussed and recommended booster now at local pharmacy. Repeat every 10 years.    MMR: Immune   VZV:  Immune   HZV:  UTD   HPV:  Not applicable   Meningococcus:  Not applicable   COVID: Eligible for booster.     Follow up: Follow up in about 6 months (around  3/30/2025).    Thank you again for sending Nyasia Middleton to see Dr. Joselo Weber today at the [facility]. Please don't hesitate to contact Dr. Weber if there are any questions regarding this evaluation, or if you have any other patients with inflammatory bowel disease for whom you would like a consultation. You can reach Dr. Weber at [phone] or by email at [email or web address]    Patrick Weber MD  Department of Gastroenterology  Inflammatory Bowel Disease

## 2024-10-01 LAB
GAMMA INTERFERON BACKGROUND BLD IA-ACNC: 0.01 IU/ML
M TB IFN-G CD4+ BCKGRND COR BLD-ACNC: 0.06 IU/ML
M TB IFN-G CD4+ BCKGRND COR BLD-ACNC: 0.06 IU/ML
MITOGEN IGNF BCKGRD COR BLD-ACNC: 8.86 IU/ML
TB GOLD PLUS: NEGATIVE

## 2024-10-02 LAB
HBV SURFACE AB SER QL IA: POSITIVE
HBV SURFACE AB SERPL IA-ACNC: 382 MIU/ML

## 2024-11-04 DIAGNOSIS — F41.1 GAD (GENERALIZED ANXIETY DISORDER): ICD-10-CM

## 2024-11-04 RX ORDER — ALPRAZOLAM 0.5 MG/1
0.5 TABLET ORAL 2 TIMES DAILY PRN
Qty: 60 TABLET | Refills: 0 | Status: SHIPPED | OUTPATIENT
Start: 2024-11-04

## 2024-11-04 NOTE — TELEPHONE ENCOUNTER
No care due was identified.  [facility] Embedded Care Due Messages. Reference number: [id number].   11/04/2024 12:23:06 PM CST

## 2024-11-15 DIAGNOSIS — F51.01 PRIMARY INSOMNIA: ICD-10-CM

## 2024-11-15 NOTE — TELEPHONE ENCOUNTER
No care due was identified.  [facility] Embedded Care Due Messages. Reference number: [id number].   11/15/2024 11:25:58 AM CST

## 2024-11-17 RX ORDER — TEMAZEPAM 15 MG/1
CAPSULE ORAL
Qty: 30 CAPSULE | Refills: 3 | Status: SHIPPED | OUTPATIENT
Start: 2024-11-17

## 2024-11-29 DIAGNOSIS — K50.013 CROHN'S DISEASE OF SMALL INTESTINE WITH FISTULA: ICD-10-CM

## 2024-11-29 RX ORDER — RISANKIZUMAB-RZAA 360 MG/2.4
WEARABLE INJECTOR SUBCUTANEOUS
Qty: 2.4 ML | Refills: 1 | Status: SHIPPED | OUTPATIENT
Start: 2024-11-29

## 2024-11-29 NOTE — TELEPHONE ENCOUNTER
"Primary provider: Dr. Patrick Weber    IBD medications: Skyrizi 360 mg every 8 weeks     Refill request for Skyrizi 360 mg every 8 weeks     Allergies reviewed: Yes    Drug Monitoring labs/frequency for all IBD meds:    CBC & CMP every 6 months  HepB & TB annually    Lab Results   Component Value Date    HEPBSAG Non-reactive 09/30/2024    HEPBCAB Non-reactive 09/30/2024     Lab Results   Component Value Date    TBGOLDPLUS Negative 09/30/2024     No results found for: "QUANTNILVALU", "QUANTIFERON", "QUANTTBGDPL"   No results found for: "TSPOTSCREN"  Lab Results   Component Value Date    TZRTLTLH81PK 28 (L) 03/09/2023    PNCMKXDP24 852 09/07/2022     Lab Results   Component Value Date    WBC 6.67 09/30/2024    HGB 12.9 09/30/2024    HCT 39.0 09/30/2024    MCV 96 09/30/2024     09/30/2024     Lab Results   Component Value Date    CREATININE 0.7 09/30/2024    ALBUMIN 3.9 09/30/2024    BILITOT 1.5 (H) 09/30/2024    ALKPHOS 89 09/30/2024    AST 19 09/30/2024    ALT 18 09/30/2024       Lab due date (mo/yr):  3/2025    Labs scheduled: no - FOV 3/31/2025    Next appt: 3/31/2025    RX refill sent to provider for amount until next labs: Yes-ND 12/2024 and 2/2025     "

## 2024-12-09 ENCOUNTER — PATIENT MESSAGE (OUTPATIENT)
Dept: PHARMACY | Facility: CLINIC | Age: 44
End: 2024-12-09
Payer: COMMERCIAL

## 2024-12-09 DIAGNOSIS — F41.1 GAD (GENERALIZED ANXIETY DISORDER): ICD-10-CM

## 2024-12-09 DIAGNOSIS — K21.9 GASTROESOPHAGEAL REFLUX DISEASE, UNSPECIFIED WHETHER ESOPHAGITIS PRESENT: ICD-10-CM

## 2024-12-09 RX ORDER — PANTOPRAZOLE SODIUM 40 MG/1
40 TABLET, DELAYED RELEASE ORAL 2 TIMES DAILY
Qty: 180 TABLET | Refills: 2 | Status: SHIPPED | OUTPATIENT
Start: 2024-12-09

## 2024-12-09 RX ORDER — PANTOPRAZOLE SODIUM 40 MG/1
40 TABLET, DELAYED RELEASE ORAL 2 TIMES DAILY
Qty: 180 TABLET | Refills: 2 | OUTPATIENT
Start: 2024-12-09

## 2024-12-09 NOTE — TELEPHONE ENCOUNTER
"Primary provider: Dr. Patrick Weber    IBD medications: pantoprazole (PROTONIX) 40 MG tablet BID, risankizumab-rzaa (SKYRIZI) 360 mg/2.4 mL (150 mg/mL) Injt every 8 weeks     Refill request for pantoprazole (PROTONIX) 40 MG tablet BID    Allergies reviewed: Yes    Drug Monitoring labs/frequency for all IBD meds:    CBC and CMP every 6 months  TB and Hep B every 12 months    Lab Results   Component Value Date    HEPBSAG Non-reactive 09/30/2024    HEPBCAB Non-reactive 09/30/2024     Lab Results   Component Value Date    TBGOLDPLUS Negative 09/30/2024     No results found for: "QUANTNILVALU", "QUANTIFERON", "QUANTTBGDPL"   No results found for: "TSPOTSCREN"  Lab Results   Component Value Date    [id number] 28 (L) 03/09/2023    KLNPTQRL83 852 09/07/2022     Lab Results   Component Value Date    WBC 6.67 09/30/2024    HGB 12.9 09/30/2024    HCT 39.0 09/30/2024    MCV 96 09/30/2024     09/30/2024     Lab Results   Component Value Date    CREATININE 0.7 09/30/2024    ALBUMIN 3.9 09/30/2024    BILITOT 1.5 (H) 09/30/2024    ALKPHOS 89 09/30/2024    AST 19 09/30/2024    ALT 18 09/30/2024       Lab due date (mo/yr):  3/2025    Labs scheduled: No - but patient has an OV before or when labs are due     Next appt: 3/31/25    RX refill sent to provider for amount until next labs: Yes       "

## 2024-12-09 NOTE — TELEPHONE ENCOUNTER
Pantoprazole was sent earlier today. Sent patient mychart message to inform her. Will duplicate this encounter.

## 2024-12-09 NOTE — TELEPHONE ENCOUNTER
Care Due:                  Date            Visit Type   Department     Provider  --------------------------------------------------------------------------------                                ESTABLISHED                              PATIENT -    [facility] FAMILY  Last Visit: 03-      [facility]      MEDICINE       Kofi Winkler  Next Visit: None Scheduled  None         None Found                                                            Last  Test          Frequency    Reason                     Performed    Due Date  --------------------------------------------------------------------------------    Office Visit  12 months..  busPIRone................  03- 03-    [facility] Embedded Care Due Messages. Reference number: [id number].   12/09/2024 2:42:17 PM CST

## 2024-12-11 RX ORDER — ALPRAZOLAM 0.5 MG/1
0.5 TABLET ORAL 2 TIMES DAILY PRN
Qty: 60 TABLET | Refills: 0 | Status: SHIPPED | OUTPATIENT
Start: 2024-12-11

## 2024-12-13 ENCOUNTER — OFFICE VISIT (OUTPATIENT)
Dept: FAMILY MEDICINE | Facility: CLINIC | Age: 44
End: 2024-12-13
Payer: COMMERCIAL

## 2024-12-13 DIAGNOSIS — Z00.00 ANNUAL PHYSICAL EXAM: Primary | ICD-10-CM

## 2024-12-13 DIAGNOSIS — Z93.2 PRESENCE OF ILEOSTOMY: ICD-10-CM

## 2024-12-13 DIAGNOSIS — K50.018 CROHN'S DISEASE OF SMALL INTESTINE WITH OTHER COMPLICATION: ICD-10-CM

## 2024-12-13 DIAGNOSIS — F51.01 PRIMARY INSOMNIA: ICD-10-CM

## 2024-12-13 RX ORDER — TEMAZEPAM 30 MG/1
30 CAPSULE ORAL NIGHTLY PRN
Qty: 90 CAPSULE | Refills: 1 | Status: SHIPPED | OUTPATIENT
Start: 2024-12-13

## 2024-12-13 NOTE — PROGRESS NOTES
Primary Care Virtual Visit    The patient location is: [place], [place]  The chief complaint leading to consultation is:  annual  Visit type: Virtual visit with synchronous audio and video  Total time spent with patient:  15 minutes  Each patient to whom he or she provides medical services by telemedicine is:  (1) informed of the relationship between the physician and patient and the respective role of any other health care provider with respect to management of the patient; and (2) notified that he or she may decline to receive medical services by telemedicine and may withdraw from such care at any time.     Patient ID: Nyasia Middleton is a 44 y.o. female.       HPI   Patient presents for follow up. She is feeling well. She has been sleeping well with restoril. She has been taking 30 mg. She recently saw colorectal surgery who recommended against ostomy reversal at this time. She reports symptoms of crohns disease have been well controlled.     Review of Systems  Review of Systems   Constitutional:  Negative for fever.   HENT:  Negative for ear pain, hearing loss and sinus pain.    Eyes:  Negative for discharge.   Respiratory:  Negative for cough, shortness of breath and wheezing.    Cardiovascular:  Negative for chest pain, palpitations and leg swelling.   Gastrointestinal:  Negative for blood in stool, constipation, diarrhea, nausea and vomiting.   Genitourinary:  Negative for dysuria, hematuria and urgency.   Musculoskeletal:  Negative for myalgias and neck pain.   Skin:  Negative for rash.   Neurological:  Negative for weakness and headaches.   Endo/Heme/Allergies:  Negative for polydipsia.   Psychiatric/Behavioral:  Negative for depression.    All other systems reviewed and are negative.      Currently Medications  Current Outpatient Medications on File Prior to Visit   Medication Sig Dispense Refill    ALPRAZolam (XANAX) 0.5 MG tablet Take 1 tablet (0.5 mg total) by mouth 2 (two) times daily as needed for Anxiety.  Do not take with restoril 60 tablet 0    busPIRone (BUSPAR) 10 MG tablet Take 1 tablet (10 mg total) by mouth 2 (two) times daily as needed (anxiety). (Patient not taking: Reported on 9/30/2024) 180 tablet 1    FLUoxetine 40 MG capsule Take 1 capsule (40 mg total) by mouth every evening. 90 capsule 3    ondansetron (ZOFRAN-ODT) 8 MG TbDL Take 1 tablet (8 mg total) by mouth every 12 (twelve) hours as needed (vomiting). (Patient not taking: Reported on 9/30/2024) 20 tablet 2    pantoprazole (PROTONIX) 40 MG tablet TAKE 1 TABLET BY MOUTH TWO TIMES A  tablet 2    risankizumab-rzaa (SKYRIZI) 360 mg/2.4 mL (150 mg/mL) Injt INJECT 360 MG UNDER THE SKIN EVERY 8 WEEKS 2.4 mL 1    sucralfate (CARAFATE) 1 gram tablet Take 1 tablet (1 g total) by mouth 4 (four) times daily before meals and nightly. 360 tablet 3    [DISCONTINUED] temazepam (RESTORIL) 15 mg Cap TAKE 1 CAPSULE BY MOUTH AT BEDTIME AS NEEDED FOR INSOMNIA, DO NOT TAKE WITH XANAX 30 capsule 3     No current facility-administered medications on file prior to visit.       Physical  Exam  There were no vitals filed for this visit.   Physical Exam  Constitutional:       Appearance: Normal appearance.   HENT:      Head: Normocephalic and atraumatic.   Eyes:      Extraocular Movements: Extraocular movements intact.   Skin:     Coloration: Skin is not pale.   Neurological:      Mental Status: She is alert and oriented to person, place, and time.   Psychiatric:         Mood and Affect: Mood normal.         Labs:    Complete Blood Count  Lab Results   Component Value Date    RBC 4.07 09/30/2024    HGB 12.9 09/30/2024    HCT 39.0 09/30/2024    MCV 96 09/30/2024    MCH 31.7 (H) 09/30/2024    MCHC 33.1 09/30/2024    RDW 13.1 09/30/2024     09/30/2024    MPV 9.6 09/30/2024    GRAN 4.3 09/30/2024    GRAN 64.8 09/30/2024    LYMPH 1.2 09/30/2024    LYMPH 17.4 (L) 09/30/2024    MONO 0.5 09/30/2024    MONO 7.5 09/30/2024    EOS 0.6 (H) 09/30/2024    BASO 0.09 09/30/2024  "   EOSINOPHIL 8.4 (H) 09/30/2024    BASOPHIL 1.3 09/30/2024    DIFFMETHOD Automated 09/30/2024       Comprehensive Metabolic Panel  Lab Results   Component Value Date    GLU 78 09/30/2024    BUN 9 09/30/2024    CREATININE 0.7 09/30/2024     09/30/2024    K 3.5 09/30/2024     09/30/2024    PROT 7.5 09/30/2024    ALBUMIN 3.9 09/30/2024    BILITOT 1.5 (H) 09/30/2024    AST 19 09/30/2024    ALKPHOS 89 09/30/2024    CO2 26 09/30/2024    ALT 18 09/30/2024    ANIONGAP 8 09/30/2024       TSH  No results found for: "TSH"    Imaging:  Mammo Digital Screening Bilat w/ Abe  Narrative: Facility:  [facility]  [place] [address] [id number]  [phone]    Name: Nyasia Middleton    MRN: [id number]    Result:  Mammo Digital Screening Bilat w/ Abe    History:  Patient is 43 y.o. and is seen for a screening mammogram.    Films Compared:  Prior images were available and compared.     Findings:  This procedure was performed using tomosynthesis.   Computer-aided detection was utilized in the interpretation of this   examination.    There is no evidence of suspicious masses, microcalcifications or   architectural distortion.    Breast Density:  There are scattered areas of fibroglandular density.   Impression:    No mammographic evidence of malignancy.    BI-RADS Category 1: Negative    Recommendation:  Routine screening mammogram in 1 year is recommended.    Your estimated lifetime risk of breast cancer (to age 85) based on   Tyrer-Cuzick risk assessment model is 7.92%.  According to the American   Cancer Society, patients with a lifetime breast cancer risk of 20% or   higher might benefit from supplemental screening tests, such as screening   breast MRI.    Reginald Wetzel MD      Assessment/Plan:    1. Annual physical exam    2. Presence of ileostomy    3. Crohn's disease of small intestine with other complication    4. Primary insomnia  -     temazepam (RESTORIL) 30 mg " capsule; Take 1 capsule (30 mg total) by mouth nightly as needed. Do not take at the same time as xanax  Dispense: 90 capsule; Refill: 1          Discussed how to stay healthy including: diet, exercise, refraining from smoking and discussed screening exams / tests needed for age, sex and family Hx.      Kofi Winkler MD      Answers submitted by the patient for this visit:  Review of Systems Questionnaire (Submitted on 12/10/2024)  activity change: No  unexpected weight change: No  rhinorrhea: No  trouble swallowing: No  visual disturbance: No  chest tightness: No  polyuria: No  difficulty urinating: No  menstrual problem: No  joint swelling: No  arthralgias: No  confusion: No  dysphoric mood: No

## 2025-01-29 ENCOUNTER — PATIENT MESSAGE (OUTPATIENT)
Dept: WOUND CARE | Facility: CLINIC | Age: 45
End: 2025-01-29
Payer: COMMERCIAL

## 2025-01-29 ENCOUNTER — PATIENT MESSAGE (OUTPATIENT)
Dept: SURGERY | Facility: CLINIC | Age: 45
End: 2025-01-29
Payer: COMMERCIAL

## 2025-01-29 ENCOUNTER — TELEPHONE (OUTPATIENT)
Dept: SURGERY | Facility: CLINIC | Age: 45
End: 2025-01-29
Payer: COMMERCIAL

## 2025-01-29 DIAGNOSIS — F41.1 GAD (GENERALIZED ANXIETY DISORDER): ICD-10-CM

## 2025-01-29 NOTE — TELEPHONE ENCOUNTER
No care due was identified.  [facility] Embedded Care Due Messages. Reference number: [id number].   1/29/2025 10:53:18 AM CST

## 2025-01-29 NOTE — TELEPHONE ENCOUNTER
"Returned pt's call.  Leaving for [place] tomorrow.    During the snow storm ate a bunch of oranges.   Became "backed up"; eventually ileostomy started functioning again.  Notes thinner than usual stool.  Emptying ~5x/day.  Is having to change pouch approx daily now d/t leaks.  Reports stoma is smaller.  +60 lb weight gain.   "

## 2025-01-30 RX ORDER — ALPRAZOLAM 0.5 MG/1
TABLET ORAL
Qty: 60 TABLET | Refills: 1 | Status: SHIPPED | OUTPATIENT
Start: 2025-01-30

## 2025-01-30 RX ORDER — ONDANSETRON 8 MG/1
TABLET, ORALLY DISINTEGRATING ORAL
Qty: 20 TABLET | Refills: 2 | Status: SHIPPED | OUTPATIENT
Start: 2025-01-30

## 2025-02-12 ENCOUNTER — TELEPHONE (OUTPATIENT)
Dept: SURGERY | Facility: CLINIC | Age: 45
End: 2025-02-12
Payer: COMMERCIAL

## 2025-02-13 ENCOUNTER — OFFICE VISIT (OUTPATIENT)
Dept: SURGERY | Facility: CLINIC | Age: 45
End: 2025-02-13
Payer: COMMERCIAL

## 2025-02-13 VITALS
BODY MASS INDEX: 27.39 KG/M2 | DIASTOLIC BLOOD PRESSURE: 80 MMHG | SYSTOLIC BLOOD PRESSURE: 115 MMHG | HEART RATE: 74 BPM | HEIGHT: 68 IN | WEIGHT: 180.75 LBS

## 2025-02-13 DIAGNOSIS — K12.0 APHTHOUS ULCER: ICD-10-CM

## 2025-02-13 DIAGNOSIS — Z93.2 ILEOSTOMY IN PLACE: Primary | ICD-10-CM

## 2025-02-13 DIAGNOSIS — K50.919 CROHN'S DISEASE WITH COMPLICATION, UNSPECIFIED GASTROINTESTINAL TRACT LOCATION: ICD-10-CM

## 2025-02-13 PROCEDURE — 3008F BODY MASS INDEX DOCD: CPT | Mod: CPTII,S$GLB,, | Performed by: NURSE PRACTITIONER

## 2025-02-13 PROCEDURE — 99213 OFFICE O/P EST LOW 20 MIN: CPT | Mod: S$GLB,,, | Performed by: NURSE PRACTITIONER

## 2025-02-13 PROCEDURE — 1160F RVW MEDS BY RX/DR IN RCRD: CPT | Mod: CPTII,S$GLB,, | Performed by: NURSE PRACTITIONER

## 2025-02-13 PROCEDURE — 3079F DIAST BP 80-89 MM HG: CPT | Mod: CPTII,S$GLB,, | Performed by: NURSE PRACTITIONER

## 2025-02-13 PROCEDURE — 99999 PR PBB SHADOW E&M-EST. PATIENT-LVL III: CPT | Mod: PBBFAC,,, | Performed by: NURSE PRACTITIONER

## 2025-02-13 PROCEDURE — 1159F MED LIST DOCD IN RCRD: CPT | Mod: CPTII,S$GLB,, | Performed by: NURSE PRACTITIONER

## 2025-02-13 PROCEDURE — 3074F SYST BP LT 130 MM HG: CPT | Mod: CPTII,S$GLB,, | Performed by: NURSE PRACTITIONER

## 2025-02-13 NOTE — PROGRESS NOTES
CRS Office Visit History and Physical    Referring Md:   No referring provider defined for this encounter.    SUBJECTIVE:     Chief Complaint: ulcer near stoma    History of Present Illness:  The patient is well known patient to this practice.   Course is as follows:  Patient is a 44 y.o. female presents with ulcer near stoma.  Symptoms have been present for 2 weeks. Possibly improving with triamcinolone dental paste use.  +weight gain. Stoma changes.  Stool still loose though emptying 4-6x/day as usual.  Wearing 2 pc anny bag. Cutting to 25 mm.       Review of patient's allergies indicates:  No Known Allergies    Past Medical History:   Diagnosis Date    Abnormal Pap smear of cervix     Anxiety     Crohn disease     GERD (gastroesophageal reflux disease)     History of Clostridium difficile infection     Perineal sinus 2017     Past Surgical History:   Procedure Laterality Date     SECTION, CLASSIC      DIAGNOSTIC LAPAROSCOPY N/A 2022    Procedure: LAPAROSCOPY, DIAGNOSTIC;  Surgeon: CORETTA Doyle MD;  Location: [facility] OR [address] [place] [address];  Service: Colon and Rectal;  Laterality: N/A;    ESOPHAGOGASTRODUODENOSCOPY N/A 2024    Procedure: EGD (ESOPHAGOGASTRODUODENOSCOPY);  Surgeon: Patrick Weber MD;  Location: [facility] (4TH FLR);  Service: Endoscopy;  Laterality: N/A;    FISTULOTOMY N/A 2024    Procedure: FISTULOTOMY;  Surgeon: CORETTA Doyle MD;  [facility];  Service: Colon and Rectal;  Laterality: N/A;    FLEXIBLE SIGMOIDOSCOPY N/A 2018    Procedure: SIGMOIDOSCOPY, FLEXIBLE;  Surgeon: Jairo Peralta MD;  Location: [facility] ENDO (4TH FLR);  Service: Endoscopy;  Laterality: N/A;  no enemas per [facility]    FLEXIBLE SIGMOIDOSCOPY N/A 2022    Procedure: SIGMOIDOSCOPY, FLEXIBLE;  Surgeon: CORETTA Doyle MD;  [facility];  Service: Colon and Rectal;  Laterality: N/A;    HYSTERECTOMY      LSH    ILEOSCOPY N/A 2024    Procedure:  ILEOSCOPY;  Surgeon: Patrick Weber MD;  Location: [facility] ENDO (4TH FLR);  Service: Endoscopy;  Laterality: N/A;  Ref by:jackie Fuller sent via portal,half miralax prep,hold on snapboard for -[facility]  5/31-pre call complete-tb    ILEOSTOMY CLOSURE      LAPAROSCOPIC ILEOSTOMY N/A 08/17/2022    Procedure: CREATION, ILEOSTOMY, LAPAROSCOPIC;  Surgeon: CORETTA Doyle MD;  Location: [facility] OR 2ND FLR;  Service: Colon and Rectal;  Laterality: N/A;    POUCHOSCOPY N/A 11/06/2018    Procedure: ENDOSCOPY, SMALL INTESTINAL POUCH, DIAGNOSTIC, possible seton placement;  Surgeon: Jairo Peralta MD;  Location: [facility] (4TH FLR);  Service: Endoscopy;  Laterality: N/A;  Schedule early Nov 2018; No prep    POUCHOSCOPY N/A 6/16/2023    Procedure: ENDOSCOPY, POUCH, SMALL INTESTINE, DIAGNOSTIC;  Surgeon: CORETTA Doyle MD;  Location: [facility] END[facility] (4TH FLR);  Service: Endoscopy;  Laterality: N/A;  inst via portal    POUCHOSCOPY N/A 6/7/2024    Procedure: ENDOSCOPY, POUCH, SMALL INTESTINE, DIAGNOSTIC;  Surgeon: Patrick Weber MD;  Location: [facility] END[facility] (4TH FLR);  Service: Endoscopy;  Laterality: N/A;    rectovaginal fistula repair      RESTORATIVE PROCTOCOLECTOMY      TONSILLECTOMY      adenoids    TUBAL LIGATION      june 2016     Family History   Problem Relation Name Age of Onset    No Known Problems Mother      No Known Problems Father      Crohn's disease Sister      Crohn's disease Maternal Aunt      Hypertension Maternal Grandmother      Colon cancer Maternal Grandfather      Breast cancer Neg Hx      Ovarian cancer Neg Hx      Esophageal cancer Neg Hx       Social History     Tobacco Use    Smoking status: Former    Smokeless tobacco: Never   Substance Use Topics    Alcohol use: Yes     Comment: occ    Drug use: No        Review of Systems:  Review of Systems   Gastrointestinal:         Ileostomy; parastomal ulcer       OBJECTIVE:     Vital Signs (Most Recent)  /80 (BP Location: Right arm, Patient  "Position: Sitting)   Pulse 74   Ht 5' 8" (1.727 m)   Wt 82 kg (180 lb 12.4 oz)   LMP 07/14/2017   BMI 27.49 kg/m²     Physical Exam:  General: White female in no distress   Neuro: Alert and oriented to person, place, and time.  Moves all extremities.     HEENT: No icterus.  Trachea midline  Respiratory: Respirations are even and unlabored, no cough or audible wheezing  Abdomen:     2/13/25    Labs reviewed today:  Lab Results   Component Value Date    WBC 6.67 09/30/2024    HGB 12.9 09/30/2024    HCT 39.0 09/30/2024     09/30/2024    CHOL 147 06/22/2023    TRIG 136 06/22/2023    HDL 69 06/22/2023    ALT 18 09/30/2024    AST 19 09/30/2024     09/30/2024    K 3.5 09/30/2024     09/30/2024    CREATININE 0.7 09/30/2024    BUN 9 09/30/2024    CO2 26 09/30/2024    TSH 0.723 06/22/2023    HGBA1C 4.5 03/09/2023         ASSESSMENT/PLAN:     Diagnoses and all orders for this visit:    Ileostomy in place    Aphthous ulcer    Crohn's disease with complication, unspecified gastrointestinal tract location          Patient instructed to do the following routine for pouching:  Cleanse skin with water, dry well.  Apply skin barrier film. Allow to dry  Apply dental paste/hydrofera blue stoma wafer  Apply pouch sized appropriately for stoma. 29 mm.   30 minute visit in face to face counseling.  Return clinic visit recommended BRITTNEY Castro  Colon and Rectal Surgery        "

## 2025-03-14 DIAGNOSIS — K50.013 CROHN'S DISEASE OF SMALL INTESTINE WITH FISTULA: ICD-10-CM

## 2025-03-14 RX ORDER — RISANKIZUMAB-RZAA 360 MG/2.4
WEARABLE INJECTOR SUBCUTANEOUS
Qty: 2.4 ML | Refills: 0 | Status: SHIPPED | OUTPATIENT
Start: 2025-03-14

## 2025-03-14 NOTE — TELEPHONE ENCOUNTER
"Primary provider: Dr. Patrick Weber    IBD medications: Skyrizi 360 mg SC Q 8 weeks    Refill request for:      Pended Medication(s)   Requested Prescriptions     Pending Prescriptions Disp Refills    SKYRIZI 360 mg/2.4 mL (150 mg/mL) Injt [Pharmacy Med Name: SKYRIZI 360 MG/2.4 ML ON-BODY] 2.4 mL 1     Sig: INJECT 360 MG UNDER THE SKIN EVERY 8 WEEKS           Allergies reviewed: Yes    Drug Monitoring labs/frequency for all IBD meds:    CBC and CMP every 6 months  TB and Hep B every 12 months    Lab Results   Component Value Date    HEPBSAG Non-reactive 09/30/2024    HEPBCAB Non-reactive 09/30/2024     Lab Results   Component Value Date    TBGOLDPLUS Negative 09/30/2024     No results found for: "QUANTNILVALU", "QUANTIFERON", "QUANTTBGDPL"   No results found for: "TSPOTSCREN"  Lab Results   Component Value Date    [id number] 28 (L) 03/09/2023    UQOCESZZ54 852 09/07/2022     Lab Results   Component Value Date    WBC 6.67 09/30/2024    HGB 12.9 09/30/2024    HCT 39.0 09/30/2024    MCV 96 09/30/2024     09/30/2024     Lab Results   Component Value Date    CREATININE 0.7 09/30/2024    ALBUMIN 3.9 09/30/2024    BILITOT 1.5 (H) 09/30/2024    ALKPHOS 89 09/30/2024    AST 19 09/30/2024    ALT 18 09/30/2024       Lab due date (mo/yr):  3/25    Labs scheduled: No - but patient has an OV before or when labs are due     Next appt: 3/31/25    RX refill sent to provider for amount until next labs: Yes       "

## 2025-03-25 DIAGNOSIS — F41.1 GAD (GENERALIZED ANXIETY DISORDER): ICD-10-CM

## 2025-03-25 NOTE — TELEPHONE ENCOUNTER
No care due was identified.  [facility] Embedded Care Due Messages. Reference number: [id number].   3/25/2025 11:57:18 AM CDT

## 2025-03-26 RX ORDER — ALPRAZOLAM 0.5 MG/1
0.5 TABLET ORAL 2 TIMES DAILY PRN
Qty: 60 TABLET | Refills: 1 | Status: SHIPPED | OUTPATIENT
Start: 2025-03-26

## 2025-03-31 ENCOUNTER — OFFICE VISIT (OUTPATIENT)
Dept: GASTROENTEROLOGY | Facility: CLINIC | Age: 45
End: 2025-03-31
Payer: COMMERCIAL

## 2025-03-31 ENCOUNTER — PATIENT MESSAGE (OUTPATIENT)
Dept: GASTROENTEROLOGY | Facility: CLINIC | Age: 45
End: 2025-03-31

## 2025-03-31 VITALS — WEIGHT: 175 LBS | HEIGHT: 68 IN | BODY MASS INDEX: 26.52 KG/M2

## 2025-03-31 DIAGNOSIS — Z93.2 ILEOSTOMY IN PLACE: ICD-10-CM

## 2025-03-31 DIAGNOSIS — Z79.899 IMMUNOSUPPRESSION DUE TO DRUG THERAPY: ICD-10-CM

## 2025-03-31 DIAGNOSIS — N82.3 RECTOVAGINAL FISTULA: ICD-10-CM

## 2025-03-31 DIAGNOSIS — K21.9 GASTROESOPHAGEAL REFLUX DISEASE, UNSPECIFIED WHETHER ESOPHAGITIS PRESENT: ICD-10-CM

## 2025-03-31 DIAGNOSIS — K50.013 CROHN'S DISEASE OF SMALL INTESTINE WITH FISTULA: Primary | ICD-10-CM

## 2025-03-31 DIAGNOSIS — D84.821 IMMUNOSUPPRESSION DUE TO DRUG THERAPY: ICD-10-CM

## 2025-03-31 RX ORDER — BUSPIRONE HYDROCHLORIDE 7.5 MG/1
7.5 TABLET ORAL 2 TIMES DAILY
COMMUNITY
Start: 2024-12-17

## 2025-03-31 NOTE — PROGRESS NOTES
[facility]          Inflammatory Bowel Disease Follow Up Note         TODAY'S VISIT DATE:  3/31/2025    Reason for Consult:    Chief Complaint   Patient presents with    Crohn's Disease       PCP: Kofi Winkler      Referring MD:   No ref. provider found    History of Present Illness:  Nyasia Middleton who is a 44 y.o. female is being seen today at the [facility] on 03/31/2025 for inflammatory bowel disease- Crohn's disease.  She continues to do very well.  She is taking Skyrizi every 8 weeks.  She denies any new issues.  Her ostomy output has been stable.  She has not had any issues with perianal fistulas or abscesses.  She did have a small ulcer adjacent to her ileostomy stoma.  This has resolved with topical therapy.  She denies any other complaints today.  She does report some fatigue issues but that is the only issue she notes. Overall she is feeling quite well.      IBD History:  In 2010 she had an episode of C diff colitis in March and was treated with oral vancomycin.  This was while she was pregnant.  She continued to have issues with diarrhea and eventually was tested negative for C diff and was diagnosed with ulcerative colitis around May of 2010 after she delivered.  The 1st notes I have to review are from July of 2010 when she presented with ongoing rectal pain, blood in the stools, diarrhea.  At that time it was noted that she was taking prednisone and Asacol 2400 mg daily.  She had a colonoscopy on July 16, 2010 that showed severe inflammation throughout the entire colon.  It does not appear that the terminal ileum was intubated during this procedure.  Biopsies were taken but I do not have those results.  She reports that she received infliximab in the late summer of 2010.  She can not remember how many doses she received but she thinks that was only 1 or 2 doses.  She does not recall having a significant improvement and in  October of 2010 she underwent laparoscopic total abdominal colectomy and end ileostomy.  On January 4, 2011 she underwent completion proctectomy, J pouch formation, and diverting loop ileostomy.  In February 2011 she underwent loop ileostomy closure.  A follow-up pouchoscopy was done in December of 2011 and demonstrated some mild stenosis of the ileoanal anastomosis but otherwise the pouch was normal appearing.  By June of 2012 she had developed a perianal abscess and underwent exam under anesthesia with incision and drainage in September of that year.  In February of 2013 a repeat pouchoscopy showed moderate inflammation of the pouch.  Biopsies demonstrated chronic inflammatory changes consistent with acute pouchitis.  Given the perianal abscess there was concern for underlying Crohn's disease.  The pouchitis was treated with Cipro.  In April of 2013 she underwent a repeat exam under anesthesia with fistulotomy.  In 2013 she continued to have ongoing issues and was started on Lialda.  She had multiple subsequent procedures for perianal fistula management.  She continued with treatment with antibiotics with minimal improvement.  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  It appears that she did fairly well between 2014 and 2016.  She became pregnant again in 2013 and then again in 2016 and reports that during her pregnancy she actually did quite well.  In 2016 she underwent a pouchoscopy which showed an anal stricture.  There was normal-appearing mucosa but the J pouch was felt to be small in size.  In November of 2016 she underwent an upper endoscopy for epigastric pain and was notable for grade 1 esophagitis but otherwise normal stomach and duodenum.  Biopsies of the duodenum showed mild focal duodenal lymphocytosis but with normal villous architecture.  There was also chronic gastritis noted in the stomach.  Esophageal biopsies were  normal.  In April 2017 she was noted to have 2 small openings in the perineum with purulence drainage.  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  In June of 2017 she underwent a repeat exam under anesthesia with ligation of an intersphincteric fistula tract.  She was eventually started on sulfasalazine for an inflammatory arthritis.  In October of 2017 and MRI of the pelvis showed inflammation near the ileoanal anastomosis but no other significant abnormalities were identified.  In April 2018 she underwent laparoscopic lysis of adhesions for abdominal pain.  In 2018 she had recurrence of perianal fistulas and was treated with antibiotics.  A pouchoscopy revealed 2 fistulas from the pouch to the vagina.  In June of 2019 a fistulogram was done at [facility] that showed no definite vaginal fistula.  In December of 2019 she had a repeat anal fistula repair.  At some point she was treated with Humira.  She does not recall ever having her dosing optimized but she was on this for several months and maybe even up to a year are more.  She does not recall having a significant improvement in any of her fistulas or gastrointestinal symptoms and eventually the medication was stopped because of the development of antibodies to Humira.  In March of 2020 it is noted that she was taking Stelara.  She does not recall when she started taking this but she took it for a little over a year.  She felt like of all the medication she was ever on this did provide some benefit.  Her fistula issues never resolved but she did have a decrease in stool frequency and had more energy and an overall better sense of well-being.  She eventually stop this because of multiple issues with her insurance and frequent delays in issues with receiving her medication from the mail order pharmacy she was required to use.   She does remember that her dose was increased to every 4 weeks at some point and that provided significant benefit.  Notes from May of 2022 mention that she was planning to switch from Stelara to Entyvio. It appears that her perianal fistulas were treated with stem cells as well. In July 2022 she had a pouchoscopy that showed ileoanal anastomosis stenosis, perianal fistulas, and inflammation in the pouch. Kenalog was injected in to the anastomosis.  She was started on Entyvio and received the 1st 3 loading doses but never felt well taking this and actually felt like she was getting worse while taking it.  It was stopped in preparation for her surgery.  On 8/17/22 she underwent diverting loop ileostomy due to continued perianal fistulizing disease. A subsequent laparoscopy on 8/24 due to concern for ischemic bowel did not show any ischemia but did show some twisting of the ileostomy.  She also reports being on antibiotics intermittently that which made her feel poorly overall but did seem to decrease the frequency of bowel movements sometimes.  She has never been on probiotics.  She has never been on immunomodulator or methotrexate.  She was on budesonide at 1 point and reports that this did not provide any benefit.  She started Skyrizi October 27, 2022.  After starting this she had significant improvement in her perianal fistula and began gaining weight.  She felt significantly better overall.  In June 2023 she underwent a follow-up pouchoscopy. At that time her perianal fistula appeared significantly better.  There was an anal stricture noted as well as some hemorrhagic appearance to the rectal cuff.  There was some inflammatory changes in the pouch in the pouch was small in size.  There was some mild narrowing of the pouch inlet but the ileum above that and the pouch inlet did not have any evidence of significant ulceration or inflammation. In June of 2024 she underwent an upper endoscopy that was normal.  A  pouchoscopy at that time showed minimal inflammation but ongoing structural issues including a perianal fistula and severe anal canal stenosis as well as mild stricturing of the pouch inlet.  Ileoscopy at that time showed no evidence of active inflammation.  She also underwent an exam under anesthesia with fistulotomy on August 26, 2024.  At that time she was again noted to have severe anal canal stenosis and a small superficial fistula.       IBD Details:  Dx Date:  2010  Disease type/distribution:  Initially diagnosed with ulcerative colitis, now with Crohn's like disease of the pouch complicated by perianal and anal vaginal fistulas.  Also with ileoanal stenosis and pouch inflammation in the distal pouch  Current Treatment:  Skyrizi 360 mg every 8 weeks Start Date:  October 27, 2022 Response:  Endoscopic improvement  Optimized:  Not applicable  Adverse reactions:  None  Prior surgeries:   2011-total proctocolectomy with 3 stage J pouch placement     Multiple incisions and drainage, advancement flaps, other treatments for perianal fistulas and anovaginal fistulas     2022-diverting loop ileostomy due to ongoing perianal Crohn's disease  CRP Elevation: Y  calprotectin: Unknown  Disease Complications:  Severe fistulizing disease of the J pouch  Extraintestinal manifestations:  Joint pains  Prior treatments:   Steroids:  Incomplete response to prednisone and budesonide  5ASA:  Incomplete response to Asacol and Lialda  IMM:  None  TNF Inh:  Infliximab-received 2 doses prior to proctocolectomy, no response, not optimized, no adverse reactions    Humira-took this for a while.  Does not optimized, no improvement clinically, developed antibodies   Anti-Integrin:  Received 3 loading doses of Entyvio.  No improvement in symptoms, no maintenance dose, not optimized   IL 12/23:   Stelara-took this for over a year with dose optimization to every 4 week dosing.  Improvement in GI symptoms of diarrhea but no resolution of  "perianal fistulizing disease-no adverse reactions, stopped due to   difficulties with insurance   Skyrizi-current medication  HATTIE Inh:  None    Previous Clinical Trials:  None    Last Colonoscopy:   June 2024-pouchoscopy-severe anal stenosis, perianal fistula, minimal inflammation in the pouch and pre pouch ileum, mild narrowing of the pouch inlet; ileoscopy normal  June 2023-pouchoscopy-anal stenosis, improving perianal fistula, inflammation in the J pouch, mild narrowing of the pouch inlet  7/22-pouchoscopy with ileoanal anastomosis stenosis, perianal fistulas, inflammation in the pouch-Kenalog injected into the anastomosis    Other Endoscopies:   June 2024-EGD-normal  Previous EGD report reviewed from 2016    Imaging:   MRE:  None   CT:  CT scan from August 2022 reviewed, other CT scans also reviewed   Other:  Other pertinent studies reviewed    Pertinent Labs:  Lab Results   Component Value Date    SEDRATE 78 (H) 07/09/2018    CRP 1.3 09/30/2024     Lab Results   Component Value Date    TTGIGA 6 09/07/2022     09/07/2022     Lab Results   Component Value Date    TSH 0.723 06/22/2023    FREET4 1.01 03/14/2017     Lab Results   Component Value Date    YYHVTIJA53JF 28 (L) 03/09/2023    DYIUBZPU34 852 09/07/2022     Lab Results   Component Value Date    HEPBSAG Non-reactive 09/30/2024    HEPBCAB Non-reactive 09/30/2024    HEPCAB Non-reactive 09/07/2022     Lab Results   Component Value Date    DUC16EEAC Non-reactive 09/07/2022     Lab Results   Component Value Date    NIL 0.34493 09/30/2024    MITOGENNIL 8.862 09/30/2024     Lab Results   Component Value Date    TPTMINTERP SEE BELOW 09/07/2022     Lab Results   Component Value Date    CDIFFICILEAN Negative 08/27/2022    CDIFFTOX Negative 08/27/2022     Lab Results   Component Value Date    CALPROTECTIN 56.9 (H) 09/14/2023       Therapeutic Drug Monitoring Labs:  No results found for: "PROMETH"  No results found for: "ANSADAINIT", "INFLIXIMAB", " ""INFLIXINTERP"    Vaccinations:  No results found for: "HEPBSAB"  Lab Results   Component Value Date    HEPAIGG Non-reactive 09/07/2022     Lab Results   Component Value Date    VARICELLAZOS 3.25 (H) 09/07/2022    VARICELLAINT Positive (A) 09/07/2022     Immunization History   Administered Date(s) Administered    COVID-19, MRNA, LN-S, PF (MODERNA FULL 0.5 ML DOSE) 01/20/2021, 02/19/2021    Hepatitis A, Adult 03/11/2024    Hepatitis B, Pediatric/Adolescent 06/25/2003, 07/23/2003, 01/09/2004    Influenza 09/15/2014, 09/25/2019, 12/07/2020    Influenza - Quadrivalent 09/25/2019, 12/07/2020, 11/08/2021    Influenza - Quadrivalent - PF *Preferred* (6 months and older) 11/22/2023    Influenza - Trivalent - Fluarix, Flulaval, Fluzone, Afluria - PF 09/30/2024    Influenza A (H1N1) 2009 Monovalent - IM - PF 11/06/2009    MMR 04/27/1983, 02/07/1997    Pneumococcal Conjugate - 20 Valent 02/17/2023    Td (ADULT) 02/07/1997, 02/07/1997    Zoster Recombinant 02/17/2023, 04/21/2023         Review of Systems  Review of Systems   Constitutional:  Negative for chills, fever and weight loss.   HENT:  Negative for sore throat.    Eyes:  Negative for pain, discharge and redness.   Respiratory:  Negative for cough, shortness of breath and wheezing.    Cardiovascular:  Negative for chest pain, orthopnea and leg swelling.   Gastrointestinal:  Negative for abdominal pain, blood in stool, constipation, diarrhea, heartburn, melena, nausea and vomiting.   Genitourinary:  Negative for dysuria, frequency and urgency.   Musculoskeletal:  Negative for back pain, joint pain and myalgias.   Skin:  Negative for itching and rash.   Neurological:  Negative for focal weakness and seizures.   Endo/Heme/Allergies:  Does not bruise/bleed easily.   Psychiatric/Behavioral:  Negative for depression. The patient is not nervous/anxious.        Medical History:   Past Medical History:   Diagnosis Date    Abnormal Pap smear of cervix     Anxiety     Crohn disease "     GERD (gastroesophageal reflux disease)     History of Clostridium difficile infection     Perineal sinus 2017       Surgical History:  Past Surgical History:   Procedure Laterality Date     SECTION, CLASSIC      DIAGNOSTIC LAPAROSCOPY N/A 2022    Procedure: LAPAROSCOPY, DIAGNOSTIC;  Surgeon: CORETTA Doyle MD;  [facility];  Service: Colon and Rectal;  Laterality: N/A;    ESOPHAGOGASTRODUODENOSCOPY N/A 2024    Procedure: EGD (ESOPHAGOGASTRODUODENOSCOPY);  Surgeon: Patrick Weber MD;  Location: [facility] ENDO (4TH FLR);  Service: Endoscopy;  Laterality: N/A;    FISTULOTOMY N/A 2024    Procedure: FISTULOTOMY;  Surgeon: CORETTA Doyle MD;  [facility];  Service: Colon and Rectal;  Laterality: N/A;    FLEXIBLE SIGMOIDOSCOPY N/A 2018    Procedure: SIGMOIDOSCOPY, FLEXIBLE;  Surgeon: Jairo Peralta MD;  [facility]  Service: Endoscopy;  Laterality: N/A;  no enemas per [place] NP    FLEXIBLE SIGMOIDOSCOPY N/A 2022    Procedure: SIGMOIDOSCOPY, FLEXIBLE;  Surgeon: CORETTA Doyle MD;  [facility];  Service: Colon and Rectal;  Laterality: N/A;    HYSTERECTOMY      LSH    ILEOSCOPY N/A 2024    Procedure: ILEOSCOPY;  Surgeon: Patrick Weber MD;  Location: [facility] ENDO (4TH FLR);  Service: Endoscopy;  Laterality: N/A;  Ref by:,instr sent via portal,half miralax prep,hold on snapboard for -[facility]  -pre call complete-tb    ILEOSTOMY CLOSURE      LAPAROSCOPIC ILEOSTOMY N/A 2022    Procedure: CREATION, ILEOSTOMY, LAPAROSCOPIC;  Surgeon: CORETTA Doyle MD;  [facility];  Service: Colon and Rectal;  Laterality: N/A;    POUCHOSCOPY N/A 2018    Procedure: ENDOSCOPY, SMALL INTESTINAL POUCH, DIAGNOSTIC, possible seton placement;  Surgeon: Jairo Peralta MD;  Location: [facility] ENDO ([facility] FLR);  Service: Endoscopy;  Laterality: N/A;  Schedule early 2018; No  prep    POUCHOSCOPY N/A 6/16/2023    Procedure: ENDOSCOPY, POUCH, SMALL INTESTINE, DIAGNOSTIC;  Surgeon: CORETTA Doyle MD;  Location: [facility] ENDO (4TH FLR);  Service: Endoscopy;  Laterality: N/A;  inst via portal    POUCHOSCOPY N/A 6/7/2024    Procedure: ENDOSCOPY, POUCH, SMALL INTESTINE, DIAGNOSTIC;  Surgeon: Patrick Weber MD;  Location: [facility] ENDO (4TH FLR);  Service: Endoscopy;  Laterality: N/A;    rectovaginal fistula repair      RESTORATIVE PROCTOCOLECTOMY      TONSILLECTOMY      adenoids    TUBAL LIGATION      june 2016       Family History:   Family History   Problem Relation Name Age of Onset    No Known Problems Mother      No Known Problems Father      Crohn's disease Sister      Crohn's disease Maternal Aunt      Hypertension Maternal Grandmother      Colon cancer Maternal Grandfather      Breast cancer Neg Hx      Ovarian cancer Neg Hx      Esophageal cancer Neg Hx         Social History:   Social History     Tobacco Use    Smoking status: Former    Smokeless tobacco: Never   Substance Use Topics    Alcohol use: Yes     Comment: occ    Drug use: No       Allergies: Reviewed    Home Medications:   Medication List with Changes/Refills   Current Medications    ALPRAZOLAM (XANAX) 0.5 MG TABLET    Take 1 tablet (0.5 mg total) by mouth 2 (two) times daily as needed for Anxiety (do not take with restoril).    BUSPIRONE (BUSPAR) 10 MG TABLET    Take 1 tablet (10 mg total) by mouth 2 (two) times daily as needed (anxiety).    BUSPIRONE (BUSPAR) 7.5 MG TABLET    Take 7.5 mg by mouth 2 (two) times daily.    FLUOXETINE 40 MG CAPSULE    Take 1 capsule (40 mg total) by mouth every evening.    ONDANSETRON (ZOFRAN-ODT) 8 MG TBDL    DISSOLVE 1 TABLET IN MOUTH EVERY 12 HOURS AS NEEDED FOR NAUSEA AND VOMITING    PANTOPRAZOLE (PROTONIX) 40 MG TABLET    TAKE 1 TABLET BY MOUTH TWO TIMES A DAY    RISANKIZUMAB-RZAA (SKYRIZI) 360 MG/2.4 ML (150 MG/ML) INJT    INJECT 360 MG UNDER THE SKIN EVERY 8 WEEKS    SUCRALFATE  "(CARAFATE) 1 GRAM TABLET    Take 1 tablet (1 g total) by mouth 4 (four) times daily before meals and nightly.    TEMAZEPAM (RESTORIL) 30 MG CAPSULE    Take 1 capsule (30 mg total) by mouth nightly as needed. Do not take at the same time as xanax       Physical Exam:  Vital Signs:  Ht 5' 8" (1.727 m)   Wt 79.4 kg (175 lb)   LMP 07/14/2017   BMI 26.61 kg/m²   Body mass index is 26.61 kg/m².    Physical Exam  No physical exam as today was a virtual visit    Labs: reviewed and pertinent noted above    Assessment/Plan:  Nyasia Middleton is a 44 y.o. female with Crohn's like disease of the J pouch complicated by perianal and anal vaginal fistula formation. The following issues were addresssed:    1. Crohn's disease of small intestine with fistula    2. Immunosuppression due to drug therapy    3. Gastroesophageal reflux disease, unspecified whether esophagitis present    4. Rectovaginal fistula    5. Ileostomy in place        1. Crohn's disease of the pouch:  She is doing well.  Continue Skyrizi.  Otherwise doing well.  Update labs in the near future.    2. Acid reflux:  Reflux well controlled.  Continue current management.    3. Immunosuppression due to drug therapy:  Vaccinations up-to-date.      4. Fatigue:  Check iron studies, B12, TSH.      5. Ileostomy: She did have a small ulcer adjacent to her ileostomy stoma.  Concerning for possible pyoderma.  Resolved with topical steroid.  We will continue to monitor for further developments.    # IBD specific health maintenance:  Colon cancer surveillance:  Not applicable    Annual:  - Eye exam:  Not applicable  - Skin exam (if on IMM/TNF):  Discussed and recommended she establishes care with a dermatologist  - reminded pt to use sunblock/hats/sunprotective clothing  - PAP (if immunosuppressed):  Hysterectomy - last PAP 5/2023 - yearly     DEXA:  Not applicable    Vitamin D:  Check with next labs. Currently not on vit d supplementation     Vaccines:    Influenza:  Receiving " today   Pneumovax: UTD   HAV:  Received 1st dose   HBV:  Immune   Tdap:  Discussed and recommended booster now at local pharmacy. Repeat every 10 years.    MMR: Immune   VZV:  Immune   HZV:  UTD   HPV:  Not applicable   Meningococcus:  Not applicable   COVID: Eligible for booster.     Follow up: Follow up in about 6 months (around 9/30/2025).    Thank you again for sending Nyasia Willard Kane to see Dr. Joselo Weber today at the [facility]. Please don't hesitate to contact Dr. Weber if there are any questions regarding this evaluation, or if you have any other patients with inflammatory bowel disease for whom you would like a consultation. You can reach Dr. Weber at [phone] or by email at [email or web address]    Patrick Weber MD  Department of Gastroenterology  Inflammatory Bowel Disease      The patient location is: [place]  The chief complaint leading to consultation is: Crohn's disease    Visit type: audio only    Face to Face time with patient: 7  15 minutes of total time spent on the encounter, which includes face to face time and non-face to face time preparing to see the patient (eg, review of tests), Obtaining and/or reviewing separately obtained history, Documenting clinical information in the electronic or other health record, Independently interpreting results (not separately reported) and communicating results to the patient/family/caregiver, or Care coordination (not separately reported).         Each patient to whom he or she provides medical services by telemedicine is:  (1) informed of the relationship between the physician and patient and the respective role of any other health care provider with respect to management of the patient; and (2) notified that he or she may decline to receive medical services by telemedicine and may withdraw from such care at any time.    Notes:

## 2025-04-03 ENCOUNTER — PATIENT MESSAGE (OUTPATIENT)
Dept: SURGERY | Facility: CLINIC | Age: 45
End: 2025-04-03
Payer: COMMERCIAL

## 2025-04-04 ENCOUNTER — RESULTS FOLLOW-UP (OUTPATIENT)
Dept: GASTROENTEROLOGY | Facility: HOSPITAL | Age: 45
End: 2025-04-04

## 2025-04-04 ENCOUNTER — LAB VISIT (OUTPATIENT)
Dept: LAB | Facility: HOSPITAL | Age: 45
End: 2025-04-04
Attending: INTERNAL MEDICINE
Payer: COMMERCIAL

## 2025-04-04 ENCOUNTER — PATIENT MESSAGE (OUTPATIENT)
Dept: WOUND CARE | Facility: CLINIC | Age: 45
End: 2025-04-04
Payer: COMMERCIAL

## 2025-04-04 DIAGNOSIS — K50.013 CROHN'S DISEASE OF SMALL INTESTINE WITH FISTULA: ICD-10-CM

## 2025-04-04 LAB
ABSOLUTE EOSINOPHIL (OHS): 0.42 K/UL
ABSOLUTE MONOCYTE (OHS): 0.61 K/UL (ref 0.3–1)
ABSOLUTE NEUTROPHIL COUNT (OHS): 4.87 K/UL (ref 1.8–7.7)
ALBUMIN SERPL BCP-MCNC: 4.3 G/DL (ref 3.5–5.2)
ALP SERPL-CCNC: 77 UNIT/L (ref 38–126)
ALT SERPL W/O P-5'-P-CCNC: 19 UNIT/L (ref 10–44)
ANION GAP (OHS): 8 MMOL/L (ref 8–16)
AST SERPL-CCNC: 24 UNIT/L (ref 15–46)
BASOPHILS # BLD AUTO: 0.09 K/UL
BASOPHILS NFR BLD AUTO: 1.3 %
BILIRUB SERPL-MCNC: 1.7 MG/DL (ref 0.1–1)
BUN SERPL-MCNC: 19 MG/DL (ref 7–17)
CALCIUM SERPL-MCNC: 9 MG/DL (ref 8.7–10.5)
CHLORIDE SERPL-SCNC: 107 MMOL/L (ref 95–110)
CO2 SERPL-SCNC: 24 MMOL/L (ref 23–29)
CREAT SERPL-MCNC: 0.6 MG/DL (ref 0.5–1.4)
ERYTHROCYTE [DISTWIDTH] IN BLOOD BY AUTOMATED COUNT: 12.9 % (ref 11.5–14.5)
FERRITIN SERPL-MCNC: 47 NG/ML (ref 20–300)
GFR SERPLBLD CREATININE-BSD FMLA CKD-EPI: >60 ML/MIN/1.73/M2
GLUCOSE SERPL-MCNC: 84 MG/DL (ref 70–110)
HCT VFR BLD AUTO: 39.6 % (ref 37–48.5)
HGB BLD-MCNC: 13.3 GM/DL (ref 12–16)
IMM GRANULOCYTES # BLD AUTO: 0.03 K/UL (ref 0–0.04)
IMM GRANULOCYTES NFR BLD AUTO: 0.4 % (ref 0–0.5)
LYMPHOCYTES # BLD AUTO: 1.09 K/UL (ref 1–4.8)
MCH RBC QN AUTO: 31.1 PG (ref 27–31)
MCHC RBC AUTO-ENTMCNC: 33.6 G/DL (ref 32–36)
MCV RBC AUTO: 93 FL (ref 82–98)
NUCLEATED RBC (/100WBC) (OHS): 0 /100 WBC
PLATELET # BLD AUTO: 253 K/UL (ref 150–450)
PMV BLD AUTO: 9.4 FL (ref 9.2–12.9)
POTASSIUM SERPL-SCNC: 4.8 MMOL/L (ref 3.5–5.1)
PROT SERPL-MCNC: 7.4 GM/DL (ref 6–8.4)
RBC # BLD AUTO: 4.28 M/UL (ref 4–5.4)
RELATIVE EOSINOPHIL (OHS): 5.9 %
RELATIVE LYMPHOCYTE (OHS): 15.3 % (ref 18–48)
RELATIVE MONOCYTE (OHS): 8.6 % (ref 4–15)
RELATIVE NEUTROPHIL (OHS): 68.5 % (ref 38–73)
SODIUM SERPL-SCNC: 139 MMOL/L (ref 136–145)
TSH SERPL-ACNC: 1.38 UIU/ML (ref 0.4–4)
VIT B12 SERPL-MCNC: 353 PG/ML (ref 210–950)
WBC # BLD AUTO: 7.11 K/UL (ref 3.9–12.7)

## 2025-04-04 PROCEDURE — 85025 COMPLETE CBC W/AUTO DIFF WBC: CPT | Mod: PN

## 2025-04-04 PROCEDURE — 36415 COLL VENOUS BLD VENIPUNCTURE: CPT | Mod: PN

## 2025-04-04 PROCEDURE — 84443 ASSAY THYROID STIM HORMONE: CPT | Mod: PN

## 2025-04-04 PROCEDURE — 84460 ALANINE AMINO (ALT) (SGPT): CPT | Mod: PN

## 2025-04-04 PROCEDURE — 82607 VITAMIN B-12: CPT | Mod: PN

## 2025-04-04 PROCEDURE — 84466 ASSAY OF TRANSFERRIN: CPT | Mod: PN

## 2025-04-04 PROCEDURE — 82728 ASSAY OF FERRITIN: CPT | Mod: PN

## 2025-04-08 LAB
IRON SATN MFR SERPL: 23 % (ref 20–50)
IRON SERPL-MCNC: 111 UG/DL (ref 30–160)
TIBC SERPL-MCNC: 488 UG/DL (ref 250–450)
TRANSFERRIN SERPL-MCNC: 330 MG/DL (ref 200–375)

## 2025-04-16 DIAGNOSIS — B00.1 COLD SORE: Primary | ICD-10-CM

## 2025-04-16 RX ORDER — VALACYCLOVIR HYDROCHLORIDE 1 G/1
2000 TABLET, FILM COATED ORAL 2 TIMES DAILY
Qty: 4 TABLET | Refills: 0 | Status: SHIPPED | OUTPATIENT
Start: 2025-04-16 | End: 2025-04-17

## 2025-04-22 ENCOUNTER — TELEPHONE (OUTPATIENT)
Dept: GASTROENTEROLOGY | Facility: CLINIC | Age: 45
End: 2025-04-22
Payer: COMMERCIAL

## 2025-04-22 ENCOUNTER — OFFICE VISIT (OUTPATIENT)
Dept: WOUND CARE | Facility: CLINIC | Age: 45
End: 2025-04-22
Payer: COMMERCIAL

## 2025-04-22 DIAGNOSIS — Z43.2 ATTENTION TO ILEOSTOMY: Primary | ICD-10-CM

## 2025-04-22 PROCEDURE — 99214 OFFICE O/P EST MOD 30 MIN: CPT | Mod: S$GLB,,, | Performed by: CLINICAL NURSE SPECIALIST

## 2025-04-22 PROCEDURE — 1160F RVW MEDS BY RX/DR IN RCRD: CPT | Mod: CPTII,S$GLB,, | Performed by: CLINICAL NURSE SPECIALIST

## 2025-04-22 PROCEDURE — 99999 PR PBB SHADOW E&M-EST. PATIENT-LVL III: CPT | Mod: PBBFAC,,, | Performed by: CLINICAL NURSE SPECIALIST

## 2025-04-22 PROCEDURE — 1159F MED LIST DOCD IN RCRD: CPT | Mod: CPTII,S$GLB,, | Performed by: CLINICAL NURSE SPECIALIST

## 2025-04-22 NOTE — PROGRESS NOTES
"Subjective     Patient ID: Nyasia Middleton is a 44 y.o. female.    Chief Complaint: Ileostomy    Nyasia is known pt to this dept. She has ILEOSTOMY  ( 3 yrs ) and has had ups and downs with it.  Was doing well then ulceration occurred and that is now healed but now has leaks after 1 day and this is causing her stress and concern.       Review of Systems   Constitutional: Negative.    Gastrointestinal:  Negative for abdominal distention and abdominal pain.   Genitourinary:  Negative for dysuria.   Musculoskeletal: Negative.    Integumentary:  Negative for rash and wound.          Objective     Physical Exam  Constitutional:       Appearance: Normal appearance.   Pulmonary:      Effort: Pulmonary effort is normal. No respiratory distress.   Abdominal:      General: Bowel sounds are normal.      Palpations: Abdomen is soft.      Tenderness: There is no abdominal tenderness.   Skin:     Findings: No rash.   Neurological:      Mental Status: She is alert.         4/22/25  she wears convex new image that she cuts and her cut seems to be close to 7/8"  I believe this is her problem as stoma is actually just over 1 " , so I showed her how to cut beyond the line and try placing the ring 8815 like a necklace to be sure to hug stoma and then her wafer   This should eliminate her leaks   Her previous ulceration is HEALED   She tells me the REVEL works wonderful and she really likes it for lubrication and deod too        Assessment and Plan     1. Attention to ileostomy        Change her cutting size and use of ring placement   FU if not helping  can see her on 5/20 if needed   I spent a total of 30 minutes on the day of the visit.  This includes face to face time and non-face to face time preparing to see the patient (eg, review of tests), obtaining and/or reviewing separately obtained history, documenting clinical information in the electronic or other health record, independently interpreting results and communicating results " to the patient/family/caregiver, or care coordinator.           No follow-ups on file.

## 2025-05-19 ENCOUNTER — TELEPHONE (OUTPATIENT)
Dept: SURGERY | Facility: CLINIC | Age: 45
End: 2025-05-19
Payer: COMMERCIAL

## 2025-05-19 NOTE — TELEPHONE ENCOUNTER
Spoke with Ms. Middleton, she informed she is unable to make appointment tomorrow with [facility], she asked that JARROD Iniguez reach out to her regarding rescheduling appointment. Appointment was cancelled and I will send a message to Nurse Geetha.

## 2025-05-26 ENCOUNTER — OFFICE VISIT (OUTPATIENT)
Dept: FAMILY MEDICINE | Facility: CLINIC | Age: 45
End: 2025-05-26
Payer: COMMERCIAL

## 2025-05-26 DIAGNOSIS — Z90.711 HISTORY OF PARTIAL HYSTERECTOMY: Primary | ICD-10-CM

## 2025-05-26 DIAGNOSIS — F51.01 PRIMARY INSOMNIA: ICD-10-CM

## 2025-05-26 DIAGNOSIS — R23.2 HOT FLASHES: ICD-10-CM

## 2025-05-26 DIAGNOSIS — R45.86 MOOD SWINGS: ICD-10-CM

## 2025-05-26 DIAGNOSIS — F41.1 GAD (GENERALIZED ANXIETY DISORDER): ICD-10-CM

## 2025-05-26 PROCEDURE — 1160F RVW MEDS BY RX/DR IN RCRD: CPT | Mod: CPTII,95,, | Performed by: STUDENT IN AN ORGANIZED HEALTH CARE EDUCATION/TRAINING PROGRAM

## 2025-05-26 PROCEDURE — 98006 SYNCH AUDIO-VIDEO EST MOD 30: CPT | Mod: 95,,, | Performed by: STUDENT IN AN ORGANIZED HEALTH CARE EDUCATION/TRAINING PROGRAM

## 2025-05-26 PROCEDURE — 1159F MED LIST DOCD IN RCRD: CPT | Mod: CPTII,95,, | Performed by: STUDENT IN AN ORGANIZED HEALTH CARE EDUCATION/TRAINING PROGRAM

## 2025-05-26 RX ORDER — TEMAZEPAM 30 MG/1
30 CAPSULE ORAL NIGHTLY PRN
Qty: 90 CAPSULE | Refills: 1 | Status: SHIPPED | OUTPATIENT
Start: 2025-05-26

## 2025-05-26 RX ORDER — ALPRAZOLAM 0.5 MG/1
0.5 TABLET ORAL 2 TIMES DAILY PRN
Qty: 60 TABLET | Refills: 1 | Status: SHIPPED | OUTPATIENT
Start: 2025-05-26

## 2025-05-26 RX ORDER — ESTRADIOL 0.5 MG/1
0.5 TABLET ORAL DAILY
Qty: 30 TABLET | Refills: 11 | Status: SHIPPED | OUTPATIENT
Start: 2025-05-26 | End: 2026-05-26

## 2025-05-26 RX ORDER — FLUOXETINE HYDROCHLORIDE 40 MG/1
40 CAPSULE ORAL NIGHTLY
Qty: 90 CAPSULE | Refills: 3 | Status: SHIPPED | OUTPATIENT
Start: 2025-05-26

## 2025-05-26 NOTE — PROGRESS NOTES
Primary Care Virtual Visit    The patient location is: [place], [place]  The chief complaint leading to consultation is:  follow up medications  Visit type: Virtual visit with synchronous audio and video  Total time spent with patient:  18 minutes  Each patient to whom he or she provides medical services by telemedicine is:  (1) informed of the relationship between the physician and patient and the respective role of any other health care provider with respect to management of the patient; and (2) notified that he or she may decline to receive medical services by telemedicine and may withdraw from such care at any time.     Patient ID: Nyasia Middleton is a 44 y.o. female.       HPI  Patient here for follow up of medications.  She needs refills of medications. She is no longer taking buspar as she felt that it made her angry. She has been experiencing mood swings as well as hot flashes. She has a history of partial hysterectomy.     Review of Systems  Review of Systems   Constitutional:  Negative for fever.   HENT:  Negative for ear pain, hearing loss and sinus pain.    Eyes:  Negative for discharge.   Respiratory:  Negative for cough, shortness of breath and wheezing.    Cardiovascular:  Negative for chest pain, palpitations and leg swelling.   Gastrointestinal:  Negative for blood in stool, constipation, diarrhea, nausea and vomiting.   Genitourinary:  Negative for dysuria, hematuria and urgency.   Musculoskeletal:  Negative for myalgias and neck pain.   Skin:  Negative for rash.   Neurological:  Negative for weakness and headaches.   Endo/Heme/Allergies:  Negative for polydipsia.   Psychiatric/Behavioral:  Negative for depression.    All other systems reviewed and are negative.      Currently Medications  Medications Ordered Prior to Encounter[1]    Physical  Exam  There were no vitals filed for this visit.   Physical Exam  Constitutional:       Appearance: Normal appearance.   HENT:      Head: Normocephalic and  atraumatic.   Eyes:      Extraocular Movements: Extraocular movements intact.   Skin:     Coloration: Skin is not pale.   Neurological:      Mental Status: She is alert and oriented to person, place, and time.   Psychiatric:         Mood and Affect: Mood normal.         Labs:    Complete Blood Count  Lab Results   Component Value Date    RBC 4.28 04/04/2025    HGB 13.3 04/04/2025    HCT 39.6 04/04/2025    MCV 93 04/04/2025    MCH 31.1 (H) 04/04/2025    MCHC 33.6 04/04/2025    RDW 12.9 04/04/2025     04/04/2025    MPV 9.4 04/04/2025    GRAN 4.3 09/30/2024    GRAN 64.8 09/30/2024    LYMPH 15.3 (L) 04/04/2025    LYMPH 1.09 04/04/2025    MONO 8.6 04/04/2025    MONO 0.61 04/04/2025    EOS 5.9 04/04/2025    EOS 0.42 04/04/2025    BASO 0.09 09/30/2024    EOSINOPHIL 8.4 (H) 09/30/2024    BASOPHIL 1.3 04/04/2025    BASOPHIL 0.09 04/04/2025    DIFFMETHOD Automated 09/30/2024       Comprehensive Metabolic Panel  Lab Results   Component Value Date    GLU 84 04/04/2025    BUN 19 (H) 04/04/2025    CREATININE 0.6 04/04/2025     04/04/2025    K 4.8 04/04/2025     04/04/2025    PROT 7.4 04/04/2025    ALBUMIN 4.3 04/04/2025    BILITOT 1.7 (H) 04/04/2025    AST 24 04/04/2025    ALKPHOS 77 04/04/2025    CO2 24 04/04/2025    ALT 19 04/04/2025    ANIONGAP 8 04/04/2025       TSH  Lab Results   Component Value Date    TSH 1.380 04/04/2025       Imaging:  Mammo Digital Screening Bilat w/ Abe  Narrative: Facility:  [facility]  [address] [id number]  [phone]    Name: Nyasia Middleton    MRN: [id number]    Result:  Mammo Digital Screening Bilat w/ Abe    History:  Patient is 43 y.o. and is seen for a screening mammogram.    Films Compared:  Prior images were available and compared.     Findings:  This procedure was performed using tomosynthesis.   Computer-aided detection was utilized in the interpretation of this   examination.    There is no evidence of suspicious  masses, microcalcifications or   architectural distortion.    Breast Density:  There are scattered areas of fibroglandular density.   Impression:    No mammographic evidence of malignancy.    BI-RADS Category 1: Negative    Recommendation:  Routine screening mammogram in 1 year is recommended.    Your estimated lifetime risk of breast cancer (to age 85) based on   Tyrer-Cuzick risk assessment model is 7.92%.  According to the American   Can[employer] Society, patients with a lifetime breast cancer risk of 20% or   higher might benefit from supplemental screening tests, such as screening   breast MRI.    Reginald Wetzel MD      Assessment/Plan:    1. History of partial hysterectomy    2. MARION (generalized anxiety disorder)  -     ALPRAZolam (XANAX) 0.5 MG tablet; Take 1 tablet (0.5 mg total) by mouth 2 (two) times daily as needed for Anxiety (do not take with restoril).  Dispense: 60 tablet; Refill: 1  -     FLUoxetine 40 MG capsule; Take 1 capsule (40 mg total) by mouth every evening.  Dispense: 90 capsule; Refill: 3    3. Primary insomnia  -     temazepam (RESTORIL) 30 mg capsule; Take 1 capsule (30 mg total) by mouth nightly as needed. Do not take at the same time as xanax  Dispense: 90 capsule; Refill: 1    4. Mood swings  -     estradioL (ESTRACE) 0.5 MG tablet; Take 1 tablet (0.5 mg total) by mouth once daily.  Dispense: 30 tablet; Refill: 11    5. Hot flashes  -     estradioL (ESTRACE) 0.5 MG tablet; Take 1 tablet (0.5 mg total) by mouth once daily.  Dispense: 30 tablet; Refill: 11          Discussed how to stay healthy including: diet, exercise, refraining from smoking and discussed screening exams / tests needed for age, sex and family Hx.    RTC 6 mo    Kofi Winkler MD    Answers submitted by the patient for this visit:  Review of Systems Questionnaire (Submitted on 5/26/2025)  activity change: Yes  unexpected weight change: No  rhinorrhea: No  trouble swallowing: No  visual disturbance: No  chest tightness:  No  polyuria: No  difficulty urinating: No  menstrual problem: No  joint swelling: No  arthralgias: No  confusion: No  dysphoric mood: No         [1]   Current Outpatient Medications on File Prior to Visit   Medication Sig Dispense Refill    ondansetron (ZOFRAN-ODT) 8 MG TbDL DISSOLVE 1 TABLET IN MOUTH EVERY 12 HOURS AS NEEDED FOR NAUSEA AND VOMITING 20 tablet 2    pantoprazole (PROTONIX) 40 MG tablet TAKE 1 TABLET BY MOUTH TWO TIMES A  tablet 2    risankizumab-rzaa (SKYRIZI) 360 mg/2.4 mL (150 mg/mL) Injt INJECT 360 MG UNDER THE SKIN EVERY 8 WEEKS 2.4 mL 0    sucralfate (CARAFATE) 1 gram tablet Take 1 tablet (1 g total) by mouth 4 (four) times daily before meals and nightly. 360 tablet 3    [DISCONTINUED] ALPRAZolam (XANAX) 0.5 MG tablet Take 1 tablet (0.5 mg total) by mouth 2 (two) times daily as needed for Anxiety (do not take with restoril). 60 tablet 1    [DISCONTINUED] busPIRone (BUSPAR) 10 MG tablet Take 1 tablet (10 mg total) by mouth 2 (two) times daily as needed (anxiety). (Patient not taking: Reported on 3/31/2025) 180 tablet 1    [DISCONTINUED] busPIRone (BUSPAR) 7.5 MG tablet Take 7.5 mg by mouth 2 (two) times daily.      [DISCONTINUED] FLUoxetine 40 MG capsule Take 1 capsule (40 mg total) by mouth every evening. 90 capsule 3    [DISCONTINUED] temazepam (RESTORIL) 30 mg capsule Take 1 capsule (30 mg total) by mouth nightly as needed. Do not take at the same time as xanax 90 capsule 1    [DISCONTINUED] valACYclovir (VALTREX) 1000 MG tablet Take 2 tablets (2,000 mg total) by mouth 2 (two) times daily. for 1 day 4 tablet 0     No current facility-administered medications on file prior to visit.

## 2025-06-06 DIAGNOSIS — K50.013 CROHN'S DISEASE OF SMALL INTESTINE WITH FISTULA: ICD-10-CM

## 2025-06-06 RX ORDER — RISANKIZUMAB-RZAA 360 MG/2.4
WEARABLE INJECTOR SUBCUTANEOUS
Qty: 2.4 ML | Refills: 0 | Status: SHIPPED | OUTPATIENT
Start: 2025-06-06

## 2025-07-08 ENCOUNTER — TELEPHONE (OUTPATIENT)
Dept: GASTROENTEROLOGY | Facility: CLINIC | Age: 45
End: 2025-07-08
Payer: COMMERCIAL

## 2025-07-08 NOTE — TELEPHONE ENCOUNTER
PA APPROVED ([employer] 360MG/2.4ML)   (Key: [id number])   Authorized: 7/8/2025- 7/8/2026        Awaiting approval letter via fax.

## 2025-08-12 DIAGNOSIS — F41.1 GAD (GENERALIZED ANXIETY DISORDER): ICD-10-CM

## 2025-08-13 RX ORDER — ALPRAZOLAM 0.5 MG/1
0.5 TABLET ORAL 2 TIMES DAILY PRN
Qty: 60 TABLET | Refills: 1 | Status: SHIPPED | OUTPATIENT
Start: 2025-08-13

## 2025-08-27 ENCOUNTER — PATIENT MESSAGE (OUTPATIENT)
Dept: SURGERY | Facility: CLINIC | Age: 45
End: 2025-08-27
Payer: COMMERCIAL

## (undated) DEVICE — SYR IRRIGATION BULB STER 60ML

## (undated) DEVICE — COVER MAYO STND XL 30X57IN

## (undated) DEVICE — ELECTRODE REM PLYHSV RETURN 9

## (undated) DEVICE — SEE MEDLINE ITEM 156902

## (undated) DEVICE — NDL 22GA X1 1/2 REG BEVEL

## (undated) DEVICE — TRAY MINOR GEN SURG

## (undated) DEVICE — SEE MEDLINE ITEM 146417

## (undated) DEVICE — CHLORAPREP TNT2%SOL10.5ML TEAL

## (undated) DEVICE — TIP YANKAUERS BULB NO VENT

## (undated) DEVICE — SEALER LIGASURE LAP 37CM 5MM

## (undated) DEVICE — SYR ONLY LUER LOCK 20CC

## (undated) DEVICE — SUT 3-0 VICRYL SH CR/8 18

## (undated) DEVICE — LUBRICANT SURGILUBE 2 OZ

## (undated) DEVICE — DRAPE ABDOMINAL TIBURON 14X11

## (undated) DEVICE — CLOSURE SKIN STERI STRIP 1/2X4

## (undated) DEVICE — DRESSING LEUKOPLAST FLEX 1X3IN

## (undated) DEVICE — TRAY SKIN SCRUB WET PREMIUM

## (undated) DEVICE — TROCAR ENDOPATH XCEL 5X75MM

## (undated) DEVICE — NDL BOX COUNTER

## (undated) DEVICE — APPLICATOR CHLORAPREP ORN 26ML

## (undated) DEVICE — TRAY CATH FOL SIL URIMTR 16FR

## (undated) DEVICE — DRAPE INCISE IOBAN 2 23X17IN

## (undated) DEVICE — KIT ANTIFOG W/SPONG & FLUID

## (undated) DEVICE — BOVIE SUCTION

## (undated) DEVICE — DRAPE CORETEMP FLD WRM 56X62IN

## (undated) DEVICE — KIT VUETIP TROCAR SWAB

## (undated) DEVICE — TOWEL OR DISP STRL BLUE 4/PK

## (undated) DEVICE — TROCAR ENDOPATH XCEL 5MM 7.5CM

## (undated) DEVICE — PAD PINK TRENDELENBURG POS XL

## (undated) DEVICE — SUT COATED VICRYL 4/0 27IN

## (undated) DEVICE — BOWL STERILE LARGE 32OZ

## (undated) DEVICE — DRESSING ABSRBNT ISLAND 3.6X8

## (undated) DEVICE — TAPE SILK 3IN

## (undated) DEVICE — NDL HYPO A BEVEL 22X1 1/2

## (undated) DEVICE — SEE MEDLINE ITEM 157148

## (undated) DEVICE — TUBING HF INSUFFLATION W/ FLTR

## (undated) DEVICE — COVER LIGHT HANDLE 80/CA

## (undated) DEVICE — TRAY MINOR GEN SURG OMC

## (undated) DEVICE — PANTIES FEMININE NAPKIN LG/XLG

## (undated) DEVICE — ADHESIVE DERMABOND ADVANCED

## (undated) DEVICE — TUBING SUCTION STERILE

## (undated) DEVICE — MARKER SKIN STND TIP BLUE BARR

## (undated) DEVICE — DRAPE LAP T SHT W/ INSTR PAD